# Patient Record
Sex: FEMALE | Race: WHITE | Employment: OTHER | URBAN - METROPOLITAN AREA
[De-identification: names, ages, dates, MRNs, and addresses within clinical notes are randomized per-mention and may not be internally consistent; named-entity substitution may affect disease eponyms.]

---

## 2017-02-20 PROBLEM — E11.621 TYPE 2 DIABETES MELLITUS WITH FOOT ULCER, WITHOUT LONG-TERM CURRENT USE OF INSULIN (HCC): Status: ACTIVE | Noted: 2017-02-20

## 2017-02-20 PROBLEM — L97.509 TYPE 2 DIABETES MELLITUS WITH FOOT ULCER, WITHOUT LONG-TERM CURRENT USE OF INSULIN (HCC): Status: ACTIVE | Noted: 2017-02-20

## 2017-03-24 PROBLEM — E11.610 DIABETIC CHARCOT FOOT (HCC): Status: ACTIVE | Noted: 2017-03-24

## 2017-03-24 PROBLEM — L97.509 TYPE 2 DIABETES MELLITUS WITH FOOT ULCER, WITHOUT LONG-TERM CURRENT USE OF INSULIN (HCC): Status: RESOLVED | Noted: 2017-02-20 | Resolved: 2017-03-24

## 2017-03-24 PROBLEM — E11.621 TYPE 2 DIABETES MELLITUS WITH FOOT ULCER, WITHOUT LONG-TERM CURRENT USE OF INSULIN (HCC): Status: RESOLVED | Noted: 2017-02-20 | Resolved: 2017-03-24

## 2017-03-24 PROBLEM — E11.42 DM TYPE 2 WITH DIABETIC PERIPHERAL NEUROPATHY (HCC): Status: ACTIVE | Noted: 2017-03-24

## 2017-08-17 ENCOUNTER — HOME HEALTH ADMISSION (OUTPATIENT)
Dept: HOME HEALTH SERVICES | Facility: HOME HEALTH | Age: 82
End: 2017-08-17
Payer: MEDICARE

## 2017-08-17 PROBLEM — L03.119 CELLULITIS OF FOOT: Status: ACTIVE | Noted: 2017-08-17

## 2017-08-17 PROBLEM — L97.502 DIABETIC ULCER OF TOE ASSOCIATED WITH TYPE 2 DIABETES MELLITUS, WITH FAT LAYER EXPOSED (HCC): Status: ACTIVE | Noted: 2017-08-17

## 2017-08-17 PROBLEM — E11.621 DIABETIC ULCER OF TOE ASSOCIATED WITH TYPE 2 DIABETES MELLITUS, WITH FAT LAYER EXPOSED (HCC): Status: ACTIVE | Noted: 2017-08-17

## 2017-08-17 PROBLEM — L97.501 DIABETIC ULCER OF TOE ASSOCIATED WITH TYPE 2 DIABETES MELLITUS, LIMITED TO BREAKDOWN OF SKIN (HCC): Status: ACTIVE | Noted: 2017-08-17

## 2017-08-17 PROBLEM — E11.621 DIABETIC ULCER OF TOE ASSOCIATED WITH TYPE 2 DIABETES MELLITUS, LIMITED TO BREAKDOWN OF SKIN (HCC): Status: ACTIVE | Noted: 2017-08-17

## 2017-08-18 ENCOUNTER — HOME CARE VISIT (OUTPATIENT)
Dept: SCHEDULING | Facility: HOME HEALTH | Age: 82
End: 2017-08-18
Payer: MEDICARE

## 2017-08-18 VITALS
SYSTOLIC BLOOD PRESSURE: 130 MMHG | TEMPERATURE: 98 F | RESPIRATION RATE: 13 BRPM | DIASTOLIC BLOOD PRESSURE: 70 MMHG | HEART RATE: 71 BPM | OXYGEN SATURATION: 97 %

## 2017-08-18 PROCEDURE — G0299 HHS/HOSPICE OF RN EA 15 MIN: HCPCS

## 2017-08-18 PROCEDURE — 400013 HH SOC

## 2017-08-21 ENCOUNTER — HOME CARE VISIT (OUTPATIENT)
Dept: SCHEDULING | Facility: HOME HEALTH | Age: 82
End: 2017-08-21
Payer: MEDICARE

## 2017-08-21 PROCEDURE — G0299 HHS/HOSPICE OF RN EA 15 MIN: HCPCS

## 2017-08-23 ENCOUNTER — HOME CARE VISIT (OUTPATIENT)
Dept: SCHEDULING | Facility: HOME HEALTH | Age: 82
End: 2017-08-23
Payer: MEDICARE

## 2017-08-23 VITALS
RESPIRATION RATE: 16 BRPM | SYSTOLIC BLOOD PRESSURE: 152 MMHG | TEMPERATURE: 99.1 F | DIASTOLIC BLOOD PRESSURE: 52 MMHG | OXYGEN SATURATION: 98 % | HEART RATE: 107 BPM

## 2017-08-23 VITALS
RESPIRATION RATE: 20 BRPM | OXYGEN SATURATION: 99 % | SYSTOLIC BLOOD PRESSURE: 138 MMHG | TEMPERATURE: 98.2 F | HEART RATE: 100 BPM | DIASTOLIC BLOOD PRESSURE: 70 MMHG

## 2017-08-23 PROCEDURE — G0299 HHS/HOSPICE OF RN EA 15 MIN: HCPCS

## 2017-08-23 PROCEDURE — A9270 NON-COVERED ITEM OR SERVICE: HCPCS

## 2017-08-23 PROCEDURE — A6266 IMPREG GAUZE NO H20/SAL/YARD: HCPCS

## 2017-08-25 ENCOUNTER — HOME CARE VISIT (OUTPATIENT)
Dept: SCHEDULING | Facility: HOME HEALTH | Age: 82
End: 2017-08-25
Payer: MEDICARE

## 2017-08-25 PROCEDURE — G0299 HHS/HOSPICE OF RN EA 15 MIN: HCPCS

## 2017-08-27 PROBLEM — E11.621 DIABETIC ULCER OF TOE ASSOCIATED WITH TYPE 2 DIABETES MELLITUS, LIMITED TO BREAKDOWN OF SKIN (HCC): Status: RESOLVED | Noted: 2017-08-17 | Resolved: 2017-08-27

## 2017-08-27 PROBLEM — L97.501 DIABETIC ULCER OF TOE ASSOCIATED WITH TYPE 2 DIABETES MELLITUS, LIMITED TO BREAKDOWN OF SKIN (HCC): Status: RESOLVED | Noted: 2017-08-17 | Resolved: 2017-08-27

## 2017-08-28 ENCOUNTER — HOME CARE VISIT (OUTPATIENT)
Dept: SCHEDULING | Facility: HOME HEALTH | Age: 82
End: 2017-08-28
Payer: MEDICARE

## 2017-08-28 VITALS
DIASTOLIC BLOOD PRESSURE: 60 MMHG | SYSTOLIC BLOOD PRESSURE: 146 MMHG | TEMPERATURE: 98.6 F | OXYGEN SATURATION: 98 % | RESPIRATION RATE: 16 BRPM | HEART RATE: 96 BPM

## 2017-08-28 PROCEDURE — G0299 HHS/HOSPICE OF RN EA 15 MIN: HCPCS

## 2017-08-30 ENCOUNTER — HOME CARE VISIT (OUTPATIENT)
Dept: SCHEDULING | Facility: HOME HEALTH | Age: 82
End: 2017-08-30
Payer: MEDICARE

## 2017-08-30 ENCOUNTER — HOSPITAL ENCOUNTER (OUTPATIENT)
Dept: GENERAL RADIOLOGY | Age: 82
Discharge: HOME | End: 2017-08-30
Attending: PODIATRIST
Payer: MEDICARE

## 2017-08-30 ENCOUNTER — HOSPITAL ENCOUNTER (OUTPATIENT)
Dept: WOUND CARE | Age: 82
Discharge: HOME | End: 2017-08-30
Attending: PODIATRIST
Payer: MEDICARE

## 2017-08-30 DIAGNOSIS — M25.471 RIGHT ANKLE SWELLING: ICD-10-CM
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
## 2017-08-30 PROCEDURE — 11042 DBRDMT SUBQ TIS 1ST 20SQCM/<: CPT

## 2017-08-30 PROCEDURE — 99215 OFFICE O/P EST HI 40 MIN: CPT

## 2017-08-30 PROCEDURE — 73630 X-RAY EXAM OF FOOT: CPT

## 2017-08-30 PROCEDURE — 11043 DBRDMT MUSC&/FSCA 1ST 20/<: CPT

## 2017-08-31 ENCOUNTER — HOME CARE VISIT (OUTPATIENT)
Dept: SCHEDULING | Facility: HOME HEALTH | Age: 82
End: 2017-08-31
Payer: MEDICARE

## 2017-08-31 VITALS
RESPIRATION RATE: 16 BRPM | TEMPERATURE: 98.6 F | OXYGEN SATURATION: 98 % | DIASTOLIC BLOOD PRESSURE: 58 MMHG | SYSTOLIC BLOOD PRESSURE: 144 MMHG | HEART RATE: 93 BPM

## 2017-08-31 PROCEDURE — G0299 HHS/HOSPICE OF RN EA 15 MIN: HCPCS

## 2017-09-01 ENCOUNTER — HOME CARE VISIT (OUTPATIENT)
Dept: SCHEDULING | Facility: HOME HEALTH | Age: 82
End: 2017-09-01
Payer: MEDICARE

## 2017-09-01 VITALS
OXYGEN SATURATION: 98 % | HEART RATE: 95 BPM | TEMPERATURE: 98.6 F | RESPIRATION RATE: 16 BRPM | DIASTOLIC BLOOD PRESSURE: 58 MMHG | SYSTOLIC BLOOD PRESSURE: 134 MMHG

## 2017-09-01 PROCEDURE — A6445 CONFORM BAND S W <3"/YD: HCPCS

## 2017-09-01 PROCEDURE — A6252 ABSORPT DRG >16 <=48 W/O BDR: HCPCS

## 2017-09-01 PROCEDURE — G0299 HHS/HOSPICE OF RN EA 15 MIN: HCPCS

## 2017-09-02 ENCOUNTER — HOME CARE VISIT (OUTPATIENT)
Dept: SCHEDULING | Facility: HOME HEALTH | Age: 82
End: 2017-09-02
Payer: MEDICARE

## 2017-09-02 VITALS
TEMPERATURE: 97 F | RESPIRATION RATE: 13 BRPM | HEART RATE: 78 BPM | SYSTOLIC BLOOD PRESSURE: 130 MMHG | DIASTOLIC BLOOD PRESSURE: 70 MMHG | OXYGEN SATURATION: 98 %

## 2017-09-02 PROCEDURE — G0299 HHS/HOSPICE OF RN EA 15 MIN: HCPCS

## 2017-09-03 ENCOUNTER — HOME CARE VISIT (OUTPATIENT)
Dept: SCHEDULING | Facility: HOME HEALTH | Age: 82
End: 2017-09-03
Payer: MEDICARE

## 2017-09-03 VITALS
HEART RATE: 78 BPM | RESPIRATION RATE: 15 BRPM | OXYGEN SATURATION: 98 % | SYSTOLIC BLOOD PRESSURE: 120 MMHG | TEMPERATURE: 98.1 F | DIASTOLIC BLOOD PRESSURE: 60 MMHG

## 2017-09-03 PROCEDURE — G0299 HHS/HOSPICE OF RN EA 15 MIN: HCPCS

## 2017-09-04 ENCOUNTER — HOME CARE VISIT (OUTPATIENT)
Dept: SCHEDULING | Facility: HOME HEALTH | Age: 82
End: 2017-09-04
Payer: MEDICARE

## 2017-09-04 VITALS
SYSTOLIC BLOOD PRESSURE: 140 MMHG | HEART RATE: 81 BPM | TEMPERATURE: 98 F | DIASTOLIC BLOOD PRESSURE: 70 MMHG | RESPIRATION RATE: 20 BRPM | OXYGEN SATURATION: 99 %

## 2017-09-04 PROCEDURE — G0299 HHS/HOSPICE OF RN EA 15 MIN: HCPCS

## 2017-09-05 ENCOUNTER — HOME CARE VISIT (OUTPATIENT)
Dept: SCHEDULING | Facility: HOME HEALTH | Age: 82
End: 2017-09-05
Payer: MEDICARE

## 2017-09-05 VITALS
DIASTOLIC BLOOD PRESSURE: 60 MMHG | OXYGEN SATURATION: 98 % | RESPIRATION RATE: 16 BRPM | HEART RATE: 96 BPM | TEMPERATURE: 99.3 F | SYSTOLIC BLOOD PRESSURE: 146 MMHG

## 2017-09-05 PROCEDURE — G0299 HHS/HOSPICE OF RN EA 15 MIN: HCPCS

## 2017-09-06 ENCOUNTER — HOSPITAL ENCOUNTER (OUTPATIENT)
Dept: GENERAL RADIOLOGY | Age: 82
Discharge: HOME | End: 2017-09-06
Payer: MEDICARE

## 2017-09-06 ENCOUNTER — HOSPITAL ENCOUNTER (OUTPATIENT)
Dept: WOUND CARE | Age: 82
Discharge: HOME | End: 2017-09-06
Attending: PODIATRIST
Payer: MEDICARE

## 2017-09-06 DIAGNOSIS — L97.509 DIABETIC FOOT ULCER (HCC): ICD-10-CM

## 2017-09-06 DIAGNOSIS — E11.621 DIABETIC FOOT ULCER (HCC): ICD-10-CM

## 2017-09-06 PROCEDURE — 99214 OFFICE O/P EST MOD 30 MIN: CPT

## 2017-09-06 PROCEDURE — 77030012935 HC DRSG AQUACEL BMS -B

## 2017-09-06 PROCEDURE — 97597 DBRDMT OPN WND 1ST 20 CM/<: CPT

## 2017-09-08 ENCOUNTER — HOME CARE VISIT (OUTPATIENT)
Dept: SCHEDULING | Facility: HOME HEALTH | Age: 82
End: 2017-09-08
Payer: MEDICARE

## 2017-09-08 VITALS
TEMPERATURE: 98.5 F | RESPIRATION RATE: 18 BRPM | OXYGEN SATURATION: 97 % | DIASTOLIC BLOOD PRESSURE: 58 MMHG | HEART RATE: 97 BPM | SYSTOLIC BLOOD PRESSURE: 126 MMHG

## 2017-09-08 PROCEDURE — G0299 HHS/HOSPICE OF RN EA 15 MIN: HCPCS

## 2017-09-11 ENCOUNTER — HOME CARE VISIT (OUTPATIENT)
Dept: SCHEDULING | Facility: HOME HEALTH | Age: 82
End: 2017-09-11
Payer: MEDICARE

## 2017-09-11 VITALS
RESPIRATION RATE: 16 BRPM | TEMPERATURE: 98.8 F | DIASTOLIC BLOOD PRESSURE: 66 MMHG | OXYGEN SATURATION: 98 % | HEART RATE: 94 BPM | SYSTOLIC BLOOD PRESSURE: 150 MMHG

## 2017-09-11 PROCEDURE — G0299 HHS/HOSPICE OF RN EA 15 MIN: HCPCS

## 2017-09-12 ENCOUNTER — APPOINTMENT (OUTPATIENT)
Dept: GENERAL RADIOLOGY | Age: 82
DRG: 872 | End: 2017-09-12
Attending: EMERGENCY MEDICINE
Payer: MEDICARE

## 2017-09-12 ENCOUNTER — HOSPITAL ENCOUNTER (INPATIENT)
Age: 82
LOS: 3 days | Discharge: NURSING, REHAB OR LONG-TERM CARE | DRG: 872 | End: 2017-09-15
Attending: EMERGENCY MEDICINE | Admitting: INTERNAL MEDICINE
Payer: MEDICARE

## 2017-09-12 DIAGNOSIS — M54.50 LOW BACK PAIN RADIATING TO BOTH LEGS: ICD-10-CM

## 2017-09-12 DIAGNOSIS — M79.604 LOW BACK PAIN RADIATING TO BOTH LEGS: ICD-10-CM

## 2017-09-12 DIAGNOSIS — M86.172 OTHER ACUTE OSTEOMYELITIS OF LEFT FOOT (HCC): Primary | ICD-10-CM

## 2017-09-12 DIAGNOSIS — M15.9 GENERALIZED OA: ICD-10-CM

## 2017-09-12 DIAGNOSIS — M79.605 LOW BACK PAIN RADIATING TO BOTH LEGS: ICD-10-CM

## 2017-09-12 PROBLEM — N17.9 AKI (ACUTE KIDNEY INJURY) (HCC): Status: ACTIVE | Noted: 2017-09-12

## 2017-09-12 PROBLEM — A41.9 SEPSIS (HCC): Status: ACTIVE | Noted: 2017-09-12

## 2017-09-12 PROBLEM — E87.1 HYPONATREMIA: Status: ACTIVE | Noted: 2017-09-12

## 2017-09-12 PROBLEM — N18.30 STAGE 3 CHRONIC KIDNEY DISEASE (HCC): Status: ACTIVE | Noted: 2017-09-12

## 2017-09-12 LAB
ANION GAP SERPL CALC-SCNC: 8 MMOL/L (ref 7–16)
BASOPHILS # BLD: 0 K/UL (ref 0–0.2)
BASOPHILS NFR BLD: 0 % (ref 0–2)
BUN SERPL-MCNC: 29 MG/DL (ref 8–23)
CALCIUM SERPL-MCNC: 8.6 MG/DL (ref 8.3–10.4)
CHLORIDE SERPL-SCNC: 93 MMOL/L (ref 98–107)
CO2 SERPL-SCNC: 28 MMOL/L (ref 21–32)
CREAT SERPL-MCNC: 1.57 MG/DL (ref 0.6–1)
CRP SERPL-MCNC: 13.6 MG/DL (ref 0–0.9)
DIFFERENTIAL METHOD BLD: ABNORMAL
EOSINOPHIL # BLD: 0.4 K/UL (ref 0–0.8)
EOSINOPHIL NFR BLD: 3 % (ref 0.5–7.8)
ERYTHROCYTE [DISTWIDTH] IN BLOOD BY AUTOMATED COUNT: 12.7 % (ref 11.9–14.6)
ERYTHROCYTE [SEDIMENTATION RATE] IN BLOOD: 56 MM/HR (ref 0–30)
GLUCOSE BLD STRIP.AUTO-MCNC: 194 MG/DL (ref 65–100)
GLUCOSE SERPL-MCNC: 215 MG/DL (ref 65–100)
HCT VFR BLD AUTO: 32.4 % (ref 35.8–46.3)
HGB BLD-MCNC: 10.6 G/DL (ref 11.7–15.4)
IMM GRANULOCYTES # BLD: 0 K/UL (ref 0–0.5)
IMM GRANULOCYTES NFR BLD: 0.3 % (ref 0–5)
LACTATE BLD-SCNC: 2.3 MMOL/L (ref 0.5–1.9)
LACTATE SERPL-SCNC: 1.8 MMOL/L (ref 0.4–2)
LYMPHOCYTES # BLD: 1.3 K/UL (ref 0.5–4.6)
LYMPHOCYTES NFR BLD: 9 % (ref 13–44)
MCH RBC QN AUTO: 31.3 PG (ref 26.1–32.9)
MCHC RBC AUTO-ENTMCNC: 32.7 G/DL (ref 31.4–35)
MCV RBC AUTO: 95.6 FL (ref 79.6–97.8)
MONOCYTES # BLD: 1.2 K/UL (ref 0.1–1.3)
MONOCYTES NFR BLD: 9 % (ref 4–12)
NEUTS SEG # BLD: 11.1 K/UL (ref 1.7–8.2)
NEUTS SEG NFR BLD: 79 % (ref 43–78)
PLATELET # BLD AUTO: 365 K/UL (ref 150–450)
PMV BLD AUTO: 8.5 FL (ref 10.8–14.1)
POTASSIUM SERPL-SCNC: 4.7 MMOL/L (ref 3.5–5.1)
RBC # BLD AUTO: 3.39 M/UL (ref 4.05–5.25)
SODIUM SERPL-SCNC: 129 MMOL/L (ref 136–145)
WBC # BLD AUTO: 14.1 K/UL (ref 4.3–11.1)

## 2017-09-12 PROCEDURE — 77030027138 HC INCENT SPIROMETER -A

## 2017-09-12 PROCEDURE — 99284 EMERGENCY DEPT VISIT MOD MDM: CPT | Performed by: EMERGENCY MEDICINE

## 2017-09-12 PROCEDURE — 74011000302 HC RX REV CODE- 302: Performed by: INTERNAL MEDICINE

## 2017-09-12 PROCEDURE — 74011000258 HC RX REV CODE- 258: Performed by: INTERNAL MEDICINE

## 2017-09-12 PROCEDURE — 65270000029 HC RM PRIVATE

## 2017-09-12 PROCEDURE — 74011250636 HC RX REV CODE- 250/636: Performed by: EMERGENCY MEDICINE

## 2017-09-12 PROCEDURE — 74011250636 HC RX REV CODE- 250/636: Performed by: INTERNAL MEDICINE

## 2017-09-12 PROCEDURE — 94640 AIRWAY INHALATION TREATMENT: CPT

## 2017-09-12 PROCEDURE — 85652 RBC SED RATE AUTOMATED: CPT | Performed by: EMERGENCY MEDICINE

## 2017-09-12 PROCEDURE — 85025 COMPLETE CBC W/AUTO DIFF WBC: CPT | Performed by: EMERGENCY MEDICINE

## 2017-09-12 PROCEDURE — 73630 X-RAY EXAM OF FOOT: CPT

## 2017-09-12 PROCEDURE — 80048 BASIC METABOLIC PNL TOTAL CA: CPT | Performed by: EMERGENCY MEDICINE

## 2017-09-12 PROCEDURE — 74011250637 HC RX REV CODE- 250/637: Performed by: INTERNAL MEDICINE

## 2017-09-12 PROCEDURE — 86580 TB INTRADERMAL TEST: CPT | Performed by: INTERNAL MEDICINE

## 2017-09-12 PROCEDURE — 83605 ASSAY OF LACTIC ACID: CPT

## 2017-09-12 PROCEDURE — 94760 N-INVAS EAR/PLS OXIMETRY 1: CPT

## 2017-09-12 PROCEDURE — 82962 GLUCOSE BLOOD TEST: CPT

## 2017-09-12 PROCEDURE — 36415 COLL VENOUS BLD VENIPUNCTURE: CPT | Performed by: INTERNAL MEDICINE

## 2017-09-12 PROCEDURE — 74011000250 HC RX REV CODE- 250: Performed by: INTERNAL MEDICINE

## 2017-09-12 PROCEDURE — 87040 BLOOD CULTURE FOR BACTERIA: CPT | Performed by: EMERGENCY MEDICINE

## 2017-09-12 PROCEDURE — 93005 ELECTROCARDIOGRAM TRACING: CPT | Performed by: EMERGENCY MEDICINE

## 2017-09-12 PROCEDURE — 74011636637 HC RX REV CODE- 636/637: Performed by: INTERNAL MEDICINE

## 2017-09-12 PROCEDURE — 83605 ASSAY OF LACTIC ACID: CPT | Performed by: INTERNAL MEDICINE

## 2017-09-12 PROCEDURE — 74011000258 HC RX REV CODE- 258: Performed by: EMERGENCY MEDICINE

## 2017-09-12 PROCEDURE — 86140 C-REACTIVE PROTEIN: CPT | Performed by: EMERGENCY MEDICINE

## 2017-09-12 RX ORDER — FENOFIBRATE 48 MG/1
48 TABLET, COATED ORAL DAILY
Status: DISCONTINUED | OUTPATIENT
Start: 2017-09-13 | End: 2017-09-15 | Stop reason: HOSPADM

## 2017-09-12 RX ORDER — SODIUM CHLORIDE 0.9 % (FLUSH) 0.9 %
5-10 SYRINGE (ML) INJECTION EVERY 8 HOURS
Status: DISCONTINUED | OUTPATIENT
Start: 2017-09-12 | End: 2017-09-15 | Stop reason: HOSPADM

## 2017-09-12 RX ORDER — HEPARIN SODIUM 5000 [USP'U]/ML
5000 INJECTION, SOLUTION INTRAVENOUS; SUBCUTANEOUS EVERY 8 HOURS
Status: DISCONTINUED | OUTPATIENT
Start: 2017-09-12 | End: 2017-09-15 | Stop reason: HOSPADM

## 2017-09-12 RX ORDER — ALBUTEROL SULFATE 0.83 MG/ML
2.5 SOLUTION RESPIRATORY (INHALATION)
Status: DISCONTINUED | OUTPATIENT
Start: 2017-09-12 | End: 2017-09-15 | Stop reason: HOSPADM

## 2017-09-12 RX ORDER — QUETIAPINE FUMARATE 25 MG/1
12.5 TABLET, FILM COATED ORAL
Status: DISCONTINUED | OUTPATIENT
Start: 2017-09-12 | End: 2017-09-15 | Stop reason: HOSPADM

## 2017-09-12 RX ORDER — AMLODIPINE BESYLATE 10 MG/1
10 TABLET ORAL DAILY
Status: DISCONTINUED | OUTPATIENT
Start: 2017-09-13 | End: 2017-09-15 | Stop reason: HOSPADM

## 2017-09-12 RX ORDER — VANCOMYCIN 1.75 GRAM/500 ML IN 0.9 % SODIUM CHLORIDE INTRAVENOUS
1750 ONCE
Status: COMPLETED | OUTPATIENT
Start: 2017-09-12 | End: 2017-09-12

## 2017-09-12 RX ORDER — INSULIN LISPRO 100 [IU]/ML
INJECTION, SOLUTION INTRAVENOUS; SUBCUTANEOUS
Status: DISCONTINUED | OUTPATIENT
Start: 2017-09-12 | End: 2017-09-15 | Stop reason: HOSPADM

## 2017-09-12 RX ORDER — VANCOMYCIN HYDROCHLORIDE 1 G/20ML
INJECTION, POWDER, LYOPHILIZED, FOR SOLUTION INTRAVENOUS ONCE
Status: DISCONTINUED | OUTPATIENT
Start: 2017-09-12 | End: 2017-09-12 | Stop reason: SDUPTHER

## 2017-09-12 RX ORDER — ACETAMINOPHEN 325 MG/1
650 TABLET ORAL
Status: DISCONTINUED | OUTPATIENT
Start: 2017-09-12 | End: 2017-09-15 | Stop reason: HOSPADM

## 2017-09-12 RX ORDER — VANCOMYCIN HYDROCHLORIDE
1250 EVERY 24 HOURS
Status: DISCONTINUED | OUTPATIENT
Start: 2017-09-13 | End: 2017-09-15 | Stop reason: HOSPADM

## 2017-09-12 RX ORDER — BUDESONIDE 0.5 MG/2ML
500 INHALANT ORAL
Status: DISCONTINUED | OUTPATIENT
Start: 2017-09-12 | End: 2017-09-15 | Stop reason: HOSPADM

## 2017-09-12 RX ORDER — SODIUM CHLORIDE 9 MG/ML
75 INJECTION, SOLUTION INTRAVENOUS CONTINUOUS
Status: DISCONTINUED | OUTPATIENT
Start: 2017-09-12 | End: 2017-09-14

## 2017-09-12 RX ORDER — TRAMADOL HYDROCHLORIDE 50 MG/1
50 TABLET ORAL
Status: DISCONTINUED | OUTPATIENT
Start: 2017-09-12 | End: 2017-09-15 | Stop reason: HOSPADM

## 2017-09-12 RX ORDER — SODIUM CHLORIDE 0.9 % (FLUSH) 0.9 %
5-10 SYRINGE (ML) INJECTION AS NEEDED
Status: DISCONTINUED | OUTPATIENT
Start: 2017-09-12 | End: 2017-09-15 | Stop reason: HOSPADM

## 2017-09-12 RX ORDER — MONTELUKAST SODIUM 10 MG/1
10 TABLET ORAL DAILY
Status: DISCONTINUED | OUTPATIENT
Start: 2017-09-13 | End: 2017-09-15 | Stop reason: HOSPADM

## 2017-09-12 RX ORDER — ASPIRIN 81 MG/1
81 TABLET ORAL DAILY
Status: DISCONTINUED | OUTPATIENT
Start: 2017-09-13 | End: 2017-09-15 | Stop reason: HOSPADM

## 2017-09-12 RX ADMIN — TUBERCULIN PURIFIED PROTEIN DERIVATIVE 5 UNITS: 5 INJECTION, SOLUTION INTRADERMAL at 19:29

## 2017-09-12 RX ADMIN — VANCOMYCIN HYDROCHLORIDE 1750 MG: 10 INJECTION, POWDER, LYOPHILIZED, FOR SOLUTION INTRAVENOUS at 17:39

## 2017-09-12 RX ADMIN — CEFTRIAXONE 1 G: 1 INJECTION, POWDER, FOR SOLUTION INTRAMUSCULAR; INTRAVENOUS at 21:00

## 2017-09-12 RX ADMIN — SODIUM CHLORIDE 75 ML/HR: 900 INJECTION, SOLUTION INTRAVENOUS at 19:33

## 2017-09-12 RX ADMIN — PIPERACILLIN SODIUM,TAZOBACTAM SODIUM 4.5 G: 4; .5 INJECTION, POWDER, FOR SOLUTION INTRAVENOUS at 17:04

## 2017-09-12 RX ADMIN — INSULIN LISPRO 2 UNITS: 100 INJECTION, SOLUTION INTRAVENOUS; SUBCUTANEOUS at 22:00

## 2017-09-12 RX ADMIN — QUETIAPINE FUMARATE 12.5 MG: 25 TABLET, FILM COATED ORAL at 21:24

## 2017-09-12 RX ADMIN — SODIUM CHLORIDE 1000 ML: 900 INJECTION, SOLUTION INTRAVENOUS at 17:04

## 2017-09-12 RX ADMIN — HEPARIN SODIUM 5000 UNITS: 5000 INJECTION, SOLUTION INTRAVENOUS; SUBCUTANEOUS at 19:29

## 2017-09-12 RX ADMIN — BUDESONIDE 500 MCG: 0.5 INHALANT RESPIRATORY (INHALATION) at 22:32

## 2017-09-12 NOTE — ED NOTES
TRANSFER - OUT REPORT:    Verbal report given to Agnieszka Lee RN on Oliver Castro  being transferred to [phone] for routine progression of care       Report consisted of patients Situation, Background, Assessment and   Recommendations(SBAR). Information from the following report(s) SBAR, ED Summary, Intake/Output, MAR and Cardiac Rhythm Sinus Tach was reviewed with the receiving nurse. Lines:   Peripheral IV 09/12/17 Right Forearm (Active)   Site Assessment Clean, dry, & intact 9/12/2017  4:21 PM   Phlebitis Assessment 0 9/12/2017  4:21 PM   Infiltration Assessment 0 9/12/2017  4:21 PM   Dressing Status Clean, dry, & intact 9/12/2017  4:21 PM   Dressing Type 4 X 4 9/12/2017  4:21 PM        Opportunity for questions and clarification was provided.       Patient transported with:   [email or web address]

## 2017-09-12 NOTE — ED PROVIDER NOTES
HPI Comments: 27-year-old female has a history of diabetes. Because of that she has neuropathy in her legs and cannot really feel much below her knees. She has had some ulcerations on the left second and third toes that is being treated by the wound center. She had some redness and drainage over the last 2 weeks. Given prescription first for Bactrim then Cipro. Has not improved any. The last 4 days has been increasing redness and swelling and discoloration. Perhaps some chills no definite fever there's been no vomiting chest or abdominal pain. Saw her primary care doctor who referred her over here. Patient is a 80 y.o. female presenting with toe pain. The history is provided by the patient. Toe Pain    This is a new problem. The current episode started more than 1 week ago. The problem has been gradually worsening. The pain is present in the left foot. The pain is at a severity of 1/10. Associated symptoms include numbness and limited range of motion. Pertinent negatives include no back pain and no neck pain. There has been no history of extremity trauma. Past Medical History:   Diagnosis Date    Arthritis     Asthma     sees Dr. Rocco Pritchard with urination     Charcot-Evy-Tooth disease-like deformity of foot     Diabetes ([address]     Full dentures     Gastrointestinal disorder     GERD    Hypertension     Incontinence of urine in female        Past Surgical History:   Procedure Laterality Date    BREAST SURGERY PROCEDURE UNLISTED      bilat lumpectomy    HX CHOLECYSTECTOMY      HX COLONOSCOPY      HX GYN      hyst    HX ORTHOPAEDIC      bilateral knee replacements    HX ORTHOPAEDIC      back         Family History:   Problem Relation Age of Onset    Family history unknown: Yes       Social History     Social History    Marital status:      Spouse name: N/A    Number of children: N/A    Years of education: N/A     Occupational History    Not on file.      Social History Main Topics    Smoking status: Never Smoker    Smokeless tobacco: Never Used    Alcohol use No    Drug use: No    Sexual activity: Not on file     Other Topics Concern    Not on file     Social History Narrative         ALLERGIES: Aleve [naproxen sodium]; Hydrocodone-acetaminophen; Metformin; and Phenobarbital    Review of Systems   Constitutional: Negative for chills and fever. Respiratory: Negative for cough and shortness of breath. Cardiovascular: Negative for chest pain and palpitations. Gastrointestinal: Negative for abdominal pain, diarrhea and vomiting. Genitourinary: Negative for dysuria and flank pain. Musculoskeletal: Negative for back pain and neck pain. Skin: Negative for color change and rash. Neurological: Positive for numbness. Negative for syncope and headaches. All other systems reviewed and are negative. Vitals:    09/12/17 1503   BP: 154/71   Pulse: (!) 115   Resp: 16   Temp: 98.2 °F (36.8 °C)   SpO2: 97%   Weight: 87.5 kg (193 lb)            Physical Exam   Constitutional: She is oriented to person, place, and time. She appears well-developed and well-nourished. No distress. HENT:   Head: Normocephalic and atraumatic. Eyes: Conjunctivae and EOM are normal. Pupils are equal, round, and reactive to light. Neck: Normal range of motion. Neck supple. Pulmonary/Chest: Breath sounds normal. No respiratory distress. Abdominal: Soft. Bowel sounds are normal. She exhibits no mass. There is no tenderness. There is no rebound and no guarding. No hernia. Musculoskeletal:        Feet:    Neurological: She is alert and oriented to person, place, and time. Gait normal.   Nl speech   Skin: Skin is warm and dry. Psychiatric: She has a normal mood and affect. Her speech is normal.   Nursing note and vitals reviewed. MDM  Number of Diagnoses or Management Options  Diagnosis management comments: Apparent diabetic foot infection resistant to outpatient treatment. Assessment osteomyelitis or air in the tissues. Imaging and blood work. Consultation hospitalist.       Amount and/or Complexity of Data Reviewed  Clinical lab tests: ordered and reviewed  Tests in the radiology section of CPT®: ordered and reviewed  Review and summarize past medical records: yes (Recent office visits to primary care doctor and wound center.)  Independent visualization of images, tracings, or specimens: yes    Risk of Complications, Morbidity, and/or Mortality  Presenting problems: moderate  Diagnostic procedures: low  Management options: moderate    Patient Progress  Patient progress: stable    ED Course       Procedures      Xr Foot Lt Min 3 V    Result Date: 9/12/2017  Left foot Clinical indications: Redness in the toes, on antibiotics FINDINGS: Three views of the right foot show hardware screws across the fused second, third, and fourth PIP joints. There is destructive bone changes noted of the distal phalanges of the third and fourth toe with soft tissue swelling. Osteomyelitis is suspected. 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 IMPRESSION: Destructive bone changes of the distal phalanges of the third and fourth toe most in keeping with osteomyelitis. Results Include:    Recent Results (from the past 24 hour(s))   CBC WITH AUTOMATED DIFF    Collection Time: 09/12/17  3:51 PM   Result Value Ref Range    WBC 14.1 (H) 4.3 - 11.1 K/uL    RBC 3.39 (L) 4.05 - 5.25 M/uL    HGB 10.6 (L) 11.7 - 15.4 g/dL    HCT 32.4 (L) 35.8 - 46.3 %    MCV 95.6 79.6 - 97.8 FL    MCH 31.3 26.1 - 32.9 PG    MCHC 32.7 31.4 - 35.0 g/dL    RDW 12.7 11.9 - 14.6 %    PLATELET 607 537 - 807 K/uL    MPV 8.5 (L) 10.8 - 14.1 FL    DF AUTOMATED      NEUTROPHILS 79 (H) 43 - 78 %    LYMPHOCYTES 9 (L) 13 - 44 %    MONOCYTES 9 4.0 - 12.0 %    EOSINOPHILS 3 0.5 - 7.8 %    BASOPHILS 0 0.0 - 2.0 %    IMMATURE GRANULOCYTES 0.3 0.0 - 5.0 %    ABS. NEUTROPHILS 11.1 (H) 1.7 - 8.2 K/UL    ABS. LYMPHOCYTES 1.3 0.5 - 4.6 K/UL    ABS. MONOCYTES 1.2 0.1 - 1.3 K/UL    ABS. EOSINOPHILS 0.4 0.0 - 0.8 K/UL    ABS. BASOPHILS 0.0 0.0 - 0.2 K/UL    ABS. IMM. GRANS. 0.0 0.0 - 0.5 K/UL   METABOLIC PANEL, BASIC    Collection Time: 09/12/17  3:51 PM   Result Value Ref Range    Sodium 129 (L) 136 - 145 mmol/L    Potassium 4.7 3.5 - 5.1 mmol/L    Chloride 93 (L) 98 - 107 mmol/L    CO2 28 21 - 32 mmol/L    Anion gap 8 7 - 16 mmol/L    Glucose 215 (H) 65 - 100 mg/dL    BUN 29 (H) 8 - 23 MG/DL    Creatinine 1.57 (H) 0.6 - 1.0 MG/DL    GFR est AA 40 (L) >60 ml/min/1.73m2    GFR est non-AA 33 (L) >60 ml/min/1.73m2    Calcium 8.6 8.3 - 10.4 MG/DL   POC LACTIC ACID    Collection Time: 09/12/17  4:01 PM   Result Value Ref Range    Lactic Acid (POC) 2.3 (H) 0.5 - 1.9 mmol/L        Xr Foot Lt Min 3 V    Result Date: 9/12/2017  Left foot Clinical indications: Redness in the toes, on antibiotics FINDINGS: Three views of the right foot show hardware screws across the fused second, third, and fourth PIP joints. There is destructive bone changes noted of the distal phalanges of the third and fourth toe with soft tissue swelling. Osteomyelitis is suspected. 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 IMPRESSION: Destructive bone changes of the distal phalanges of the third and fourth toe most in keeping with osteomyelitis. Spoke with the hospitalist regarding admitting the patient.

## 2017-09-12 NOTE — ED TRIAGE NOTES
Pt reports redness to middle 3 toes on left foot and redness going up into left foot. Pt had been on antibiotics 2 weeks for wound on foot/toes and was sent here by primary care to get IV antibiotics.      Jennifer Fowler RN

## 2017-09-12 NOTE — H&P
HOSPITALIST H&P  NAME:  Sabas Blanchard   Age:  80 y.o.  :   1934   MRN:   [id number]  PCP: Terry Bryant MD  Consulting MD:  Treatment Team: Attending Provider: Yasemin Goel MD  HPI:   Josiah Ibarra is an 81 yo with pmhx of T2DM, Charcot-Evy-Tooth deformity, and neuropathy, who presents with worsening L 3rd and 4th toe ulcers and L foot cellulitis. Has been on both bactrim and cipro recently, which did not help. Is following with the [address]. The wounds worsened ~4-5 days ago and she began to have purulent drainage from the toes. Her foot became significantly red. Due to neuropathy of her feet, she cannot feel pain in the foot or toes. Apparently, she was advised to come to the ED 4 days ago, but she did not. She saw Dr. Merlinda Plaster, her PCP today, who sent her to the ED for further evaluation. In the ED, she was noted to be tachycardic, have a leukocytosis of 14k, and changes no L foot x-ray consistent with osteomyelitis of her 3rd and 4th toes. She is accompanied by her daughter, Cathie Majano. They report Ms. Shira Ramirez had an arterial duplex of her lower extremities in the vascular surgery office about a week ago. I see the order for the study, but do not see results. They were told that she likely has what sounds like tibial disease and was to see Dr. Collin Barnhart on .       Complete ROS done and is as stated in HPI or otherwise negative  Past Medical History:   Diagnosis Date    Arthritis     Asthma     sees Dr. Nano Ramsey with urination     Charcot-Evy-Tooth disease-like deformity of foot     Diabetes ([address]     Full dentures     Gastrointestinal disorder     GERD    Hypertension     Incontinence of urine in female       Past Surgical History:   Procedure Laterality Date    BREAST SURGERY PROCEDURE UNLISTED      bilat lumpectomy    HX CHOLECYSTECTOMY      HX COLONOSCOPY      HX GYN      hyst    HX ORTHOPAEDIC      bilateral knee replacements    HX ORTHOPAEDIC      back      Prior to Admission Medications   Prescriptions Last Dose Informant Patient Reported? Taking? Azelastine (ASTEPRO) 0.15 % (205.5 mcg) nasal spray   Yes No   Sig: two (2) times a day. Cholecalciferol, Vitamin D3, (VITAMIN D3) 2,000 unit cap capsule   No No   Sig: Take 2,000 Units by mouth two (2) times a day. FLOVENT HFA 44 mcg/actuation inhaler   Yes Yes   Sig: Take 2 Puffs by inhalation two (2) times a day. ONE TOUCH DELICA 33 gauge misc   Yes No   ONE TOUCH ULTRA 2 monitoring kit   Yes No   ONE TOUCH ULTRA TEST strip   Yes No   QUEtiapine (SEROQUEL) 25 mg tablet   No Yes   Sig: Take 1/2 tablet before bedtime   SPIRIVA WITH HANDIHALER 18 mcg inhalation capsule   Yes Yes   albuterol (PROAIR HFA) 90 mcg/actuation inhaler   Yes Yes   Sig: Take  by inhalation. amLODIPine (NORVASC) 10 mg tablet   No Yes   Sig: Take 1 Tab by mouth daily. aspirin delayed-release 81 mg tablet   Yes Yes   Sig: Take  by mouth daily. b complex vitamins tablet   Yes No   Sig: Take 1 tablet by mouth daily. fenofibrate nanocrystallized (TRICOR) 48 mg tablet   No Yes   Sig: Take 1 Tab by mouth daily. folic acid 953 mcg tablet   Yes No   Sig: Take 400 mcg by mouth daily. glipiZIDE SR (GLUCOTROL XL) 10 mg CR tablet   No Yes   Sig: Take 1 Tab by mouth daily. glucosamine-chondroitin (ELATION) 1,500-1,200 mg/30 mL liqd   Yes Yes   Sig: Take  by mouth.   linagliptin (TRADJENTA) 5 mg tablet   No Yes   Sig: Take 1 Tab by mouth daily. lisinopril (PRINIVIL, ZESTRIL) 40 mg tablet   No Yes   Sig: Take 1 Tab by mouth daily. montelukast (SINGULAIR) 10 mg tablet   No Yes   Sig: Take 1 Tab by mouth daily. omega-3 fatty acids-vitamin e (FISH OIL) 1,000 mg cap   Yes No   Sig: Take 1 capsule by mouth.   pioglitazone (ACTOS) 15 mg tablet   No Yes   Sig: Take 1 Tab by mouth nightly. traMADol (ULTRAM-ER) 300 mg tablet   No Yes   Sig: Take 1 Tab by mouth daily. Max Daily Amount: 300 mg. Facility-Administered Medications: None     Allergies   Allergen Reactions    Aleve [Naproxen Sodium] Rash    Hydrocodone-Acetaminophen Itching    Metformin Other (comments)     Decreased kidney function    Phenobarbital Unknown (comments)      Social History   Substance Use Topics    Smoking status: Never Smoker    Smokeless tobacco: Never Used    Alcohol use No      Family History   Problem Relation Age of Onset    Family history unknown: Yes      Objective:     Visit Vitals    /60    Pulse (!) 101    Temp 98 °F (36.7 °C)    Resp 16    Wt 87.5 kg (193 lb)    LMP 1980    SpO2 97%    BMI 33.13 kg/m2      Temp (24hrs), Av.1 °F (36.7 °C), Min:98 °F (36.7 °C), Max:98.2 °F (36.8 °C)    Patient Vitals for the past 24 hrs:   Temp Pulse Resp BP SpO2   17 1723 98 °F (36.7 °C) (!) 101 16 140/60 97 %   17 1626 - 98 - 141/63 100 %   17 1509 - (!) 110 - 164/74 97 %   17 1503 98.2 °F (36.8 °C) (!) 115 16 154/71 97 %       Oxygen Therapy  O2 Sat (%): 97 % (17 1723)  Pulse via Oximetry: 101 beats per minute (17 1723)  Physical Exam:  General:    Alert, cooperative, no distress, appears stated age. Head:   Normocephalic, without obvious abnormality, atraumatic. Nose:  Nares normal. No drainage or sinus tenderness. Lungs:   Clear to auscultation bilaterally. No Wheezing or Rhonchi. No rales. Heart:   Regular rate and rhythm,  no murmur, rub or gallop. Abdomen:   Soft, non-tender. Not distended. Bowel sounds normal.   Extremities: R foot in walking boot, L foot with significant dorsal erythema, swelling, and warmth, L 3rd and 4th toes swollen and erythematous with packing in ulcers of distal toes  Skin:     Texture, turgor normal. No rashes or lesions.   Not Jaundiced  Neurologic: Alert and oriented x 3, no focal deficits   Data Review:   Recent Results (from the past 24 hour(s))   CBC WITH AUTOMATED DIFF    Collection Time: 17  3:51 PM   Result Value Ref Range    WBC 14.1 (H) 4.3 - 11.1 K/uL    RBC 3.39 (L) 4.05 - 5.25 M/uL    HGB 10.6 (L) 11.7 - 15.4 g/dL    HCT 32.4 (L) 35.8 - 46.3 %    MCV 95.6 79.6 - 97.8 FL    MCH 31.3 26.1 - 32.9 PG    MCHC 32.7 31.4 - 35.0 g/dL    RDW 12.7 11.9 - 14.6 %    PLATELET 306 988 - 444 K/uL    MPV 8.5 (L) 10.8 - 14.1 FL    DF AUTOMATED      NEUTROPHILS 79 (H) 43 - 78 %    LYMPHOCYTES 9 (L) 13 - 44 %    MONOCYTES 9 4.0 - 12.0 %    EOSINOPHILS 3 0.5 - 7.8 %    BASOPHILS 0 0.0 - 2.0 %    IMMATURE GRANULOCYTES 0.3 0.0 - 5.0 %    ABS. NEUTROPHILS 11.1 (H) 1.7 - 8.2 K/UL    ABS. LYMPHOCYTES 1.3 0.5 - 4.6 K/UL    ABS. MONOCYTES 1.2 0.1 - 1.3 K/UL    ABS. EOSINOPHILS 0.4 0.0 - 0.8 K/UL    ABS. BASOPHILS 0.0 0.0 - 0.2 K/UL    ABS. IMM. GRANS. 0.0 0.0 - 0.5 K/UL   METABOLIC PANEL, BASIC    Collection Time: 09/12/17  3:51 PM   Result Value Ref Range    Sodium 129 (L) 136 - 145 mmol/L    Potassium 4.7 3.5 - 5.1 mmol/L    Chloride 93 (L) 98 - 107 mmol/L    CO2 28 21 - 32 mmol/L    Anion gap 8 7 - 16 mmol/L    Glucose 215 (H) 65 - 100 mg/dL    BUN 29 (H) 8 - 23 MG/DL    Creatinine 1.57 (H) 0.6 - 1.0 MG/DL    GFR est AA 40 (L) >60 ml/min/1.73m2    GFR est non-AA 33 (L) >60 ml/min/1.73m2    Calcium 8.6 8.3 - 10.4 MG/DL   C REACTIVE PROTEIN, QT    Collection Time: 09/12/17  3:51 PM   Result Value Ref Range    C-Reactive protein 13.6 (H) 0.0 - 0.9 mg/dL   SED RATE, AUTOMATED    Collection Time: 09/12/17  3:51 PM   Result Value Ref Range    Sed rate, automated 56 (H) 0 - 30 mm/hr   POC LACTIC ACID    Collection Time: 09/12/17  4:01 PM   Result Value Ref Range    Lactic Acid (POC) 2.3 (H) 0.5 - 1.9 mmol/L     Imaging /Procedures /Studies   XR FOOT LT MIN 3 V   Final Result   IMPRESSION: Destructive bone changes of the distal phalanges of the third and   fourth toe most in keeping with osteomyelitis. 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 Pam Morley      HTN (hypertension) 11/16/2014       PLAN  ·  Admit to medical bed for IV antibiotics (vanc and rocephin). She has failed outpatient trial of bactrim and cipro. She also meets sepsis criteria (mild) with leukocytosis and tachycardia on admission. · Due to concern for osteomyelitis of L 3rd/4th toes, will consult general surgery and infectious disease. · Given reported PAD based on recent arterial duplex with [facility] (report is missing in [facility]), may need transfer downtown for vascular surgery evaluation. At time of admission, there were no beds available downtown. · DANIELLA on stage 3 CKD- IVF and monitor Cr. · Trend lactic acid level. · Continue spiriva, inhaled corticosteroid, and prn albuterol. · Due to hyponatremia and DANIELLA, hold lisinopril. May need to add additional anti-hypertensive therapy. · Continue other home meds. DVT prophylaxis- Heparin    Plan of care discussed with patient and her daughter, Julissa Michelle, who were in agreement. Code Status: Full    Anticipated discharge: 3-4 days pending clinical course    Signed By: Rafael Mcdaniel.  Jonatan Oconnor MD     September 12, 2017

## 2017-09-12 NOTE — PROGRESS NOTES
Pt arrived to floor, VSS. Pt is A&Ox3. Pt has left foot ulcerations, area of redness outlined. Assisted pt up to bathroom, pt uses walker. Pt has right healing wound toe third toe on right foot, packed with gauze. Daughter at bedside.

## 2017-09-13 ENCOUNTER — APPOINTMENT (OUTPATIENT)
Dept: MRI IMAGING | Age: 82
DRG: 872 | End: 2017-09-13
Attending: INTERNAL MEDICINE
Payer: MEDICARE

## 2017-09-13 ENCOUNTER — HOME CARE VISIT (OUTPATIENT)
Dept: SCHEDULING | Facility: HOME HEALTH | Age: 82
End: 2017-09-13
Payer: MEDICARE

## 2017-09-13 LAB
ANION GAP SERPL CALC-SCNC: 7 MMOL/L (ref 7–16)
ATRIAL RATE: 100 BPM
BASOPHILS # BLD: 0 K/UL (ref 0–0.2)
BASOPHILS NFR BLD: 1 % (ref 0–2)
BUN SERPL-MCNC: 23 MG/DL (ref 8–23)
CALCIUM SERPL-MCNC: 8.4 MG/DL (ref 8.3–10.4)
CALCULATED R AXIS, ECG10: -30 DEGREES
CALCULATED T AXIS, ECG11: 74 DEGREES
CHLORIDE SERPL-SCNC: 103 MMOL/L (ref 98–107)
CO2 SERPL-SCNC: 26 MMOL/L (ref 21–32)
CREAT SERPL-MCNC: 1.14 MG/DL (ref 0.6–1)
DIAGNOSIS, 93000: NORMAL
DIFFERENTIAL METHOD BLD: ABNORMAL
EOSINOPHIL # BLD: 0.4 K/UL (ref 0–0.8)
EOSINOPHIL NFR BLD: 5 % (ref 0.5–7.8)
ERYTHROCYTE [DISTWIDTH] IN BLOOD BY AUTOMATED COUNT: 13 % (ref 11.9–14.6)
GLUCOSE BLD STRIP.AUTO-MCNC: 106 MG/DL (ref 65–100)
GLUCOSE BLD STRIP.AUTO-MCNC: 148 MG/DL (ref 65–100)
GLUCOSE BLD STRIP.AUTO-MCNC: 160 MG/DL (ref 65–100)
GLUCOSE BLD STRIP.AUTO-MCNC: 213 MG/DL (ref 65–100)
GLUCOSE SERPL-MCNC: 94 MG/DL (ref 65–100)
HCT VFR BLD AUTO: 32.3 % (ref 35.8–46.3)
HGB BLD-MCNC: 10.5 G/DL (ref 11.7–15.4)
IMM GRANULOCYTES # BLD: 0 K/UL (ref 0–0.5)
IMM GRANULOCYTES NFR BLD: 0.3 % (ref 0–5)
LYMPHOCYTES # BLD: 1.4 K/UL (ref 0.5–4.6)
LYMPHOCYTES NFR BLD: 16 % (ref 13–44)
MCH RBC QN AUTO: 31.3 PG (ref 26.1–32.9)
MCHC RBC AUTO-ENTMCNC: 32.5 G/DL (ref 31.4–35)
MCV RBC AUTO: 96.4 FL (ref 79.6–97.8)
MM INDURATION POC: 0 MM (ref 0–5)
MONOCYTES # BLD: 1.1 K/UL (ref 0.1–1.3)
MONOCYTES NFR BLD: 12 % (ref 4–12)
NEUTS SEG # BLD: 5.9 K/UL (ref 1.7–8.2)
NEUTS SEG NFR BLD: 66 % (ref 43–78)
P-R INTERVAL, ECG05: 192 MS
PLATELET # BLD AUTO: 315 K/UL (ref 150–450)
PMV BLD AUTO: 8.4 FL (ref 10.8–14.1)
POTASSIUM SERPL-SCNC: 4.4 MMOL/L (ref 3.5–5.1)
PPD POC: NEGATIVE NEGATIVE
Q-T INTERVAL, ECG07: 358 MS
QRS DURATION, ECG06: 134 MS
QTC CALCULATION (BEZET), ECG08: 461 MS
RBC # BLD AUTO: 3.35 M/UL (ref 4.05–5.25)
SODIUM SERPL-SCNC: 136 MMOL/L (ref 136–145)
VENTRICULAR RATE, ECG03: 100 BPM
WBC # BLD AUTO: 8.9 K/UL (ref 4.3–11.1)

## 2017-09-13 PROCEDURE — 74011250637 HC RX REV CODE- 250/637: Performed by: INTERNAL MEDICINE

## 2017-09-13 PROCEDURE — 74011000258 HC RX REV CODE- 258: Performed by: INTERNAL MEDICINE

## 2017-09-13 PROCEDURE — 85025 COMPLETE CBC W/AUTO DIFF WBC: CPT | Performed by: INTERNAL MEDICINE

## 2017-09-13 PROCEDURE — 94640 AIRWAY INHALATION TREATMENT: CPT

## 2017-09-13 PROCEDURE — 73720 MRI LWR EXTREMITY W/O&W/DYE: CPT

## 2017-09-13 PROCEDURE — 77030018719 HC DRSG PTCH ANTIMIC J&J -A

## 2017-09-13 PROCEDURE — 74011250636 HC RX REV CODE- 250/636: Performed by: INTERNAL MEDICINE

## 2017-09-13 PROCEDURE — 02HV33Z INSERTION OF INFUSION DEVICE INTO SUPERIOR VENA CAVA, PERCUTANEOUS APPROACH: ICD-10-PCS | Performed by: INTERNAL MEDICINE

## 2017-09-13 PROCEDURE — 76937 US GUIDE VASCULAR ACCESS: CPT

## 2017-09-13 PROCEDURE — 36569 INSJ PICC 5 YR+ W/O IMAGING: CPT | Performed by: INTERNAL MEDICINE

## 2017-09-13 PROCEDURE — 77030018786 HC NDL GD F/USND BARD -B

## 2017-09-13 PROCEDURE — C1751 CATH, INF, PER/CENT/MIDLINE: HCPCS

## 2017-09-13 PROCEDURE — 74011636637 HC RX REV CODE- 636/637: Performed by: INTERNAL MEDICINE

## 2017-09-13 PROCEDURE — 80048 BASIC METABOLIC PNL TOTAL CA: CPT | Performed by: INTERNAL MEDICINE

## 2017-09-13 PROCEDURE — 65270000029 HC RM PRIVATE

## 2017-09-13 PROCEDURE — B548ZZA ULTRASONOGRAPHY OF SUPERIOR VENA CAVA, GUIDANCE: ICD-10-PCS | Performed by: INTERNAL MEDICINE

## 2017-09-13 PROCEDURE — 94760 N-INVAS EAR/PLS OXIMETRY 1: CPT

## 2017-09-13 PROCEDURE — A9577 INJ MULTIHANCE: HCPCS | Performed by: INTERNAL MEDICINE

## 2017-09-13 PROCEDURE — 36415 COLL VENOUS BLD VENIPUNCTURE: CPT | Performed by: INTERNAL MEDICINE

## 2017-09-13 PROCEDURE — 74011000250 HC RX REV CODE- 250: Performed by: INTERNAL MEDICINE

## 2017-09-13 PROCEDURE — 82962 GLUCOSE BLOOD TEST: CPT

## 2017-09-13 RX ORDER — POLYETHYLENE GLYCOL 3350 17 G/17G
17 POWDER, FOR SOLUTION ORAL
Status: DISCONTINUED | OUTPATIENT
Start: 2017-09-13 | End: 2017-09-14

## 2017-09-13 RX ORDER — POLYETHYLENE GLYCOL 3350 17 G/17G
17 POWDER, FOR SOLUTION ORAL ONCE
Status: COMPLETED | OUTPATIENT
Start: 2017-09-13 | End: 2017-09-13

## 2017-09-13 RX ORDER — SODIUM CHLORIDE 0.9 % (FLUSH) 0.9 %
20 SYRINGE (ML) INJECTION EVERY 8 HOURS
Status: DISCONTINUED | OUTPATIENT
Start: 2017-09-13 | End: 2017-09-15 | Stop reason: HOSPADM

## 2017-09-13 RX ORDER — HEPARIN 100 UNIT/ML
600 SYRINGE INTRAVENOUS EVERY 8 HOURS
Status: DISCONTINUED | OUTPATIENT
Start: 2017-09-13 | End: 2017-09-15 | Stop reason: HOSPADM

## 2017-09-13 RX ORDER — BISACODYL 5 MG
5 TABLET, DELAYED RELEASE (ENTERIC COATED) ORAL DAILY PRN
Status: DISCONTINUED | OUTPATIENT
Start: 2017-09-13 | End: 2017-09-15 | Stop reason: HOSPADM

## 2017-09-13 RX ORDER — SODIUM CHLORIDE 0.9 % (FLUSH) 0.9 %
20 SYRINGE (ML) INJECTION AS NEEDED
Status: DISCONTINUED | OUTPATIENT
Start: 2017-09-13 | End: 2017-09-15 | Stop reason: HOSPADM

## 2017-09-13 RX ORDER — HEPARIN 100 UNIT/ML
600 SYRINGE INTRAVENOUS AS NEEDED
Status: DISCONTINUED | OUTPATIENT
Start: 2017-09-13 | End: 2017-09-15 | Stop reason: HOSPADM

## 2017-09-13 RX ORDER — SODIUM CHLORIDE 0.9 % (FLUSH) 0.9 %
10 SYRINGE (ML) INJECTION
Status: COMPLETED | OUTPATIENT
Start: 2017-09-13 | End: 2017-09-13

## 2017-09-13 RX ORDER — DOCUSATE SODIUM 100 MG/1
100 CAPSULE, LIQUID FILLED ORAL DAILY
Status: DISCONTINUED | OUTPATIENT
Start: 2017-09-13 | End: 2017-09-15 | Stop reason: HOSPADM

## 2017-09-13 RX ADMIN — HEPARIN SODIUM 5000 UNITS: 5000 INJECTION, SOLUTION INTRAVENOUS; SUBCUTANEOUS at 04:33

## 2017-09-13 RX ADMIN — INSULIN LISPRO 4 UNITS: 100 INJECTION, SOLUTION INTRAVENOUS; SUBCUTANEOUS at 21:36

## 2017-09-13 RX ADMIN — SODIUM CHLORIDE 75 ML/HR: 900 INJECTION, SOLUTION INTRAVENOUS at 11:36

## 2017-09-13 RX ADMIN — QUETIAPINE FUMARATE 12.5 MG: 25 TABLET, FILM COATED ORAL at 21:28

## 2017-09-13 RX ADMIN — INSULIN LISPRO 2 UNITS: 100 INJECTION, SOLUTION INTRAVENOUS; SUBCUTANEOUS at 12:15

## 2017-09-13 RX ADMIN — Medication 10 ML: at 14:32

## 2017-09-13 RX ADMIN — DOCUSATE SODIUM 100 MG: 100 CAPSULE, LIQUID FILLED ORAL at 16:23

## 2017-09-13 RX ADMIN — ASPIRIN 81 MG: 81 TABLET, COATED ORAL at 08:15

## 2017-09-13 RX ADMIN — ALBUTEROL SULFATE 2.5 MG: 2.5 SOLUTION RESPIRATORY (INHALATION) at 07:35

## 2017-09-13 RX ADMIN — HEPARIN SODIUM 5000 UNITS: 5000 INJECTION, SOLUTION INTRAVENOUS; SUBCUTANEOUS at 19:36

## 2017-09-13 RX ADMIN — POLYETHYLENE GLYCOL (3350) 17 G: 17 POWDER, FOR SOLUTION ORAL at 16:22

## 2017-09-13 RX ADMIN — HEPARIN SODIUM 5000 UNITS: 5000 INJECTION, SOLUTION INTRAVENOUS; SUBCUTANEOUS at 11:34

## 2017-09-13 RX ADMIN — CEFTRIAXONE SODIUM 2 G: 2 INJECTION, POWDER, FOR SOLUTION INTRAMUSCULAR; INTRAVENOUS at 21:30

## 2017-09-13 RX ADMIN — Medication 20 ML: at 21:38

## 2017-09-13 RX ADMIN — MONTELUKAST SODIUM 10 MG: 10 TABLET, FILM COATED ORAL at 08:15

## 2017-09-13 RX ADMIN — BUDESONIDE 500 MCG: 0.5 INHALANT RESPIRATORY (INHALATION) at 07:35

## 2017-09-13 RX ADMIN — VANCOMYCIN HYDROCHLORIDE 1250 MG: 10 INJECTION, POWDER, LYOPHILIZED, FOR SOLUTION INTRAVENOUS at 18:09

## 2017-09-13 RX ADMIN — Medication 10 ML: at 21:39

## 2017-09-13 RX ADMIN — SODIUM CHLORIDE, PRESERVATIVE FREE 600 UNITS: 5 INJECTION INTRAVENOUS at 21:38

## 2017-09-13 RX ADMIN — BUDESONIDE 500 MCG: 0.5 INHALANT RESPIRATORY (INHALATION) at 20:05

## 2017-09-13 RX ADMIN — AMLODIPINE BESYLATE 10 MG: 10 TABLET ORAL at 08:15

## 2017-09-13 RX ADMIN — GADOBENATE DIMEGLUMINE 9 ML: 529 INJECTION, SOLUTION INTRAVENOUS at 14:31

## 2017-09-13 RX ADMIN — TIOTROPIUM BROMIDE 18 MCG: 18 CAPSULE ORAL; RESPIRATORY (INHALATION) at 07:44

## 2017-09-13 NOTE — PROGRESS NOTES
Patient in MRI. Will see tomorrow after results are final.  Patient known to me. Office notes reviewed.       Angeles Nicolas, DO

## 2017-09-13 NOTE — CONSULTS
Infectious Disease Consult    Today's Date: 9/13/2017   Admit Date: 9/12/2017    Impression:   · Left foot wounds/cellulitis with confirmed osteomyelitis, +hardware due to hammer toe corrections: no recent cultures 2016 cutlures with MSSA  · Charcot Evy arthropathy of amita feet  · DM Hgb A1c 7.3  · PAD    Plan:   · Continue Vancomycin and Ceftriaxone - increase dose to 2g  · MRI neg for abscess requiring surgery - will treat with 6 weeks of IV vanc/rocephin  · picc ordered  · Will need SNF placement for abx - lives alone and family cannot help    Anti-infectives:   Vanc/Ceftriaxone 9/12-    Subjective:   Date of Consultation:  September 13, 2017  Referring Physician: Dr Killian Moncada    Patient is a 80 y.o. female admitted to the hospital from her PCP office for worsening Left foot wounds with increased drainage, presentation appears to be acute; she was noted to have leukocytosis, however no fever, LA 1.8, ESR 56, CRP 13.6. She has a hx significant for Charcot evy tooth arthropathy, newly noted PAD (abnormal ROB recently) and DM. She has been followed at the wound care center, and has been on Cipro recently, and Bactrim prior to that. She had a left foot xray that noted possible osteomyelitis, and MRI that is pending. She has been started on Vancomycin and Ceftriaxone. Chart review notes she has been followed by Dr Pierce/Dr Aiyana Winston at [facility], most recently for her R foot 3/2017 and onychomycosis 6/2017. Left foot sigificantly erythematous up to ankle, per lines, erythema extended beyond this as well and has now improved. Pt says she feels well and has not had fevers or chills. Ulcers have been present for weeks-months.  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 E11.621, L97.502    Cellulitis of foot L03.119    Sepsis (HCC) A41.9    Hyponatremia E87.1    DANIELLA (acute kidney injury) ([facility] N17.9    Stage 3 chronic kidney disease N18.3     Past Medical History:   Diagnosis Date    Arthritis     Asthma     sees Dr. Agustin Felix with urination     Charcot-Evy-Tooth disease-like deformity of foot     Diabetes ([facility]     Full dentures     Gastrointestinal disorder     GERD    Hypertension     Incontinence of urine in female       Family History   Problem Relation Age of Onset    Family history unknown: Yes      Social History   Substance Use Topics    Smoking status: Never Smoker    Smokeless tobacco: Never Used    Alcohol use No     Past Surgical History:   Procedure Laterality Date    BREAST SURGERY PROCEDURE UNLISTED      bilat lumpectomy    HX CHOLECYSTECTOMY      HX COLONOSCOPY      HX GYN      hyst    HX ORTHOPAEDIC      bilateral knee replacements    HX ORTHOPAEDIC      back      Prior to Admission medications    Medication Sig Start Date End Date Taking? Authorizing Provider   pioglitazone (ACTOS) 15 mg tablet Take 1 Tab by mouth nightly. 9/12/17  Yes Corey Jurado MD   linagliptin (TRADJENTA) 5 mg tablet Take 1 Tab by mouth daily. 8/24/17  Yes Corey Jurado MD   montelukast (SINGULAIR) 10 mg tablet Take 1 Tab by mouth daily. 8/24/17  Yes Corey Jurado MD   amLODIPine (NORVASC) 10 mg tablet Take 1 Tab by mouth daily. 8/24/17  Yes Corey Jurado MD   lisinopril (PRINIVIL, ZESTRIL) 40 mg tablet Take 1 Tab by mouth daily. 8/24/17  Yes Corey Jurado MD   traMADol (ULTRAM-ER) 300 mg tablet Take 1 Tab by mouth daily.  Max Daily Amount: 300 mg. 8/24/17  Yes Corey Jurado MD glucosamine-chondroitin (ELATION) 1,500-1,200 mg/30 mL liqd Take  by mouth. Yes Historical Provider   QUEtiapine (SEROQUEL) 25 mg tablet Take 1/2 tablet before bedtime 5/25/17  Yes Manasa Curtis MD   fenofibrate nanocrystallized (TRICOR) 48 mg tablet Take 1 Tab by mouth daily. 5/25/17  Yes Manasa Curtis MD   aspirin delayed-release 81 mg tablet Take  by mouth daily. Yes Historical Provider   glipiZIDE SR (GLUCOTROL XL) 10 mg CR tablet Take 1 Tab by mouth daily. 5/25/17  Yes Manasa Curtis MD   FLOVENT HFA 44 mcg/actuation inhaler Take 2 Puffs by inhalation two (2) times a day. 12/26/14  Yes Historical Provider   SPIRIVA WITH HANDIHALER 18 mcg inhalation capsule  12/26/14  Yes Historical Provider   albuterol (PROAIR HFA) 90 mcg/actuation inhaler Take  by inhalation. Yes Historical Provider   Azelastine (ASTEPRO) 0.15 % (205.5 mcg) nasal spray two (2) times a day. Historical Provider   Cholecalciferol, Vitamin D3, (VITAMIN D3) 2,000 unit cap capsule Take 2,000 Units by mouth two (2) times a day. 1/13/15   Manasa Curtis MD   ONE TOUCH ULTRA TEST strip  1/7/15   Historical Provider   ONE TOUCH ULTRA 2 monitoring kit  1/7/15   Historical Provider   ONE TOUCH DELICA 33 gauge misc  6/0/01   Historical Provider   omega-3 fatty acids-vitamin e (FISH OIL) 1,000 mg cap Take 1 capsule by mouth. Historical Provider   b complex vitamins tablet Take 1 tablet by mouth daily. Historical Provider   folic acid 820 mcg tablet Take 400 mcg by mouth daily. Historical Provider       Allergies   Allergen Reactions    Aleve [Naproxen Sodium] Rash    Hydrocodone-Acetaminophen Itching    Metformin Other (comments)     Decreased kidney function    Phenobarbital Unknown (comments)        Review of Systems:  A comprehensive review of systems was negative except for that written in the History of Present Illness. Objective:     Visit Vitals    /74 (BP 1 Location: Left arm, BP Patient Position:  At rest)    Pulse 98    Temp 96.9 °F (36.1 °C)    Resp 20    Wt 87.5 kg (193 lb)    SpO2 95%    BMI 33.13 kg/m2     Temp (24hrs), Av.7 °F (36.5 °C), Min:96.7 °F (35.9 °C), Max:98.3 °F (36.8 °C)       Lines:  Peripheral IV:            Physical Exam:    General:  Alert, cooperative, well noursished, well developed, appears stated age   Eyes:  Sclera anicteric. Pupils equally round and reactive to light. Mouth/Throat: Mucous membranes normal, oral pharynx clear   Neck: Supple   Lungs:   Clear to auscultation bilaterally, good effort   CV:  Regular rate and rhythm,no murmur, click, rub or gallop   Abdomen:   Soft, non-tender. bowel sounds normal. non-distended   Extremities: No cyanosis or edema   Skin: Skin color, texture, turgor normal. no acute rash or lesions   Lymph nodes: Cervical and supraclavicular normal   Musculoskeletal: Bilateral flat feet. L foot with 3rd and 4th toe ulcerations packed. Cellulitis extending to ankle   Lines/Devices:  Intact, no erythema, drainage or tenderness   Psych: Alert and oriented, normal mood affect given the setting         Data Review:     CBC:Recent Labs      17   0607  17   1551   WBC  8.9  14.1*   GRANS  66  79*   MONOS  12  9   EOS  5  3   ANEU  5.9  11.1*   ABL  1.4  1.3   HGB  10.5*  10.6*   HCT  32.3*  32.4*   PLT  315  365       BMP:  Recent Labs      17   0607  17   1551   CREA  1.14*  1.57*   BUN  23  29*   NA  136  129*   K  4.4  4.7   CL  103  93*   CO2  26  28   AGAP  7  8   GLU  94  215*       LFTS:  No results for input(s): TBILI, ALT, SGOT, AP, TP, ALB in the last 72 hours.     Microbiology:     All Micro Results     Procedure Component Value Units Date/Time    CULTURE, BLOOD [[id number]] Collected:  17 1704    Order Status:  Completed Specimen:  Whole Blood from Blood Updated:  17 1723    CULTURE, BLOOD [[id number]] Collected:  17 1551    Order Status:  Completed Specimen:  Whole Blood from Blood Updated:  17 1616          Imagin17 Left foot Xray  IMPRESSION: Destructive bone changes of the distal phalanges of the third and  fourth toe most in keeping with osteomyelitis. 17 MRI left foot   IMPRESSION  IMPRESSION:  1. Findings compatible with osteomyelitis of the third and fourth distal  phalanges. Cannot exclude infection extending beyond the level of the DIP. Evaluation is limited due to susceptibility artifact from surgical hardware. 2. Ulcerations of the third and fourth digits with subjacent cellulitis. 3. Evidence of chronic denervation change.     Signed By: Erik Rosales NP     2017

## 2017-09-13 NOTE — PROGRESS NOTES
AM assessment completed. Pt alert and oriented x4. Resting in bed quietly, daughter at bedside. Respirations even and unlabored, lung sounds clear bilaterally on room air. Abdomen soft, nontender. Pt has 1+ edema to BLLE. Left foot with redness on 3 toes. Warmth noted. Pt denies pain, denies other needs. All safety measures are in place. Son at bedside.

## 2017-09-13 NOTE — PROGRESS NOTES
Problem: Interdisciplinary Rounds  Goal: Interdisciplinary Rounds  Interdisciplinary team rounds were held 9/13/2017 with the following team members:Care Management, Nursing, Nutrition, Pharmacy and Physician and the patient. Plan of care discussed. See clinical pathway and/or care plan for interventions and desired outcomes.

## 2017-09-13 NOTE — PROGRESS NOTES
Hospitalist Progress Note    2017  Admit Date: 2017  3:06 PM   NAME: Oliver Castro   :  1934   MRN:  [id number]   Attending: Carlin Morales MD  PCP:  Bruce Miller MD    SUBJECTIVE:   Ivonne Yeager is an 79 yo F admitted for sepsis due L foot cellulitis and toe ulcers. X-ray showed possible osteomyelitis of the 3rd and 4th toes. She reports she is doing okay this morning. Denies concerns. She has improved clinically with her lactic acid normalizing, renal function improving, sodium normalizing, and WBC count trending down to normal.  She thinks her L foot is less red than yesterday. Review of Systems negative with exception of pertinent positives noted above  PHYSICAL EXAM     Visit Vitals    /67 (BP 1 Location: Left arm, BP Patient Position: At rest)    Pulse 94    Temp 97.4 °F (36.3 °C)    Resp 19    Wt 87.5 kg (193 lb)    LMP 1980    SpO2 98%    BMI 33.13 kg/m2      Temp (24hrs), Av.8 °F (36.6 °C), Min:96.7 °F (35.9 °C), Max:98.3 °F (36.8 °C)    Oxygen Therapy  O2 Sat (%): 98 % (17 0735)  Pulse via Oximetry: 90 beats per minute (17 0735)  O2 Device: Room air (17 0735)    Intake/Output Summary (Last 24 hours) at 17 1011  Last data filed at 17 0850   Gross per 24 hour   Intake                0 ml   Output              375 ml   Net             -375 ml      General: No acute distress    Lungs:  CTA Bilaterally.    Heart:  Regular rate and rhythm,  No murmur, rub, or gallop  Abdomen: Soft, Non distended, Non tender, Positive bowel sounds  Extremities: L foot erythematous (slightly less so) and 3rd and 4th toes with packing in place  Neurologic:  No focal deficits    Recent Results (from the past 24 hour(s))   CBC WITH AUTOMATED DIFF    Collection Time: 17  3:51 PM   Result Value Ref Range    WBC 14.1 (H) 4.3 - 11.1 K/uL    RBC 3.39 (L) 4.05 - 5.25 M/uL    HGB 10.6 (L) 11.7 - 15.4 g/dL    HCT 32.4 (L) 35.8 - 46.3 %    MCV 95.6 79.6 - 97.8 FL    MCH 31.3 26.1 - 32.9 PG    MCHC 32.7 31.4 - 35.0 g/dL    RDW 12.7 11.9 - 14.6 %    PLATELET 030 179 - 432 K/uL    MPV 8.5 (L) 10.8 - 14.1 FL    DF AUTOMATED      NEUTROPHILS 79 (H) 43 - 78 %    LYMPHOCYTES 9 (L) 13 - 44 %    MONOCYTES 9 4.0 - 12.0 %    EOSINOPHILS 3 0.5 - 7.8 %    BASOPHILS 0 0.0 - 2.0 %    IMMATURE GRANULOCYTES 0.3 0.0 - 5.0 %    ABS. NEUTROPHILS 11.1 (H) 1.7 - 8.2 K/UL    ABS. LYMPHOCYTES 1.3 0.5 - 4.6 K/UL    ABS. MONOCYTES 1.2 0.1 - 1.3 K/UL    ABS. EOSINOPHILS 0.4 0.0 - 0.8 K/UL    ABS. BASOPHILS 0.0 0.0 - 0.2 K/UL    ABS. IMM.  GRANS. 0.0 0.0 - 0.5 K/UL   METABOLIC PANEL, BASIC    Collection Time: 09/12/17  3:51 PM   Result Value Ref Range    Sodium 129 (L) 136 - 145 mmol/L    Potassium 4.7 3.5 - 5.1 mmol/L    Chloride 93 (L) 98 - 107 mmol/L    CO2 28 21 - 32 mmol/L    Anion gap 8 7 - 16 mmol/L    Glucose 215 (H) 65 - 100 mg/dL    BUN 29 (H) 8 - 23 MG/DL    Creatinine 1.57 (H) 0.6 - 1.0 MG/DL    GFR est AA 40 (L) >60 ml/min/1.73m2    GFR est non-AA 33 (L) >60 ml/min/1.73m2    Calcium 8.6 8.3 - 10.4 MG/DL   C REACTIVE PROTEIN, QT    Collection Time: 09/12/17  3:51 PM   Result Value Ref Range    C-Reactive protein 13.6 (H) 0.0 - 0.9 mg/dL   SED RATE, AUTOMATED    Collection Time: 09/12/17  3:51 PM   Result Value Ref Range    Sed rate, automated 56 (H) 0 - 30 mm/hr   POC LACTIC ACID    Collection Time: 09/12/17  4:01 PM   Result Value Ref Range    Lactic Acid (POC) 2.3 (H) 0.5 - 1.9 mmol/L   EKG, 12 LEAD, INITIAL    Collection Time: 09/12/17  5:22 PM   Result Value Ref Range    Ventricular Rate 100 BPM    Atrial Rate 100 BPM    P-R Interval 192 ms    QRS Duration 134 ms    Q-T Interval 358 ms    QTC Calculation (Bezet) 461 ms    Calculated R Axis -30 degrees    Calculated T Axis 74 degrees    Diagnosis       Normal sinus rhythm  Left axis deviation  Right bundle branch block  Abnormal ECG  When compared with ECG of 11-FEB-2015 09:12,  No significant change was found  Confirmed by SCOT SHANNON (), Chanda Piersonshirley ([id number]) on 9/13/2017 8:11:47 AM     LACTIC ACID    Collection Time: 09/12/17  8:24 PM   Result Value Ref Range    Lactic acid 1.8 0.4 - 2.0 MMOL/L   GLUCOSE, POC    Collection Time: 09/12/17  9:37 PM   Result Value Ref Range    Glucose (POC) 194 (H) 65 - 552 mg/dL   METABOLIC PANEL, BASIC    Collection Time: 09/13/17  6:07 AM   Result Value Ref Range    Sodium 136 136 - 145 mmol/L    Potassium 4.4 3.5 - 5.1 mmol/L    Chloride 103 98 - 107 mmol/L    CO2 26 21 - 32 mmol/L    Anion gap 7 7 - 16 mmol/L    Glucose 94 65 - 100 mg/dL    BUN 23 8 - 23 MG/DL    Creatinine 1.14 (H) 0.6 - 1.0 MG/DL    GFR est AA 59 (L) >60 ml/min/1.73m2    GFR est non-AA 48 (L) >60 ml/min/1.73m2    Calcium 8.4 8.3 - 10.4 MG/DL   CBC WITH AUTOMATED DIFF    Collection Time: 09/13/17  6:07 AM   Result Value Ref Range    WBC 8.9 4.3 - 11.1 K/uL    RBC 3.35 (L) 4.05 - 5.25 M/uL    HGB 10.5 (L) 11.7 - 15.4 g/dL    HCT 32.3 (L) 35.8 - 46.3 %    MCV 96.4 79.6 - 97.8 FL    MCH 31.3 26.1 - 32.9 PG    MCHC 32.5 31.4 - 35.0 g/dL    RDW 13.0 11.9 - 14.6 %    PLATELET 475 618 - 876 K/uL    MPV 8.4 (L) 10.8 - 14.1 FL    DF AUTOMATED      NEUTROPHILS 66 43 - 78 %    LYMPHOCYTES 16 13 - 44 %    MONOCYTES 12 4.0 - 12.0 %    EOSINOPHILS 5 0.5 - 7.8 %    BASOPHILS 1 0.0 - 2.0 %    IMMATURE GRANULOCYTES 0.3 0.0 - 5.0 %    ABS. NEUTROPHILS 5.9 1.7 - 8.2 K/UL    ABS. LYMPHOCYTES 1.4 0.5 - 4.6 K/UL    ABS. MONOCYTES 1.1 0.1 - 1.3 K/UL    ABS. EOSINOPHILS 0.4 0.0 - 0.8 K/UL    ABS. BASOPHILS 0.0 0.0 - 0.2 K/UL    ABS. IMM. GRANS. 0.0 0.0 - 0.5 K/UL   GLUCOSE, POC    Collection Time: 09/13/17  7:13 AM   Result Value Ref Range    Glucose (POC) 106 (H) 65 - 100 mg/dL     Imaging:    XR FOOT LT MIN 3 V   Final Result   IMPRESSION: Destructive bone changes of the distal phalanges of the third and   fourth toe most in keeping with osteomyelitis.       MRI FOOT LT W WO CONT    (Results Pending)         [address] Problems    Diagnosis Date Noted    Sepsis ([facility] 09/12/2017    Hyponatremia 09/12/2017    DANIELLA [facility]acute [facility]) [facility] 09/12/2017    Stage 3 chronic kidney disease 09/12/2017    Cellulitis of foot 08/17/2017    Diabetic ulcer of toe associated with type 2 diabetes mellitus, with fat layer exposed ([facility] 08/17/2017    Asthma      sees Dr. Artie Benavidez      HTN (hypertension) 11/16/2014     Plan:  · Continue rocephin and vancomycin. Sepsis resolved and clinically improved. · Await ID and surgical consult. · Get MRI of L foot. · DANIELLA- Continue IVF until after MRI complete. · T2DM- continue SSI. DVT Prophylaxis: Heparin    Signed By: Doron Zee.  Darcy Duke MD     September 13, 2017

## 2017-09-13 NOTE — WOUND CARE
Attempted to see patient. She just left the floor for MRI per nurse. Noted surgical consult for this same toe wound. Will follow up tomorrow and see if needed.

## 2017-09-13 NOTE — PROGRESS NOTES
Pharmacokinetic Consult to Pharmacist    Juan R Last is a 80 y.o. female being treated for left foot 3rd and 4th toe diabetic cellulitis with ceftriaxone and vancomycin. Weight: 87.5 kg (193 lb)  Lab Results   Component Value Date/Time    BUN 23 09/13/2017 06:07 AM    Creatinine 1.14 09/13/2017 06:07 AM    WBC 8.9 09/13/2017 06:07 AM    Lactic acid 1.8 09/12/2017 08:24 PM    Lactic Acid (POC) 2.3 09/12/2017 04:01 PM      Estimated Creatinine Clearance: 40 mL/min (based on Cr of 1.14). CULTURES:  9/12  BC x 2 : Pending        Day 2 of vancomycin. Goal trough is 15-20. We have initiated therapy with vancomycin 1750mg x 1 followed by  1250mg q24h. . We will continue to follow patient and adjust the dose as necessary per guidelines    Thank you,  Yael Meyer, PharmD

## 2017-09-13 NOTE — PROGRESS NOTES
PICC PLACEMENT NOTE    PRE-PROCEDURE VERIFICATION    Correct Patient: Yes (Time out preformed)Suzanne Merlin Cutting rn in agreement with time out. Consent Procedure: Yes  Appropriate Site: Yes    Temperature: Temp: 96.9 °F (36.1 °C), Temperature Source: Temp Source: Oral    Recent Labs      09/13/17   0607   BUN  23   CREA  1.14*   PLT  315   WBC  8.9       Allergies: Aleve [naproxen sodium]; Hydrocodone-acetaminophen; Metformin; and Phenobarbital    Education materials for PICC Care given to family: yes. See Patient Education activity for further details. PICC Booklet placed on bedside table. PROCEDURE DETAIL  A double lumen PICC line was started for antibiotic therapy. The following documentation is in addition to the PICC properties in the lines/airways flowsheet :  Lot #: [id number]  xylocaine used: yes  Mid-Arm Circumference: 35 (cm)  Internal Catheter Length: 39 (cm)  Internal Catheter Total Length: 39 (cm)  Vein Selection for PICC:right basilic  Central Line Bundle followed yes  Complication Related to Insertion: none  Ports flushed with positive blood return in each port. Guidewires removed intact. The placement was verified by ecg technology: yes. The ecg technology results state the tip location is on the right side and the tip overlies the lower  superior vena cava. Primary nurse notified.     Line is okay to use: yes      Mike Foss RN

## 2017-09-14 ENCOUNTER — HOME CARE VISIT (OUTPATIENT)
Dept: HOME HEALTH SERVICES | Facility: HOME HEALTH | Age: 82
End: 2017-09-14
Payer: MEDICARE

## 2017-09-14 LAB
ANION GAP SERPL CALC-SCNC: 8 MMOL/L (ref 7–16)
BASOPHILS # BLD: 0 K/UL (ref 0–0.2)
BASOPHILS NFR BLD: 1 % (ref 0–2)
BUN SERPL-MCNC: 15 MG/DL (ref 8–23)
CALCIUM SERPL-MCNC: 8.9 MG/DL (ref 8.3–10.4)
CHLORIDE SERPL-SCNC: 104 MMOL/L (ref 98–107)
CO2 SERPL-SCNC: 26 MMOL/L (ref 21–32)
CREAT SERPL-MCNC: 1.11 MG/DL (ref 0.6–1)
DIFFERENTIAL METHOD BLD: ABNORMAL
EOSINOPHIL # BLD: 0.4 K/UL (ref 0–0.8)
EOSINOPHIL NFR BLD: 5 % (ref 0.5–7.8)
ERYTHROCYTE [DISTWIDTH] IN BLOOD BY AUTOMATED COUNT: 12.7 % (ref 11.9–14.6)
GLUCOSE BLD STRIP.AUTO-MCNC: 124 MG/DL (ref 65–100)
GLUCOSE BLD STRIP.AUTO-MCNC: 137 MG/DL (ref 65–100)
GLUCOSE BLD STRIP.AUTO-MCNC: 152 MG/DL (ref 65–100)
GLUCOSE BLD STRIP.AUTO-MCNC: 157 MG/DL (ref 65–100)
GLUCOSE SERPL-MCNC: 119 MG/DL (ref 65–100)
HCT VFR BLD AUTO: 32 % (ref 35.8–46.3)
HGB BLD-MCNC: 10.3 G/DL (ref 11.7–15.4)
IMM GRANULOCYTES # BLD: 0 K/UL (ref 0–0.5)
IMM GRANULOCYTES NFR BLD: 0.3 % (ref 0–5)
LYMPHOCYTES # BLD: 1.5 K/UL (ref 0.5–4.6)
LYMPHOCYTES NFR BLD: 17 % (ref 13–44)
MCH RBC QN AUTO: 30.7 PG (ref 26.1–32.9)
MCHC RBC AUTO-ENTMCNC: 32.2 G/DL (ref 31.4–35)
MCV RBC AUTO: 95.5 FL (ref 79.6–97.8)
MM INDURATION POC: 0 MM (ref 0–5)
MONOCYTES # BLD: 0.8 K/UL (ref 0.1–1.3)
MONOCYTES NFR BLD: 9 % (ref 4–12)
NEUTS SEG # BLD: 6 K/UL (ref 1.7–8.2)
NEUTS SEG NFR BLD: 68 % (ref 43–78)
PLATELET # BLD AUTO: 362 K/UL (ref 150–450)
PMV BLD AUTO: 8.5 FL (ref 10.8–14.1)
POTASSIUM SERPL-SCNC: 4.4 MMOL/L (ref 3.5–5.1)
PPD POC: NORMAL NEGATIVE
RBC # BLD AUTO: 3.35 M/UL (ref 4.05–5.25)
SODIUM SERPL-SCNC: 138 MMOL/L (ref 136–145)
WBC # BLD AUTO: 8.8 K/UL (ref 4.3–11.1)

## 2017-09-14 PROCEDURE — 74011000250 HC RX REV CODE- 250: Performed by: INTERNAL MEDICINE

## 2017-09-14 PROCEDURE — 77030019895 HC PCKNG STRP IODO -A

## 2017-09-14 PROCEDURE — 85025 COMPLETE CBC W/AUTO DIFF WBC: CPT | Performed by: INTERNAL MEDICINE

## 2017-09-14 PROCEDURE — 74011250636 HC RX REV CODE- 250/636: Performed by: INTERNAL MEDICINE

## 2017-09-14 PROCEDURE — 82962 GLUCOSE BLOOD TEST: CPT

## 2017-09-14 PROCEDURE — 80048 BASIC METABOLIC PNL TOTAL CA: CPT | Performed by: INTERNAL MEDICINE

## 2017-09-14 PROCEDURE — 97162 PT EVAL MOD COMPLEX 30 MIN: CPT

## 2017-09-14 PROCEDURE — 94760 N-INVAS EAR/PLS OXIMETRY 1: CPT

## 2017-09-14 PROCEDURE — 65270000029 HC RM PRIVATE

## 2017-09-14 PROCEDURE — 94640 AIRWAY INHALATION TREATMENT: CPT

## 2017-09-14 PROCEDURE — 74011636637 HC RX REV CODE- 636/637: Performed by: INTERNAL MEDICINE

## 2017-09-14 PROCEDURE — 74011250637 HC RX REV CODE- 250/637: Performed by: INTERNAL MEDICINE

## 2017-09-14 PROCEDURE — 74011000258 HC RX REV CODE- 258: Performed by: INTERNAL MEDICINE

## 2017-09-14 RX ORDER — POLYETHYLENE GLYCOL 3350 17 G/17G
17 POWDER, FOR SOLUTION ORAL
Status: DISCONTINUED | OUTPATIENT
Start: 2017-09-14 | End: 2017-09-15 | Stop reason: HOSPADM

## 2017-09-14 RX ORDER — CARVEDILOL 3.12 MG/1
3.12 TABLET ORAL 2 TIMES DAILY WITH MEALS
Status: DISCONTINUED | OUTPATIENT
Start: 2017-09-14 | End: 2017-09-15 | Stop reason: HOSPADM

## 2017-09-14 RX ADMIN — HEPARIN SODIUM 5000 UNITS: 5000 INJECTION, SOLUTION INTRAVENOUS; SUBCUTANEOUS at 02:32

## 2017-09-14 RX ADMIN — DOCUSATE SODIUM 100 MG: 100 CAPSULE, LIQUID FILLED ORAL at 08:39

## 2017-09-14 RX ADMIN — HEPARIN SODIUM 5000 UNITS: 5000 INJECTION, SOLUTION INTRAVENOUS; SUBCUTANEOUS at 10:50

## 2017-09-14 RX ADMIN — AMLODIPINE BESYLATE 10 MG: 10 TABLET ORAL at 08:39

## 2017-09-14 RX ADMIN — BUDESONIDE 500 MCG: 0.5 INHALANT RESPIRATORY (INHALATION) at 20:21

## 2017-09-14 RX ADMIN — ASPIRIN 81 MG: 81 TABLET, COATED ORAL at 08:39

## 2017-09-14 RX ADMIN — CARVEDILOL 3.12 MG: 3.12 TABLET, FILM COATED ORAL at 10:50

## 2017-09-14 RX ADMIN — Medication 20 ML: at 21:49

## 2017-09-14 RX ADMIN — MONTELUKAST SODIUM 10 MG: 10 TABLET, FILM COATED ORAL at 08:39

## 2017-09-14 RX ADMIN — HEPARIN SODIUM 5000 UNITS: 5000 INJECTION, SOLUTION INTRAVENOUS; SUBCUTANEOUS at 19:00

## 2017-09-14 RX ADMIN — BUDESONIDE 500 MCG: 0.5 INHALANT RESPIRATORY (INHALATION) at 08:04

## 2017-09-14 RX ADMIN — FENOFIBRATE 48 MG: 48 TABLET, COATED ORAL at 08:39

## 2017-09-14 RX ADMIN — SODIUM CHLORIDE, PRESERVATIVE FREE 600 UNITS: 5 INJECTION INTRAVENOUS at 21:46

## 2017-09-14 RX ADMIN — QUETIAPINE FUMARATE 12.5 MG: 25 TABLET, FILM COATED ORAL at 21:47

## 2017-09-14 RX ADMIN — Medication 20 ML: at 14:52

## 2017-09-14 RX ADMIN — CARVEDILOL 3.12 MG: 3.12 TABLET, FILM COATED ORAL at 17:39

## 2017-09-14 RX ADMIN — Medication 20 ML: at 06:21

## 2017-09-14 RX ADMIN — SODIUM CHLORIDE, PRESERVATIVE FREE 600 UNITS: 5 INJECTION INTRAVENOUS at 06:21

## 2017-09-14 RX ADMIN — VANCOMYCIN HYDROCHLORIDE 1250 MG: 10 INJECTION, POWDER, LYOPHILIZED, FOR SOLUTION INTRAVENOUS at 17:39

## 2017-09-14 RX ADMIN — BISACODYL 5 MG: 5 TABLET, COATED ORAL at 08:42

## 2017-09-14 RX ADMIN — TIOTROPIUM BROMIDE 18 MCG: 18 CAPSULE ORAL; RESPIRATORY (INHALATION) at 08:04

## 2017-09-14 RX ADMIN — Medication 10 ML: at 06:21

## 2017-09-14 RX ADMIN — SODIUM CHLORIDE, PRESERVATIVE FREE 600 UNITS: 5 INJECTION INTRAVENOUS at 14:52

## 2017-09-14 RX ADMIN — CEFTRIAXONE SODIUM 2 G: 2 INJECTION, POWDER, FOR SOLUTION INTRAMUSCULAR; INTRAVENOUS at 21:37

## 2017-09-14 RX ADMIN — INSULIN LISPRO 2 UNITS: 100 INJECTION, SOLUTION INTRAVENOUS; SUBCUTANEOUS at 17:44

## 2017-09-14 RX ADMIN — INSULIN LISPRO 2 UNITS: 100 INJECTION, SOLUTION INTRAVENOUS; SUBCUTANEOUS at 12:30

## 2017-09-14 NOTE — PROGRESS NOTES
Hospitalist spoke with surgeon. Pt can transfer to [facility] today. Rn to call report. All paperwork completed. Michelle spoke with daughter and pt and both are very pleased to be going to a facility close to home. No other needs iden. Yessenia White spoke with [facility] who accepted pt and is prepared to handle all of pt's needs. Michelle will follow up with hospitalist and surgery tomorrow to discuss when pt will be medically stable to transfer. Bed avail tomorrow and no precert needed as [employer] is notification only. Yessenia White met pt and her daughter this pm. Pt is an 80 yr old female adm on 9/12 due to osteomyelitis of the right two toes. Pt is being treated with two IV antibiotics and will need them for weeks. Pt's daughter informed [facility] and they absolutely will not go back. Daughter had many examples of their bad experience. Pt and daughter are open to other facilities and are aware semi private may be necessary. Pt has [employer] and Medicaid Ins. Pt unsure when she will be ready to go but does not feel this care be managed at home. Pt will need therapy to assist with boot and ambulation as well as wound care and IV meds. Due to the area in which pt and daughter live (after Michelle discussed the affiliation) all agreed to [facility]. Michelle placed call to adm coord and opened the CC Link for review. Need to make sure NH can accommodate all pt's needs.  Will cont to follow and assist. Esequiel Galloway

## 2017-09-14 NOTE — PROGRESS NOTES
Shift assessment complete. Pt alert and oriented x4, respirations present, even and unlabored, HOB elevated, pt denies any SOB at this time, S1&S2 auscultated, HR regular, abd soft, non- tender, Active BS in all 4 quadrants, dressing to left foot clean, dry, intact, pt is up with assistance to the bathroom, voiding, IVF infusing without difficulty, pt denies any pain at this time, pt instructed to call for assistance, pt verbalizes understanding, bed low and locked, side rails x3, call light within reach.

## 2017-09-14 NOTE — PROGRESS NOTES
Problem: Mobility Impaired (Adult and Pediatric)  Goal: *Acute Goals and Plan of Care (Insert Text)  STG:  (1.)Ms. Tomasz Tanner will move from supine to sit and sit to supine with SUPERVISION within 5 day(s). (2.)Ms. Tomasz Tanner will transfer from bed to chair and chair to bed with SUPERVISION using the least restrictive device within 5 day(s). (3.)Ms. Tomasz Tanner will ambulate with SUPERVISION for 100 feet with the least restrictive device within 5 day(s). (4.)Ms. Tomasz Tanner will increase B LE strength 1/2 grade to improve functional mobility within 5 day(s). ________________________________________________________________________________________________      PHYSICAL THERAPY: INITIAL ASSESSMENT, AM 9/14/2017  INPATIENT: Hospital Day: 3  Payor: CARE IMPROVEMENT PLUS [place] / Product Type: [employer] Medicare /      NAME/AGE/GENDER: Odessa Yang is a 80 y.o. female             PRIMARY DIAGNOSIS: Cellulitis of foot Cellulitis of foot Cellulitis of foot        ICD-10: Treatment Diagnosis:       · Generalized Muscle Weakness (M62.81)  · Difficulty in walking, Not elsewhere classified (R26.2)  · Other abnormalities of gait and mobility (R26.89)   Precaution/Allergies:  Aleve [naproxen sodium]; Hydrocodone-acetaminophen; Metformin; and Phenobarbital       ASSESSMENT:      Ms. Tomasz Tanner presents with cellulitis of the L foot. MRI confirms osteomyelitis of the L 3rd and 4th distal phalanges. She wears a boot on the R LE for support and protection and has a large tennis shoe on the L foot. I expect that she will need a similar boot for the L LE in near future. She presents with generalized weakness in all 4 extremities, decreased endurance, standing balance and functional mobility. She would benefit from further PT while here. The surgeon will review her chart and speak with her today. Her dtr. Feels that the plan will be for her to go to rehab at [place] to continue with IV antibiotics.  I think she would benefit from further therapy there, if she is approved for admission. Otherwise HH or OP PT would beneficial.      This section established at most recent assessment   PROBLEM LIST (Impairments causing functional limitations):  1. Decreased Strength  2. Decreased ADL/Functional Activities  3. Decreased Transfer Abilities  4. Decreased Ambulation Ability/Technique  5. Decreased Balance  6. Decreased Activity Tolerance  7. Increased Fatigue  8. Decreased Flexibility/Joint Mobility  9. Decreased Skin Integrity/Hygeine  10. Decreased [place] with Home Exercise Program    INTERVENTIONS PLANNED: (Benefits and precautions of physical therapy have been discussed with the patient.)  1. Balance Exercise  2. Bed Mobility  3. Family Education  4. Gait Training  5. Home Exercise Program (HEP)  6. Range of Motion (ROM)  7. Therapeutic Activites  8. Therapeutic Exercise/Strengthening  9. Transfer Training      TREATMENT PLAN: Frequency/Duration: daily for duration of hospital stay  Rehabilitation Potential For Stated Goals: [place] REHABILITATION/EQUIPMENT: (at time of discharge pending progress): Due to the probability of continued deficits (see above) this patient will likely need continued skilled physical therapy after discharge. Equipment: Pt. has  · Walkers, Type: Rolling Walker  · [employer], Type: Raised Toilet Seat and shower chair  · rollator                   HISTORY:   History of Present Injury/Illness (Reason for Referral): Admitted with cellulitis of the L foot. MRI confirms osteomyelitis L 3rd and 4th toes  Past Medical History/Comorbidities:   Ms. Sonia Lee  has a past medical history of Arthritis; Asthma; Burning with urination; Charcot-Evy-Tooth disease-like deformity of foot; Diabetes ([facility] [address] Full dentures; Gastrointestinal disorder; Hypertension; and Incontinence of urine in female. She also has no past medical history of CAD (coronary artery disease); Cancer ([address]  Chronic kidney disease; COPD; Heart failure ([facility] [address] Liver disease; PUD (peptic ulcer disease); Seizures ([facility] [address] Stroke [facility]); or Thromboembolus ([facility] [address] Ms. Jon Hale  has a past surgical history that includes gyn; breast surgery procedure unlisted; cholecystectomy; orthopaedic; orthopaedic; and colonoscopy. Social History/Living Environment:   Home Environment: Apartment  # Steps to Enter: 0  One/Two Story Residence: One story  Living Alone: Yes  Support Systems: Child(don), Family member(s)  Patient Expects to be Discharged to[de-identified] Apartment (dtr states she may go to rehab for IV antibiotics)  Current DME Used/Available at Home: Raised toilet seat, Shower chair, Walker, rolling, Walker, rollator  Tub or Shower Type: Tub/Shower combination  Prior Level of Function/Work/Activity:  Lives alone and is functionally independent with ADls, uses rollator for ambulation. Family helps with meal prep  Dominant Side:         RIGHT   Number of Personal Factors/Comorbidities that affect the Plan of Care: 1-2: MODERATE COMPLEXITY   EXAMINATION:   Most Recent Physical Functioning:   Gross Assessment:  AROM: Generally decreased, functional (all 4s)  Strength: Generally decreased, functional (grossly 3+/5 all 4s)  Sensation: Impaired (B feet)               Posture:  Posture Assessment: Forward head, Rounded shoulders  Balance:  Sitting: Intact  Standing: Pull to stand; With support Bed Mobility:  Supine to Sit: Minimum assistance  Wheelchair Mobility:     Transfers:  Sit to Stand: Minimum assistance  Stand to Sit: Contact guard assistance  Gait:     Base of Support: Narrowed  Speed/Loan: Pace decreased (<100 feet/min)  Gait Abnormalities: Path deviations  Assistive Device: Walker, rollator  Ambulation - Level of Assistance:  (close CGA)  Interventions: Safety awareness training;Verbal cues       Body Structures Involved:  1. Bones  2. Joints  3. Muscles Body Functions Affected:  1. Neuromusculoskeletal  2. Movement Related  3.  Skin Related Activities and Participation Affected:  1. Mobility  2. Self Care   Number of elements that affect the Plan of Care: 4+: HIGH COMPLEXITY   CLINICAL PRESENTATION:   Presentation: Evolving clinical presentation with changing clinical characteristics: MODERATE COMPLEXITY   CLINICAL DECISION MAKIN [facility] Mobility Inpatient Short Form  How much difficulty does the patient currently have. .. Unable A Lot A Little None   1. Turning over in bed (including adjusting bedclothes, sheets and blankets)? [ ] 1   [ ] 2   [ ] 3   [X] 4   2. Sitting down on and standing up from a chair with arms ( e.g., wheelchair, bedside commode, etc.)   [ ] 1   [ ] 2   [X] 3   [ ] 4   3. Moving from lying on back to sitting on the side of the bed? [ ] 1   [ ] 2   [X] 3   [ ] 4   How much help from another person does the patient currently need. .. Total A Lot A Little None   4. Moving to and from a bed to a chair (including a wheelchair)? [ ] 1   [ ] 2   [X] 3   [ ] 4   5. Need to walk in hospital room? [ ] 1   [ ] 2   [X] 3   [ ] 4   6. Climbing 3-5 steps with a railing? [ ] 1   [ ] 2   [X] 3   [ ] 4   © , Trustees of [address] [place] [address] [address] [address], under license to [employer]. All rights reserved    Score:  Initial:19 Most Recent: X (Date: -- )     Interpretation of Tool:  Represents activities that are increasingly more difficult (i.e. Bed mobility, Transfers, Gait).        Score 24 23 22-20 19-15 14-10 9-7 6       Modifier CH CI CJ CK CL CM CN         · Mobility - Walking and Moving Around:               - CURRENT STATUS:    CK - 40%-59% impaired, limited or restricted               - GOAL STATUS:           CJ - 20%-39% impaired, limited or restricted               - D/C STATUS:                       ---------------To be determined---------------  Payor: CARE IMPROVEMENT PLUS [place] / Product Type: Managed Care Medicare /       Medical Necessity:     · Patient demonstrates fair rehab potential due to higher previous functional level. Reason for Services/Other Comments:  · Patient continues to require present interventions due to patient's inability to perform functional mobility independently. Use of outcome tool(s) and clinical judgement create a POC that gives a: Questionable prediction of patient's progress: MODERATE COMPLEXITY                 TREATMENT:   (In addition to Assessment/Re-Assessment sessions the following treatments were rendered)   Pre-treatment Symptoms/Complaints:  No pain in feet due to lack of sensation  Pain: Initial:   Pain Intensity 1: 0  Post Session:  0      Assessment/Reassessment only, no treatment provided today     Braces/Orthotics/Lines/Etc:   · special boot for R LE  · O2 Device: Room air  Treatment/Session Assessment:    · Response to Treatment:  Tolerated well. Wanted to sit up in chair with her dtr in the room  · Interdisciplinary Collaboration:  · Physical Therapist  · Registered Nurse  · After treatment position/precautions:  · Up in chair  · Call light within reach  · RN notified  · Family at bedside  · Compliance with Program/Exercises: Will assess as treatment progresses. · Recommendations/Intent for next treatment session: \"Next visit will focus on advancements to more challenging activities and reduction in assistance provided\".   Total Treatment Duration:  PT Patient Time In/Time Out  Time In: 1000  Time Out: [address], PT

## 2017-09-14 NOTE — PROGRESS NOTES
Hospitalist Progress Note    2017  Admit Date: 2017  3:06 PM   NAME: Emelina Oreilly   :  1934   MRN:  [id number]   Attending: Jasmyne العلي. oNrth Norton MD  PCP:  Sera Schmidt MD    SUBJECTIVE:   Cecy Sharma is an 81 yo F admitted for sepsis due L foot cellulitis and toe ulcers. X-ray showed possible osteomyelitis of the 3rd and 4th toes. She reports she is doing okay this morning. Denies concerns. She has improved clinically with her lactic acid normalizing, renal function improving, sodium normalizing, and WBC count trending down to normal.  She thinks her L foot is less red than yesterday. - seen with daughter at bedside. She is complaining of constipation. Otherwise, she feels okay. MRI R foot confirmed osteomyelitis. ID following and recommending rocephin and vanc for 6 weeks. Surgical consult pending. PICC line placed yesterday. Review of Systems negative with exception of pertinent positives noted above  PHYSICAL EXAM     Visit Vitals    /70 (BP 1 Location: Left arm, BP Patient Position: At rest;Head of bed elevated (Comment degrees))    Pulse 100    Temp 97.3 °F (36.3 °C)    Resp 20    Wt 87.5 kg (193 lb)    LMP 1980    SpO2 96%    BMI 33.13 kg/m2      Temp (24hrs), Av.4 °F (36.9 °C), Min:96.9 °F (36.1 °C), Max:99.9 °F (37.7 °C)    Oxygen Therapy  O2 Sat (%): 96 % (17 08)  Pulse via Oximetry: 101 beats per minute (17 08)  O2 Device: Room air (17 08)    Intake/Output Summary (Last 24 hours) at 17 09  Last data filed at 17 0138   Gross per 24 hour   Intake                0 ml   Output              775 ml   Net             -775 ml      General: No acute distress    Lungs:  CTA Bilaterally.    Heart:  Regular rate and rhythm,  No murmur, rub, or gallop  Abdomen: Soft, Non distended, Non tender, Positive bowel sounds  Extremities: L foot erythema and swelling improved; 3rd and 4th toes with packing in place  Neurologic:  No focal deficits    Recent Results (from the past 24 hour(s))   GLUCOSE, POC    Collection Time: 09/13/17 11:27 AM   Result Value Ref Range    Glucose (POC) 160 (H) 65 - 100 mg/dL   GLUCOSE, POC    Collection Time: 09/13/17  5:11 PM   Result Value Ref Range    Glucose (POC) 148 (H) 65 - 100 mg/dL   PLEASE READ & DOCUMENT PPD TEST IN 24 HRS    Collection Time: 09/13/17  8:00 PM   Result Value Ref Range    PPD Negative Negative    mm Induration 0 mm   GLUCOSE, POC    Collection Time: 09/13/17  9:13 PM   Result Value Ref Range    Glucose (POC) 213 (H) 65 - 455 mg/dL   METABOLIC PANEL, BASIC    Collection Time: 09/14/17  6:17 AM   Result Value Ref Range    Sodium 138 136 - 145 mmol/L    Potassium 4.4 3.5 - 5.1 mmol/L    Chloride 104 98 - 107 mmol/L    CO2 26 21 - 32 mmol/L    Anion gap 8 7 - 16 mmol/L    Glucose 119 (H) 65 - 100 mg/dL    BUN 15 8 - 23 MG/DL    Creatinine 1.11 (H) 0.6 - 1.0 MG/DL    GFR est AA >60 >60 ml/min/1.73m2    GFR est non-AA 50 (L) >60 ml/min/1.73m2    Calcium 8.9 8.3 - 10.4 MG/DL   CBC WITH AUTOMATED DIFF    Collection Time: 09/14/17  6:17 AM   Result Value Ref Range    WBC 8.8 4.3 - 11.1 K/uL    RBC 3.35 (L) 4.05 - 5.25 M/uL    HGB 10.3 (L) 11.7 - 15.4 g/dL    HCT 32.0 (L) 35.8 - 46.3 %    MCV 95.5 79.6 - 97.8 FL    MCH 30.7 26.1 - 32.9 PG    MCHC 32.2 31.4 - 35.0 g/dL    RDW 12.7 11.9 - 14.6 %    PLATELET 965 838 - 230 K/uL    MPV 8.5 (L) 10.8 - 14.1 FL    DF AUTOMATED      NEUTROPHILS 68 43 - 78 %    LYMPHOCYTES 17 13 - 44 %    MONOCYTES 9 4.0 - 12.0 %    EOSINOPHILS 5 0.5 - 7.8 %    BASOPHILS 1 0.0 - 2.0 %    IMMATURE GRANULOCYTES 0.3 0.0 - 5.0 %    ABS. NEUTROPHILS 6.0 1.7 - 8.2 K/UL    ABS. LYMPHOCYTES 1.5 0.5 - 4.6 K/UL    ABS. MONOCYTES 0.8 0.1 - 1.3 K/UL    ABS. EOSINOPHILS 0.4 0.0 - 0.8 K/UL    ABS. BASOPHILS 0.0 0.0 - 0.2 K/UL    ABS. IMM.  GRANS. 0.0 0.0 - 0.5 K/UL   GLUCOSE, POC    Collection Time: 09/14/17  7:36 AM   Result Value Ref Range    Glucose (POC) 124 (H) 65 - 100 mg/dL     Imaging:    MRI FOOT LT W WO CONT   Final Result   IMPRESSION:   1. Findings compatible with osteomyelitis of the third and fourth distal   phalanges. Cannot exclude infection extending beyond the level of the DIP. Evaluation is limited due to susceptibility artifact from surgical hardware. 2. Ulcerations of the third and fourth digits with subjacent cellulitis. 3. Evidence of chronic denervation change. XR FOOT LT MIN 3 V   Final Result   IMPRESSION: Destructive bone changes of the distal phalanges of the third and   fourth toe most in keeping with osteomyelitis. 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 Sarahi Arellano HTN (hypertension) 11/16/2014     Plan:  · MRI R foot confirmed osteomyelitis of 3rd and 4th toes. Continue rocephin and vancomycin for 6 weeks per ID. Cellulitis clinically improving. · Await ID surgical consult. · Get MRI of L foot. · DANIELLA- Resoloved. Stop IVF. · HTN- continue to hold ACE due to DANIELLA. Start carvedilol. · T2DM- continue SSI. · Constipation- if not improved with dulcolax, try enema tomorrow. DVT Prophylaxis: Heparin    Signed By: Emery Tejeda.  Abdoulaye Alexis MD     September 14, 2017

## 2017-09-14 NOTE — PROGRESS NOTES
Infectious Disease Consult    Today's Date: 2017   Admit Date: 2017    Impression:   · Left foot wounds/cellulitis with confirmed osteomyelitis, +hardware due to hammer toe corrections: no recent cultures 2016 cutlures with MSSA  · Charcot Evy arthropathy of amita feet  · DM Hgb A1c 7.3  · PAD    Plan:   · Continue Vancomycin and Ceftriaxone 6 week eot 10/25/17. Can shorten to 2 weeks and switch to pills (bactrim, duricef) for remainder of treatment course if desired  · Surgery following - may need to amputate 3rd and 4th toes if does not improve  · Will arrange ID f/u in 2-3 weeks to assess if treatment should continue with IV abx or switched to pills (2 week eot would be 17)    [place] planned      Anti-infectives:   Vanc/Ceftriaxone -    Subjective:   Pt not seen today (no charge)      Allergies   Allergen Reactions    Aleve [Naproxen Sodium] Rash    Hydrocodone-Acetaminophen Itching    Metformin Other (comments)     Decreased kidney function    Phenobarbital Unknown (comments)        Review of Systems:  A comprehensive review of systems was negative except for that written in the History of Present Illness. Objective:     Visit Vitals    /78 (BP 1 Location: Left arm, BP Patient Position: At rest;Post activity)    Pulse (!) 103    Temp 97.8 °F (36.6 °C)    Resp 18    Wt 87.5 kg (193 lb)    SpO2 99%    BMI 33.13 kg/m2     Temp (24hrs), Av.4 °F (36.9 °C), Min:97.3 °F (36.3 °C), Max:99.9 °F (37.7 °C)       Lines:  Peripheral IV:            Physical Exam:    General:  Alert, cooperative, well noursished, well developed, appears stated age   Eyes:  Sclera anicteric. Pupils equally round and reactive to light. Mouth/Throat: Mucous membranes normal, oral pharynx clear   Neck: Supple   Lungs:   Clear to auscultation bilaterally, good effort   CV:  Regular rate and rhythm,no murmur, click, rub or gallop   Abdomen:   Soft, non-tender.  bowel sounds normal. non-distended Extremities: No cyanosis or edema   Skin: Skin color, texture, turgor normal. no acute rash or lesions   Lymph nodes: Cervical and supraclavicular normal   Musculoskeletal: Bilateral flat feet. L foot with 3rd and 4th toe ulcerations packed. Cellulitis extending to ankle   Lines/Devices:  Intact, no erythema, drainage or tenderness   Psych: Alert and oriented, normal mood affect given the setting         Data Review:     CBC:  Recent Labs      17   0617   1551   WBC  8.8  8.9  14.1*   GRANS  68  66  79*   MONOS  9  12  9   EOS  5  5  3   ANEU  6.0  5.9  11.1*   ABL  1.5  1.4  1.3   HGB  10.3*  10.5*  10.6*   HCT  32.0*  32.3*  32.4*   PLT  362  315  365       BMP:  Recent Labs      1707  17   1551   CREA  1.11*  1.14*  1.57*   BUN  15  23  29*   NA  138  136  129*   K  4.4  4.4  4.7   CL  104  103  93*   CO2  26  26  28   AGAP  8  7  8   GLU  119*  94  215*       LFTS:  No results for input(s): TBILI, ALT, SGOT, AP, TP, ALB in the last 72 hours. Microbiology:     All Micro Results     Procedure Component Value Units Date/Time    CULTURE, BLOOD [[id number]] Collected:  17 1551    Order Status:  Completed Specimen:  Whole Blood from Blood Updated:  17     Special Requests: --        RIGHT  Antecubital       Culture result: NO GROWTH 2 DAYS       CULTURE, BLOOD [[id number]] Collected:  17 1704    Order Status:  Completed Specimen:  Whole Blood from Blood Updated:  17     Special Requests: --        RIGHT  HAND       Culture result: NO GROWTH 2 DAYS             Imagin17 Left foot Xray  IMPRESSION: Destructive bone changes of the distal phalanges of the third and  fourth toe most in keeping with osteomyelitis. 17 MRI left foot   IMPRESSION  IMPRESSION:  1. Findings compatible with osteomyelitis of the third and fourth distal  phalanges.  Cannot exclude infection extending beyond the level of the DIP.  Evaluation is limited due to susceptibility artifact from surgical hardware. 2. Ulcerations of the third and fourth digits with subjacent cellulitis. 3. Evidence of chronic denervation change.     Signed By: Lanette Fleischer, MD     September 14, 2017

## 2017-09-14 NOTE — PROGRESS NOTES
Problem: Falls - Risk of  Goal: *Absence of Falls  Document Jj Fall Risk and appropriate interventions in the flowsheet.    Outcome: Progressing Towards Goal  Fall Risk Interventions:  Mobility Interventions: Patient to call before getting OOB           Medication Interventions: Patient to call before getting OOB     Elimination Interventions: Call light in reach, Patient to call for help with toileting needs

## 2017-09-14 NOTE — PROGRESS NOTES
Bedside and Verbal shift change report given to Tc King  (oncoming nurse) by Kamran Ang  (offgoing nurse). Report included the following information SBAR, Kardex, Intake/Output, MAR and Recent Results.

## 2017-09-14 NOTE — PROGRESS NOTES
Shift assessment complete; pt resting in bed with daughter at bedside. Alert & oriented X4. Resp even & unlabored on room air; lungs clear bilat. HR regular. Abdomen soft with active bowel sounds; pt states she feels constipated. Will give prn dulcolax. Right UE PICC in place & infusing IV fluids with no difficulty. Left foot red with outline receding. Pt states she has very little sensation in her LE but that is not abnormal for her. No needs voiced; call light in reach; bed low & locked; will continue to monitor.

## 2017-09-14 NOTE — CONSULTS
[address]  [address] [facility], [address] [place] [address], [address] [facility]       History and Physical/Surgical Consult   Joana Terrell date: 2017    MRN: [id number]     : 1934     Age: 80 y.o.          2017 1:18 PM    Subjective/HPI:   This patient is a 80 y.o. previously seen in my office for diabetic foot ulcers on the left foot second third and fourth digits. At that time the patient was on antibiotics and had a scheduled wound care appointment. The ulcers were superficial and did not require surgery there is very little redness. That appointment was approximately 2 weeks ago. The patient was admitted to [facility] with increasing cellulitis over the forefoot and third and fourth digits. Again the patient has a Charcot's foot and diabetic neuropathy and does not know when the symptoms began because she has no feeling. She has had hardware placed in the third and fourth digits in the distant past and the details of that surgery are not known. She denies any pain. She has reported odor and redness over her forefoot. The patient had x-rays that revealed suspicion of osteomyelitis and this has been confirmed with MRI. Patient has just received a PICC line and has agreed to 2-6 weeks of IV antibiotics in a rehab unit. .     Review of Systems  A comprehensive review of systems was negative except for that written in the HPI.   Past Medical History:   Diagnosis Date    Arthritis     Asthma     sees Dr. Kailey Lara with urination     Charcot-Evy-Tooth disease-like deformity of foot     Diabetes ([address]     Full dentures     Gastrointestinal disorder     GERD    Hypertension     Incontinence of urine in female       Past Surgical History:   Procedure Laterality Date    BREAST SURGERY PROCEDURE UNLISTED      bilat lumpectomy    HX CHOLECYSTECTOMY      HX COLONOSCOPY      HX GYN      hyst    HX ORTHOPAEDIC      bilateral knee replacements    HX ORTHOPAEDIC      back      Allergies   Allergen Reactions    Aleve [Naproxen Sodium] Rash    Hydrocodone-Acetaminophen Itching    Metformin Other (comments)     Decreased kidney function    Phenobarbital Unknown (comments)      Social History   Substance Use Topics    Smoking status: Never Smoker    Smokeless tobacco: Never Used    Alcohol use No      Social History     Social History Narrative     Family History   Problem Relation Age of Onset    Family history unknown: Yes      Prior to Admission Medications   Prescriptions Last Dose Informant Patient Reported? Taking? Azelastine (ASTEPRO) 0.15 % (205.5 mcg) nasal spray Not Taking at Unknown time  Yes No   Sig: two (2) times a day. Cholecalciferol, Vitamin D3, (VITAMIN D3) 2,000 unit cap capsule 9/11/2017  No No   Sig: Take 2,000 Units by mouth two (2) times a day. FLOVENT HFA 44 mcg/actuation inhaler 9/11/2017  Yes Yes   Sig: Take 2 Puffs by inhalation two (2) times a day. ONE TOUCH DELICA 33 gauge misc 9/53/7676  Yes No   ONE TOUCH ULTRA 2 monitoring kit 9/11/2017  Yes No   ONE TOUCH ULTRA TEST strip 9/11/2017  Yes No   QUEtiapine (SEROQUEL) 25 mg tablet 9/10/2017  No Yes   Sig: Take 1/2 tablet before bedtime   SPIRIVA WITH HANDIHALER 18 mcg inhalation capsule 9/11/2017  Yes Yes   albuterol (PROAIR HFA) 90 mcg/actuation inhaler 9/11/2017 at Unknown time  Yes Yes   Sig: Take  by inhalation. amLODIPine (NORVASC) 10 mg tablet 9/11/2017 at Unknown time  No Yes   Sig: Take 1 Tab by mouth daily. aspirin delayed-release 81 mg tablet 9/11/2017  Yes Yes   Sig: Take  by mouth daily. b complex vitamins tablet 9/11/2017  Yes No   Sig: Take 1 tablet by mouth daily. fenofibrate nanocrystallized (TRICOR) 48 mg tablet 9/11/2017  No Yes   Sig: Take 1 Tab by mouth daily. folic acid 422 mcg tablet 9/11/2017  Yes No   Sig: Take 400 mcg by mouth daily.    glipiZIDE SR (GLUCOTROL XL) 10 mg CR tablet 9/11/2017  No Yes   Sig: Take 1 Tab by mouth daily. glucosamine-chondroitin (ELATION) 1,500-1,200 mg/30 mL liqd 9/11/2017  Yes Yes   Sig: Take  by mouth.   linagliptin (TRADJENTA) 5 mg tablet 9/11/2017  No Yes   Sig: Take 1 Tab by mouth daily. lisinopril (PRINIVIL, ZESTRIL) 40 mg tablet 9/11/2017  No Yes   Sig: Take 1 Tab by mouth daily. montelukast (SINGULAIR) 10 mg tablet 9/11/2017  No Yes   Sig: Take 1 Tab by mouth daily. omega-3 fatty acids-vitamin e (FISH OIL) 1,000 mg cap Not Taking at Unknown time  Yes No   Sig: Take 1 capsule by mouth.   pioglitazone (ACTOS) 15 mg tablet 9/10/2017  No Yes   Sig: Take 1 Tab by mouth nightly. traMADol (ULTRAM-ER) 300 mg tablet 9/11/2017  No Yes   Sig: Take 1 Tab by mouth daily. Max Daily Amount: 300 mg.       Facility-Administered Medications: None     Current Facility-Administered Medications   Medication Dose Route Frequency    carvedilol (COREG) tablet 3.125 mg  3.125 mg Oral BID WITH MEALS    polyethylene glycol (MIRALAX) packet 17 g  17 g Oral DAILY PRN    docusate sodium (COLACE) capsule 100 mg  100 mg Oral DAILY    bisacodyl (DULCOLAX) tablet 5 mg  5 mg Oral DAILY PRN    cefTRIAXone (ROCEPHIN) 2 g in 0.9% sodium chloride (MBP/ADV) 50 mL  2 g IntraVENous Q24H    sodium chloride (NS) flush 20 mL  20 mL InterCATHeter Q8H    heparin (porcine) pf 600 Units  600 Units InterCATHeter Q8H    sodium chloride (NS) flush 20 mL  20 mL InterCATHeter PRN    heparin (porcine) pf 600 Units  600 Units InterCATHeter PRN    amLODIPine (NORVASC) tablet 10 mg  10 mg Oral DAILY    aspirin delayed-release tablet 81 mg  81 mg Oral DAILY    fenofibrate nanocrystallized (TRICOR) tablet 48 mg (Patient Supplied)  48 mg Oral DAILY    linagliptin (TRADJENTA) tablet 5 mg (Patient Supplied)  5 mg Oral DAILY    montelukast (SINGULAIR) tablet 10 mg  10 mg Oral DAILY    QUEtiapine (SEROquel) tablet 12.5 mg  12.5 mg Oral QHS    tiotropium (SPIRIVA) inhalation capsule 18 mcg  1 Cap Inhalation DAILY    sodium chloride (NS) flush 5-10 mL  5-10 mL IntraVENous Q8H    sodium chloride (NS) flush 5-10 mL  5-10 mL IntraVENous PRN    acetaminophen (TYLENOL) tablet 650 mg  650 mg Oral Q4H PRN    heparin (porcine) injection 5,000 Units  5,000 Units SubCUTAneous Q8H    vancomycin (VANCOCIN) 1250 mg in  ml infusion  1,250 mg IntraVENous Q24H    albuterol (PROVENTIL VENTOLIN) nebulizer solution 2.5 mg  2.5 mg Nebulization Q6H PRN    insulin lispro (HUMALOG) injection   SubCUTAneous AC&HS    traMADol (ULTRAM) tablet 50 mg  50 mg Oral Q6H PRN    budesonide (PULMICORT) 500 mcg/2 ml nebulizer suspension  500 mcg Nebulization BID RT     Objective:     Vitals:    09/14/17 0231 09/14/17 0728 09/14/17 0804 09/14/17 1109   BP: 167/78 157/70  154/79   Pulse: 97 100  100   Resp: 20 20  18   Temp: 98.7 °F (37.1 °C) 97.3 °F (36.3 °C)  97.8 °F (36.6 °C)   SpO2: 95% 95% 96% 96%   Weight:            09/12 1901 - 09/14 0700  In: -   Out: 1150 [Urine:1150]  Physical Exam:   Gen- the patient is well developed and in no acute distress  HEENT- PERRL, EOMI, no scleral icterus       nose without alar flaring or epistaxis                  oral muscosa moist without cyanosis  Neck- no JVD or retractions  Lungs- resp even/unlab   Heart- RRR   Abd-soft no tenderness palpation normal bowel sounds  Ext- warm without cyanosis. There is no lower leg edema. The left foot shows edema and redness up to the ankle. The area has been marked and the redness has improved to the mid forefoot. The third and fourth digit are red with ulceration along the toe. There is open wound that tracks down to the bone. The packing was removed and built will be replaced by the nursing staff. Skin- no jaundice or rashes  Neuro- alert and oriented x 3. No gross sensorimotor deficits are present.      Data Review   Recent Labs      09/14/17   0617  09/13/17   0607  09/12/17   1551   WBC  8.8  8.9  14.1*   HGB  10.3*  10.5*  10.6*   HCT  32.0*  32.3*  32.4*   PLT  362  315  434 Recent Labs      09/14/17   0617  09/13/17   0607  09/12/17   1551   NA  138  136  129*   K  4.4  4.4  4.7   CL  104  103  93*   CO2  26  26  28   GLU  119*  94  215*   BUN  15  23  29*   CREA  1.11*  1.14*  1.57*       Assessment:     Hospital Problems  Date Reviewed: 8/24/2017          Codes Class Noted POA    Sepsis ([facility] ICD-10-CM: A41.9  ICD-9-CM: 038.9, 995.91  9/12/2017 Yes        Hyponatremia ICD-10-CM: E87.1  ICD-9-CM: 276.1  9/12/2017 Yes        DANIELLA (acute kidney injury) ([facility] ICD-10-CM: N17.9  ICD-9-CM: 584.9  9/12/2017 Yes        Stage 3 chronic kidney disease ICD-10-CM: N18.3  ICD-9-CM: 585.3  9/12/2017 Yes        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 Jn Larson             HTN (hypertension) ICD-10-CM: I10  ICD-9-CM: 401.9  11/16/2014 Yes            Plan:   #1 continue IV antibiotics    2. I will follow the patient on a daily basis evaluating the cellulitis. If there is no improvement then amputation of the third and fourth digit may be necessary. 3.  Agree with 2-6 weeks of IV antibiotics and local wound care.     Thank you for this consult I will continue to follow while she is in the hospital.  --    Vangie Fermin Maria Isabel, DO

## 2017-09-14 NOTE — PROGRESS NOTES
Problem: Interdisciplinary Rounds  Goal: Interdisciplinary Rounds  Interdisciplinary team rounds were held 9/14/2017 with the following team members:Care Management, Nursing, Nutrition, Patient Relations, Pharmacy and Physician . Plan of care discussed. See clinical pathway and/or care plan for interventions and desired outcomes.

## 2017-09-15 VITALS
RESPIRATION RATE: 16 BRPM | SYSTOLIC BLOOD PRESSURE: 146 MMHG | TEMPERATURE: 98.6 F | HEART RATE: 104 BPM | BODY MASS INDEX: 33.13 KG/M2 | OXYGEN SATURATION: 99 % | WEIGHT: 193 LBS | DIASTOLIC BLOOD PRESSURE: 76 MMHG

## 2017-09-15 LAB
ANION GAP SERPL CALC-SCNC: 8 MMOL/L (ref 7–16)
BASOPHILS # BLD: 0.1 K/UL (ref 0–0.2)
BASOPHILS NFR BLD: 1 % (ref 0–2)
BUN SERPL-MCNC: 16 MG/DL (ref 8–23)
CALCIUM SERPL-MCNC: 9.8 MG/DL (ref 8.3–10.4)
CHLORIDE SERPL-SCNC: 101 MMOL/L (ref 98–107)
CO2 SERPL-SCNC: 27 MMOL/L (ref 21–32)
CREAT SERPL-MCNC: 1.2 MG/DL (ref 0.6–1)
DIFFERENTIAL METHOD BLD: ABNORMAL
EOSINOPHIL # BLD: 0.5 K/UL (ref 0–0.8)
EOSINOPHIL NFR BLD: 5 % (ref 0.5–7.8)
ERYTHROCYTE [DISTWIDTH] IN BLOOD BY AUTOMATED COUNT: 12.5 % (ref 11.9–14.6)
GLUCOSE BLD STRIP.AUTO-MCNC: 132 MG/DL (ref 65–100)
GLUCOSE BLD STRIP.AUTO-MCNC: 148 MG/DL (ref 65–100)
GLUCOSE SERPL-MCNC: 149 MG/DL (ref 65–100)
HCT VFR BLD AUTO: 34.2 % (ref 35.8–46.3)
HGB BLD-MCNC: 11.3 G/DL (ref 11.7–15.4)
IMM GRANULOCYTES # BLD: 0 K/UL (ref 0–0.5)
IMM GRANULOCYTES NFR BLD: 0.3 % (ref 0–5)
LYMPHOCYTES # BLD: 1.6 K/UL (ref 0.5–4.6)
LYMPHOCYTES NFR BLD: 17 % (ref 13–44)
MCH RBC QN AUTO: 31.4 PG (ref 26.1–32.9)
MCHC RBC AUTO-ENTMCNC: 33 G/DL (ref 31.4–35)
MCV RBC AUTO: 95 FL (ref 79.6–97.8)
MONOCYTES # BLD: 0.7 K/UL (ref 0.1–1.3)
MONOCYTES NFR BLD: 7 % (ref 4–12)
NEUTS SEG # BLD: 6.6 K/UL (ref 1.7–8.2)
NEUTS SEG NFR BLD: 70 % (ref 43–78)
PLATELET # BLD AUTO: 391 K/UL (ref 150–450)
PMV BLD AUTO: 8.2 FL (ref 10.8–14.1)
POTASSIUM SERPL-SCNC: 4.3 MMOL/L (ref 3.5–5.1)
RBC # BLD AUTO: 3.6 M/UL (ref 4.05–5.25)
SODIUM SERPL-SCNC: 136 MMOL/L (ref 136–145)
WBC # BLD AUTO: 9.5 K/UL (ref 4.3–11.1)

## 2017-09-15 PROCEDURE — 97116 GAIT TRAINING THERAPY: CPT

## 2017-09-15 PROCEDURE — 94640 AIRWAY INHALATION TREATMENT: CPT

## 2017-09-15 PROCEDURE — 74011250636 HC RX REV CODE- 250/636: Performed by: INTERNAL MEDICINE

## 2017-09-15 PROCEDURE — 82962 GLUCOSE BLOOD TEST: CPT

## 2017-09-15 PROCEDURE — 80048 BASIC METABOLIC PNL TOTAL CA: CPT | Performed by: INTERNAL MEDICINE

## 2017-09-15 PROCEDURE — 85025 COMPLETE CBC W/AUTO DIFF WBC: CPT | Performed by: INTERNAL MEDICINE

## 2017-09-15 PROCEDURE — 94760 N-INVAS EAR/PLS OXIMETRY 1: CPT

## 2017-09-15 PROCEDURE — 77030012939 HC DRSG HYDRCOIL BMS -A

## 2017-09-15 PROCEDURE — 77030019895 HC PCKNG STRP IODO -A

## 2017-09-15 PROCEDURE — 74011000250 HC RX REV CODE- 250: Performed by: INTERNAL MEDICINE

## 2017-09-15 PROCEDURE — 74011250637 HC RX REV CODE- 250/637: Performed by: INTERNAL MEDICINE

## 2017-09-15 RX ORDER — BISACODYL 5 MG
5 TABLET, DELAYED RELEASE (ENTERIC COATED) ORAL
Qty: 1 TAB | Refills: 0 | Status: SHIPPED
Start: 2017-09-15 | End: 2017-09-15

## 2017-09-15 RX ORDER — TRAMADOL HYDROCHLORIDE 300 MG/1
300 TABLET, EXTENDED RELEASE ORAL DAILY
Qty: 5 TAB | Refills: 0 | Status: SHIPPED | OUTPATIENT
Start: 2017-09-15 | End: 2018-02-26 | Stop reason: SDUPTHER

## 2017-09-15 RX ORDER — CARVEDILOL 12.5 MG/1
12.5 TABLET ORAL 2 TIMES DAILY WITH MEALS
Qty: 60 TAB | Refills: 0 | Status: SHIPPED
Start: 2017-09-15 | End: 2018-05-24 | Stop reason: SDUPTHER

## 2017-09-15 RX ORDER — DOCUSATE SODIUM 100 MG/1
100 CAPSULE, LIQUID FILLED ORAL DAILY
Qty: 30 CAP | Refills: 2 | Status: SHIPPED
Start: 2017-09-15 | End: 2017-12-14

## 2017-09-15 RX ORDER — POLYETHYLENE GLYCOL 3350 17 G/17G
17 POWDER, FOR SOLUTION ORAL
Qty: 1 EACH | Refills: 0 | Status: SHIPPED
Start: 2017-09-15 | End: 2021-11-02 | Stop reason: SDUPTHER

## 2017-09-15 RX ORDER — VANCOMYCIN HYDROCHLORIDE
1250 EVERY 24 HOURS
Qty: 7500 ML | Refills: 1 | Status: SHIPPED
Start: 2017-09-15 | End: 2017-10-25

## 2017-09-15 RX ADMIN — HEPARIN SODIUM 5000 UNITS: 5000 INJECTION, SOLUTION INTRAVENOUS; SUBCUTANEOUS at 11:23

## 2017-09-15 RX ADMIN — DOCUSATE SODIUM 100 MG: 100 CAPSULE, LIQUID FILLED ORAL at 08:08

## 2017-09-15 RX ADMIN — CARVEDILOL 3.12 MG: 3.12 TABLET, FILM COATED ORAL at 08:14

## 2017-09-15 RX ADMIN — HEPARIN SODIUM 5000 UNITS: 5000 INJECTION, SOLUTION INTRAVENOUS; SUBCUTANEOUS at 02:44

## 2017-09-15 RX ADMIN — SODIUM CHLORIDE, PRESERVATIVE FREE 600 UNITS: 5 INJECTION INTRAVENOUS at 06:11

## 2017-09-15 RX ADMIN — TIOTROPIUM BROMIDE 18 MCG: 18 CAPSULE ORAL; RESPIRATORY (INHALATION) at 08:44

## 2017-09-15 RX ADMIN — FENOFIBRATE 48 MG: 48 TABLET, COATED ORAL at 11:13

## 2017-09-15 RX ADMIN — BUDESONIDE 500 MCG: 0.5 INHALANT RESPIRATORY (INHALATION) at 08:44

## 2017-09-15 RX ADMIN — Medication 20 ML: at 06:11

## 2017-09-15 RX ADMIN — SODIUM CHLORIDE, PRESERVATIVE FREE 600 UNITS: 5 INJECTION INTRAVENOUS at 10:58

## 2017-09-15 RX ADMIN — ASPIRIN 81 MG: 81 TABLET, COATED ORAL at 08:08

## 2017-09-15 RX ADMIN — AMLODIPINE BESYLATE 10 MG: 10 TABLET ORAL at 08:08

## 2017-09-15 RX ADMIN — MONTELUKAST SODIUM 10 MG: 10 TABLET, FILM COATED ORAL at 08:08

## 2017-09-15 NOTE — DISCHARGE SUMMARY
Hospitalist Discharge Summary     Patient ID:  Marilee Mcrae  [id number]  89 y.o.  1934  Admit date: 9/12/2017  3:06 PM  Discharge date and time: 9/15/2017  Attending: Radha Espinoza. Dariana Cortez MD  PCP:  Taniya Mccabe MD  Treatment Team: Attending Provider: Radha Espinoza. Dariana Cortez MD; Consulting Provider: Sydney Tam MD; Consulting Provider: Joseph Villatoro MD; Utilization Review: Mariela Urbano RN    Principal Diagnosis Cellulitis of foot   Principal Problem:    Cellulitis of foot (8/17/2017)    Active Problems:    HTN (hypertension) (11/16/2014)      Asthma ()      Overview: sees Dr. 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 Has been on both bactrim and cipro recently, which did not help. Is following with the [address] [facility]. The wounds worsened ~4-5 days ago and she began to have purulent drainage from the toes. Her foot became significantly red. Due to neuropathy of her feet, she cannot feel pain in the foot or toes. Apparently, she was advised to come to the ED 4 days ago, but she did not. She saw Dr. Philly Arias, her PCP today, who sent her to the ED for further evaluation. In the ED, she was noted to be tachycardic, have a leukocytosis of 14k, and changes no L foot x-ray consistent with osteomyelitis of her 3rd and 4th toes.     She is accompanied by her daughter, Roc Grullon. They report Ms. Kobe Skelton had an arterial duplex of her lower extremities in the vascular surgery office about a week ago. I see the order for the study, but do not see results.   They were told that she likely has what sounds like tibial disease and was to see Dr. Jessica Clement on 9/18[facility] Course:  She was admitted and started on IV rocephin and vancomycin. MRI of L foot showing osteomyelitis, but no abscess. Infectious Disease team consulted and recommended IV rocephin and vancomycin for 2-6 weeks pending ulcer improvement. General surgery consulted and no need for surgery at present, but if wounds do not heal, may need L 3rd and 4th toe amputations. Her Cr was 1.57 on admission. Her lisinopril stopped due to this and carvedilol added at 3.125 mg BID. BP elevated and dose adjusted to 12.5 mg at discharge. Anti-hypertensive meds may need to be further titrated while at SNF. Renal function improved and Cr down to 1.2 on day of discharge. On day of discharge, she is deemed stable to go to SNF for IV antibiotic therapy. Significant Diagnostic Studies:       Labs: Results:       Chemistry Recent Labs      09/15/17   0608 09/14/17 0617 09/13/17   0607   GLU  149*  119*  94   NA  136  138  136   K  4.3  4.4  4.4   CL  101  104  103   CO2  27  26  26   BUN  16  15  23   CREA  1.20*  1.11*  1.14*   CA  9.8  8.9  8.4   AGAP  8  8  7      CBC w/Diff Recent Labs      09/15/17   0608 09/14/17 0617 09/13/17   0607   WBC  9.5  8.8  8.9   RBC  3.60*  3.35*  3.35*   HGB  11.3*  10.3*  10.5*   HCT  34.2*  32.0*  32.3*   PLT  391  362  315   GRANS  70  68  66   LYMPH  17  17  16   EOS  5  5  5      Cardiac Enzymes No results for input(s): CPK, CKND1, WALI in the last 72 hours. No lab exists for component: CKRMB, TROIP   Coagulation No results for input(s): PTP, INR, APTT in the last 72 hours.     No lab exists for component: INREXT    Lipid Panel Lab Results   Component Value Date/Time    Cholesterol, total 174 08/17/2017 09:56 AM    HDL Cholesterol 57 08/17/2017 09:56 AM    LDL, calculated 83 08/17/2017 09:56 AM    VLDL, calculated 34 08/17/2017 09:56 AM    Triglyceride 172 08/17/2017 09:56 AM      BNP No results for input(s): BNPP in the last 72 hours. Liver Enzymes No results for input(s): TP, ALB, TBIL, AP, SGOT, GPT in the last 72 hours. No lab exists for component: DBIL   Thyroid Studies Lab Results   Component Value Date/Time    TSH 3.760 03/03/2016 10:17 AM          Imaging:    MRI FOOT LT W WO CONT   Final Result   IMPRESSION:   1. Findings compatible with osteomyelitis of the third and fourth distal   phalanges. Cannot exclude infection extending beyond the level of the DIP. Evaluation is limited due to susceptibility artifact from surgical hardware. 2. Ulcerations of the third and fourth digits with subjacent cellulitis. 3. Evidence of chronic denervation change. XR FOOT LT MIN 3 V   Final Result   IMPRESSION: Destructive bone changes of the distal phalanges of the third and   fourth toe most in keeping with osteomyelitis. Discharge Exam:  Visit Vitals    /86 (BP 1 Location: Left arm, BP Patient Position: Post activity)    Pulse (!) 103  Comment: before meds    Temp 96.6 °F (35.9 °C)    Resp 16    Wt 87.5 kg (193 lb)    LMP 11/12/1980    SpO2 96%    BMI 33.13 kg/m2     General appearance: alert, cooperative, no distress, appears stated age  Lungs: clear to auscultation bilaterally  Heart: regular rate and rhythm, S1, S2 normal, no murmur, click, rub or gallop  Abdomen: soft, non-tender. Bowel sounds normal. No masses,  no organomegaly  Extremities: L foot erythema improved, L 3rd and 4th toe ulcers packed  Neurologic: Grossly normal    Disposition: SNF  Discharge Condition: stable  Patient Instructions:   Current Discharge Medication List      START taking these medications    Details   bisacodyl (DULCOLAX) 5 mg EC tablet Take 1 Tab by mouth now for 1 dose. Qty: 1 Tab, Refills: 0      carvedilol (COREG) 12.5 mg tablet Take 1 Tab by mouth two (2) times daily (with meals).  Indications: hypertension  Qty: 60 Tab, Refills: 0      cefTRIAXone 2 gram 2 g, ADDaptor 1 Device IVPB 2 g by IntraVENous route every twenty-four (24) hours for 40 days. Indications: Toe ulcers/cellulitis  Qty: 40 Dose, Refills: 0      docusate sodium (COLACE) 100 mg capsule Take 1 Cap by mouth daily for 90 days. Qty: 30 Cap, Refills: 2      polyethylene glycol (MIRALAX) 17 gram packet Take 1 Packet by mouth daily as needed. Indications: constipation  Qty: 1 Each, Refills: 0      VANCOMYCIN/0.9 % SOD CHLORIDE (VANCOMYCIN IN 0.9% SODIUM CL) 1.25 gram/250 mL soln 250 mL by IntraVENous route every twenty-four (24) hours for 40 days. Indications: Toe ulcers/foot cellulitis  Qty: 7500 mL, Refills: 1         CONTINUE these medications which have CHANGED    Details   traMADol (ULTRAM-ER) 300 mg tablet Take 1 Tab by mouth daily. Max Daily Amount: 300 mg. Qty: 5 Tab, Refills: 0    Associated Diagnoses: Low back pain radiating to both legs; Generalized OA         CONTINUE these medications which have NOT CHANGED    Details   pioglitazone (ACTOS) 15 mg tablet Take 1 Tab by mouth nightly. Qty: 30 Tab, Refills: 2      linagliptin (TRADJENTA) 5 mg tablet Take 1 Tab by mouth daily. Qty: 90 Tab, Refills: 3    Associated Diagnoses: DM type 2 with diabetic peripheral neuropathy (HCC)      montelukast (SINGULAIR) 10 mg tablet Take 1 Tab by mouth daily. Qty: 90 Tab, Refills: 3    Associated Diagnoses: Mild intermittent asthma without complication      amLODIPine (NORVASC) 10 mg tablet Take 1 Tab by mouth daily. Qty: 90 Tab, Refills: 3    Associated Diagnoses: Essential hypertension with goal blood pressure less than 140/90      glucosamine-chondroitin (ELATION) 1,500-1,200 mg/30 mL liqd Take  by mouth. QUEtiapine (SEROQUEL) 25 mg tablet Take 1/2 tablet before bedtime  Qty: 45 Tab, Refills: 3    Associated Diagnoses: Presenile dementia, paranoid type, with behavioral disturbance      fenofibrate nanocrystallized (TRICOR) 48 mg tablet Take 1 Tab by mouth daily.   Qty: 90 Tab, Refills: 3    Associated Diagnoses: Hypertriglyceridemia      aspirin delayed-release 81 mg tablet Take  by mouth daily. glipiZIDE SR (GLUCOTROL XL) 10 mg CR tablet Take 1 Tab by mouth daily. Qty: 90 Tab, Refills: 3      FLOVENT HFA 44 mcg/actuation inhaler Take 2 Puffs by inhalation two (2) times a day. SPIRIVA WITH HANDIHALER 18 mcg inhalation capsule       albuterol (PROAIR HFA) 90 mcg/actuation inhaler Take  by inhalation. Azelastine (ASTEPRO) 0.15 % (205.5 mcg) nasal spray two (2) times a day. Cholecalciferol, Vitamin D3, (VITAMIN D3) 2,000 unit cap capsule Take 2,000 Units by mouth two (2) times a day. Qty: 60 capsule, Refills: 5    Associated Diagnoses: Vitamin D deficiency      ONE TOUCH ULTRA TEST strip       ONE TOUCH ULTRA 2 monitoring kit       ONE TOUCH DELICA 33 gauge misc       omega-3 fatty acids-vitamin e (FISH OIL) 1,000 mg cap Take 1 capsule by mouth.      b complex vitamins tablet Take 1 tablet by mouth daily. folic acid 146 mcg tablet Take 400 mcg by mouth daily. STOP taking these medications       lisinopril (PRINIVIL, ZESTRIL) 40 mg tablet Comments:   Reason for Stopping:               Activity: PT/OT Eval and Treat  Diet: Diabetic Diet  Wound Care: Wound Orders  Wound #1 Right, Distal, Plantar Second Toe  Other Orders  Wound Cleansing  n Cleanse wound and periwound with wound cleanser or normal saline. n May shower if wound can be effectively kept dry. Cast covers may be purchased at [facility]. Wound Dressings  n Acticoat Flex: cut to approximate size of wound, apply to wound bed. - cover with ABD, wrap with Rolled  Gauze. Dressing changes every other day. Off-Loading, Pressure Relief  n Off-loading Device - Continue wearing CROW BOOT with [employer] Orth[employer]. Continue anticipating arrival of new  Diabetic Shoe to Left Foot with Custom Orthotic. Begin wearing as soon as possible. Bring new Offloading SHOES AND  INSERTS to next visit or as soon as possible.   Wound #2 Left, Distal, Plantar Third Toe  Other Orders  Wound Cleansing  n Cleanse wound and periwound with wound cleanser or normal saline. n May shower if wound can be effectively kept dry. Cast covers may be purchased at [facility]. Wound Dressings  n Iodoform: pack loosely into wound, filling all dead space change daily - cover with ABD, wrap with Rolled  Gauze, secure with Tape. DRESSING CHANGE DAILY. HOME HEALTH, PLEASE SEE PATIENT DAILY FOR AT LEAST  FIRST WEEK OF WOUND CARE. Off-Loading, Pressure Relief  n Off-loading Device - Continue wearing CROW BOOT with Custom Orthotics. Continue anticipating arrival of new  Diabetic Shoe to Left Foot with Custom Orthotic. Begin wearing as soon as possible. Bring new Offloading SHOES AND  INSERTS to next visit or as soon as possible. Wound #3 Left, Distal, Plantar Fourth Toe  Other Orders  Wound Cleansing  n Cleanse wound and periwound with wound cleanser or normal saline. n May shower if wound can be effectively kept dry. Cast covers may be purchased at [facility]. Wound Dressings  n Iodoform: pack loosely into wound, filling all dead space change daily - cover with ABD, wrap with Rolled  Gauze, secure with Tape. DRESSING CHANGE DAILY. HOME HEALTH, PLEASE SEE PATIENT DAILY FOR AT LEAST  FIRST WEEK OF WOUND CARE. Off-Loading, Pressure Relief  n Off-loading Device - Continue wearing CROW BOOT with Custom Orthotics. Continue anticipating arrival of new  Diabetic Shoe to Left Foot with Custom Orthotic. Begin wearing as soon as possible. Bring new Offloading SHOES AND  INSERTS to next visit or as soon as possible. Follow-up  ·   Care team at [facility]. · Infecious Disease (Dr. Madelyn Sultana) in 2 weeks. · General Surgery (Dr. Mark Cabrera) in 2 weeks  Time spent to discharge patient 35 minutes  Signed:  Lexi Lamar.  Radha Taylor MD  9/15/2017  11:44 AM

## 2017-09-15 NOTE — PROGRESS NOTES
Report called to [facility] rehab to unit 4, Nurse Smiley. Included SBAR, Dressing change instructions, H/P. No further clarification needed.

## 2017-09-15 NOTE — PROGRESS NOTES
Problem: Mobility Impaired (Adult and Pediatric)  Goal: *Acute Goals and Plan of Care (Insert Text)  STG:  (1.)Ms. Ayan Gardner will move from supine to sit and sit to supine with SUPERVISION within 5 day(s). (2.)Ms. Ayan Gardner will transfer from bed to chair and chair to bed with SUPERVISION using the least restrictive device within 5 day(s). (3.)Ms. Ayan Gardner will ambulate with SUPERVISION for 100 feet with the least restrictive device within 5 day(s). (4.)Ms. Ayan Gardner will increase B LE strength 1/2 grade to improve functional mobility within 5 day(s). ________________________________________________________________________________________________      PHYSICAL THERAPY: Daily Note, Treatment Day: 1st and PM 9/15/2017  INPATIENT: Hospital Day: 4  Payor: CARE IMPROVEMENT PLUS [place] / Product Type: [employer] Medicare /      NAME/AGE/GENDER: Caryle Peper is a 80 y.o. female             PRIMARY DIAGNOSIS: Cellulitis of foot Cellulitis of foot Cellulitis of foot        ICD-10: Treatment Diagnosis:       · Generalized Muscle Weakness (M62.81)  · Difficulty in walking, Not elsewhere classified (R26.2)  · Other abnormalities of gait and mobility (R26.89)   Precaution/Allergies:  Aleve [naproxen sodium]; Hydrocodone-acetaminophen; Metformin; and Phenobarbital       ASSESSMENT:      Ms. Ayan Gardner presents with cellulitis of the L foot. MRI confirms osteomyelitis of the L 3rd and 4th distal phalanges. She wears a boot on the R LE for support and protection and has a large tennis shoe on the L foot. I expect that she will need a similar boot for the L LE in near future. She presents with generalized weakness in all 4 extremities, decreased endurance, standing balance and functional mobility. She would benefit from further PT while here. The surgeon will review her chart and speak with her today. Her dtr.  Feels that the plan will be for her to go to rehab at [place] to continue with IV antibiotics. I think she would benefit from further therapy there, if she is approved for admission. Otherwise HH or OP PT would beneficial.  She is sitting in the chair upon arrival.  She walk 650 ft using a Rollator Walker with SBA and no LOB with 1 rest break. She is leaving today. This section established at most recent assessment   PROBLEM LIST (Impairments causing functional limitations):  1. Decreased Strength  2. Decreased ADL/Functional Activities  3. Decreased Transfer Abilities  4. Decreased Ambulation Ability/Technique  5. Decreased Balance  6. Decreased Activity Tolerance  7. Increased Fatigue  8. Decreased Flexibility/Joint Mobility  9. Decreased Skin Integrity/Hygeine  10. Decreased [place] with Home Exercise Program    INTERVENTIONS PLANNED: (Benefits and precautions of physical therapy have been discussed with the patient.)  1. Balance Exercise  2. Bed Mobility  3. Family Education  4. Gait Training  5. Home Exercise Program (HEP)  6. Range of Motion (ROM)  7. Therapeutic Activites  8. Therapeutic Exercise/Strengthening  9. Transfer Training      TREATMENT PLAN: Frequency/Duration: daily for duration of hospital stay  Rehabilitation Potential For Stated Goals: Daphney Barreto REHABILITATION/EQUIPMENT: (at time of discharge pending progress): Due to the probability of continued deficits (see above) this patient will likely need continued skilled physical therapy after discharge. Equipment: Pt. has  · Walkers, Type: Rolling Walker  · [employer], Type: Raised Toilet Seat and shower chair  · rollator                   HISTORY:   History of Present Injury/Illness (Reason for Referral): Admitted with cellulitis of the L foot. MRI confirms osteomyelitis L 3rd and 4th toes  Past Medical History/Comorbidities:   Ms. Tawny New  has a past medical history of Arthritis; Asthma; Burning with urination; Charcot-Evy-Tooth disease-like deformity of foot; Diabetes ([facility]  Full dentures; Gastrointestinal disorder; Hypertension; and Incontinence of urine in female. She also has no past medical history of CAD (coronary artery disease); Cancer ([facility] Chronic kidney disease; COPD; Heart failure ([facility] [address] Liver disease; PUD (peptic ulcer disease); Seizures ([facility] Stroke [facility]); or Thromboembolus ([facility] Ms. Ronal Knowles  has a past surgical history that includes gyn; breast surgery procedure unlisted; cholecystectomy; orthopaedic; orthopaedic; and colonoscopy. Social History/Living Environment:   Home Environment: Apartment  # Steps to Enter: 0  One/Two Story Residence: One story  Living Alone: Yes  Support Systems: Child(don), Family member(s)  Patient Expects to be Discharged to[de-identified] Apartment (dtr states she may go to rehab for IV antibiotics)  Current DME Used/Available at Home: Raised toilet seat, Shower chair, Walker, rolling, Walker, rollator  Tub or Shower Type: Tub/Shower combination  Prior Level of Function/Work/Activity:  Lives alone and is functionally independent with ADls, uses rollator for ambulation. Family helps with meal prep  Dominant Side:         RIGHT   Number of Personal Factors/Comorbidities that affect the Plan of Care: 1-2: MODERATE COMPLEXITY   EXAMINATION:   Most Recent Physical Functioning:   Gross Assessment:                  Posture:     Balance:    Bed Mobility:     Wheelchair Mobility:     Transfers:  Sit to Stand: Stand-by asssistance  Stand to Sit: Stand-by asssistance  Gait:     Base of Support: Narrowed  Speed/Loan: Pace decreased (<100 feet/min)  Gait Abnormalities: Path deviations  Distance (ft): 650 Feet (ft)  Assistive Device: Walker, rollator  Ambulation - Level of Assistance: Stand-by asssistance  Interventions: Safety awareness training  Duration: 25 Minutes       Body Structures Involved:  1. Bones  2. Joints  3. Muscles Body Functions Affected:  1. Neuromusculoskeletal  2. Movement Related  3. Skin Related Activities and Participation Affected:  1. Mobility  2.  Self Care   Number of elements that affect the Plan of Care: 4+: HIGH COMPLEXITY   CLINICAL PRESENTATION:   Presentation: Evolving clinical presentation with changing clinical characteristics: MODERATE COMPLEXITY   CLINICAL DECISION MAKIN [facility] Mobility Inpatient Short Form  How much difficulty does the patient currently have. .. Unable A Lot A Little None   1. Turning over in bed (including adjusting bedclothes, sheets and blankets)? [ ] 1   [ ] 2   [ ] 3   [X] 4   2. Sitting down on and standing up from a chair with arms ( e.g., wheelchair, bedside commode, etc.)   [ ] 1   [ ] 2   [X] 3   [ ] 4   3. Moving from lying on back to sitting on the side of the bed? [ ] 1   [ ] 2   [X] 3   [ ] 4   How much help from another person does the patient currently need. .. Total A Lot A Little None   4. Moving to and from a bed to a chair (including a wheelchair)? [ ] 1   [ ] 2   [X] 3   [ ] 4   5. Need to walk in hospital room? [ ] 1   [ ] 2   [X] 3   [ ] 4   6. Climbing 3-5 steps with a railing? [ ] 1   [ ] 2   [X] 3   [ ] 4   © , Trustees of [address] [place] [address], under license to [employer]. All rights reserved    Score:  Initial:19 Most Recent: X (Date: -- )     Interpretation of Tool:  Represents activities that are increasingly more difficult (i.e. Bed mobility, Transfers, Gait).        Score 24 23 22-20 19-15 14-10 9-7 6       Modifier CH CI CJ CK CL CM CN         · Mobility - Walking and Moving Around:               - CURRENT STATUS:    CK - 40%-59% impaired, limited or restricted               - GOAL STATUS:           CJ - 20%-39% impaired, limited or restricted               - D/C STATUS:                       ---------------To be determined---------------  Payor: CARE IMPROVEMENT PLUS [place] / Product Type: Managed Care Medicare /       Medical Necessity:     · Patient demonstrates fair rehab potential due to higher previous functional level. Reason for Services/Other Comments:  · Patient continues to require present interventions due to patient's inability to perform functional mobility independently. Use of outcome tool(s) and clinical judgement create a POC that gives a: Questionable prediction of patient's progress: MODERATE COMPLEXITY                 TREATMENT:   (In addition to Assessment/Re-Assessment sessions the following treatments were rendered)   Pre-treatment Symptoms/Complaints:  No pain in feet due to lack of sensation  Pain: Initial:   Pain Intensity 1: 0 (0/10 after therapy)  Post Session:        Gait Training (25 Minutes):  Gait training to improve and/or restore physical functioning as related to mobility. Ambulated 650 Feet (ft) with Stand-by asssistance using a Walker, rollator and minimal Safety awareness training       Braces/Orthotics/Lines/Etc:   · special boot for R LE  · O2 Device: Room air  Treatment/Session Assessment:    · Response to Treatment: she tolerated session well. · Interdisciplinary Collaboration:  · Physical Therapist  · Registered Nurse  · After treatment position/precautions:  · Up in chair  · Call light within reach  · RN notified  · Family at bedside  · Compliance with Program/Exercises: Will assess as treatment progresses. · Recommendations/Intent for next treatment session: \"Next visit will focus on advancements to more challenging activities and reduction in assistance provided\".   Total Treatment Duration:PT Patient Time In/Time Out  Time In: 1330  Time Out: Ryley Sanchez, PTA

## 2017-09-15 NOTE — PROGRESS NOTES
Problem: Mobility Impaired (Adult and Pediatric)  Goal: *Acute Goals and Plan of Care (Insert Text)  STG:  (1.)Ms. India Brown will move from supine to sit and sit to supine with SUPERVISION within 5 day(s). (2.)Ms. India Brown will transfer from bed to chair and chair to bed with SUPERVISION using the least restrictive device within 5 day(s). (3.)Ms. India Brown will ambulate with SUPERVISION for 100 feet with the least restrictive device within 5 day(s). (4.)Ms. India Brown will increase B LE strength 1/2 grade to improve functional mobility within 5 day(s). ________________________________________________________________________________________________      PHYSICAL THERAPY: Daily Note, Treatment Day: 1st and AM 9/15/2017  INPATIENT: Hospital Day: 4  Payor: CARE IMPROVEMENT PLUS [place] / Product Type: [employer] via [place] Care Medicare /      NAME/AGE/GENDER: Oliver Castro is a 80 y.o. female             PRIMARY DIAGNOSIS: Cellulitis of foot Cellulitis of foot Cellulitis of foot        ICD-10: Treatment Diagnosis:       · Generalized Muscle Weakness (M62.81)  · Difficulty in walking, Not elsewhere classified (R26.2)  · Other abnormalities of gait and mobility (R26.89)   Precaution/Allergies:  Aleve [naproxen sodium]; Hydrocodone-acetaminophen; Metformin; and Phenobarbital       ASSESSMENT:      Ms. India Brown presents with cellulitis of the L foot. MRI confirms osteomyelitis of the L 3rd and 4th distal phalanges. She wears a boot on the R LE for support and protection and has a large tennis shoe on the L foot. I expect that she will need a similar boot for the L LE in near future. She presents with generalized weakness in all 4 extremities, decreased endurance, standing balance and functional mobility. She would benefit from further PT while here. The surgeon will review her chart and speak with her today. Her dtr.  Feels that the plan will be for her to go to rehab at [place] to continue with IV antibiotics. I think she would benefit from further therapy there, if she is approved for admission. Otherwise HH or OP PT would beneficial.  She is sitting in the chair upon arrival.  She goes from sit to stand with CGA-SBA , then ambulates down the martin using a Rollator Walker with CGA-SBA with no LOB. She return to the chair with call light near. This section established at most recent assessment   PROBLEM LIST (Impairments causing functional limitations):  1. Decreased Strength  2. Decreased ADL/Functional Activities  3. Decreased Transfer Abilities  4. Decreased Ambulation Ability/Technique  5. Decreased Balance  6. Decreased Activity Tolerance  7. Increased Fatigue  8. Decreased Flexibility/Joint Mobility  9. Decreased Skin Integrity/Hygeine  10. Decreased [place] with Home Exercise Program    INTERVENTIONS PLANNED: (Benefits and precautions of physical therapy have been discussed with the patient.)  1. Balance Exercise  2. Bed Mobility  3. Family Education  4. Gait Training  5. Home Exercise Program (HEP)  6. Range of Motion (ROM)  7. Therapeutic Activites  8. Therapeutic Exercise/Strengthening  9. Transfer Training      TREATMENT PLAN: Frequency/Duration: daily for duration of hospital stay  Rehabilitation Potential For Stated Goals: Daphney Barreto REHABILITATION/EQUIPMENT: (at time of discharge pending progress): Due to the probability of continued deficits (see above) this patient will likely need continued skilled physical therapy after discharge. Equipment: Pt. has  · Walkers, Type: Rolling Walker  · [employer], Type: Raised Toilet Seat and shower chair  · rollator                   HISTORY:   History of Present Injury/Illness (Reason for Referral): Admitted with cellulitis of the L foot. MRI confirms osteomyelitis L 3rd and 4th toes  Past Medical History/Comorbidities:   Ms. Tawny New  has a past medical history of Arthritis;  Asthma; Burning with urination; Charcot-Evy-Tooth disease-like deformity of foot; Diabetes ([facility] Full dentures; Gastrointestinal disorder; Hypertension; and Incontinence of urine in female. She also has no past medical history of CAD (coronary artery disease); Cancer ([facility] Chronic kidney disease; COPD; Heart failure ([facility] Liver disease; PUD (peptic ulcer disease); Seizures ([facility] Stroke [facility]); or Thromboembolus ([facility] Ms. Natalie Wise  has a past surgical history that includes gyn; breast surgery procedure unlisted; cholecystectomy; orthopaedic; orthopaedic; and colonoscopy. Social History/Living Environment:   Home Environment: Apartment  # Steps to Enter: 0  One/Two Story Residence: One story  Living Alone: Yes  Support Systems: Child(don), Family member(s)  Patient Expects to be Discharged to[de-identified] Apartment (dtr states she may go to rehab for IV antibiotics)  Current DME Used/Available at Home: Raised toilet seat, Shower chair, Walker, rolling, Walker, rollator  Tub or Shower Type: Tub/Shower combination  Prior Level of Function/Work/Activity:  Lives alone and is functionally independent with ADls, uses rollator for ambulation. Family helps with meal prep  Dominant Side:         RIGHT   Number of Personal Factors/Comorbidities that affect the Plan of Care: 1-2: MODERATE COMPLEXITY   EXAMINATION:   Most Recent Physical Functioning:   Gross Assessment:                  Posture:     Balance:    Bed Mobility:     Wheelchair Mobility:     Transfers:  Sit to Stand: Stand-by asssistance  Stand to Sit: Stand-by asssistance;Contact guard assistance  Gait:     Base of Support: Narrowed  Speed/Loan: Pace decreased (<100 feet/min)  Gait Abnormalities: Path deviations  Distance (ft): 200 Feet (ft)  Assistive Device: Walker, rollator  Ambulation - Level of Assistance: Stand-by asssistance;Contact guard assistance  Interventions: Safety awareness training  Duration: 25 Minutes       Body Structures Involved:  1. Bones  2. Joints  3.  Muscles Body Functions Affected:  1. Neuromusculoskeletal  2. Movement Related  3. Skin Related Activities and Participation Affected:  1. Mobility  2. Self Care   Number of elements that affect the Plan of Care: 4+: HIGH COMPLEXITY   CLINICAL PRESENTATION:   Presentation: Evolving clinical presentation with changing clinical characteristics: MODERATE COMPLEXITY   CLINICAL DECISION MAKIN [facility] Mobility Inpatient Short Form  How much difficulty does the patient currently have. .. Unable A Lot A Little None   1. Turning over in bed (including adjusting bedclothes, sheets and blankets)? [ ] 1   [ ] 2   [ ] 3   [X] 4   2. Sitting down on and standing up from a chair with arms ( e.g., wheelchair, bedside commode, etc.)   [ ] 1   [ ] 2   [X] 3   [ ] 4   3. Moving from lying on back to sitting on the side of the bed? [ ] 1   [ ] 2   [X] 3   [ ] 4   How much help from another person does the patient currently need. .. Total A Lot A Little None   4. Moving to and from a bed to a chair (including a wheelchair)? [ ] 1   [ ] 2   [X] 3   [ ] 4   5. Need to walk in hospital room? [ ] 1   [ ] 2   [X] 3   [ ] 4   6. Climbing 3-5 steps with a railing? [ ] 1   [ ] 2   [X] 3   [ ] 4   © , Trustees of [address] [place] [address] [address] [address], under license to [employer]. All rights reserved    Score:  Initial:19 Most Recent: X (Date: -- )     Interpretation of Tool:  Represents activities that are increasingly more difficult (i.e. Bed mobility, Transfers, Gait).        Score 24 23 22-20 19-15 14-10 9-7 6       Modifier CH CI CJ CK CL CM CN         · Mobility - Walking and Moving Around:               - CURRENT STATUS:    CK - 40%-59% impaired, limited or restricted               - GOAL STATUS:           CJ - 20%-39% impaired, limited or restricted               - D/C STATUS:                       ---------------To be determined---------------  Payor: CARE IMPROVEMENT PLUS / [place]PR[place] / Product Type: Managed Care Medicare /       Medical Necessity:     · Patient demonstrates fair rehab potential due to higher previous functional level. Reason for Services/Other Comments:  · Patient continues to require present interventions due to patient's inability to perform functional mobility independently. Use of outcome tool(s) and clinical judgement create a POC that gives a: Questionable prediction of patient's progress: MODERATE COMPLEXITY                 TREATMENT:   (In addition to Assessment/Re-Assessment sessions the following treatments were rendered)   Pre-treatment Symptoms/Complaints:  No pain in feet due to lack of sensation  Pain: Initial:   Pain Intensity 1: 0 (0/10 after therapy)  Post Session:        Gait Training (25 Minutes):  Gait training to improve and/or restore physical functioning as related to mobility. Ambulated 200 Feet (ft) with Stand-by asssistance;Contact guard assistance using a Walker, rollator and minimal Safety awareness training       Braces/Orthotics/Lines/Etc:   · special boot for R LE  · O2 Device: Room air  Treatment/Session Assessment:    · Response to Treatment: she tolerated session well. · Interdisciplinary Collaboration:  · Physical Therapist  · Registered Nurse  · After treatment position/precautions:  · Up in chair  · Call light within reach  · RN notified  · Family at bedside  · Compliance with Program/Exercises: Will assess as treatment progresses. · Recommendations/Intent for next treatment session: \"Next visit will focus on advancements to more challenging activities and reduction in assistance provided\".   Total Treatment Duration:PT Patient Time In/Time Out  Time In: 1355  Time Out: 0940     Irena Sanchez, PTA

## 2017-09-15 NOTE — PROGRESS NOTES
Shift assessment complete. Pt alert and oriented. Respirations even and unlabored. Abdomen soft, non-tender. PICC patent, antibiotics infusing without difficulty. Pt denies pain. Dressing on left foot c/d/i. No needs voiced at this time. All safety measures in place. Pt instructed to call for any assistance.

## 2017-09-15 NOTE — PROGRESS NOTES
Pt assessment completed. PIC flushed and hep locked in both lumens. Dressing to Left foot changed and pt tolerated well.

## 2017-09-16 ENCOUNTER — HOME CARE VISIT (OUTPATIENT)
Dept: HOME HEALTH SERVICES | Facility: HOME HEALTH | Age: 82
End: 2017-09-16
Payer: MEDICARE

## 2017-09-17 LAB
BACTERIA SPEC CULT: NORMAL
BACTERIA SPEC CULT: NORMAL
SERVICE CMNT-IMP: NORMAL
SERVICE CMNT-IMP: NORMAL

## 2017-09-18 ENCOUNTER — HOME CARE VISIT (OUTPATIENT)
Dept: HOME HEALTH SERVICES | Facility: HOME HEALTH | Age: 82
End: 2017-09-18
Payer: MEDICARE

## 2017-09-18 ENCOUNTER — PATIENT OUTREACH (OUTPATIENT)
Dept: CASE MANAGEMENT | Age: 82
End: 2017-09-18

## 2017-09-22 ENCOUNTER — HOSPITAL ENCOUNTER (OUTPATIENT)
Dept: SURGERY | Age: 82
Discharge: HOME | End: 2017-09-22
Payer: MEDICARE

## 2017-09-22 VITALS
HEART RATE: 93 BPM | DIASTOLIC BLOOD PRESSURE: 65 MMHG | HEIGHT: 64 IN | TEMPERATURE: 98 F | SYSTOLIC BLOOD PRESSURE: 137 MMHG | BODY MASS INDEX: 33.51 KG/M2 | OXYGEN SATURATION: 96 % | RESPIRATION RATE: 16 BRPM | WEIGHT: 196.31 LBS

## 2017-09-22 LAB — GLUCOSE BLD STRIP.AUTO-MCNC: 177 MG/DL (ref 65–100)

## 2017-09-22 PROCEDURE — 82962 GLUCOSE BLOOD TEST: CPT

## 2017-09-22 RX ORDER — LISINOPRIL 40 MG/1
TABLET ORAL
COMMUNITY
Start: 2017-08-24 | End: 2018-02-26 | Stop reason: SDUPTHER

## 2017-09-22 NOTE — PERIOP NOTES
Jose Stallings reviewed labs dated 9-15-17 in [address] [place] [address] and reviewed creatinine old values in CC. Pt ok for surgery.

## 2017-09-22 NOTE — PERIOP NOTES
Patient verified name, , and surgery as listed in [facility]. Patient provided medical/health information and PTA medications to the best of their ability. TYPE  CASE:1B  Orders per surgeon: yes Received  Labs per surgeon:BMP-9-15-17 (ok to use per DR Shankar's orders. Labs per anesthesia protocol: none. Results Hgb 9-15-17  WNL 9-15-17 if needed  EKG  :  Not needed-see note    Patient provided with and instructed on education handouts including Guide to Surgery, blood transfusions, pain management, and hand hygiene for the family and community, and [facility] brochure. One bar andrew mist soap and instructions given per hospital policy. Instructed patient to continue previous medications as prescribed prior to surgery unless otherwise directed and to take the following medications the day of surgery according to anesthesia guidelines : albuterol-bring, amlodipine, ASA 81 mg, carvedilol, flovent-bring, spiriva-bring, tramadol  . Instructed patient to hold  the following medications: B complex, vit D, folic acid, glucosamine, fish oil . Original medication prescription bottles NOT visualized during patient appointment. Patient teach back successful and patient demonstrates knowledge of instruction.

## 2017-09-27 PROCEDURE — 99214 OFFICE O/P EST MOD 30 MIN: CPT

## 2017-09-27 PROCEDURE — 11056 PARNG/CUTG B9 HYPRKR LES 2-4: CPT

## 2017-09-28 ENCOUNTER — ANESTHESIA EVENT (OUTPATIENT)
Dept: SURGERY | Age: 82
End: 2017-09-28
Payer: MEDICARE

## 2017-09-29 ENCOUNTER — HOSPITAL ENCOUNTER (OUTPATIENT)
Age: 82
Setting detail: OUTPATIENT SURGERY
Discharge: HOME | End: 2017-09-29
Attending: SURGERY | Admitting: SURGERY
Payer: MEDICARE

## 2017-09-29 ENCOUNTER — ANESTHESIA (OUTPATIENT)
Dept: SURGERY | Age: 82
End: 2017-09-29
Payer: MEDICARE

## 2017-09-29 ENCOUNTER — APPOINTMENT (OUTPATIENT)
Dept: INTERVENTIONAL RADIOLOGY/VASCULAR | Age: 82
End: 2017-09-29
Attending: SURGERY
Payer: MEDICARE

## 2017-09-29 VITALS
HEART RATE: 79 BPM | WEIGHT: 200.5 LBS | BODY MASS INDEX: 34.23 KG/M2 | DIASTOLIC BLOOD PRESSURE: 69 MMHG | HEIGHT: 64 IN | SYSTOLIC BLOOD PRESSURE: 146 MMHG | RESPIRATION RATE: 18 BRPM | TEMPERATURE: 98 F | OXYGEN SATURATION: 95 %

## 2017-09-29 LAB
ANION GAP SERPL CALC-SCNC: 9 MMOL/L (ref 7–16)
BUN SERPL-MCNC: 30 MG/DL (ref 8–23)
CALCIUM SERPL-MCNC: 8.8 MG/DL (ref 8.3–10.4)
CHLORIDE SERPL-SCNC: 104 MMOL/L (ref 98–107)
CO2 SERPL-SCNC: 26 MMOL/L (ref 21–32)
CREAT SERPL-MCNC: 1.64 MG/DL (ref 0.6–1)
GLUCOSE BLD STRIP.AUTO-MCNC: 122 MG/DL (ref 65–100)
GLUCOSE SERPL-MCNC: 118 MG/DL (ref 65–100)
POTASSIUM SERPL-SCNC: 4.4 MMOL/L (ref 3.5–5.1)
SODIUM SERPL-SCNC: 139 MMOL/L (ref 136–145)

## 2017-09-29 PROCEDURE — 74011250636 HC RX REV CODE- 250/636: Performed by: SURGERY

## 2017-09-29 PROCEDURE — 75625 CONTRAST EXAM ABDOMINL AORTA: CPT

## 2017-09-29 PROCEDURE — 77030020782 HC GWN BAIR PAWS FLX 3M -B: Performed by: ANESTHESIOLOGY

## 2017-09-29 PROCEDURE — C1894 INTRO/SHEATH, NON-LASER: HCPCS

## 2017-09-29 PROCEDURE — 76210000017 HC OR PH I REC 1.5 TO 2 HR: Performed by: SURGERY

## 2017-09-29 PROCEDURE — C1769 GUIDE WIRE: HCPCS

## 2017-09-29 PROCEDURE — C1725 CATH, TRANSLUMIN NON-LASER: HCPCS

## 2017-09-29 PROCEDURE — C1887 CATHETER, GUIDING: HCPCS

## 2017-09-29 PROCEDURE — 74011636320 HC RX REV CODE- 636/320: Performed by: SURGERY

## 2017-09-29 PROCEDURE — C1751 CATH, INF, PER/CENT/MIDLINE: HCPCS

## 2017-09-29 PROCEDURE — 74011250636 HC RX REV CODE- 250/636

## 2017-09-29 PROCEDURE — 82962 GLUCOSE BLOOD TEST: CPT

## 2017-09-29 PROCEDURE — 76937 US GUIDE VASCULAR ACCESS: CPT

## 2017-09-29 PROCEDURE — 77030020143 HC AIRWY LARYN INTUB CGAS -A: Performed by: ANESTHESIOLOGY

## 2017-09-29 PROCEDURE — 80048 BASIC METABOLIC PNL TOTAL CA: CPT | Performed by: SURGERY

## 2017-09-29 PROCEDURE — 74011250637 HC RX REV CODE- 250/637: Performed by: ANESTHESIOLOGY

## 2017-09-29 PROCEDURE — 76060000033 HC ANESTHESIA 1 TO 1.5 HR: Performed by: SURGERY

## 2017-09-29 PROCEDURE — 76010000149 HC OR TIME 1 TO 1.5 HR: Performed by: SURGERY

## 2017-09-29 PROCEDURE — 76210000020 HC REC RM PH II FIRST 0.5 HR: Performed by: SURGERY

## 2017-09-29 PROCEDURE — 74011250636 HC RX REV CODE- 250/636: Performed by: ANESTHESIOLOGY

## 2017-09-29 RX ORDER — OXYCODONE AND ACETAMINOPHEN 5; 325 MG/1; MG/1
1 TABLET ORAL
Status: DISCONTINUED | OUTPATIENT
Start: 2017-09-29 | End: 2017-09-29 | Stop reason: HOSPADM

## 2017-09-29 RX ORDER — FENTANYL CITRATE 50 UG/ML
25 INJECTION, SOLUTION INTRAMUSCULAR; INTRAVENOUS ONCE
Status: DISCONTINUED | OUTPATIENT
Start: 2017-09-29 | End: 2017-09-29 | Stop reason: HOSPADM

## 2017-09-29 RX ORDER — OXYCODONE HYDROCHLORIDE 5 MG/1
5 TABLET ORAL
Status: DISCONTINUED | OUTPATIENT
Start: 2017-09-29 | End: 2017-09-29 | Stop reason: HOSPADM

## 2017-09-29 RX ORDER — MIDAZOLAM HYDROCHLORIDE 1 MG/ML
2 INJECTION, SOLUTION INTRAMUSCULAR; INTRAVENOUS
Status: DISCONTINUED | OUTPATIENT
Start: 2017-09-29 | End: 2017-09-29 | Stop reason: HOSPADM

## 2017-09-29 RX ORDER — ONDANSETRON 2 MG/ML
4 INJECTION INTRAMUSCULAR; INTRAVENOUS
Status: DISCONTINUED | OUTPATIENT
Start: 2017-09-29 | End: 2017-09-29 | Stop reason: HOSPADM

## 2017-09-29 RX ORDER — MORPHINE SULFATE 2 MG/ML
1 INJECTION, SOLUTION INTRAMUSCULAR; INTRAVENOUS
Status: DISCONTINUED | OUTPATIENT
Start: 2017-09-29 | End: 2017-09-29 | Stop reason: HOSPADM

## 2017-09-29 RX ORDER — PROTAMINE SULFATE 10 MG/ML
INJECTION, SOLUTION INTRAVENOUS AS NEEDED
Status: DISCONTINUED | OUTPATIENT
Start: 2017-09-29 | End: 2017-09-29 | Stop reason: HOSPADM

## 2017-09-29 RX ORDER — ONDANSETRON 2 MG/ML
INJECTION INTRAMUSCULAR; INTRAVENOUS AS NEEDED
Status: DISCONTINUED | OUTPATIENT
Start: 2017-09-29 | End: 2017-09-29 | Stop reason: HOSPADM

## 2017-09-29 RX ORDER — FAMOTIDINE 20 MG/1
20 TABLET, FILM COATED ORAL ONCE
Status: COMPLETED | OUTPATIENT
Start: 2017-09-29 | End: 2017-09-29

## 2017-09-29 RX ORDER — HEPARIN SODIUM 1000 [USP'U]/ML
INJECTION, SOLUTION INTRAVENOUS; SUBCUTANEOUS AS NEEDED
Status: DISCONTINUED | OUTPATIENT
Start: 2017-09-29 | End: 2017-09-29 | Stop reason: HOSPADM

## 2017-09-29 RX ORDER — PROPOFOL 10 MG/ML
INJECTION, EMULSION INTRAVENOUS AS NEEDED
Status: DISCONTINUED | OUTPATIENT
Start: 2017-09-29 | End: 2017-09-29 | Stop reason: HOSPADM

## 2017-09-29 RX ORDER — SODIUM CHLORIDE 9 MG/ML
50 INJECTION, SOLUTION INTRAVENOUS CONTINUOUS
Status: DISCONTINUED | OUTPATIENT
Start: 2017-09-29 | End: 2017-09-29 | Stop reason: HOSPADM

## 2017-09-29 RX ORDER — LIDOCAINE HYDROCHLORIDE 10 MG/ML
0.1 INJECTION INFILTRATION; PERINEURAL AS NEEDED
Status: DISCONTINUED | OUTPATIENT
Start: 2017-09-29 | End: 2017-09-29 | Stop reason: HOSPADM

## 2017-09-29 RX ORDER — OXYCODONE AND ACETAMINOPHEN 5; 325 MG/1; MG/1
1 TABLET ORAL AS NEEDED
Status: DISCONTINUED | OUTPATIENT
Start: 2017-09-29 | End: 2017-09-29 | Stop reason: HOSPADM

## 2017-09-29 RX ORDER — SODIUM CHLORIDE, SODIUM LACTATE, POTASSIUM CHLORIDE, CALCIUM CHLORIDE 600; 310; 30; 20 MG/100ML; MG/100ML; MG/100ML; MG/100ML
100 INJECTION, SOLUTION INTRAVENOUS CONTINUOUS
Status: DISCONTINUED | OUTPATIENT
Start: 2017-09-29 | End: 2017-09-29 | Stop reason: HOSPADM

## 2017-09-29 RX ORDER — SODIUM CHLORIDE 9 MG/ML
75 INJECTION, SOLUTION INTRAVENOUS CONTINUOUS
Status: DISCONTINUED | OUTPATIENT
Start: 2017-09-29 | End: 2017-09-29 | Stop reason: HOSPADM

## 2017-09-29 RX ORDER — SODIUM CHLORIDE 0.9 % (FLUSH) 0.9 %
5-10 SYRINGE (ML) INJECTION EVERY 8 HOURS
Status: DISCONTINUED | OUTPATIENT
Start: 2017-09-29 | End: 2017-09-29 | Stop reason: HOSPADM

## 2017-09-29 RX ORDER — SODIUM CHLORIDE 0.9 % (FLUSH) 0.9 %
5-10 SYRINGE (ML) INJECTION AS NEEDED
Status: DISCONTINUED | OUTPATIENT
Start: 2017-09-29 | End: 2017-09-29 | Stop reason: HOSPADM

## 2017-09-29 RX ORDER — DIPHENHYDRAMINE HYDROCHLORIDE 50 MG/ML
12.5 INJECTION, SOLUTION INTRAMUSCULAR; INTRAVENOUS
Status: DISCONTINUED | OUTPATIENT
Start: 2017-09-29 | End: 2017-09-29 | Stop reason: HOSPADM

## 2017-09-29 RX ORDER — DIPHENHYDRAMINE HYDROCHLORIDE 50 MG/ML
12.5 INJECTION, SOLUTION INTRAMUSCULAR; INTRAVENOUS ONCE
Status: DISCONTINUED | OUTPATIENT
Start: 2017-09-29 | End: 2017-09-29 | Stop reason: HOSPADM

## 2017-09-29 RX ORDER — CEFAZOLIN SODIUM IN 0.9 % NACL 2 G/50 ML
2 INTRAVENOUS SOLUTION, PIGGYBACK (ML) INTRAVENOUS ONCE
Status: COMPLETED | OUTPATIENT
Start: 2017-09-29 | End: 2017-09-29

## 2017-09-29 RX ORDER — MIDAZOLAM HYDROCHLORIDE 1 MG/ML
2 INJECTION, SOLUTION INTRAMUSCULAR; INTRAVENOUS ONCE
Status: DISCONTINUED | OUTPATIENT
Start: 2017-09-29 | End: 2017-09-29 | Stop reason: HOSPADM

## 2017-09-29 RX ORDER — CARVEDILOL 12.5 MG/1
12.5 TABLET ORAL ONCE
Status: COMPLETED | OUTPATIENT
Start: 2017-09-29 | End: 2017-09-29

## 2017-09-29 RX ORDER — HYDROMORPHONE HYDROCHLORIDE 2 MG/ML
0.5 INJECTION, SOLUTION INTRAMUSCULAR; INTRAVENOUS; SUBCUTANEOUS
Status: DISCONTINUED | OUTPATIENT
Start: 2017-09-29 | End: 2017-09-29 | Stop reason: HOSPADM

## 2017-09-29 RX ADMIN — PROTAMINE SULFATE 20 MG: 10 INJECTION, SOLUTION INTRAVENOUS at 10:59

## 2017-09-29 RX ADMIN — ONDANSETRON 4 MG: 2 INJECTION INTRAMUSCULAR; INTRAVENOUS at 10:45

## 2017-09-29 RX ADMIN — FAMOTIDINE 20 MG: 20 TABLET ORAL at 07:52

## 2017-09-29 RX ADMIN — SODIUM CHLORIDE, SODIUM LACTATE, POTASSIUM CHLORIDE, AND CALCIUM CHLORIDE 100 ML/HR: 600; 310; 30; 20 INJECTION, SOLUTION INTRAVENOUS at 08:05

## 2017-09-29 RX ADMIN — HEPARIN SODIUM 3000 UNITS: 1000 INJECTION, SOLUTION INTRAVENOUS; SUBCUTANEOUS at 10:22

## 2017-09-29 RX ADMIN — PROPOFOL 150 MG: 10 INJECTION, EMULSION INTRAVENOUS at 10:00

## 2017-09-29 RX ADMIN — CEFAZOLIN 2 G: 1 INJECTION, POWDER, FOR SOLUTION INTRAMUSCULAR; INTRAVENOUS; PARENTERAL at 09:48

## 2017-09-29 RX ADMIN — CARVEDILOL 12.5 MG: 12.5 TABLET, FILM COATED ORAL at 08:25

## 2017-09-29 NOTE — OP NOTES
[facility] 65   OPERATIVE REPORT       Name:  Lucille Gloria   MR#:  [id number]   :  1934   Account #:  [de-identified]   Date of Adm:  2017       DATE OF PROCEDURE: 2017. PREOPERATIVE DIAGNOSIS: Left lower extremity ischemia with   nonhealing foot wound. POSTOPERATIVE DIAGNOSIS: Left lower extremity ischemia with   nonhealing foot wound. PROCEDURES:    1. Aortogram.   2. Left lower extremity arteriogram.   3. Left anterior tibial artery, PTA x2, ultrasound-guided femoral   artery access. SURGEON: Adilson Longo MD.    ANESTHESIA: IV sedation +1% lidocaine as local.      TOTAL CONTRAST: 90 mL Isovue 300 mL. TOTAL FLUOROSCOPY TIME: 15 minutes. DESCRIPTION OF PROCEDURE: The patient was brought to the angio   suite, placed on the angio table in supine position. Following   adequate LMA anesthesia and a time-out procedure, the groins   were draped and prepped in a sterile fashion. The right common   femoral artery was then percutaneously punctured under direct   vision using ultrasound guidance. A 5-Fijian sheath was placed   over a guidewire via Seldinger technique. A 0.035 guidewire was   then advanced in aorta followed by a 5-Fijian Omni flush   catheter positioned into the supraceliac aorta. Abdominal   aortogram was performed. The catheter was then pulled down to   just above the aortic bifurcation used to direct the guidewire   over to the left side. Next, a 6-Fijian crossover sheath was   advanced to the level of the common femoral artery on the left. Serial images of the left lower extremity was performed from the   femoral artery to the foot. Next, the patient was systemically   heparinized. A 4-Fijian Shuttle sheath was then advanced over a   guidewire to the level of the below-knee popliteal artery. An   0.014 guidewire was then used to select the anterior tibial   artery.  A CXI catheter over 0.014 was then used to cross the   area of high-grade stenosis in the mid portion of the anterior   tibial artery exceeding guidewire access to the level of the   foot. Next, a 3 x 80 balloon was selected and used to dilate the   mid portion of the AT and overlapping segments x2. Completion   imaging demonstrated excellent flow AT with only minimal   residual stenosis and excellent flow to the level of the foot. At this point, the guidewires and sheaths were removed and the   puncture site was closed with a Mynx closure device. Sterile dry   dressings were applied. The patient was awakened from anesthesia   and transferred to the recovery room in stable condition. The   patient tolerated the procedure well. ANGIOGRAPHIC FINDINGS: Abdominal aorta is of normal caliber. It   appears the renal artery is widely patent. There does appear to   be an 80% stenosis of the left renal artery. The common iliac,   hypogastric and external iliac arteries are normal bilaterally. On the left, the common femoral, profunda and SFA are normal in   appearance. The popliteal artery is normal throughout its   course. The anterior tibial artery is patent in its origin;   however, it is severely stenotic at its mid portion  about   90+%. There is a second area of high-grade stenosis in the   distal AT. The tibioperoneal trunk is patent. The post-tibial   arteries were occluded. The peroneal artery is occluded distally   and sent off a large branch tributary that ultimately   reconstitutes the perigenicular collaterals around the ankle. The   anterior tibial artery. terminates on the top of the foot and   fills collateral branches onto the foot itself. Completion   imaging demonstrates excellent flow in the previously stenotic   anterior tibial segment with improved flow into the foot. 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         MD Kosta Warren / Robert   D:  09/29/2017   11:38   T:  09/29/2017   12:21   Job #:  [id number]

## 2017-09-29 NOTE — PERIOP NOTES
Unable to draw blood for labs from either port of PICC line. Lines flushed with NS. Purple port  For LR for procedure.  Butterfly sticks (x2 required) left arm to obtain BMP & blood sugar POC,

## 2017-09-29 NOTE — IP AVS SNAPSHOT
Merlin Laroche 
 
 
 [address] [facility] [address] 
[phone] Patient: Lisseth Kuhn MRN: [id number] UGT:1/36/7716 You are allergic to the following Allergen Reactions Aleve (Naproxen Sodium) Rash Hydrocodone-Acetaminophen Itching Metformin Other (comments) Decreased kidney function Phenobarbital Unknown (comments) Doesn't remember - long time ago Recent Documentation Height Weight BMI OB Status Smoking Status 1.626 m 90.9 kg 34.42 kg/m2 Hysterectomy Never Smoker Emergency Contacts Name Discharge Info Relation Home Work Mobile James Torres  Child [2] [phone] Giuliana Chapman  Other Relative [6] [phone] About your hospitalization You were admitted on:  September 29, 2017 [facility] You were discharged on:  September 29, 2017 Unit phone number:  [phone] Why you were hospitalized Your primary diagnosis was:  Not on File Providers Seen During Your Hospitalizations Provider Role Specialty Primary office phone Jane Marino MD Attending Provider Vascular Surgery [phone] Your Primary Care Physician (PCP) Primary Care Physician Office Phone Office Fax Claudean Lund [phone] Follow-up Information Follow up With Details Comments Contact Info Asha Jack MD   [address] [place] [address] [phone] Your Appointments Monday October 30, 2017  1:30 PM EDT  
ESTABLISHED PATIENT with VSAE PROVIDER  
[facility] ([facility]) [address] [address] [phone] Current Discharge Medication List  
  
CONTINUE these medications which have CHANGED Dose & Instructions Dispensing Information Comments Morning Noon Evening Bedtime  
 amLODIPine 10 mg tablet Commonly known as:  Sarah Brown What changed:   
- when to take this 
- additional instructions Your last dose was: Your next dose is:    
   
   
 Dose:  10 mg Take 1 Tab by mouth daily. Quantity:  90 Tab Refills:  3  
     
   
   
   
  
 carvedilol 12.5 mg tablet Commonly known as:  Kenny Henry What changed:   
- when to take this 
- additional instructions Your last dose was: Your next dose is:    
   
   
 Dose:  12.5 mg Take 1 Tab by mouth two (2) times daily (with meals). Indications: hypertension Quantity:  60 Tab Refills:  0 Cholecalciferol (Vitamin D3) 2,000 unit Cap capsule Commonly known as:  VITAMIN D3 What changed:  additional instructions Your last dose was: Your next dose is:    
   
   
 Dose:  2000 Units Take 2,000 Units by mouth two (2) times a day. Quantity:  60 capsule Refills:  5  
     
   
   
   
  
 fenofibrate nanocrystallized 48 mg tablet Commonly known as:  [employer] What changed:  when to take this Your last dose was: Your next dose is:    
   
   
 Dose:  48 mg Take 1 Tab by mouth daily. Quantity:  90 Tab Refills:  3  
     
   
   
   
  
 glipiZIDE SR 10 mg CR tablet Commonly known as:  GLUCOTROL XL What changed:   
- how much to take - when to take this Your last dose was: Your next dose is:    
   
   
 Dose:  5 mg Take 1 Tab by mouth daily. Quantity:  90 Tab Refills:  3  
     
   
   
   
  
 linagliptin 5 mg tablet Commonly known as:  [place] Kal What changed:  when to take this Your last dose was: Your next dose is:    
   
   
 Dose:  5 mg Take 1 Tab by mouth daily. Quantity:  90 Tab Refills:  3  
     
   
   
   
  
 montelukast 10 mg tablet Commonly known as:  SINGULAIR What changed:  when to take this Your last dose was: 2 g by IntraVENous route every twenty-four (24) hours for 40 days. Indications: Toe ulcers/cellulitis Quantity:  40 Dose Refills:  0  
     
   
   
   
  
 docusate sodium 100 mg capsule Commonly known as:  Rocio Dilling Your last dose was: Your next dose is:    
   
   
 Dose:  100 mg Take 1 Cap by mouth daily for 90 days. Quantity:  30 Cap Refills:  2 FISH OIL 1,000 mg Cap Generic drug:  omega-3 fatty acids-vitamin e Your last dose was: Your next dose is:    
   
   
 Dose:  1 Cap Take 1 Cap by mouth daily. Stop seven days prior to surgery per anesthesia protocol. Refills:  0  
     
   
   
   
  
 FLOVENT HFA 44 mcg/actuation inhaler Generic drug:  fluticasone Your last dose was: Your next dose is:    
   
   
 Dose:  2 Puff Take 2 Puffs by inhalation two (2) times a day. Take day of surgery per anesthesia protocol. Bring DOS  Indications: MAINTENANCE THERAPY FOR ASTHMA Refills:  0  
     
   
   
   
  
 folic acid 080 mcg tablet Your last dose was: Your next dose is:    
   
   
 Dose:  400 mcg Take 400 mcg by mouth two (2) times a day. Stop seven days prior to surgery per anesthesia protocol. Refills:  0  
     
   
   
   
  
 glucosamine-chondroitin 1,500-1,200 mg/30 mL Liqd Commonly known as:  ELATION Your last dose was: Your next dose is:    
   
   
 Dose:  1 Tab Take 1 Tab by mouth daily. Stop seven days prior to surgery per anesthesia protocol. Refills:  0  
     
   
   
   
  
 lisinopril 40 mg tablet Commonly known as:  Solange Correa Your last dose was: Your next dose is:    
   
   
  Refills:  0  
     
   
   
   
  
 polyethylene glycol 17 gram packet Commonly known as:  Miguel Machado Your last dose was: Your next dose is:    
   
   
 Dose:  17 g Take 1 Packet by mouth daily as needed. Indications: constipation Quantity:  1 Each Refills:  0 PROAIR HFA 90 mcg/actuation inhaler Generic drug:  albuterol Your last dose was: Your next dose is:    
   
   
 Dose:  2 Puff Take 2 Puffs by inhalation every four (4) hours as needed. Take day of surgery per anesthesia protocol. Bring DOS  Indications: Acute Asthma Attack Refills:  0 SPIRIVA WITH HANDIHALER 18 mcg inhalation capsule Generic drug:  tiotropium Your last dose was: Your next dose is:    
   
   
 Dose:  1 Cap Take 1 Cap by inhalation every morning. Take day of surgery per anesthesia protocol. Bring DOS  Indications: MAINTENANCE THERAPY FOR ASTHMA Refills:  0  
     
   
   
   
  
 vancomycin in 0.9% Sodium Cl 1.25 gram/250 mL Soln Your last dose was: Your next dose is:    
   
   
 Dose:  1250 mg  
250 mL by IntraVENous route every twenty-four (24) hours for 40 days. Indications: Toe ulcers/foot cellulitis Quantity:  7500 mL Refills:  1 Discharge Instructions Arteriogram: What to Expect at [facility] Your Recovery The doctor inserted a thin, flexible tube (catheter) into a blood vessel in your groin. In some cases, the catheter is placed in a blood vessel in the arm. After an arteriogram, your groin or arm may have a bruise and feel sore for a day or two. You can do light activities around the house but nothing strenuous for several days. Your doctor may give you specific instructions on when you can do your normal activities again, such as driving and going back to work. This care sheet gives you a general idea about how long it will take for you to recover. But each person recovers at a different pace. Follow the steps below to feel better as quickly as possible. How can you care for yourself at home? Activity · Do not do strenuous exercise and do not lift, pull, or push anything heavy until your doctor says it is okay. This may be for a day or two. You can walk around the house and do light activity, such as cooking. · You may shower 24 to 48 hours after the procedure, if your doctor okays it. Pat the incision dry. Do not take a bath for 1 week, or until your doctor tells you it is okay. · If the catheter was placed in your groin, try not to walk up stairs for the first couple of days. · If your doctor recommends it, get more exercise. Walking is a good choice. Bit by bit, increase the amount you walk every day. Try for at least 30 minutes on most days of the week. Diet · Drink plenty of fluids to help your body flush out the dye. If you have kidney, heart, or liver disease and have to limit fluids, talk with your doctor before you increase the amount of fluids you drink. · You can eat your normal diet. If your stomach is upset, try bland, low-fat foods like plain rice, broiled chicken, toast, and yogurt. Medicines · Be safe with medicines. Read and follow all instructions on the label. ¨ If the doctor gave you a prescription medicine for pain, take it as prescribed. ¨ If you are not taking a prescription pain medicine, ask your doctor if you can take an over-the-counter medicine. · If you take blood thinners, such as warfarin (Coumadin), clopidogrel (Plavix), or aspirin, be sure to talk to your doctor. He or she will tell you if and when to start taking those medicines again. Make sure that you understand exactly what your doctor wants you to do. · Your doctor will tell you if and when you can restart your medicines. He or she will also give you instructions about taking any new medicines. Care of the catheter site · You will have a dressing over the cut (incision). A dressing helps the incision heal and protects it. Your doctor will tell you how to take care of this.  
· Put ice or a cold pack on the area for 10 to 20 minutes at a time to help with soreness or swelling. Put a thin cloth between the ice and your skin. Follow-up care is a key part of your treatment and safety. Be sure to make and go to all appointments, and call your doctor if you are having problems. It's also a good idea to know your test results and keep a list of the medicines you take. When should you call for help? Call 911 anytime you think you may need emergency care. For example, call if: 
· You passed out (lost consciousness). · You have severe trouble breathing. · You have sudden chest pain and shortness of breath, or you cough up blood. Call your doctor now or seek immediate medical care if: 
· You are bleeding from the area where the catheter was put in your artery. · You have a fast-growing, painful lump at the catheter site. · You have signs of infection, such as: 
¨ Increased pain, swelling, warmth, or redness. ¨ Red streaks leading from the incision. ¨ Pus draining from the incision. ¨ A fever. Watch closely for any changes in your health, and be sure to contact your doctor if: 
· You don't get better as expected. After general anesthesia or intravenous sedation, for 24 hours or while taking prescription Narcotics: · Limit your activities · Do not drive and operate hazardous machinery · Do not make important personal or business decisions · Do  not drink alcoholic beverages · If you have not urinated within 8 hours after discharge, please contact your surgeon on call. *  Please give a list of your current medications to your Primary Care Provider. *  Please update this list whenever your medications are discontinued, doses are 
    changed, or new medications (including over-the-counter products) are added. *  Please carry medication information at all times in case of emergency situations. These are general instructions for a healthy lifestyle: No smoking/ No tobacco products/ Avoid exposure to second hand smoke Surgeon General's Warning:  Quitting smoking now greatly reduces serious risk to your health. Obesity, smoking, and sedentary lifestyle greatly increases your risk for illness A healthy diet, regular physical exercise & weight monitoring are important for maintaining a healthy lifestyle You may be retaining fluid if you have a history of heart failure or if you experience any of the following symptoms:  Weight gain of 3 pounds or more overnight or 5 pounds in a week, increased swelling in our hands or feet or shortness of breath while lying flat in bed. Please call your doctor as soon as you notice any of these symptoms; do not wait until your next office visit. Recognize signs and symptoms of STROKE: 
 
F-face looks uneven A-arms unable to move or move unevenly S-speech slurred or non-existent T-time-call 911 as soon as signs and symptoms begin-DO NOT go Back to bed or wait to see if you get better-TIME IS BRAIN. Discharge Orders None Introducing [facility]! Patrice Young introduces [employer] patient portal. Now you can access parts of your medical record, email your doctor's office, and request medication refills online. 1. In your internet browser, go to [email or web address] 2. Click on the First Time User? Click Here link in the Sign In box. You will see the New Member Sign Up page. 3. Enter your [employer] Access Code exactly as it appears below. You will not need to use this code after youve completed the sign-up process. If you do not sign up before the expiration date, you must request a new code. · [employer] Access Code: [email or web address] Expires: 11/21/2017 12:45 PM 
 
4. Enter the last four digits of your Social Security Number (xxxx) and Date of Birth (mm/dd/yyyy) as indicated and click Submit. You will be taken to the next sign-up page. 5. Create a [employer] ID.  This will be your [employer] login ID and cannot be changed, so think of one that is secure and easy to remember. 6. Create a [employer] password. You can change your password at any time. 7. Enter your Password Reset Question and Answer. This can be used at a later time if you forget your password. 8. Enter your e-mail address. You will receive e-mail notification when new information is available in [address] 9. Click Sign Up. You can now view and download portions of your medical record. 10. Click the Download Summary menu link to download a portable copy of your medical information. If you have questions, please visit the Frequently Asked Questions section of the [employer] website. Remember, [employer] is NOT to be used for urgent needs. For medical emergencies, dial 911. Now available from your iPhone and Android! General Information Please provide this summary of care documentation to your next provider. Patient Signature:  ____________________________________________________________ Date:  ____________________________________________________________  
  
Jeremiah Police Provider Signature:  ____________________________________________________________ Date:  ____________________________________________________________

## 2017-09-29 NOTE — BRIEF OP NOTE
BRIEF OPERATIVE NOTE    Date of Procedure: 9/29/2017   Preoperative Diagnosis: Non-healing ulcer, limited to breakdown of skin ([facility] [address] [L98.491]  Postoperative Diagnosis: Non-healing ulcer, limited to breakdown of skin ([facility] [address]     Procedure(s):  Aortogram,LEFT LOWER EXTREMITY ARTERIOGRAM, L AT PTA  Surgeon(s) and Role:     * Humaira Verde MD - Primary         Assistant Staff:       Surgical Staff:  Circ-1: David Bella RN  Circ-Relief: Grant Cornejo RN  Radiology Technician: Henry Belle, RT, R, CT  Event Time In   Incision Start 1014   Incision Close 1103     Anesthesia: General   Estimated Blood Loss: 25cc  Specimens: * No specimens in log *   Findings:    Complications: None  Implants: * No implants in log *

## 2017-09-29 NOTE — DISCHARGE INSTRUCTIONS
Arteriogram: What to Expect at [address] [place] [address] Drive inserted a thin, flexible tube (catheter) into a blood vessel in your groin. In some cases, the catheter is placed in a blood vessel in the arm. After an arteriogram, your groin or arm may have a bruise and feel sore for a day or two. You can do light activities around the house but nothing strenuous for several days. Your doctor may give you specific instructions on when you can do your normal activities again, such as driving and going back to work. This care sheet gives you a general idea about how long it will take for you to recover. But each person recovers at a different pace. Follow the steps below to feel better as quickly as possible. How can you care for yourself at home? Activity  · Do not do strenuous exercise and do not lift, pull, or push anything heavy until your doctor says it is okay. This may be for a day or two. You can walk around the house and do light activity, such as cooking. · You may shower 24 to 48 hours after the procedure, if your doctor okays it. Pat the incision dry. Do not take a bath for 1 week, or until your doctor tells you it is okay. · If the catheter was placed in your groin, try not to walk up stairs for the first couple of days. · If your doctor recommends it, get more exercise. Walking is a good choice. Bit by bit, increase the amount you walk every day. Try for at least 30 minutes on most days of the week. Diet  · Drink plenty of fluids to help your body flush out the dye. If you have kidney, heart, or liver disease and have to limit fluids, talk with your doctor before you increase the amount of fluids you drink. · You can eat your normal diet. If your stomach is upset, try bland, low-fat foods like plain rice, broiled chicken, toast, and yogurt. Medicines  · Be safe with medicines. Read and follow all instructions on the label.   ¨ If the doctor gave you a prescription medicine for pain, take it as prescribed. ¨ If you are not taking a prescription pain medicine, ask your doctor if you can take an over-the-counter medicine. · If you take blood thinners, such as warfarin (Coumadin), clopidogrel (Plavix), or aspirin, be sure to talk to your doctor. He or she will tell you if and when to start taking those medicines again. Make sure that you understand exactly what your doctor wants you to do. · Your doctor will tell you if and when you can restart your medicines. He or she will also give you instructions about taking any new medicines. Care of the catheter site  · You will have a dressing over the cut (incision). A dressing helps the incision heal and protects it. Your doctor will tell you how to take care of this. · Put ice or a cold pack on the area for 10 to 20 minutes at a time to help with soreness or swelling. Put a thin cloth between the ice and your skin. Follow-up care is a key part of your treatment and safety. Be sure to make and go to all appointments, and call your doctor if you are having problems. It's also a good idea to know your test results and keep a list of the medicines you take. When should you call for help? Call 911 anytime you think you may need emergency care. For example, call if:  · You passed out (lost consciousness). · You have severe trouble breathing. · You have sudden chest pain and shortness of breath, or you cough up blood. Call your doctor now or seek immediate medical care if:  · You are bleeding from the area where the catheter was put in your artery. · You have a fast-growing, painful lump at the catheter site. · You have signs of infection, such as:  ¨ Increased pain, swelling, warmth, or redness. ¨ Red streaks leading from the incision. ¨ Pus draining from the incision. ¨ A fever. Watch closely for any changes in your health, and be sure to contact your doctor if:  · You don't get better as expected.   After general anesthesia or intravenous sedation, for 24 hours or while taking prescription Narcotics:  · Limit your activities  · Do not drive and operate hazardous machinery  · Do not make important personal or business decisions  · Do  not drink alcoholic beverages  · If you have not urinated within 8 hours after discharge, please contact your surgeon on call. *  Please give a list of your current medications to your Primary Care Provider. *  Please update this list whenever your medications are discontinued, doses are      changed, or new medications (including over-the-counter products) are added. *  Please carry medication information at all times in case of emergency situations. These are general instructions for a healthy lifestyle:  No smoking/ No tobacco products/ Avoid exposure to second hand smoke  Surgeon General's Warning:  Quitting smoking now greatly reduces serious risk to your health. Obesity, smoking, and sedentary lifestyle greatly increases your risk for illness  A healthy diet, regular physical exercise & weight monitoring are important for maintaining a healthy lifestyle    You may be retaining fluid if you have a history of heart failure or if you experience any of the following symptoms:  Weight gain of 3 pounds or more overnight or 5 pounds in a week, increased swelling in our hands or feet or shortness of breath while lying flat in bed. Please call your doctor as soon as you notice any of these symptoms; do not wait until your next office visit. Recognize signs and symptoms of STROKE:    F-face looks uneven    A-arms unable to move or move unevenly    S-speech slurred or non-existent    T-time-call 911 as soon as signs and symptoms begin-DO NOT go       Back to bed or wait to see if you get better-TIME IS BRAIN.

## 2017-09-29 NOTE — ANESTHESIA POSTPROCEDURE EVALUATION
Post-Anesthesia Evaluation and Assessment    Patient: Lisseth Kuhn MRN: [id number]  SSN: [id number]    YOB: 1934  Age: 80 y.o. Sex: female       Cardiovascular Function/Vital Signs  Visit Vitals    /69    Pulse 79    Temp 36.7 °C (98 °F)    Resp 18    Ht 5' 4\" (1.626 m)    Wt 90.9 kg (200 lb 8 oz)    SpO2 95%    BMI 34.42 kg/m2       Patient is status post general anesthesia for Procedure(s):  LEFT LOWER EXTREMITY ARTERIOGRAM/ POSSIBLE INTERVENTION. Nausea/Vomiting: None    Postoperative hydration reviewed and adequate. Pain:  Pain Scale 1: Numeric (0 - 10) (09/29/17 1206)  Pain Intensity 1: 0 (09/29/17 1206)   Managed    Neurological Status:   Neuro (WDL): Exceptions to WDL (09/29/17 1206)  Neuro  LLE Motor Response: Numbness (09/29/17 1206)  RLE Motor Response: Numbness (09/29/17 1206)   At baseline    Mental Status and Level of Consciousness: Arousable    Pulmonary Status:   O2 Device: Room air (09/29/17 1206)   Adequate oxygenation and airway patent    Complications related to anesthesia: None    Post-anesthesia assessment completed.  No concerns    Signed By: Loreli Najjar, MD     September 29, 2017

## 2017-10-16 ENCOUNTER — HOME CARE VISIT (OUTPATIENT)
Dept: HOME HEALTH SERVICES | Facility: HOME HEALTH | Age: 82
End: 2017-10-16
Payer: MEDICARE

## 2017-10-18 ENCOUNTER — HOME HEALTH ADMISSION (OUTPATIENT)
Dept: HOME HEALTH SERVICES | Facility: HOME HEALTH | Age: 82
End: 2017-10-18
Payer: MEDICARE

## 2017-10-22 ENCOUNTER — HOME CARE VISIT (OUTPATIENT)
Dept: SCHEDULING | Facility: HOME HEALTH | Age: 82
End: 2017-10-22
Payer: MEDICARE

## 2017-10-22 PROCEDURE — 400013 HH SOC

## 2017-10-22 PROCEDURE — G0299 HHS/HOSPICE OF RN EA 15 MIN: HCPCS

## 2017-10-23 ENCOUNTER — HOME CARE VISIT (OUTPATIENT)
Dept: HOME HEALTH SERVICES | Facility: HOME HEALTH | Age: 82
End: 2017-10-23
Payer: MEDICARE

## 2017-10-23 VITALS
DIASTOLIC BLOOD PRESSURE: 74 MMHG | OXYGEN SATURATION: 99 % | RESPIRATION RATE: 18 BRPM | HEIGHT: 64 IN | HEART RATE: 84 BPM | TEMPERATURE: 97.1 F | BODY MASS INDEX: 25.52 KG/M2 | WEIGHT: 149.5 LBS | SYSTOLIC BLOOD PRESSURE: 145 MMHG

## 2017-10-24 ENCOUNTER — HOME CARE VISIT (OUTPATIENT)
Dept: SCHEDULING | Facility: HOME HEALTH | Age: 82
End: 2017-10-24
Payer: MEDICARE

## 2017-10-24 ENCOUNTER — HOME CARE VISIT (OUTPATIENT)
Dept: HOME HEALTH SERVICES | Facility: HOME HEALTH | Age: 82
End: 2017-10-24
Payer: MEDICARE

## 2017-10-24 VITALS
DIASTOLIC BLOOD PRESSURE: 74 MMHG | HEART RATE: 94 BPM | TEMPERATURE: 97.7 F | SYSTOLIC BLOOD PRESSURE: 152 MMHG | RESPIRATION RATE: 17 BRPM | OXYGEN SATURATION: 99 %

## 2017-10-24 VITALS
OXYGEN SATURATION: 97 % | DIASTOLIC BLOOD PRESSURE: 76 MMHG | RESPIRATION RATE: 16 BRPM | SYSTOLIC BLOOD PRESSURE: 140 MMHG | HEART RATE: 81 BPM

## 2017-10-24 PROCEDURE — G0151 HHCP-SERV OF PT,EA 15 MIN: HCPCS

## 2017-10-24 PROCEDURE — G0299 HHS/HOSPICE OF RN EA 15 MIN: HCPCS

## 2017-10-25 ENCOUNTER — HOME CARE VISIT (OUTPATIENT)
Dept: SCHEDULING | Facility: HOME HEALTH | Age: 82
End: 2017-10-25
Payer: MEDICARE

## 2017-10-25 VITALS
HEART RATE: 86 BPM | DIASTOLIC BLOOD PRESSURE: 70 MMHG | TEMPERATURE: 97.6 F | RESPIRATION RATE: 16 BRPM | SYSTOLIC BLOOD PRESSURE: 122 MMHG

## 2017-10-25 PROCEDURE — G0157 HHC PT ASSISTANT EA 15: HCPCS

## 2017-10-27 ENCOUNTER — HOME CARE VISIT (OUTPATIENT)
Dept: SCHEDULING | Facility: HOME HEALTH | Age: 82
End: 2017-10-27
Payer: MEDICARE

## 2017-10-27 VITALS
OXYGEN SATURATION: 96 % | SYSTOLIC BLOOD PRESSURE: 144 MMHG | HEART RATE: 90 BPM | RESPIRATION RATE: 16 BRPM | TEMPERATURE: 98.7 F | DIASTOLIC BLOOD PRESSURE: 80 MMHG

## 2017-10-27 PROCEDURE — G0152 HHCP-SERV OF OT,EA 15 MIN: HCPCS

## 2017-10-30 ENCOUNTER — HOME CARE VISIT (OUTPATIENT)
Dept: SCHEDULING | Facility: HOME HEALTH | Age: 82
End: 2017-10-30
Payer: MEDICARE

## 2017-10-30 VITALS
RESPIRATION RATE: 17 BRPM | TEMPERATURE: 96.9 F | HEART RATE: 101 BPM | SYSTOLIC BLOOD PRESSURE: 146 MMHG | OXYGEN SATURATION: 96 % | DIASTOLIC BLOOD PRESSURE: 80 MMHG

## 2017-10-30 VITALS
HEART RATE: 78 BPM | DIASTOLIC BLOOD PRESSURE: 70 MMHG | RESPIRATION RATE: 17 BRPM | TEMPERATURE: 97.8 F | SYSTOLIC BLOOD PRESSURE: 135 MMHG

## 2017-10-30 PROBLEM — I99.8 ISCHEMIA OF LEFT LOWER EXTREMITY: Status: ACTIVE | Noted: 2017-10-30

## 2017-10-30 PROCEDURE — G0157 HHC PT ASSISTANT EA 15: HCPCS

## 2017-10-30 PROCEDURE — G0299 HHS/HOSPICE OF RN EA 15 MIN: HCPCS

## 2017-11-01 ENCOUNTER — HOME CARE VISIT (OUTPATIENT)
Dept: SCHEDULING | Facility: HOME HEALTH | Age: 82
End: 2017-11-01
Payer: MEDICARE

## 2017-11-01 VITALS
HEART RATE: 80 BPM | TEMPERATURE: 97.8 F | DIASTOLIC BLOOD PRESSURE: 70 MMHG | SYSTOLIC BLOOD PRESSURE: 135 MMHG | RESPIRATION RATE: 17 BRPM

## 2017-11-01 PROCEDURE — G0157 HHC PT ASSISTANT EA 15: HCPCS

## 2017-11-02 ENCOUNTER — HOME CARE VISIT (OUTPATIENT)
Dept: HOME HEALTH SERVICES | Facility: HOME HEALTH | Age: 82
End: 2017-11-02
Payer: MEDICARE

## 2017-11-02 ENCOUNTER — HOME CARE VISIT (OUTPATIENT)
Dept: SCHEDULING | Facility: HOME HEALTH | Age: 82
End: 2017-11-02
Payer: MEDICARE

## 2017-11-02 VITALS
TEMPERATURE: 98.7 F | DIASTOLIC BLOOD PRESSURE: 70 MMHG | HEART RATE: 91 BPM | RESPIRATION RATE: 20 BRPM | SYSTOLIC BLOOD PRESSURE: 140 MMHG | OXYGEN SATURATION: 98 %

## 2017-11-02 PROCEDURE — G0299 HHS/HOSPICE OF RN EA 15 MIN: HCPCS

## 2017-11-06 ENCOUNTER — HOME CARE VISIT (OUTPATIENT)
Dept: SCHEDULING | Facility: HOME HEALTH | Age: 82
End: 2017-11-06
Payer: MEDICARE

## 2017-11-06 VITALS
RESPIRATION RATE: 17 BRPM | HEART RATE: 79 BPM | SYSTOLIC BLOOD PRESSURE: 128 MMHG | DIASTOLIC BLOOD PRESSURE: 60 MMHG | TEMPERATURE: 97.3 F | OXYGEN SATURATION: 99 %

## 2017-11-06 VITALS
SYSTOLIC BLOOD PRESSURE: 130 MMHG | RESPIRATION RATE: 17 BRPM | DIASTOLIC BLOOD PRESSURE: 60 MMHG | TEMPERATURE: 98 F | HEART RATE: 70 BPM

## 2017-11-06 PROCEDURE — G0157 HHC PT ASSISTANT EA 15: HCPCS

## 2017-11-06 PROCEDURE — G0299 HHS/HOSPICE OF RN EA 15 MIN: HCPCS

## 2017-11-16 ENCOUNTER — HOME CARE VISIT (OUTPATIENT)
Dept: SCHEDULING | Facility: HOME HEALTH | Age: 82
End: 2017-11-16
Payer: MEDICARE

## 2017-11-16 PROCEDURE — G0151 HHCP-SERV OF PT,EA 15 MIN: HCPCS

## 2017-11-17 ENCOUNTER — HOME CARE VISIT (OUTPATIENT)
Dept: SCHEDULING | Facility: HOME HEALTH | Age: 82
End: 2017-11-17
Payer: MEDICARE

## 2017-11-17 VITALS
OXYGEN SATURATION: 97 % | HEART RATE: 84 BPM | RESPIRATION RATE: 18 BRPM | DIASTOLIC BLOOD PRESSURE: 62 MMHG | SYSTOLIC BLOOD PRESSURE: 140 MMHG | TEMPERATURE: 97.7 F

## 2017-11-17 VITALS
OXYGEN SATURATION: 93 % | DIASTOLIC BLOOD PRESSURE: 90 MMHG | RESPIRATION RATE: 18 BRPM | SYSTOLIC BLOOD PRESSURE: 142 MMHG | HEART RATE: 95 BPM | TEMPERATURE: 97.3 F

## 2017-11-17 PROCEDURE — G0299 HHS/HOSPICE OF RN EA 15 MIN: HCPCS

## 2017-11-20 ENCOUNTER — HOME CARE VISIT (OUTPATIENT)
Dept: SCHEDULING | Facility: HOME HEALTH | Age: 82
End: 2017-11-20
Payer: MEDICARE

## 2017-11-20 VITALS
HEART RATE: 97 BPM | DIASTOLIC BLOOD PRESSURE: 80 MMHG | RESPIRATION RATE: 16 BRPM | OXYGEN SATURATION: 98 % | TEMPERATURE: 98.6 F | SYSTOLIC BLOOD PRESSURE: 138 MMHG

## 2017-11-20 PROCEDURE — G0299 HHS/HOSPICE OF RN EA 15 MIN: HCPCS

## 2018-02-26 PROBLEM — E11.21 TYPE 2 DIABETES WITH NEPHROPATHY (HCC): Status: ACTIVE | Noted: 2018-02-26

## 2018-04-13 ENCOUNTER — APPOINTMENT (RX ONLY)
Dept: URBAN - METROPOLITAN AREA CLINIC 349 | Facility: CLINIC | Age: 83
Setting detail: DERMATOLOGY
End: 2018-04-13

## 2018-04-13 DIAGNOSIS — D18.0 HEMANGIOMA: ICD-10-CM

## 2018-04-13 DIAGNOSIS — L82.1 OTHER SEBORRHEIC KERATOSIS: ICD-10-CM

## 2018-04-13 PROBLEM — M12.9 ARTHROPATHY, UNSPECIFIED: Status: ACTIVE | Noted: 2018-04-13

## 2018-04-13 PROBLEM — E13.9 OTHER SPECIFIED DIABETES MELLITUS WITHOUT COMPLICATIONS: Status: ACTIVE | Noted: 2018-04-13

## 2018-04-13 PROBLEM — D18.01 HEMANGIOMA OF SKIN AND SUBCUTANEOUS TISSUE: Status: ACTIVE | Noted: 2018-04-13

## 2018-04-13 PROCEDURE — ? COUNSELING

## 2018-04-13 PROCEDURE — ? PHOTO-DOCUMENTATION

## 2018-04-13 PROCEDURE — 99243 OFF/OP CNSLTJ NEW/EST LOW 30: CPT

## 2018-04-13 ASSESSMENT — LOCATION ZONE DERM: LOCATION ZONE: FACE

## 2018-04-13 ASSESSMENT — LOCATION DETAILED DESCRIPTION DERM
LOCATION DETAILED: LEFT INFERIOR CENTRAL MALAR CHEEK
LOCATION DETAILED: LEFT CENTRAL MANDIBULAR CHEEK

## 2018-04-13 ASSESSMENT — LOCATION SIMPLE DESCRIPTION DERM: LOCATION SIMPLE: LEFT CHEEK

## 2018-04-13 NOTE — HPI: SKIN LESION
How Severe Is Your Skin Lesion?: moderate
Is This A New Presentation, Or A Follow-Up?: Skin Lesion
Additional History: Patient reports history of skin cancer on right superior forehead that she had excised, but she does not remember what type. Patient states lesion of concern on cheek was blue and has now changed color. She states she pressed on it a couple of times but nothing came out.

## 2018-05-24 PROBLEM — E66.01 SEVERE OBESITY (BMI 35.0-39.9) WITH COMORBIDITY (HCC): Status: ACTIVE | Noted: 2018-05-24

## 2018-08-03 ENCOUNTER — APPOINTMENT (RX ONLY)
Dept: URBAN - METROPOLITAN AREA CLINIC 349 | Facility: CLINIC | Age: 83
Setting detail: DERMATOLOGY
End: 2018-08-03

## 2018-08-03 DIAGNOSIS — L81.4 OTHER MELANIN HYPERPIGMENTATION: ICD-10-CM

## 2018-08-03 DIAGNOSIS — L57.0 ACTINIC KERATOSIS: ICD-10-CM

## 2018-08-03 PROBLEM — J45.909 UNSPECIFIED ASTHMA, UNCOMPLICATED: Status: ACTIVE | Noted: 2018-08-03

## 2018-08-03 PROCEDURE — ? COUNSELING

## 2018-08-03 PROCEDURE — 17000 DESTRUCT PREMALG LESION: CPT

## 2018-08-03 PROCEDURE — 99213 OFFICE O/P EST LOW 20 MIN: CPT | Mod: 25

## 2018-08-03 PROCEDURE — ? LIQUID NITROGEN

## 2018-08-03 ASSESSMENT — LOCATION ZONE DERM
LOCATION ZONE: ARM
LOCATION ZONE: FACE

## 2018-08-03 ASSESSMENT — PAIN INTENSITY VAS: HOW INTENSE IS YOUR PAIN 0 BEING NO PAIN, 10 BEING THE MOST SEVERE PAIN POSSIBLE?: NO PAIN

## 2018-08-03 ASSESSMENT — LOCATION DETAILED DESCRIPTION DERM
LOCATION DETAILED: RIGHT PROXIMAL RADIAL DORSAL FOREARM
LOCATION DETAILED: LEFT INFERIOR MEDIAL MALAR CHEEK
LOCATION DETAILED: LEFT PROXIMAL DORSAL FOREARM
LOCATION DETAILED: LEFT CENTRAL MALAR CHEEK
LOCATION DETAILED: RIGHT INFERIOR CENTRAL MALAR CHEEK

## 2018-08-03 ASSESSMENT — LOCATION SIMPLE DESCRIPTION DERM
LOCATION SIMPLE: RIGHT FOREARM
LOCATION SIMPLE: RIGHT CHEEK
LOCATION SIMPLE: LEFT FOREARM
LOCATION SIMPLE: LEFT CHEEK

## 2018-08-03 NOTE — PROCEDURE: LIQUID NITROGEN
Post-Care Instructions: I reviewed with the patient in detail post-care instructions. Patient is to wear sunprotection, and avoid picking at any of the treated lesions. Pt may apply Vaseline to crusted or scabbing areas.
Detail Level: Zone
Number Of Freeze-Thaw Cycles: 2 freeze-thaw cycles
Render Post-Care Instructions In Note?: no
Duration Of Freeze Thaw-Cycle (Seconds): 2
Consent: The patient's consent was obtained including but not limited to risks of crusting, scabbing, blistering, scarring, darker or lighter pigmentary change, recurrence, incomplete removal and infection.

## 2018-10-02 ENCOUNTER — APPOINTMENT (RX ONLY)
Dept: URBAN - METROPOLITAN AREA CLINIC 349 | Facility: CLINIC | Age: 83
Setting detail: DERMATOLOGY
End: 2018-10-02

## 2018-10-02 DIAGNOSIS — L21.8 OTHER SEBORRHEIC DERMATITIS: ICD-10-CM

## 2018-10-02 PROBLEM — C44.321 SQUAMOUS CELL CARCINOMA OF SKIN OF NOSE: Status: ACTIVE | Noted: 2018-10-02

## 2018-10-02 PROBLEM — L85.3 XEROSIS CUTIS: Status: ACTIVE | Noted: 2018-10-02

## 2018-10-02 PROCEDURE — ? BIOPSY BY SHAVE METHOD

## 2018-10-02 PROCEDURE — ? PRESCRIPTION

## 2018-10-02 PROCEDURE — 11100: CPT

## 2018-10-02 PROCEDURE — 88305 TISSUE EXAM BY PATHOLOGIST: CPT

## 2018-10-02 PROCEDURE — 99213 OFFICE O/P EST LOW 20 MIN: CPT | Mod: 25

## 2018-10-02 PROCEDURE — ? PATHOLOGY BILLING

## 2018-10-02 PROCEDURE — A4550 SURGICAL TRAYS: HCPCS

## 2018-10-02 PROCEDURE — ? COUNSELING

## 2018-10-02 PROCEDURE — ? RECOMMENDATIONS

## 2018-10-02 RX ORDER — KETOCONAZOLE 20 MG/G
CREAM TOPICAL
Qty: 1 | Refills: 1 | Status: ERX | COMMUNITY
Start: 2018-10-02

## 2018-10-02 RX ADMIN — KETOCONAZOLE: 20 CREAM TOPICAL at 15:27

## 2018-10-02 NOTE — PROCEDURE: BIOPSY BY SHAVE METHOD
Size Of Lesion In Cm: 0
Accession #: [id number] [email or web address]
Detail Level: Detailed
Notification Instructions: Patient will be notified of biopsy results. However, patient instructed to call the office if not contacted within 2 weeks.
Anesthesia Type: 1% lidocaine with 1:500,000 epinephrine and a 1:10 solution of 8.4% sodium bicarbonate
Hemostasis: Aluminum Chloride
Biopsy Type: H and E
Cryotherapy Text: The wound bed was treated with cryotherapy after the biopsy was performed.
Render Post-Care Instructions In Note?: yes
Wound Care: Vaseline
Electrodesiccation Text: The wound bed was treated with electrodesiccation after the biopsy was performed.
Anesthesia Volume In Cc (Will Not Render If 0): 0.5
Dressing: bandage
Destruction After The Procedure: No
Post-Care Instructions: I reviewed with the patient in detail post-care instructions. Patient is to keep the biopsy site dry overnight, and then apply bacitracin twice daily until healed. Patient may apply hydrogen peroxide soaks to remove any crusting.\\nAft the procedure, the patient was observed for 5-10 minutes and was oriented to person, place, and time.  Denied feeling dizzy, queasy, and stated that they did not feel that they were going to faint.
X Size Of Lesion In Cm: 0.6
Depth Of Biopsy: dermis
Consent: Written consent was obtained and risks were reviewed including but not limited to scarring, infection, bleeding, scabbing, incomplete removal, nerve damage and allergy to anesthesia.
Billing Type: Third-Party Bill
Type Of Destruction Used: Electrodesiccation
Biopsy Method: Keith ferris

## 2018-10-02 NOTE — PROCEDURE: RECOMMENDATIONS
Recommendations (Free Text): Ketoconazole 2% cream nightly for two weeks then as needed
Detail Level: Zone

## 2018-10-02 NOTE — PROCEDURE: PATHOLOGY BILLING
Immunohistochemistry ([id number] and [id number]) billing is not performed here. Please use the Immunohistochemistry Stain Billing plan to accomplish this. Immunohistochemistry ([id number] and [id number]) billing is not performed here. Please use the Immunohistochemistry Stain Billing plan to accomplish this.

## 2018-10-15 PROBLEM — E11.621 DIABETIC ULCER OF TOE ASSOCIATED WITH TYPE 2 DIABETES MELLITUS, WITH FAT LAYER EXPOSED (HCC): Status: RESOLVED | Noted: 2017-08-17 | Resolved: 2018-10-15

## 2018-10-15 PROBLEM — L03.119 CELLULITIS OF FOOT: Status: RESOLVED | Noted: 2017-08-17 | Resolved: 2018-10-15

## 2018-10-15 PROBLEM — E87.1 HYPONATREMIA: Status: RESOLVED | Noted: 2017-09-12 | Resolved: 2018-10-15

## 2018-10-15 PROBLEM — N17.9 AKI (ACUTE KIDNEY INJURY) (HCC): Status: RESOLVED | Noted: 2017-09-12 | Resolved: 2018-10-15

## 2018-10-15 PROBLEM — L97.502 DIABETIC ULCER OF TOE ASSOCIATED WITH TYPE 2 DIABETES MELLITUS, WITH FAT LAYER EXPOSED (HCC): Status: RESOLVED | Noted: 2017-08-17 | Resolved: 2018-10-15

## 2018-10-15 PROBLEM — A41.9 SEPSIS (HCC): Status: RESOLVED | Noted: 2017-09-12 | Resolved: 2018-10-15

## 2018-10-18 ENCOUNTER — HOSPITAL ENCOUNTER (OUTPATIENT)
Dept: GENERAL RADIOLOGY | Age: 83
Discharge: HOME | End: 2018-10-18
Attending: FAMILY MEDICINE
Payer: MEDICARE
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
## 2018-10-18 DIAGNOSIS — E11.610 CHARCOT FOOT DUE TO DIABETES MELLITUS (HCC): ICD-10-CM

## 2018-10-18 DIAGNOSIS — M79.671 RIGHT FOOT PAIN: ICD-10-CM

## 2018-10-18 PROCEDURE — 73630 X-RAY EXAM OF FOOT: CPT

## 2018-10-25 ENCOUNTER — APPOINTMENT (RX ONLY)
Dept: URBAN - METROPOLITAN AREA CLINIC 349 | Facility: CLINIC | Age: 83
Setting detail: DERMATOLOGY
End: 2018-10-25

## 2018-10-25 PROBLEM — C44.321 SQUAMOUS CELL CARCINOMA OF SKIN OF NOSE: Status: ACTIVE | Noted: 2018-10-25

## 2018-10-25 PROBLEM — E78.5 HYPERLIPIDEMIA, UNSPECIFIED: Status: ACTIVE | Noted: 2018-10-25

## 2018-10-25 PROCEDURE — A4550 SURGICAL TRAYS: HCPCS

## 2018-10-25 PROCEDURE — 17281 DSTR MAL LS F/E/E/N/L/M .6-1: CPT

## 2018-10-25 PROCEDURE — ? CURETTAGE AND DESTRUCTION

## 2018-10-25 NOTE — PROCEDURE: CURETTAGE AND DESTRUCTION
Size Of Lesion After Curettage: 0.8
Cautery Type: electrodesiccation
Bill As A Line Item Or As Units: Line Item
Post-Care Instructions: I reviewed with the patient in detail post-care instructions. Patient is to keep the area dry for 48 hours, and not to engage in any swimming until the area is healed. Should the patient develop any fevers, chills, bleeding, severe pain patient will contact the office immediately.
Bill For Surgical Tray: yes
Total Volume (Ccs): 1
Size Of Lesion In Cm: 0.6
Detail Level: Detailed
Number Of Curettages: 2
Additional Information: (Optional): The wound was cleaned, and a pressure dressing was applied.  The patient received detailed post-op details in length. Aft the procedure, the patient was observed for 5-10 minutes and was oriented to person, place, and time.  Denied feeling dizzy, queasy, and stated that they did not feel that they were going to faint.
Consent was obtained from the patient. The risks, benefits and alternatives to therapy were discussed in detail. Specifically, the risks of infection, scarring, bleeding, prolonged wound healing, nerve injury, incomplete removal, allergy to anesthesia and recurrence were addressed. Alternatives to ED&C, such as: surgical removal and XRT were also discussed.  Prior to the procedure, the treatment site was clearly identified and confirmed by the patient. All components of Universal Protocol/PAUSE Rule completed.
Anesthesia Volume In Cc: 0.5
Add Intralesional Injection: No
Anesthesia Type: 1% lidocaine with 1:100,000 epinephrine and a 1:10 solution of 8.4% sodium bicarbonate
What Was Performed First?: Destruction

## 2018-10-26 ENCOUNTER — APPOINTMENT (RX ONLY)
Dept: URBAN - METROPOLITAN AREA CLINIC 349 | Facility: CLINIC | Age: 83
Setting detail: DERMATOLOGY
End: 2018-10-26

## 2018-10-26 PROBLEM — C44.92 SQUAMOUS CELL CARCINOMA OF SKIN, UNSPECIFIED: Status: ACTIVE | Noted: 2018-10-26

## 2018-10-26 PROBLEM — H91.90 UNSPECIFIED HEARING LOSS, UNSPECIFIED EAR: Status: ACTIVE | Noted: 2018-10-26

## 2018-10-26 PROCEDURE — ? RECOMMENDATIONS

## 2018-10-26 NOTE — PROCEDURE: RECOMMENDATIONS
Recommendations (Free Text): Pt came back into office today as she states that her lesion had been bleeding since procedure \\nArea treat with cautery \\nPt to stop aspirin until Sunday
Detail Level: Zone

## 2018-11-09 PROBLEM — I73.9 PAD (PERIPHERAL ARTERY DISEASE) (HCC): Status: ACTIVE | Noted: 2018-11-09

## 2019-01-11 ENCOUNTER — APPOINTMENT (RX ONLY)
Dept: URBAN - METROPOLITAN AREA CLINIC 349 | Facility: CLINIC | Age: 84
Setting detail: DERMATOLOGY
End: 2019-01-11

## 2019-01-11 DIAGNOSIS — L82.1 OTHER SEBORRHEIC KERATOSIS: ICD-10-CM

## 2019-01-11 DIAGNOSIS — L81.4 OTHER MELANIN HYPERPIGMENTATION: ICD-10-CM

## 2019-01-11 DIAGNOSIS — D22 MELANOCYTIC NEVI: ICD-10-CM

## 2019-01-11 DIAGNOSIS — Z85.828 PERSONAL HISTORY OF OTHER MALIGNANT NEOPLASM OF SKIN: ICD-10-CM

## 2019-01-11 PROBLEM — J30.1 ALLERGIC RHINITIS DUE TO POLLEN: Status: ACTIVE | Noted: 2019-01-11

## 2019-01-11 PROBLEM — I10 ESSENTIAL (PRIMARY) HYPERTENSION: Status: ACTIVE | Noted: 2019-01-11

## 2019-01-11 PROBLEM — L23.7 ALLERGIC CONTACT DERMATITIS DUE TO PLANTS, EXCEPT FOOD: Status: ACTIVE | Noted: 2019-01-11

## 2019-01-11 PROBLEM — D22.5 MELANOCYTIC NEVI OF TRUNK: Status: ACTIVE | Noted: 2019-01-11

## 2019-01-11 PROBLEM — L29.8 OTHER PRURITUS: Status: ACTIVE | Noted: 2019-01-11

## 2019-01-11 PROCEDURE — ? COUNSELING

## 2019-01-11 PROCEDURE — 99213 OFFICE O/P EST LOW 20 MIN: CPT

## 2019-01-11 ASSESSMENT — LOCATION SIMPLE DESCRIPTION DERM
LOCATION SIMPLE: LEFT UPPER BACK
LOCATION SIMPLE: CHEST
LOCATION SIMPLE: UPPER BACK
LOCATION SIMPLE: NOSE
LOCATION SIMPLE: RIGHT UPPER BACK
LOCATION SIMPLE: RIGHT BREAST

## 2019-01-11 ASSESSMENT — LOCATION DETAILED DESCRIPTION DERM
LOCATION DETAILED: INFERIOR THORACIC SPINE
LOCATION DETAILED: NASAL ROOT
LOCATION DETAILED: LEFT SUPERIOR MEDIAL UPPER BACK
LOCATION DETAILED: LEFT MEDIAL SUPERIOR CHEST
LOCATION DETAILED: RIGHT MEDIAL BREAST 3-4:00 REGION
LOCATION DETAILED: RIGHT SUPERIOR UPPER BACK
LOCATION DETAILED: SUPERIOR THORACIC SPINE

## 2019-01-11 ASSESSMENT — LOCATION ZONE DERM
LOCATION ZONE: NOSE
LOCATION ZONE: TRUNK

## 2019-01-11 NOTE — HPI: SKIN LESIONS
How Severe Is Your Skin Lesion?: mild
Have Your Skin Lesions Been Treated?: not been treated
Is This A New Presentation, Or A Follow-Up?: Skin Lesions
Additional History: Upper body skin check

## 2019-02-13 ENCOUNTER — HOSPITAL ENCOUNTER (OUTPATIENT)
Dept: GENERAL RADIOLOGY | Age: 84
Discharge: HOME | End: 2019-02-13
Attending: FAMILY MEDICINE
Payer: MEDICARE

## 2019-02-13 ENCOUNTER — HOSPITAL ENCOUNTER (OUTPATIENT)
Dept: MAMMOGRAPHY | Age: 84
Discharge: HOME | End: 2019-02-13
Attending: FAMILY MEDICINE
Payer: MEDICARE

## 2019-02-13 DIAGNOSIS — R92.8 ABNORMAL SCREENING MAMMOGRAM: ICD-10-CM

## 2019-02-13 DIAGNOSIS — M25.512 CHRONIC LEFT SHOULDER PAIN: ICD-10-CM

## 2019-02-13 DIAGNOSIS — G89.29 CHRONIC LEFT SHOULDER PAIN: ICD-10-CM

## 2019-02-13 PROCEDURE — 73030 X-RAY EXAM OF SHOULDER: CPT

## 2019-02-13 PROCEDURE — 77067 SCR MAMMO BI INCL CAD: CPT

## 2019-02-14 NOTE — PROGRESS NOTES
Please let patient know xray of shoulder showed no dislocation or fracture, did show some mild arthritis.

## 2019-02-25 ENCOUNTER — HOSPITAL ENCOUNTER (OUTPATIENT)
Dept: MAMMOGRAPHY | Age: 84
Discharge: HOME | End: 2019-02-25
Attending: FAMILY MEDICINE
Payer: MEDICARE

## 2019-02-25 DIAGNOSIS — R92.8 ABNORMAL SCREENING MAMMOGRAM: ICD-10-CM

## 2019-02-25 PROCEDURE — 77065 DX MAMMO INCL CAD UNI: CPT

## 2019-03-28 ENCOUNTER — HOSPITAL ENCOUNTER (OUTPATIENT)
Dept: GENERAL RADIOLOGY | Age: 84
Discharge: HOME | End: 2019-03-28
Payer: MEDICARE

## 2019-03-28 DIAGNOSIS — M25.551 RIGHT HIP PAIN: ICD-10-CM

## 2019-03-28 PROCEDURE — 73502 X-RAY EXAM HIP UNI 2-3 VIEWS: CPT

## 2019-04-01 NOTE — PROGRESS NOTES
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 The radiologist recommended an mri, I am ordering it to evaluate this as a possible cause for her hip pain. Has she ever had an MRI before? Has she ever been told she can't have an MRI?

## 2019-04-10 ENCOUNTER — HOSPITAL ENCOUNTER (OUTPATIENT)
Dept: MRI IMAGING | Age: 84
Discharge: HOME | End: 2019-04-10
Attending: FAMILY MEDICINE
Payer: MEDICARE

## 2019-04-10 DIAGNOSIS — M54.50 LOW BACK PAIN RADIATING TO BOTH LEGS: ICD-10-CM

## 2019-04-10 DIAGNOSIS — M79.604 LOW BACK PAIN RADIATING TO BOTH LEGS: ICD-10-CM

## 2019-04-10 DIAGNOSIS — M79.605 LOW BACK PAIN RADIATING TO BOTH LEGS: ICD-10-CM

## 2019-04-10 PROCEDURE — 72148 MRI LUMBAR SPINE W/O DYE: CPT

## 2019-04-12 ENCOUNTER — HOME HEALTH ADMISSION (OUTPATIENT)
Dept: HOME HEALTH SERVICES | Facility: HOME HEALTH | Age: 84
End: 2019-04-12
Payer: MEDICARE

## 2019-04-13 ENCOUNTER — HOME CARE VISIT (OUTPATIENT)
Dept: SCHEDULING | Facility: HOME HEALTH | Age: 84
End: 2019-04-13
Payer: MEDICARE

## 2019-04-13 PROCEDURE — 3331090002 HH PPS REVENUE DEBIT

## 2019-04-13 PROCEDURE — 400013 HH SOC

## 2019-04-13 PROCEDURE — G0151 HHCP-SERV OF PT,EA 15 MIN: HCPCS

## 2019-04-13 PROCEDURE — 3331090001 HH PPS REVENUE CREDIT

## 2019-04-14 VITALS
RESPIRATION RATE: 16 BRPM | DIASTOLIC BLOOD PRESSURE: 72 MMHG | TEMPERATURE: 98.9 F | SYSTOLIC BLOOD PRESSURE: 148 MMHG | HEART RATE: 70 BPM

## 2019-04-14 PROCEDURE — 3331090002 HH PPS REVENUE DEBIT

## 2019-04-14 PROCEDURE — 3331090001 HH PPS REVENUE CREDIT

## 2019-04-15 PROCEDURE — 3331090002 HH PPS REVENUE DEBIT

## 2019-04-15 PROCEDURE — 3331090001 HH PPS REVENUE CREDIT

## 2019-04-16 ENCOUNTER — HOME CARE VISIT (OUTPATIENT)
Dept: SCHEDULING | Facility: HOME HEALTH | Age: 84
End: 2019-04-16
Payer: MEDICARE

## 2019-04-16 VITALS
RESPIRATION RATE: 16 BRPM | SYSTOLIC BLOOD PRESSURE: 135 MMHG | TEMPERATURE: 98.3 F | DIASTOLIC BLOOD PRESSURE: 75 MMHG | HEART RATE: 76 BPM

## 2019-04-16 PROCEDURE — G0152 HHCP-SERV OF OT,EA 15 MIN: HCPCS

## 2019-04-16 PROCEDURE — 3331090001 HH PPS REVENUE CREDIT

## 2019-04-16 PROCEDURE — 3331090002 HH PPS REVENUE DEBIT

## 2019-04-16 PROCEDURE — G0151 HHCP-SERV OF PT,EA 15 MIN: HCPCS

## 2019-04-17 PROCEDURE — 3331090002 HH PPS REVENUE DEBIT

## 2019-04-17 PROCEDURE — 3331090001 HH PPS REVENUE CREDIT

## 2019-04-18 ENCOUNTER — HOME CARE VISIT (OUTPATIENT)
Dept: SCHEDULING | Facility: HOME HEALTH | Age: 84
End: 2019-04-18
Payer: MEDICARE

## 2019-04-18 VITALS
SYSTOLIC BLOOD PRESSURE: 138 MMHG | RESPIRATION RATE: 18 BRPM | TEMPERATURE: 97.6 F | HEART RATE: 90 BPM | DIASTOLIC BLOOD PRESSURE: 62 MMHG

## 2019-04-18 PROCEDURE — 3331090001 HH PPS REVENUE CREDIT

## 2019-04-18 PROCEDURE — G0157 HHC PT ASSISTANT EA 15: HCPCS

## 2019-04-18 PROCEDURE — 3331090002 HH PPS REVENUE DEBIT

## 2019-04-19 VITALS
RESPIRATION RATE: 16 BRPM | HEART RATE: 76 BPM | SYSTOLIC BLOOD PRESSURE: 136 MMHG | TEMPERATURE: 98.4 F | DIASTOLIC BLOOD PRESSURE: 68 MMHG

## 2019-04-19 PROCEDURE — 3331090002 HH PPS REVENUE DEBIT

## 2019-04-19 PROCEDURE — 3331090001 HH PPS REVENUE CREDIT

## 2019-04-20 PROCEDURE — 3331090002 HH PPS REVENUE DEBIT

## 2019-04-20 PROCEDURE — 3331090001 HH PPS REVENUE CREDIT

## 2019-04-21 PROCEDURE — 3331090002 HH PPS REVENUE DEBIT

## 2019-04-21 PROCEDURE — 3331090001 HH PPS REVENUE CREDIT

## 2019-04-22 PROCEDURE — 3331090002 HH PPS REVENUE DEBIT

## 2019-04-22 PROCEDURE — 3331090001 HH PPS REVENUE CREDIT

## 2019-04-23 ENCOUNTER — HOME CARE VISIT (OUTPATIENT)
Dept: SCHEDULING | Facility: HOME HEALTH | Age: 84
End: 2019-04-23
Payer: MEDICARE

## 2019-04-23 VITALS
RESPIRATION RATE: 16 BRPM | DIASTOLIC BLOOD PRESSURE: 80 MMHG | HEART RATE: 72 BPM | SYSTOLIC BLOOD PRESSURE: 125 MMHG | TEMPERATURE: 97.8 F

## 2019-04-23 PROCEDURE — 3331090002 HH PPS REVENUE DEBIT

## 2019-04-23 PROCEDURE — G0157 HHC PT ASSISTANT EA 15: HCPCS

## 2019-04-23 PROCEDURE — 3331090001 HH PPS REVENUE CREDIT

## 2019-04-24 PROCEDURE — 3331090002 HH PPS REVENUE DEBIT

## 2019-04-24 PROCEDURE — 3331090001 HH PPS REVENUE CREDIT

## 2019-04-25 PROCEDURE — 3331090001 HH PPS REVENUE CREDIT

## 2019-04-25 PROCEDURE — 3331090002 HH PPS REVENUE DEBIT

## 2019-04-26 ENCOUNTER — HOME CARE VISIT (OUTPATIENT)
Dept: SCHEDULING | Facility: HOME HEALTH | Age: 84
End: 2019-04-26
Payer: MEDICARE

## 2019-04-26 VITALS
RESPIRATION RATE: 15 BRPM | HEART RATE: 70 BPM | SYSTOLIC BLOOD PRESSURE: 140 MMHG | TEMPERATURE: 97.7 F | DIASTOLIC BLOOD PRESSURE: 75 MMHG

## 2019-04-26 PROCEDURE — 3331090001 HH PPS REVENUE CREDIT

## 2019-04-26 PROCEDURE — 3331090002 HH PPS REVENUE DEBIT

## 2019-04-26 PROCEDURE — G0157 HHC PT ASSISTANT EA 15: HCPCS

## 2019-04-27 PROCEDURE — 3331090002 HH PPS REVENUE DEBIT

## 2019-04-27 PROCEDURE — 3331090001 HH PPS REVENUE CREDIT

## 2019-04-28 PROCEDURE — 3331090002 HH PPS REVENUE DEBIT

## 2019-04-28 PROCEDURE — 3331090001 HH PPS REVENUE CREDIT

## 2019-04-29 ENCOUNTER — HOME CARE VISIT (OUTPATIENT)
Dept: SCHEDULING | Facility: HOME HEALTH | Age: 84
End: 2019-04-29
Payer: MEDICARE

## 2019-04-29 VITALS
TEMPERATURE: 98.2 F | DIASTOLIC BLOOD PRESSURE: 74 MMHG | SYSTOLIC BLOOD PRESSURE: 144 MMHG | RESPIRATION RATE: 16 BRPM | HEART RATE: 70 BPM

## 2019-04-29 PROCEDURE — 3331090001 HH PPS REVENUE CREDIT

## 2019-04-29 PROCEDURE — G0157 HHC PT ASSISTANT EA 15: HCPCS

## 2019-04-29 PROCEDURE — 3331090002 HH PPS REVENUE DEBIT

## 2019-04-30 PROCEDURE — 3331090001 HH PPS REVENUE CREDIT

## 2019-04-30 PROCEDURE — 3331090002 HH PPS REVENUE DEBIT

## 2019-05-01 ENCOUNTER — HOME CARE VISIT (OUTPATIENT)
Dept: SCHEDULING | Facility: HOME HEALTH | Age: 84
End: 2019-05-01
Payer: MEDICARE

## 2019-05-01 ENCOUNTER — HOME CARE VISIT (OUTPATIENT)
Dept: HOME HEALTH SERVICES | Facility: HOME HEALTH | Age: 84
End: 2019-05-01
Payer: MEDICARE

## 2019-05-01 VITALS
SYSTOLIC BLOOD PRESSURE: 155 MMHG | RESPIRATION RATE: 16 BRPM | DIASTOLIC BLOOD PRESSURE: 90 MMHG | HEART RATE: 70 BPM | TEMPERATURE: 98 F

## 2019-05-01 PROCEDURE — G0157 HHC PT ASSISTANT EA 15: HCPCS

## 2019-05-01 PROCEDURE — 3331090002 HH PPS REVENUE DEBIT

## 2019-05-01 PROCEDURE — 3331090001 HH PPS REVENUE CREDIT

## 2019-05-02 PROCEDURE — 3331090001 HH PPS REVENUE CREDIT

## 2019-05-02 PROCEDURE — 3331090002 HH PPS REVENUE DEBIT

## 2019-05-03 PROCEDURE — 3331090002 HH PPS REVENUE DEBIT

## 2019-05-03 PROCEDURE — 3331090001 HH PPS REVENUE CREDIT

## 2019-05-04 PROCEDURE — 3331090001 HH PPS REVENUE CREDIT

## 2019-05-04 PROCEDURE — 3331090002 HH PPS REVENUE DEBIT

## 2019-05-05 PROCEDURE — 3331090001 HH PPS REVENUE CREDIT

## 2019-05-05 PROCEDURE — 3331090002 HH PPS REVENUE DEBIT

## 2019-05-06 ENCOUNTER — HOME CARE VISIT (OUTPATIENT)
Dept: SCHEDULING | Facility: HOME HEALTH | Age: 84
End: 2019-05-06
Payer: MEDICARE

## 2019-05-06 VITALS
HEART RATE: 83 BPM | RESPIRATION RATE: 18 BRPM | TEMPERATURE: 98.2 F | DIASTOLIC BLOOD PRESSURE: 80 MMHG | SYSTOLIC BLOOD PRESSURE: 140 MMHG

## 2019-05-06 PROCEDURE — 3331090002 HH PPS REVENUE DEBIT

## 2019-05-06 PROCEDURE — G0151 HHCP-SERV OF PT,EA 15 MIN: HCPCS

## 2019-05-06 PROCEDURE — 3331090003 HH PPS REVENUE ADJ

## 2019-05-06 PROCEDURE — 3331090001 HH PPS REVENUE CREDIT

## 2019-06-04 ENCOUNTER — APPOINTMENT (OUTPATIENT)
Dept: GENERAL RADIOLOGY | Age: 84
End: 2019-06-04
Attending: EMERGENCY MEDICINE
Payer: MEDICARE

## 2019-06-04 ENCOUNTER — HOSPITAL ENCOUNTER (EMERGENCY)
Age: 84
Discharge: HOME | End: 2019-06-04
Attending: EMERGENCY MEDICINE
Payer: MEDICARE

## 2019-06-04 VITALS
TEMPERATURE: 97.9 F | HEART RATE: 91 BPM | WEIGHT: 220 LBS | SYSTOLIC BLOOD PRESSURE: 164 MMHG | BODY MASS INDEX: 37.56 KG/M2 | HEIGHT: 64 IN | DIASTOLIC BLOOD PRESSURE: 80 MMHG | RESPIRATION RATE: 16 BRPM | OXYGEN SATURATION: 96 %

## 2019-06-04 DIAGNOSIS — N18.9 CHRONIC RENAL IMPAIRMENT, UNSPECIFIED CKD STAGE: ICD-10-CM

## 2019-06-04 DIAGNOSIS — J06.9 VIRAL URI WITH COUGH: Primary | ICD-10-CM

## 2019-06-04 LAB
ALBUMIN SERPL-MCNC: 3.9 G/DL (ref 3.2–4.6)
ALBUMIN/GLOB SERPL: 1 {RATIO} (ref 1.2–3.5)
ALP SERPL-CCNC: 65 U/L (ref 50–136)
ALT SERPL-CCNC: 26 U/L (ref 12–65)
ANION GAP SERPL CALC-SCNC: 7 MMOL/L (ref 7–16)
AST SERPL-CCNC: 31 U/L (ref 15–37)
BASOPHILS # BLD: 0.1 K/UL (ref 0–0.2)
BASOPHILS NFR BLD: 1 % (ref 0–2)
BILIRUB SERPL-MCNC: 0.4 MG/DL (ref 0.2–1.1)
BUN SERPL-MCNC: 36 MG/DL (ref 8–23)
CALCIUM SERPL-MCNC: 10 MG/DL (ref 8.3–10.4)
CHLORIDE SERPL-SCNC: 98 MMOL/L (ref 98–107)
CO2 SERPL-SCNC: 28 MMOL/L (ref 21–32)
CREAT SERPL-MCNC: 1.71 MG/DL (ref 0.6–1)
DEPRECATED S PYO AG THROAT QL EIA: NEGATIVE
DIFFERENTIAL METHOD BLD: ABNORMAL
EOSINOPHIL # BLD: 0 K/UL (ref 0–0.8)
EOSINOPHIL NFR BLD: 0 % (ref 0.5–7.8)
ERYTHROCYTE [DISTWIDTH] IN BLOOD BY AUTOMATED COUNT: 12.4 % (ref 11.9–14.6)
GLOBULIN SER CALC-MCNC: 4 G/DL (ref 2.3–3.5)
GLUCOSE SERPL-MCNC: 125 MG/DL (ref 65–100)
HCT VFR BLD AUTO: 37.8 % (ref 35.8–46.3)
HGB BLD-MCNC: 12.5 G/DL (ref 11.7–15.4)
IMM GRANULOCYTES # BLD AUTO: 0 K/UL (ref 0–0.5)
IMM GRANULOCYTES NFR BLD AUTO: 0 % (ref 0–5)
LYMPHOCYTES # BLD: 1.6 K/UL (ref 0.5–4.6)
LYMPHOCYTES NFR BLD: 16 % (ref 13–44)
MCH RBC QN AUTO: 32.1 PG (ref 26.1–32.9)
MCHC RBC AUTO-ENTMCNC: 33.1 G/DL (ref 31.4–35)
MCV RBC AUTO: 96.9 FL (ref 79.6–97.8)
MONOCYTES # BLD: 1.1 K/UL (ref 0.1–1.3)
MONOCYTES NFR BLD: 10 % (ref 4–12)
NEUTS SEG # BLD: 7.3 K/UL (ref 1.7–8.2)
NEUTS SEG NFR BLD: 72 % (ref 43–78)
NRBC # BLD: 0 K/UL (ref 0–0.2)
PLATELET # BLD AUTO: 333 K/UL (ref 150–450)
PMV BLD AUTO: 8.9 FL (ref 9.4–12.3)
POTASSIUM SERPL-SCNC: 5 MMOL/L (ref 3.5–5.1)
PROT SERPL-MCNC: 7.9 G/DL (ref 6.3–8.2)
RBC # BLD AUTO: 3.9 M/UL (ref 4.05–5.2)
SODIUM SERPL-SCNC: 133 MMOL/L (ref 136–145)
WBC # BLD AUTO: 10.2 K/UL (ref 4.3–11.1)

## 2019-06-04 PROCEDURE — 71046 X-RAY EXAM CHEST 2 VIEWS: CPT

## 2019-06-04 PROCEDURE — 87081 CULTURE SCREEN ONLY: CPT

## 2019-06-04 PROCEDURE — 87880 STREP A ASSAY W/OPTIC: CPT

## 2019-06-04 PROCEDURE — 99282 EMERGENCY DEPT VISIT SF MDM: CPT | Performed by: EMERGENCY MEDICINE

## 2019-06-04 PROCEDURE — 85025 COMPLETE CBC W/AUTO DIFF WBC: CPT

## 2019-06-04 PROCEDURE — 80053 COMPREHEN METABOLIC PANEL: CPT

## 2019-06-04 NOTE — ED TRIAGE NOTES
Pt reports feeling sick for a couple of days. Pt states sore throat with right ear pain. Pt states some coughing Sunday night with sputum, yellow in color. A febrile in triage. Also reports yellow nasal drainage.

## 2019-06-04 NOTE — ED NOTES
I have reviewed discharge instructions with the patient. The patient verbalized understanding. Patient left ED via Discharge Method: wheelchair to Home with friend). Opportunity for questions and clarification provided. Patient given 0 scripts. To continue your aftercare when you leave the hospital, you may receive an automated call from our care team to check in on how you are doing. This is a free service and part of our promise to provide the best care and service to meet your aftercare needs.  If you have questions, or wish to unsubscribe from this service please call [phone]. Thank you for Choosing our [facility].

## 2019-06-04 NOTE — DISCHARGE INSTRUCTIONS
Patient Education   increase fluids. Tylenol for aches pains and fevers if needed. Over-the-counter saltwater nasal spray for nasal congestion if needed. NyQuil at night for cough congestion and better rest.  Salt water gargles for sore throat if needed. Place 1 teaspoon of salt and baking soda into a 16 ounce bottle of water-shake gargle and spit out as needed for sore throat. Follow-up with your doctor in 5-7 days if not improving. Return if any new, worsening or concerning symptoms. Upper Respiratory Infection (Cold): Care Instructions  Your Care Instructions    An upper respiratory infection, or URI, is an infection of the nose, sinuses, or throat. URIs are spread by coughs, sneezes, and direct contact. The common cold is the most frequent kind of URI. The flu and sinus infections are other kinds of URIs. Almost all URIs are caused by viruses. Antibiotics won't cure them. But you can treat most infections with home care. This may include drinking lots of fluids and taking over-the-counter pain medicine. You will probably feel better in 4 to 10 days. The doctor has checked you carefully, but problems can develop later. If you notice any problems or new symptoms, get medical treatment right away. Follow-up care is a key part of your treatment and safety. Be sure to make and go to all appointments, and call your doctor if you are having problems. It's also a good idea to know your test results and keep a list of the medicines you take. How can you care for yourself at home? · To prevent dehydration, drink plenty of fluids, enough so that your urine is light yellow or clear like water. Choose water and other caffeine-free clear liquids until you feel better. If you have kidney, heart, or liver disease and have to limit fluids, talk with your doctor before you increase the amount of fluids you drink.   · Take an over-the-counter pain medicine, such as acetaminophen (Tylenol), ibuprofen (Advil, Motrin), or naproxen (Aleve). Read and follow all instructions on the label. · Before you use cough and cold medicines, check the label. These medicines may not be safe for young children or for people with certain health problems. · Be careful when taking over-the-counter cold or flu medicines and Tylenol at the same time. Many of these medicines have acetaminophen, which is Tylenol. Read the labels to make sure that you are not taking more than the recommended dose. Too much acetaminophen (Tylenol) can be harmful. · Get plenty of rest.  · Do not smoke or allow others to smoke around you. If you need help quitting, talk to your doctor about stop-smoking programs and medicines. These can increase your chances of quitting for good. When should you call for help? Call 911 anytime you think you may need emergency care. For example, call if:    · You have severe trouble breathing.    Call your doctor now or seek immediate medical care if:    · You seem to be getting much sicker.     · You have new or worse trouble breathing.     · You have a new or higher fever.     · You have a new rash.    Watch closely for changes in your health, and be sure to contact your doctor if:    · You have a new symptom, such as a sore throat, an earache, or sinus pain.     · You cough more deeply or more often, especially if you notice more mucus or a change in the color of your mucus.     · You do not get better as expected. Where can you learn more? Go to http://latanya-cristina.info/. Enter C138 in the search box to learn more about \"Upper Respiratory Infection (Cold): Care Instructions. \"  Current as of: September 5, 2018  Content Version: 11.9  © 0411-4530 [employer]. Care instructions adapted under license by [employer] (which disclaims liability or warranty for this information).  If you have questions about a medical condition or this instruction, always ask your healthcare professional. [employer] disclaims any warranty or liability for your use of this information.

## 2019-06-04 NOTE — ED PROVIDER NOTES
80year-old with 2-3 days history of subjective fever postnasal drip sore throat and cough. No chest pain or trouble breathing. No vomiting or diarrhea. The history is provided by the patient.         Past Medical History:   Diagnosis Date    Arthritis     Erosive Osteoarthritis, sees Dr. Reji Joyner Asthma     sees Dr. Fabi Rivera with urination     Cellulitis     sees wound clinic    Charcot-Evy-Tooth disease-like deformity of foot     Diabetes ([address]     in nursing home and does not know the values when checked     Fibromyalgia     Full dentures     Gastrointestinal disorder     GERD    GERD (gastroesophageal reflux disease)     Hiatal hernia     High cholesterol     Hypertension     Incontinence of urine in female     Neuropathy     Sleep apnea     no C PAP       Past Surgical History:   Procedure Laterality Date    BREAST SURGERY PROCEDURE UNLISTED      bilat lumpectomy    CHEST SURGERY PROCEDURE UNLISTED      HX CHOLECYSTECTOMY      HX COLONOSCOPY      HX GYN      hyst    HX HEENT      HX ORTHOPAEDIC      bilateral knee replacements    HX ORTHOPAEDIC      back    VASCULAR SURGERY PROCEDURE UNLIST Left 09/29/2017    aortogram/arteriogram- PTA x2         Family History:   Problem Relation Age of Onset    No Known Problems Mother     No Known Problems Father        Social History     Socioeconomic History    Marital status:      Spouse name: Not on file    Number of children: Not on file    Years of education: Not on file    Highest education level: Not on file   Occupational History    Not on file   Social Needs    Financial resource strain: Not on file    Food insecurity:     Worry: Not on file     Inability: Not on file    Transportation needs:     Medical: Not on file     Non-medical: Not on file   Tobacco Use    Smoking status: Never Smoker    Smokeless tobacco: Never Used   Substance and Sexual Activity    Alcohol use: No    Drug use: No    Sexual activity: Not on file   Lifestyle    Physical activity:     Days per week: Not on file     Minutes per session: Not on file    Stress: Not on file   Relationships    Social connections:     Talks on phone: Not on file     Gets together: Not on file     Attends [employer] service: Not on file     Active member of club or organization: Not on file     Attends meetings of clubs or organizations: Not on file     Relationship status: Not on file    Intimate partner violence:     Fear of current or ex partner: Not on file     Emotionally abused: Not on file     Physically abused: Not on file     Forced sexual activity: Not on file   Other Topics Concern    Not on file   Social History Narrative    Not on file         ALLERGIES: Aleve [naproxen sodium]; Hydrocodone-acetaminophen; Metformin; Phenobarbital; and Statins-hmg-coa reductase inhibitors    Review of Systems   Constitutional: Positive for fever. Negative for chills. HENT: Positive for postnasal drip, rhinorrhea and sore throat (sore throat improved). Negative for congestion. Respiratory: Positive for cough. Negative for shortness of breath. Cardiovascular: Negative for chest pain. Gastrointestinal: Negative for nausea and vomiting. Genitourinary: Negative for dysuria. Musculoskeletal: Negative for back pain and neck pain. Vitals:    06/04/19 1603   BP: 164/80   Pulse: 91   Resp: 16   Temp: 97.9 °F (36.6 °C)   SpO2: 96%   Weight: 99.8 kg (220 lb)   Height: 5' 4\" (1.626 m)            Physical Exam   Constitutional: She appears well-developed and well-nourished. HENT:   Oropharynx injected, uvula midline   Eyes: Conjunctivae are normal.   Neck: Neck supple. Cardiovascular: Normal rate, regular rhythm and normal heart sounds. Pulmonary/Chest: Effort normal. She has rales (left lower lobe). Abdominal: Soft. She exhibits no distension. There is no tenderness.    Lymphadenopathy:     She has cervical adenopathy (mildly tender bilateral anterior cervical lymphadenopathy). Neurological: She is alert. Nursing note and vitals reviewed. MDM  Number of Diagnoses or Management Options  Diagnosis management comments: Viral URI with cough versus viral pharyngitis versus strep pharyngitis versus bronchitis or pneumonia. 4:53 PM  Strep negative and chest x-ray negative for pneumonia. Viral URI and pharyngitis.        Amount and/or Complexity of Data Reviewed  Clinical lab tests: ordered and reviewed (Results for orders placed or performed during the hospital encounter of 06/04/19  -STREP AG SCREEN, GROUP A       Result                      Value             Ref Range           Group A Strep Ag ID         NEGATIVE          NEG            -CBC WITH AUTOMATED DIFF       Result                      Value             Ref Range           WBC                         10.2              4.3 - 11.1 K*       RBC                         3.90 (L)          4.05 - 5.2 M*       HGB                         12.5              11.7 - 15.4 *       HCT                         37.8              35.8 - 46.3 %       MCV                         96.9              79.6 - 97.8 *       MCH                         32.1              26.1 - 32.9 *       MCHC                        33.1              31.4 - 35.0 *       RDW                         12.4              11.9 - 14.6 %       PLATELET                    333               150 - 450 K/*       MPV                         8.9 (L)           9.4 - 12.3 FL       ABSOLUTE NRBC               0.00              0.0 - 0.2 K/*       DF                          AUTOMATED                             NEUTROPHILS                 72                43 - 78 %           LYMPHOCYTES                 16                13 - 44 %           MONOCYTES                   10                4.0 - 12.0 %        EOSINOPHILS                 0 (L)             0.5 - 7.8 %         BASOPHILS                   1                 0.0 - 2.0 %         IMMATURE GRANULOCYTES       0                 0.0 - 5.0 %         ABS. NEUTROPHILS            7.3               1.7 - 8.2 K/*       ABS. LYMPHOCYTES            1.6               0.5 - 4.6 K/*       ABS. MONOCYTES              1.1               0.1 - 1.3 K/*       ABS. EOSINOPHILS            0.0               0.0 - 0.8 K/*       ABS. BASOPHILS              0.1               0.0 - 0.2 K/*       ABS. IMM. GRANS.            0.0               0.0 - 0.5 K/*  -METABOLIC PANEL, COMPREHENSIVE       Result                      Value             Ref Range           Sodium                      133 (L)           136 - 145 mm*       Potassium                   5.0               3.5 - 5.1 mm*       Chloride                    98                98 - 107 mmo*       CO2                         28                21 - 32 mmol*       Anion gap                   7                 7 - 16 mmol/L       Glucose                     125 (H)           65 - 100 mg/*       BUN                         36 (H)            8 - 23 MG/DL        Creatinine                  1.71 (H)          0.6 - 1.0 MG*       GFR est AA                  36 (L)            >60 ml/min/1*       GFR est non-AA              30 (L)            >60 ml/min/1*       Calcium                     10.0              8.3 - 10.4 M*       Bilirubin, total            0.4               0.2 - 1.1 MG*       ALT (SGPT)                  26                12 - 65 U/L         AST (SGOT)                  31                15 - 37 U/L         Alk. phosphatase            65                50 - 136 U/L        Protein, total              7.9               6.3 - 8.2 g/*       Albumin                     3.9               3.2 - 4.6 g/*       Globulin                    4.0 (H)           2.3 - 3.5 g/*       A-G Ratio                   1.0 (L)           1.2 - 3.5      )  Tests in the radiology section of CPT®: ordered and reviewed (Xr Chest Pa Lat    Result Date: 6/4/2019  PA AND LATERAL CHEST X-RAY.  Clinical Indication: Sore throat and cough Comparison: Chest x-ray dated 2/11/2015 Findings: 2 views of the chest submitted demonstrate the cardiac silhouette and mediastinum to be unremarkable. There is no pleural effusion or pneumothorax. The lung parenchyma is clear. Degenerative spondylosis is noted in the thoracic spine.      IMPRESSION: No acute cardiopulmonary abnormality.    )           Procedures

## 2019-06-06 LAB
BACTERIA SPEC CULT: NORMAL
SERVICE CMNT-IMP: NORMAL

## 2019-08-19 ENCOUNTER — APPOINTMENT (OUTPATIENT)
Dept: GENERAL RADIOLOGY | Age: 84
End: 2019-08-19
Attending: EMERGENCY MEDICINE
Payer: MEDICARE

## 2019-08-19 ENCOUNTER — HOSPITAL ENCOUNTER (OUTPATIENT)
Age: 84
Setting detail: OBSERVATION
Discharge: HOME HEALTH | End: 2019-08-21
Attending: EMERGENCY MEDICINE | Admitting: FAMILY MEDICINE
Payer: MEDICARE

## 2019-08-19 DIAGNOSIS — A41.9 SEPSIS, DUE TO UNSPECIFIED ORGANISM: Primary | ICD-10-CM

## 2019-08-19 LAB
ALBUMIN SERPL-MCNC: 4.2 G/DL (ref 3.2–4.6)
ALBUMIN/GLOB SERPL: 1.2 {RATIO} (ref 1.2–3.5)
ALP SERPL-CCNC: 64 U/L (ref 50–136)
ALT SERPL-CCNC: 29 U/L (ref 12–65)
ANION GAP SERPL CALC-SCNC: 12 MMOL/L (ref 7–16)
AST SERPL-CCNC: 27 U/L (ref 15–37)
ATRIAL RATE: 104 BPM
BACTERIA URNS QL MICRO: ABNORMAL /HPF
BASOPHILS # BLD: 0.1 K/UL (ref 0–0.2)
BASOPHILS NFR BLD: 1 % (ref 0–2)
BILIRUB SERPL-MCNC: 0.5 MG/DL (ref 0.2–1.1)
BUN SERPL-MCNC: 35 MG/DL (ref 8–23)
CALCIUM SERPL-MCNC: 9.6 MG/DL (ref 8.3–10.4)
CALCULATED P AXIS, ECG09: 15 DEGREES
CALCULATED R AXIS, ECG10: -20 DEGREES
CALCULATED T AXIS, ECG11: 85 DEGREES
CASTS URNS QL MICRO: ABNORMAL /LPF
CHLORIDE SERPL-SCNC: 104 MMOL/L (ref 98–107)
CO2 SERPL-SCNC: 24 MMOL/L (ref 21–32)
CREAT SERPL-MCNC: 1.68 MG/DL (ref 0.6–1)
DIAGNOSIS, 93000: NORMAL
DIFFERENTIAL METHOD BLD: ABNORMAL
EOSINOPHIL # BLD: 0.2 K/UL (ref 0–0.8)
EOSINOPHIL NFR BLD: 2 % (ref 0.5–7.8)
EPI CELLS #/AREA URNS HPF: ABNORMAL /HPF
ERYTHROCYTE [DISTWIDTH] IN BLOOD BY AUTOMATED COUNT: 12.4 % (ref 11.9–14.6)
GLOBULIN SER CALC-MCNC: 3.6 G/DL (ref 2.3–3.5)
GLUCOSE SERPL-MCNC: 152 MG/DL (ref 65–100)
HCT VFR BLD AUTO: 40.4 % (ref 35.8–46.3)
HGB BLD-MCNC: 13 G/DL (ref 11.7–15.4)
IMM GRANULOCYTES # BLD AUTO: 0 K/UL (ref 0–0.5)
IMM GRANULOCYTES NFR BLD AUTO: 0 % (ref 0–5)
LACTATE BLD-SCNC: 2.78 MMOL/L (ref 0.5–1.9)
LYMPHOCYTES # BLD: 2.5 K/UL (ref 0.5–4.6)
LYMPHOCYTES NFR BLD: 25 % (ref 13–44)
MCH RBC QN AUTO: 31.6 PG (ref 26.1–32.9)
MCHC RBC AUTO-ENTMCNC: 32.2 G/DL (ref 31.4–35)
MCV RBC AUTO: 98.1 FL (ref 79.6–97.8)
MONOCYTES # BLD: 1 K/UL (ref 0.1–1.3)
MONOCYTES NFR BLD: 10 % (ref 4–12)
NEUTS SEG # BLD: 6.3 K/UL (ref 1.7–8.2)
NEUTS SEG NFR BLD: 63 % (ref 43–78)
NRBC # BLD: 0 K/UL (ref 0–0.2)
P-R INTERVAL, ECG05: 190 MS
PLATELET # BLD AUTO: 324 K/UL (ref 150–450)
PMV BLD AUTO: 8.7 FL (ref 9.4–12.3)
POTASSIUM SERPL-SCNC: 3.9 MMOL/L (ref 3.5–5.1)
PROT SERPL-MCNC: 7.8 G/DL (ref 6.3–8.2)
Q-T INTERVAL, ECG07: 378 MS
QRS DURATION, ECG06: 128 MS
QTC CALCULATION (BEZET), ECG08: 497 MS
RBC # BLD AUTO: 4.12 M/UL (ref 4.05–5.2)
RBC #/AREA URNS HPF: ABNORMAL /HPF
SODIUM SERPL-SCNC: 140 MMOL/L (ref 136–145)
TROPONIN I SERPL-MCNC: 0.04 NG/ML (ref 0.02–0.05)
VENTRICULAR RATE, ECG03: 104 BPM
WBC # BLD AUTO: 10 K/UL (ref 4.3–11.1)
WBC URNS QL MICRO: >100 /HPF

## 2019-08-19 PROCEDURE — 74011250636 HC RX REV CODE- 250/636: Performed by: EMERGENCY MEDICINE

## 2019-08-19 PROCEDURE — 84484 ASSAY OF TROPONIN QUANT: CPT

## 2019-08-19 PROCEDURE — 81015 MICROSCOPIC EXAM OF URINE: CPT

## 2019-08-19 PROCEDURE — 87086 URINE CULTURE/COLONY COUNT: CPT

## 2019-08-19 PROCEDURE — 71046 X-RAY EXAM CHEST 2 VIEWS: CPT

## 2019-08-19 PROCEDURE — 93005 ELECTROCARDIOGRAM TRACING: CPT | Performed by: EMERGENCY MEDICINE

## 2019-08-19 PROCEDURE — 85025 COMPLETE CBC W/AUTO DIFF WBC: CPT

## 2019-08-19 PROCEDURE — 96365 THER/PROPH/DIAG IV INF INIT: CPT | Performed by: EMERGENCY MEDICINE

## 2019-08-19 PROCEDURE — 74011000258 HC RX REV CODE- 258: Performed by: EMERGENCY MEDICINE

## 2019-08-19 PROCEDURE — 83605 ASSAY OF LACTIC ACID: CPT

## 2019-08-19 PROCEDURE — 36415 COLL VENOUS BLD VENIPUNCTURE: CPT

## 2019-08-19 PROCEDURE — 81003 URINALYSIS AUTO W/O SCOPE: CPT | Performed by: EMERGENCY MEDICINE

## 2019-08-19 PROCEDURE — 87186 SC STD MICRODIL/AGAR DIL: CPT

## 2019-08-19 PROCEDURE — 99285 EMERGENCY DEPT VISIT HI MDM: CPT | Performed by: EMERGENCY MEDICINE

## 2019-08-19 PROCEDURE — 87088 URINE BACTERIA CULTURE: CPT

## 2019-08-19 PROCEDURE — 80053 COMPREHEN METABOLIC PANEL: CPT

## 2019-08-19 RX ADMIN — CEFTRIAXONE 1 G: 1 INJECTION, POWDER, FOR SOLUTION INTRAMUSCULAR; INTRAVENOUS at 22:51

## 2019-08-19 RX ADMIN — SODIUM CHLORIDE 500 ML: 900 INJECTION, SOLUTION INTRAVENOUS at 22:51

## 2019-08-20 PROBLEM — E87.20 LACTIC ACID ACIDOSIS: Status: ACTIVE | Noted: 2019-08-20

## 2019-08-20 PROBLEM — N39.0 UTI (URINARY TRACT INFECTION): Status: ACTIVE | Noted: 2019-08-20

## 2019-08-20 LAB
ANION GAP SERPL CALC-SCNC: 10 MMOL/L (ref 7–16)
BASOPHILS # BLD: 0.1 K/UL (ref 0–0.2)
BASOPHILS NFR BLD: 1 % (ref 0–2)
BUN SERPL-MCNC: 29 MG/DL (ref 8–23)
CALCIUM SERPL-MCNC: 8.5 MG/DL (ref 8.3–10.4)
CHLORIDE SERPL-SCNC: 108 MMOL/L (ref 98–107)
CO2 SERPL-SCNC: 22 MMOL/L (ref 21–32)
CREAT SERPL-MCNC: 1.33 MG/DL (ref 0.6–1)
DIFFERENTIAL METHOD BLD: ABNORMAL
EOSINOPHIL # BLD: 0.3 K/UL (ref 0–0.8)
EOSINOPHIL NFR BLD: 3 % (ref 0.5–7.8)
ERYTHROCYTE [DISTWIDTH] IN BLOOD BY AUTOMATED COUNT: 12.5 % (ref 11.9–14.6)
GLUCOSE BLD STRIP.AUTO-MCNC: 153 MG/DL (ref 65–100)
GLUCOSE BLD STRIP.AUTO-MCNC: 158 MG/DL (ref 65–100)
GLUCOSE BLD STRIP.AUTO-MCNC: 158 MG/DL (ref 65–100)
GLUCOSE BLD STRIP.AUTO-MCNC: 172 MG/DL (ref 65–100)
GLUCOSE BLD STRIP.AUTO-MCNC: 173 MG/DL (ref 65–100)
GLUCOSE SERPL-MCNC: 194 MG/DL (ref 65–100)
HCT VFR BLD AUTO: 35.6 % (ref 35.8–46.3)
HGB BLD-MCNC: 11.7 G/DL (ref 11.7–15.4)
IMM GRANULOCYTES # BLD AUTO: 0 K/UL (ref 0–0.5)
IMM GRANULOCYTES NFR BLD AUTO: 0 % (ref 0–5)
LACTATE BLD-SCNC: 3.37 MMOL/L (ref 0.5–1.9)
LACTATE SERPL-SCNC: 2.9 MMOL/L (ref 0.4–2)
LACTATE SERPL-SCNC: 3.3 MMOL/L (ref 0.4–2)
LYMPHOCYTES # BLD: 1.8 K/UL (ref 0.5–4.6)
LYMPHOCYTES NFR BLD: 20 % (ref 13–44)
MCH RBC QN AUTO: 32.3 PG (ref 26.1–32.9)
MCHC RBC AUTO-ENTMCNC: 32.9 G/DL (ref 31.4–35)
MCV RBC AUTO: 98.3 FL (ref 79.6–97.8)
MONOCYTES # BLD: 0.9 K/UL (ref 0.1–1.3)
MONOCYTES NFR BLD: 10 % (ref 4–12)
NEUTS SEG # BLD: 5.8 K/UL (ref 1.7–8.2)
NEUTS SEG NFR BLD: 65 % (ref 43–78)
NRBC # BLD: 0 K/UL (ref 0–0.2)
PLATELET # BLD AUTO: 270 K/UL (ref 150–450)
PMV BLD AUTO: 8.6 FL (ref 9.4–12.3)
POTASSIUM SERPL-SCNC: 3.7 MMOL/L (ref 3.5–5.1)
RBC # BLD AUTO: 3.62 M/UL (ref 4.05–5.2)
SODIUM SERPL-SCNC: 140 MMOL/L (ref 136–145)
WBC # BLD AUTO: 8.8 K/UL (ref 4.3–11.1)

## 2019-08-20 PROCEDURE — 96361 HYDRATE IV INFUSION ADD-ON: CPT

## 2019-08-20 PROCEDURE — 74011000250 HC RX REV CODE- 250: Performed by: FAMILY MEDICINE

## 2019-08-20 PROCEDURE — 74011250636 HC RX REV CODE- 250/636: Performed by: FAMILY MEDICINE

## 2019-08-20 PROCEDURE — 77030020263 HC SOL INJ SOD CL0.9% LFCR 1000ML

## 2019-08-20 PROCEDURE — 94640 AIRWAY INHALATION TREATMENT: CPT

## 2019-08-20 PROCEDURE — 94760 N-INVAS EAR/PLS OXIMETRY 1: CPT

## 2019-08-20 PROCEDURE — 74011250637 HC RX REV CODE- 250/637: Performed by: FAMILY MEDICINE

## 2019-08-20 PROCEDURE — 83605 ASSAY OF LACTIC ACID: CPT

## 2019-08-20 PROCEDURE — 74011000302 HC RX REV CODE- 302: Performed by: INTERNAL MEDICINE

## 2019-08-20 PROCEDURE — 96366 THER/PROPH/DIAG IV INF ADDON: CPT

## 2019-08-20 PROCEDURE — 86580 TB INTRADERMAL TEST: CPT | Performed by: INTERNAL MEDICINE

## 2019-08-20 PROCEDURE — 99218 HC RM OBSERVATION: CPT

## 2019-08-20 PROCEDURE — 36415 COLL VENOUS BLD VENIPUNCTURE: CPT

## 2019-08-20 PROCEDURE — 74011250636 HC RX REV CODE- 250/636: Performed by: EMERGENCY MEDICINE

## 2019-08-20 PROCEDURE — 80048 BASIC METABOLIC PNL TOTAL CA: CPT

## 2019-08-20 PROCEDURE — 96372 THER/PROPH/DIAG INJ SC/IM: CPT

## 2019-08-20 PROCEDURE — 85025 COMPLETE CBC W/AUTO DIFF WBC: CPT

## 2019-08-20 PROCEDURE — 74011000258 HC RX REV CODE- 258: Performed by: FAMILY MEDICINE

## 2019-08-20 PROCEDURE — 82962 GLUCOSE BLOOD TEST: CPT

## 2019-08-20 PROCEDURE — 74011636637 HC RX REV CODE- 636/637: Performed by: FAMILY MEDICINE

## 2019-08-20 RX ORDER — ATENOLOL 25 MG/1
25 TABLET ORAL DAILY
Status: DISCONTINUED | OUTPATIENT
Start: 2019-08-20 | End: 2019-08-21 | Stop reason: HOSPADM

## 2019-08-20 RX ORDER — PREGABALIN 50 MG/1
50 CAPSULE ORAL 2 TIMES DAILY
Status: DISCONTINUED | OUTPATIENT
Start: 2019-08-20 | End: 2019-08-21 | Stop reason: HOSPADM

## 2019-08-20 RX ORDER — SODIUM CHLORIDE 0.9 % (FLUSH) 0.9 %
5-40 SYRINGE (ML) INJECTION AS NEEDED
Status: DISCONTINUED | OUTPATIENT
Start: 2019-08-20 | End: 2019-08-21 | Stop reason: HOSPADM

## 2019-08-20 RX ORDER — ADHESIVE BANDAGE
30 BANDAGE TOPICAL DAILY PRN
Status: DISCONTINUED | OUTPATIENT
Start: 2019-08-20 | End: 2019-08-21 | Stop reason: HOSPADM

## 2019-08-20 RX ORDER — LISINOPRIL 20 MG/1
40 TABLET ORAL DAILY
Status: DISCONTINUED | OUTPATIENT
Start: 2019-08-20 | End: 2019-08-21 | Stop reason: HOSPADM

## 2019-08-20 RX ORDER — HEPARIN SODIUM 5000 [USP'U]/ML
5000 INJECTION, SOLUTION INTRAVENOUS; SUBCUTANEOUS EVERY 8 HOURS
Status: DISCONTINUED | OUTPATIENT
Start: 2019-08-20 | End: 2019-08-21 | Stop reason: HOSPADM

## 2019-08-20 RX ORDER — ASPIRIN 81 MG/1
81 TABLET ORAL DAILY
Status: DISCONTINUED | OUTPATIENT
Start: 2019-08-20 | End: 2019-08-21 | Stop reason: HOSPADM

## 2019-08-20 RX ORDER — SODIUM CHLORIDE 0.9 % (FLUSH) 0.9 %
5-40 SYRINGE (ML) INJECTION EVERY 8 HOURS
Status: DISCONTINUED | OUTPATIENT
Start: 2019-08-20 | End: 2019-08-21 | Stop reason: HOSPADM

## 2019-08-20 RX ORDER — GLIPIZIDE 5 MG/1
10 TABLET, FILM COATED, EXTENDED RELEASE ORAL DAILY
Status: DISCONTINUED | OUTPATIENT
Start: 2019-08-20 | End: 2019-08-21 | Stop reason: HOSPADM

## 2019-08-20 RX ORDER — SODIUM CHLORIDE 9 MG/ML
150 INJECTION, SOLUTION INTRAVENOUS CONTINUOUS
Status: DISCONTINUED | OUTPATIENT
Start: 2019-08-20 | End: 2019-08-21

## 2019-08-20 RX ORDER — FENOFIBRATE 54 MG/1
54 TABLET ORAL DAILY
Status: DISCONTINUED | OUTPATIENT
Start: 2019-08-20 | End: 2019-08-21 | Stop reason: HOSPADM

## 2019-08-20 RX ORDER — QUETIAPINE FUMARATE 25 MG/1
25 TABLET, FILM COATED ORAL
Status: DISCONTINUED | OUTPATIENT
Start: 2019-08-20 | End: 2019-08-21 | Stop reason: HOSPADM

## 2019-08-20 RX ORDER — ONDANSETRON 2 MG/ML
4 INJECTION INTRAMUSCULAR; INTRAVENOUS
Status: DISCONTINUED | OUTPATIENT
Start: 2019-08-20 | End: 2019-08-21 | Stop reason: HOSPADM

## 2019-08-20 RX ORDER — LEVOTHYROXINE SODIUM 50 UG/1
25 TABLET ORAL
Status: DISCONTINUED | OUTPATIENT
Start: 2019-08-20 | End: 2019-08-21 | Stop reason: HOSPADM

## 2019-08-20 RX ORDER — BUDESONIDE 0.5 MG/2ML
500 INHALANT ORAL
Status: DISCONTINUED | OUTPATIENT
Start: 2019-08-20 | End: 2019-08-21 | Stop reason: HOSPADM

## 2019-08-20 RX ORDER — ACETAMINOPHEN 325 MG/1
650 TABLET ORAL
Status: DISCONTINUED | OUTPATIENT
Start: 2019-08-20 | End: 2019-08-21 | Stop reason: HOSPADM

## 2019-08-20 RX ORDER — MONTELUKAST SODIUM 10 MG/1
10 TABLET ORAL DAILY
Status: DISCONTINUED | OUTPATIENT
Start: 2019-08-20 | End: 2019-08-21 | Stop reason: HOSPADM

## 2019-08-20 RX ORDER — AMLODIPINE BESYLATE 10 MG/1
10 TABLET ORAL DAILY
Status: DISCONTINUED | OUTPATIENT
Start: 2019-08-20 | End: 2019-08-21 | Stop reason: HOSPADM

## 2019-08-20 RX ADMIN — LISINOPRIL 40 MG: 20 TABLET ORAL at 09:35

## 2019-08-20 RX ADMIN — LEVOTHYROXINE SODIUM 25 MCG: 50 TABLET ORAL at 05:38

## 2019-08-20 RX ADMIN — Medication 10 ML: at 05:36

## 2019-08-20 RX ADMIN — HEPARIN SODIUM 5000 UNITS: 5000 INJECTION INTRAVENOUS; SUBCUTANEOUS at 14:13

## 2019-08-20 RX ADMIN — SODIUM CHLORIDE 1000 ML: 900 INJECTION, SOLUTION INTRAVENOUS at 02:15

## 2019-08-20 RX ADMIN — CEFTRIAXONE 1 G: 1 INJECTION, POWDER, FOR SOLUTION INTRAMUSCULAR; INTRAVENOUS at 21:40

## 2019-08-20 RX ADMIN — ASPIRIN 81 MG: 81 TABLET ORAL at 09:36

## 2019-08-20 RX ADMIN — SODIUM CHLORIDE 150 ML/HR: 900 INJECTION, SOLUTION INTRAVENOUS at 21:50

## 2019-08-20 RX ADMIN — INSULIN HUMAN 3 UNITS: 100 INJECTION, SOLUTION PARENTERAL at 16:45

## 2019-08-20 RX ADMIN — INSULIN HUMAN 3 UNITS: 100 INJECTION, SOLUTION PARENTERAL at 07:45

## 2019-08-20 RX ADMIN — TIOTROPIUM BROMIDE 18 MCG: 18 CAPSULE ORAL; RESPIRATORY (INHALATION) at 07:34

## 2019-08-20 RX ADMIN — SODIUM CHLORIDE 150 ML/HR: 900 INJECTION, SOLUTION INTRAVENOUS at 05:37

## 2019-08-20 RX ADMIN — ATENOLOL 25 MG: 25 TABLET ORAL at 09:36

## 2019-08-20 RX ADMIN — MONTELUKAST SODIUM 10 MG: 10 TABLET, FILM COATED ORAL at 09:36

## 2019-08-20 RX ADMIN — BUDESONIDE 500 MCG: 0.5 INHALANT RESPIRATORY (INHALATION) at 07:35

## 2019-08-20 RX ADMIN — PREGABALIN 50 MG: 50 CAPSULE ORAL at 18:29

## 2019-08-20 RX ADMIN — AMLODIPINE BESYLATE 10 MG: 10 TABLET ORAL at 09:36

## 2019-08-20 RX ADMIN — SODIUM CHLORIDE 150 ML/HR: 900 INJECTION, SOLUTION INTRAVENOUS at 15:00

## 2019-08-20 RX ADMIN — QUETIAPINE FUMARATE 25 MG: 25 TABLET ORAL at 21:39

## 2019-08-20 RX ADMIN — TUBERCULIN PURIFIED PROTEIN DERIVATIVE 5 UNITS: 5 INJECTION, SOLUTION INTRADERMAL at 18:29

## 2019-08-20 RX ADMIN — FENOFIBRATE 54 MG: 54 TABLET ORAL at 09:36

## 2019-08-20 RX ADMIN — HEPARIN SODIUM 5000 UNITS: 5000 INJECTION INTRAVENOUS; SUBCUTANEOUS at 21:39

## 2019-08-20 RX ADMIN — GLIPIZIDE 10 MG: 5 TABLET, FILM COATED, EXTENDED RELEASE ORAL at 09:34

## 2019-08-20 RX ADMIN — HEPARIN SODIUM 5000 UNITS: 5000 INJECTION INTRAVENOUS; SUBCUTANEOUS at 05:36

## 2019-08-20 RX ADMIN — ACETAMINOPHEN 650 MG: 325 TABLET, FILM COATED ORAL at 21:39

## 2019-08-20 RX ADMIN — Medication 10 ML: at 13:15

## 2019-08-20 RX ADMIN — INSULIN HUMAN 3 UNITS: 100 INJECTION, SOLUTION PARENTERAL at 21:40

## 2019-08-20 RX ADMIN — INSULIN HUMAN 3 UNITS: 100 INJECTION, SOLUTION PARENTERAL at 12:00

## 2019-08-20 RX ADMIN — Medication 10 ML: at 21:40

## 2019-08-20 RX ADMIN — SODIUM CHLORIDE 150 ML/HR: 900 INJECTION, SOLUTION INTRAVENOUS at 09:31

## 2019-08-20 RX ADMIN — PREGABALIN 50 MG: 50 CAPSULE ORAL at 09:36

## 2019-08-20 NOTE — PROGRESS NOTES
Initial visit by  to convey care and concern and encourage patient that  services are available if desired. No needs were voiced during the visit. Provided 's business card for future reference. Chaplains remain available for support.      Ermias Wright 68  Board Certified

## 2019-08-20 NOTE — PROGRESS NOTES
RN recorded 200 UOP now. Pt reports she voided 'at least 3 times' today. Did not see recorded in I/O doc flow chart. Pt reports.

## 2019-08-20 NOTE — PROGRESS NOTES
All hourly rounds performed. Call light within reach. No complaints at this time. Will continue with plan of care and give report to oncoming nurse.

## 2019-08-20 NOTE — ROUTINE PROCESS
TRANSFER - IN REPORT:    Verbal report received from Kole Tellez RN) on [facility]  being received from ED for routine progression of care      Report consisted of patients Situation, Background, Assessment and   Recommendations(SBAR). Information from the following report(s) SBAR was reviewed with the receiving nurse. Opportunity for questions and clarification was provided. Assessment completed upon patients arrival to unit and care assumed.

## 2019-08-20 NOTE — H&P
HOSPITALIST INITIAL HISTORY AND PHYSICAL    NAME:  Bucky Hancock   Age:  80 y.o.  :   1934   MRN:   [id number]  PCP: Markie Larsen MD  Consulting MD:  Treatment Team: Attending Provider: Susan Snell MD; Primary Nurse: Tika Sears COMPLAINT: malaise, dizziness    HISTORY OF PRESENT ILLNESS:   Bucky Hancock is an 80 y.o. female with a past medical history of HTN, asthma who presents to the Er with report of feeling generally unwell for the past 3 days. She admits to intermittent dizziness and weakness. No complaints currently. Denies any fevers, chills, nausea, vomiting. REVIEW OF SYSTEMS: Comprehensive ROS performed. Denies fevers, chills, nausea, vomiting, otherwise negative. Past Medical History:   Diagnosis Date    Arthritis     Erosive Osteoarthritis, sees Dr. aEn Gonzalez Asthma     sees Dr. Keen with urination     Cellulitis     sees wound clinic    Charcot-Evy-Tooth disease-like deformity of foot     Diabetes ([address]     in nursing home and does not know the values when checked     Fibromyalgia     Full dentures     Gastrointestinal disorder     GERD    GERD (gastroesophageal reflux disease)     Hiatal hernia     High cholesterol     Hypertension     Incontinence of urine in female     Neuropathy     Sleep apnea     no C PAP        Past Surgical History:   Procedure Laterality Date    BREAST SURGERY PROCEDURE UNLISTED      bilat lumpectomy    CHEST SURGERY PROCEDURE UNLISTED      HX CHOLECYSTECTOMY      HX COLONOSCOPY      HX GYN      hyst    HX HEENT      HX ORTHOPAEDIC      bilateral knee replacements    HX ORTHOPAEDIC      back    VASCULAR SURGERY PROCEDURE UNLIST Left 2017    aortogram/arteriogram- PTA x2       Prior to Admission Medications   Prescriptions Last Dose Informant Patient Reported? Taking?    Blood-Glucose Meter monitoring kit   No No   Sig: Check sugars once daily   Cholecalciferol, Vitamin D3, (VITAMIN D3) 2,000 unit cap capsule   No No   Sig: Take 2,000 Units by mouth two (2) times a day. Patient taking differently: Take 2,000 Units by mouth two (2) times a day. Stop seven days prior to surgery per anesthesia protocol. FLOVENT HFA 44 mcg/actuation inhaler   Yes No   Sig: Take 2 Puffs by inhalation two (2) times a day. Take day of surgery per anesthesia protocol. Bring DOS  Indications: MAINTENANCE THERAPY FOR ASTHMA   Lancets (ONETOUCH SURESOFT LANCING DEV) misc   No No   Sig: Use to check blood sugar. E11.42   Lancets (ONETOUCH ULTRASOFT LANCETS) misc   No No   Sig: Use to check blood sugar. E11.42   QUEtiapine (SEROQUEL) 25 mg tablet   No No   Sig: Take 1/2 tablet before bedtime   Patient taking differently: Take 25 mg by mouth nightly. Take 1/2 tablet before bedtime   SPIRIVA WITH HANDIHALER 18 mcg inhalation capsule   Yes No   Sig: Take 1 Cap by inhalation every morning. Take day of surgery per anesthesia protocol. Bring DOS  Indications: MAINTENANCE THERAPY FOR ASTHMA   acetaminophen (TYLENOL) 650 mg TbER   Yes No   Sig: Take 650 mg by mouth every six (6) hours as needed for Pain. albuterol (PROAIR HFA) 90 mcg/actuation inhaler   Yes No   Sig: Take 2 Puffs by inhalation every four (4) hours as needed. Take day of surgery per anesthesia protocol. Bring DOS  Indications: Acute Asthma Attack   amLODIPine (NORVASC) 10 mg tablet   No No   Sig: Take 1 Tab by mouth daily. aspirin delayed-release 81 mg tablet   Yes No   Sig: Take 81 mg by mouth every morning. Take day of surgery per anesthesia protocol. atenolol (TENORMIN) 25 mg tablet   No No   Sig: Take 1 Tab by mouth daily. docusate sodium (COLACE) 100 mg capsule   Yes No   Sig: Take 100 mg by mouth two (2) times daily as needed for Constipation. exenatide microspheres (BYDUREON BCISE) 2 mg/0.85 mL atIn   No No   Si Syringe by SubCUTAneous route every seven (7) days.    fenofibrate nanocrystallized (TRICOR) 48 mg tablet   No No Sig: Take 1 Tab by mouth daily. folic acid 278 mcg tablet   Yes No   Sig: Take 400 mcg by mouth two (2) times a day. Stop seven days prior to surgery per anesthesia protocol. glipiZIDE SR (GLUCOTROL XL) 10 mg CR tablet   No No   Sig: Take 1 Tab by mouth daily. glucose blood VI test strips (ONETOUCH ULTRA TEST) strip   No No   Sig: Use to check blood sugar once daily. E11.42   levothyroxine (SYNTHROID) 25 mcg tablet   No No   Sig: Take 1 Tab by mouth Daily (before breakfast). lisinopril (PRINIVIL, ZESTRIL) 40 mg tablet   No No   Sig: Take 1 Tab by mouth daily. montelukast (SINGULAIR) 10 mg tablet   No No   Sig: Take 1 Tab by mouth daily. polyethylene glycol (MIRALAX) 17 gram packet   No No   Sig: Take 1 Packet by mouth daily as needed. Indications: constipation   Patient taking differently: Take 1 Packet by mouth daily as needed (constipation). mix with 8oz water  Indications: constipation   pregabalin (LYRICA) 50 mg capsule   No No   Si tab po bid   Patient taking differently: Take 50 mg by mouth two (2) times a day. 1 tab po bid   traMADol (ULTRAM-ER) 300 mg tablet   No No   Sig: Take 1 Tab by mouth daily for 180 days. Max Daily Amount: 300 mg. Facility-Administered Medications: None       Allergies   Allergen Reactions    Aleve [Naproxen Sodium] Rash    Hydrocodone-Acetaminophen Itching    Metformin Other (comments)     Decreased kidney function    Phenobarbital Unknown (comments)     Doesn't remember - long time ago     Statins-Hmg-Coa Reductase Inhibitors Myalgia     intolerance       FAMILY HISTORY: Reviewed.  Negative except   Family History   Problem Relation Age of Onset    No Known Problems Mother     No Known Problems Father        Social History     Tobacco Use    Smoking status: Never Smoker    Smokeless tobacco: Never Used   Substance Use Topics    Alcohol use: No         Objective:     Visit Vitals  /76 (BP 1 Location: Right arm, BP Patient Position: At rest) Pulse 99   Temp 98.6 °F (37 °C)   Resp 18   Ht 5' 4\" (1.626 m)   Wt 87.5 kg (193 lb)   LMP 1980   SpO2 96%   BMI 33.13 kg/m²      Temp (24hrs), Av.3 °F (36.8 °C), Min:98 °F (36.7 °C), Max:98.6 °F (37 °C)    Oxygen Therapy  O2 Sat (%): 96 % (19)  Pulse via Oximetry: 99 beats per minute (19)  O2 Device: Room air (19)  Physical Exam:  General:    The patient is a pleasant elderly female in no acute distress. Head:   Normocephalic/atraumatic. Eyes:  No palpebral pallor or scleral icterus. ENT:  External auricular and nasal exam within normal limits. Lungs:   No respiratory distress or accessory muscle use. Abdomen:   non-distended   Skin:     Normal color, texture, and turgor. No rashes, lesions, or jaundice. Neurologic: CN II-XII grossly intact and symmetrical.     Moving all four extremities well with no focal deficits. Psychiatric: Pleasant demeanor, appropriate affect. Alert     Data Review:   Recent Results (from the past 24 hour(s))   TROPONIN I    Collection Time: 19  8:38 PM   Result Value Ref Range    Troponin-I, Qt. 0.04 0.02 - 0.05 NG/ML   CBC WITH AUTOMATED DIFF    Collection Time: 19  8:38 PM   Result Value Ref Range    WBC 10.0 4.3 - 11.1 K/uL    RBC 4.12 4.05 - 5.2 M/uL    HGB 13.0 11.7 - 15.4 g/dL    HCT 40.4 35.8 - 46.3 %    MCV 98.1 (H) 79.6 - 97.8 FL    MCH 31.6 26.1 - 32.9 PG    MCHC 32.2 31.4 - 35.0 g/dL    RDW 12.4 11.9 - 14.6 %    PLATELET 102 721 - 597 K/uL    MPV 8.7 (L) 9.4 - 12.3 FL    ABSOLUTE NRBC 0.00 0.0 - 0.2 K/uL    DF AUTOMATED      NEUTROPHILS 63 43 - 78 %    LYMPHOCYTES 25 13 - 44 %    MONOCYTES 10 4.0 - 12.0 %    EOSINOPHILS 2 0.5 - 7.8 %    BASOPHILS 1 0.0 - 2.0 %    IMMATURE GRANULOCYTES 0 0.0 - 5.0 %    ABS. NEUTROPHILS 6.3 1.7 - 8.2 K/UL    ABS. LYMPHOCYTES 2.5 0.5 - 4.6 K/UL    ABS. MONOCYTES 1.0 0.1 - 1.3 K/UL    ABS. EOSINOPHILS 0.2 0.0 - 0.8 K/UL    ABS. BASOPHILS 0.1 0.0 - 0.2 K/UL    ABS. IMM.  GRANS. 0.0 0.0 - 0.5 K/UL   METABOLIC PANEL, COMPREHENSIVE    Collection Time: 08/19/19  8:38 PM   Result Value Ref Range    Sodium 140 136 - 145 mmol/L    Potassium 3.9 3.5 - 5.1 mmol/L    Chloride 104 98 - 107 mmol/L    CO2 24 21 - 32 mmol/L    Anion gap 12 7 - 16 mmol/L    Glucose 152 (H) 65 - 100 mg/dL    BUN 35 (H) 8 - 23 MG/DL    Creatinine 1.68 (H) 0.6 - 1.0 MG/DL    GFR est AA 37 (L) >60 ml/min/1.73m2    GFR est non-AA 31 (L) >60 ml/min/1.73m2    Calcium 9.6 8.3 - 10.4 MG/DL    Bilirubin, total 0.5 0.2 - 1.1 MG/DL    ALT (SGPT) 29 12 - 65 U/L    AST (SGOT) 27 15 - 37 U/L    Alk.  phosphatase 64 50 - 136 U/L    Protein, total 7.8 6.3 - 8.2 g/dL    Albumin 4.2 3.2 - 4.6 g/dL    Globulin 3.6 (H) 2.3 - 3.5 g/dL    A-G Ratio 1.2 1.2 - 3.5     POC LACTIC ACID    Collection Time: 08/19/19  8:43 PM   Result Value Ref Range    Lactic Acid (POC) 2.78 (H) 0.5 - 1.9 mmol/L   EKG, 12 LEAD, INITIAL    Collection Time: 08/19/19  8:43 PM   Result Value Ref Range    Ventricular Rate 104 BPM    Atrial Rate 104 BPM    P-R Interval 190 ms    QRS Duration 128 ms    Q-T Interval 378 ms    QTC Calculation (Bezet) 497 ms    Calculated P Axis 15 degrees    Calculated R Axis -20 degrees    Calculated T Axis 85 degrees    Diagnosis       Sinus tachycardia  Right bundle branch block  Nonspecific T wave abnormality  Abnormal ECG  When compared with ECG of 12-SEP-2017 17:22,  Right bundle branch block is no longer Present  Confirmed by ST PHOEBE MCCRARY MD (), AVIVA PARHAM ([id number]) on 8/19/2019 9:10:13 PM     URINE MICROSCOPIC    Collection Time: 08/19/19 10:32 PM   Result Value Ref Range    WBC >100 (H) 0 /hpf    RBC 0-3 0 /hpf    Epithelial cells 3-5 0 /hpf    Bacteria 4+ (H) 0 /hpf    Casts 10-20 0 /lpf   POC LACTIC ACID    Collection Time: 08/20/19 12:55 AM   Result Value Ref Range    Lactic Acid (POC) 3.37 (H) 0.5 - 1.9 mmol/L       Imaging /Procedures /Studies:  Xr Chest Pa Lat    Result Date: 8/19/2019  IMPRESSION: No acute cardiopulmonary abnormality. Assessment and Plan:     Principal Problem:    UTI (urinary tract infection) (8/20/2019)    IV Rocephin. Urine culture pending. Active Problems:    Lactic acid acidosis (8/20/2019)    Mildly elevated, slightly worse after 1 L IVF in Er. Likely related to dehydration as patient does not meet other criteria for sepsis. Continue IV NS. Asthma ()    Stable. Continue home meds. Stage 3 chronic kidney disease ([address] (9/12/2017)    Stable. Continue home meds. HTN (hypertension) (11/16/2014)    Stable. Continue home meds. DM type 2 with diabetic peripheral neuropathy ([address] (3/24/2017)    Stable. Continue home meds. Type 2 diabetes with nephropathy ([address] (2/26/2018)    Stable. Continue home meds. PAD (peripheral artery disease) ([address] (11/9/2018)    Stable. Continue home meds. Hyperlipidemia (11/16/2014)    Stable. Continue home meds. Presenile dementia, paranoid type (11/7/2016)    Stable. Severe obesity (BMI 35.0-39. 9) with comorbidity ([address] (5/24/2018)    Stable. DVT Prophylaxis: Heparin      Code Status: FULL CODE      Disposition: Observe on med/surg for evaluation and treatment as per above.       Anticipated discharge: < 2 midnights     Signed By: Letty Alvarado MD     August 20, 2019

## 2019-08-20 NOTE — PROGRESS NOTES
Hospitalist Progress Note    2019  Admit Date: 2019  8:44 PM   NAME: Vimal Mccarthy   :  1934   MRN:  [id number]   Attending: Markell King MD  PCP:  Valentine Sepulveda MD    SUBJECTIVE:   Patient placed in observation for elevated lactic acid and suspected UTI. She reports feeling 'much better.'  Denies concerns. Lactic acid 3.3 this AM.      Review of Systems negative with exception of pertinent positives noted above  PHYSICAL EXAM     Visit Vitals  /68 (BP 1 Location: Right arm, BP Patient Position: At rest)   Pulse 97   Temp 98.6 °F (37 °C)   Resp 18   Ht 5' 4\" (1.626 m)   Wt 87.5 kg (193 lb)   LMP 1980   SpO2 95%   BMI 33.13 kg/m²      Temp (24hrs), Av.5 °F (36.9 °C), Min:98 °F (36.7 °C), Max:98.8 °F (37.1 °C)    Patient Vitals for the past 24 hrs:   Temp Pulse Resp BP SpO2   19 1256 98.6 °F (37 °C) 97 18 141/68 95 %   19 0735     96 %   19 0723 98.8 °F (37.1 °C) (!) 106 18 189/89 96 %   19 0258 98.5 °F (36.9 °C) (!) 102 18 181/89 99 %   19 2253 98.6 °F (37 °C) 99  160/76 96 %   19 2230  (!) 103 18 162/86 96 %   19 2107  (!) 101  188/84 96 %   19 2026 98 °F (36.7 °C) (!) 105 20 (!) 185/93 98 %       Oxygen Therapy  O2 Sat (%): 95 % (19 1256)  Pulse via Oximetry: 100 beats per minute (19 0735)  O2 Device: Room air (19 0735)    Intake/Output Summary (Last 24 hours) at 2019 1605  Last data filed at 2019 1259  Gross per 24 hour   Intake 2351 ml   Output    Net 2351 ml      General: No acute distress    Lungs:  CTA Bilaterally.    Heart:  Regular rate and rhythm,  No murmur, rub, or gallop  Abdomen: Soft, Non distended, Non tender, Positive bowel sounds  Extremities: No cyanosis, clubbing or edema  Neurologic:  No focal deficits    ASSESSMENT      Hospital Problems as of 2019 Date Reviewed: 2019          Codes Class Noted - Resolved POA    * (Principal) UTI (urinary tract infection) ICD-10-CM: N39.0  ICD-9-CM: 599.0  8/20/2019 - Present Yes        Lactic acid acidosis ICD-10-CM: E87.2  ICD-9-CM: 276.2  8/20/2019 - Present Yes        PAD (peripheral artery disease) (HCC) ICD-10-CM: I73.9  ICD-9-CM: 443.9  11/9/2018 - Present Yes        Severe obesity (BMI 35.0-39. 9) with comorbidity ([facility] ICD-10-CM: E66.01  ICD-9-CM: 278.01  5/24/2018 - Present Yes        Type 2 diabetes with nephropathy ([facility] ICD-10-CM: E11.21  ICD-9-CM: 250.40, 583.81  2/26/2018 - Present Yes        Stage 3 chronic kidney disease ([facility] ICD-10-CM: N18.3  ICD-9-CM: 585.3  9/12/2017 - Present Yes        DM type 2 with diabetic peripheral neuropathy ([facility] ICD-10-CM: E11.42  ICD-9-CM: 250.60, 357.2  3/24/2017 - Present Yes        Presenile dementia, paranoid type ICD-10-CM: F03.90  ICD-9-CM: 290.12  11/7/2016 - Present Yes        Asthma ICD-10-CM: J45.909  ICD-9-CM: 493.90  Unknown - Present Yes    Overview Signed 1/13/2015  6:41 AM by Analia Baires             HTN (hypertension) ICD-10-CM: I10  ICD-9-CM: 401.9  11/16/2014 - Present Yes        Hyperlipidemia ICD-10-CM: E78.5  ICD-9-CM: 272.4  11/16/2014 - Present Yes            Plan:  · UTI   · Continue rocephin    · DANIELLA on CKD- improved with IVF  · Stop when lactic acid improved    · Elevated lactic acid- recheck     PT/OT consult, Place PPD    DVT Prophylaxis: Heparin    Signed By: Jason Tobar MD     August 20, 2019

## 2019-08-20 NOTE — PROGRESS NOTES
Problem: Urinary Tract Infection - Adult  Goal: *Absence of infection signs and symptoms  Outcome: Not Progressing Towards Goal

## 2019-08-20 NOTE — ED PROVIDER NOTES
59-year-old lady presents with concerns about not feeling well for the last few days. She said that she does not have any real specific symptoms she just does not feel normal.  She says she has had some intermittent dizziness but she denies any nausea, vomiting, or diarrhea. She says she has had some subjective fevers at home. She denies any chest pain and says she has had no abdominal pain. Elements of this note were created using speech recognition software. As such, errors of speech recognition may be present.            Past Medical History:   Diagnosis Date    Arthritis     Erosive Osteoarthritis, sees Dr. Bebe Vallecillo Asthma     sees Dr. Santos Nolan with urination     Cellulitis     sees wound clinic    Charcot-Evy-Tooth disease-like deformity of foot     Diabetes ([address]     in nursing home and does not know the values when checked     Fibromyalgia     Full dentures     Gastrointestinal disorder     GERD    GERD (gastroesophageal reflux disease)     Hiatal hernia     High cholesterol     Hypertension     Incontinence of urine in female     Neuropathy     Sleep apnea     no C PAP       Past Surgical History:   Procedure Laterality Date    BREAST SURGERY PROCEDURE UNLISTED      bilat lumpectomy    CHEST SURGERY PROCEDURE UNLISTED      HX CHOLECYSTECTOMY      HX COLONOSCOPY      HX GYN      hyst    HX HEENT      HX ORTHOPAEDIC      bilateral knee replacements    HX ORTHOPAEDIC      back    VASCULAR SURGERY PROCEDURE UNLIST Left 09/29/2017    aortogram/arteriogram- PTA x2         Family History:   Problem Relation Age of Onset    No Known Problems Mother     No Known Problems Father        Social History     Socioeconomic History    Marital status:      Spouse name: Not on file    Number of children: Not on file    Years of education: Not on file    Highest education level: Not on file   Occupational History    Not on file   Social Needs    Financial resource strain: Not on file    Food insecurity:     Worry: Not on file     Inability: Not on file    Transportation needs:     Medical: Not on file     Non-medical: Not on file   Tobacco Use    Smoking status: Never Smoker    Smokeless tobacco: Never Used   Substance and Sexual Activity    Alcohol use: No    Drug use: No    Sexual activity: Not on file   Lifestyle    Physical activity:     Days per week: Not on file     Minutes per session: Not on file    Stress: Not on file   Relationships    Social connections:     Talks on phone: Not on file     Gets together: Not on file     Attends Restoration service: Not on file     Active member of club or organization: Not on file     Attends meetings of clubs or organizations: Not on file     Relationship status: Not on file    Intimate partner violence:     Fear of current or ex partner: Not on file     Emotionally abused: Not on file     Physically abused: Not on file     Forced sexual activity: Not on file   Other Topics Concern    Not on file   Social History Narrative    Not on file         ALLERGIES: Aleve [naproxen sodium]; Hydrocodone-acetaminophen; Metformin; Phenobarbital; and Statins-hmg-coa reductase inhibitors    Review of Systems   Constitutional: Positive for activity change and fatigue. Negative for chills, diaphoresis and fever. HENT: Negative for congestion, rhinorrhea and sore throat. Eyes: Negative for redness and visual disturbance. Respiratory: Negative for cough, chest tightness, shortness of breath and wheezing. Cardiovascular: Negative for chest pain and palpitations. Gastrointestinal: Negative for abdominal pain, blood in stool, diarrhea, nausea and vomiting. Endocrine: Negative for polydipsia and polyuria. Genitourinary: Negative for dysuria and hematuria. Musculoskeletal: Negative for arthralgias, myalgias and neck stiffness. Skin: Negative for rash.    Allergic/Immunologic: Negative for environmental allergies and food allergies. Neurological: Positive for dizziness. Negative for weakness and headaches. Hematological: Negative for adenopathy. Does not bruise/bleed easily. Psychiatric/Behavioral: Negative for confusion and sleep disturbance. The patient is not nervous/anxious. Vitals:    08/19/19 2026 08/19/19 2107 08/19/19 2230 08/19/19 2253   BP: (!) 185/93 188/84 162/86 160/76   Pulse: (!) 105 (!) 101 (!) 103 99   Resp: 20  18    Temp: 98 °F (36.7 °C)   98.6 °F (37 °C)   SpO2: 98% 96% 96% 96%   Weight: 87.5 kg (193 lb)      Height: 5' 4\" (1.626 m)               Physical Exam   Constitutional: She is oriented to person, place, and time. She appears well-developed and well-nourished. HENT:   Head: Normocephalic and atraumatic. Eyes: Pupils are equal, round, and reactive to light. Conjunctivae and EOM are normal.   Neck: Normal range of motion. Cardiovascular: Normal rate and regular rhythm. Pulmonary/Chest: Effort normal and breath sounds normal. No respiratory distress. She has no wheezes. She has no rales. She exhibits no tenderness. Abdominal: Soft. Bowel sounds are normal. There is no rebound and no guarding. Musculoskeletal: Normal range of motion. She exhibits no edema or tenderness. Lymphadenopathy:     She has no cervical adenopathy. Neurological: She is alert and oriented to person, place, and time. Skin: Skin is warm and dry. Psychiatric: She has a normal mood and affect. Nursing note and vitals reviewed. MDM  Number of Diagnoses or Management Options  Diagnosis management comments: Patient's lactic acid is elevated. Other blood work is essentially unremarkable. Her urine was nitrite positive. I will treat with some IV fluids and IV Rocephin and then repeat her lactic acid. XR CHEST PA LAT   Final Result    IMPRESSION: No acute cardiopulmonary abnormality. END OF WET READ REPORT  I reviewed the complete radiology report. I included only the impression here.     Abnormal Labs Reviewed  CBC WITH AUTOMATED DIFF - Abnormal; Notable for the following components:     MCV                           98.1 (*)               MPV                           8.7 (*)             All other components within normal limits  METABOLIC PANEL, COMPREHENSIVE - Abnormal; Notable for the following components:     Glucose                       152 (*)                BUN                           35 (*)                 Creatinine                    1.68 (*)               GFR est AA                    37 (*)                 GFR est non-AA                31 (*)                 Globulin                      3.6 (*)             All other components within normal limits  POC LACTIC ACID - Abnormal; Notable for the following components:     Lactic Acid (POC)             2.78 (*)            All other components within normal limits    Patient's repeat lactic acid went up. Therefore I will give her another liter of fluid and discussed the case with the hospitalist for admission. 1:06 AM  I spoke with the hospitalist who kindly agreed to see the patient.          Procedures

## 2019-08-20 NOTE — ED NOTES
TRANSFER - OUT REPORT:    Verbal report given to [facility], RN(name) on Jose Jang  being transferred to 6th floor(unit) for routine progression of care       Report consisted of patients Situation, Background, Assessment and   Recommendations(SBAR). Information from the following report(s) SBAR and ED Summary was reviewed with the receiving nurse. Lines:   Peripheral IV 08/19/19 Left Forearm (Active)   Site Assessment Clean, dry, & intact 8/19/2019 10:55 PM   Phlebitis Assessment 0 8/19/2019 10:55 PM   Infiltration Assessment 0 8/19/2019 10:55 PM   Dressing Status Clean, dry, & intact 8/19/2019 10:55 PM   Hub Color/Line Status Blue 8/19/2019 10:55 PM       Peripheral IV 08/20/19 Right Hand (Active)   Site Assessment Clean, dry, & intact 8/20/2019 12:52 AM   Phlebitis Assessment 0 8/20/2019 12:52 AM   Infiltration Assessment 0 8/20/2019 12:52 AM   Dressing Status Clean, dry, & intact 8/20/2019 12:52 AM   Dressing Type Transparent 8/20/2019 12:52 AM   Hub Color/Line Status Blue 8/20/2019 12:52 AM        Opportunity for questions and clarification was provided.       Patient transported with:   T[place]

## 2019-08-20 NOTE — PROGRESS NOTES
08/20/19 0245   Dual Skin Pressure Injury Assessment   Dual Skin Pressure Injury Assessment WDL   Second Care Provider (Based on Facility Policy) Sharon Stephens   Skin Integumentary   Skin Integumentary (WDL) WDL   Skin Color Appropriate for ethnicity   Skin Condition/Temp Warm;Dry   Skin Integrity Intact;Scars (comment)  (scars to bilateral knees from prior surgery)   Turgor Non-tenting   Hair Growth Present   Varicosities Absent   Wound Prevention and Protection Methods   Orientation of Wound Prevention Posterior   Location of Wound Prevention Sacrum/Coccyx   Dressing Present  No   Wound Offloading (Prevention Methods) Bed, pressure redistribution/air

## 2019-08-20 NOTE — ED TRIAGE NOTES
Pt coming in by [facility] from home for weakness x4 days. Intermittent diaphoresis with dizziness. States it happens multiple times a day. Denies nausea/vomiting/diarrhea. Denies chills. Denies chest pain or SHOB. States she does have a cough but denies coughing anything up. Denies fevers at home. Denies pain with urination. Hx of asthma, diabetes, COPD, HTN. EMS assessment- /100 has taken her medication. EKG showed Left BBB. .

## 2019-08-20 NOTE — PROGRESS NOTES
END OF SHIFT NOTE:    INTAKE/OUTPUT  08/19 0701 - 08/20 0700  In: 530 [P.O.:30; I.V.:500]  Out: -   Voiding: YES  Catheter: NO  Color: clear  Drain:              DIET  regular    Flatus: Patient does have flatus present. Stool:  1 occurrences. --BM this AM per pt  Characteristics:       Ambulating  Yes    Emesis: 0 occurrences.     Characteristics:          VITAL SIGNS  Patient Vitals for the past 12 hrs:   Temp Pulse Resp BP SpO2   08/20/19 1932 98 °F (36.7 °C) 90 16 144/80 97 %   08/20/19 1743 98.1 °F (36.7 °C) 93 18 147/74 97 %   08/20/19 1256 98.6 °F (37 °C) 97 18 141/68 95 %       Pain Assessment  Pain Intensity 1: 0 (08/20/19 1500)        Patient Stated Pain Goal: 0            Dulce Knowles RN

## 2019-08-21 ENCOUNTER — HOME HEALTH ADMISSION (OUTPATIENT)
Dept: HOME HEALTH SERVICES | Facility: HOME HEALTH | Age: 84
End: 2019-08-21

## 2019-08-21 VITALS
WEIGHT: 193 LBS | TEMPERATURE: 98.2 F | HEART RATE: 101 BPM | OXYGEN SATURATION: 92 % | RESPIRATION RATE: 18 BRPM | HEIGHT: 64 IN | DIASTOLIC BLOOD PRESSURE: 74 MMHG | BODY MASS INDEX: 32.95 KG/M2 | SYSTOLIC BLOOD PRESSURE: 125 MMHG

## 2019-08-21 PROBLEM — E87.20 LACTIC ACID ACIDOSIS: Status: RESOLVED | Noted: 2019-08-20 | Resolved: 2019-08-21

## 2019-08-21 LAB
ANION GAP SERPL CALC-SCNC: 8 MMOL/L (ref 7–16)
BASOPHILS # BLD: 0.1 K/UL (ref 0–0.2)
BASOPHILS NFR BLD: 1 % (ref 0–2)
BUN SERPL-MCNC: 20 MG/DL (ref 8–23)
CALCIUM SERPL-MCNC: 7.9 MG/DL (ref 8.3–10.4)
CHLORIDE SERPL-SCNC: 112 MMOL/L (ref 98–107)
CO2 SERPL-SCNC: 23 MMOL/L (ref 21–32)
CREAT SERPL-MCNC: 1.1 MG/DL (ref 0.6–1)
DIFFERENTIAL METHOD BLD: ABNORMAL
EOSINOPHIL # BLD: 0.1 K/UL (ref 0–0.8)
EOSINOPHIL NFR BLD: 2 % (ref 0.5–7.8)
ERYTHROCYTE [DISTWIDTH] IN BLOOD BY AUTOMATED COUNT: 12.5 % (ref 11.9–14.6)
EST. AVERAGE GLUCOSE BLD GHB EST-MCNC: 192 MG/DL
GLUCOSE BLD STRIP.AUTO-MCNC: 143 MG/DL (ref 65–100)
GLUCOSE BLD STRIP.AUTO-MCNC: 87 MG/DL (ref 65–100)
GLUCOSE SERPL-MCNC: 101 MG/DL (ref 65–100)
HBA1C MFR BLD: 8.3 % (ref 4.8–6)
HCT VFR BLD AUTO: 34.3 % (ref 35.8–46.3)
HGB BLD-MCNC: 11 G/DL (ref 11.7–15.4)
IMM GRANULOCYTES # BLD AUTO: 0 K/UL (ref 0–0.5)
IMM GRANULOCYTES NFR BLD AUTO: 0 % (ref 0–5)
LACTATE SERPL-SCNC: 1.4 MMOL/L (ref 0.4–2)
LACTATE SERPL-SCNC: 2.5 MMOL/L (ref 0.4–2)
LYMPHOCYTES # BLD: 2.2 K/UL (ref 0.5–4.6)
LYMPHOCYTES NFR BLD: 32 % (ref 13–44)
MCH RBC QN AUTO: 31.7 PG (ref 26.1–32.9)
MCHC RBC AUTO-ENTMCNC: 32.1 G/DL (ref 31.4–35)
MCV RBC AUTO: 98.8 FL (ref 79.6–97.8)
MONOCYTES # BLD: 0.7 K/UL (ref 0.1–1.3)
MONOCYTES NFR BLD: 11 % (ref 4–12)
NEUTS SEG # BLD: 3.7 K/UL (ref 1.7–8.2)
NEUTS SEG NFR BLD: 54 % (ref 43–78)
NRBC # BLD: 0 K/UL (ref 0–0.2)
PLATELET # BLD AUTO: 249 K/UL (ref 150–450)
PLATELET COMMENTS,PCOM: ADEQUATE
PMV BLD AUTO: 8.7 FL (ref 9.4–12.3)
POTASSIUM SERPL-SCNC: 3.7 MMOL/L (ref 3.5–5.1)
RBC # BLD AUTO: 3.47 M/UL (ref 4.05–5.2)
RBC MORPH BLD: ABNORMAL
SODIUM SERPL-SCNC: 143 MMOL/L (ref 136–145)
WBC # BLD AUTO: 6.8 K/UL (ref 4.3–11.1)
WBC MORPH BLD: ABNORMAL

## 2019-08-21 PROCEDURE — 94640 AIRWAY INHALATION TREATMENT: CPT

## 2019-08-21 PROCEDURE — 96372 THER/PROPH/DIAG INJ SC/IM: CPT

## 2019-08-21 PROCEDURE — 74011250637 HC RX REV CODE- 250/637: Performed by: FAMILY MEDICINE

## 2019-08-21 PROCEDURE — 80048 BASIC METABOLIC PNL TOTAL CA: CPT

## 2019-08-21 PROCEDURE — 82962 GLUCOSE BLOOD TEST: CPT

## 2019-08-21 PROCEDURE — 77030020263 HC SOL INJ SOD CL0.9% LFCR 1000ML

## 2019-08-21 PROCEDURE — 85025 COMPLETE CBC W/AUTO DIFF WBC: CPT

## 2019-08-21 PROCEDURE — 96361 HYDRATE IV INFUSION ADD-ON: CPT

## 2019-08-21 PROCEDURE — 97530 THERAPEUTIC ACTIVITIES: CPT

## 2019-08-21 PROCEDURE — 83605 ASSAY OF LACTIC ACID: CPT

## 2019-08-21 PROCEDURE — 74011250636 HC RX REV CODE- 250/636: Performed by: FAMILY MEDICINE

## 2019-08-21 PROCEDURE — 99218 HC RM OBSERVATION: CPT

## 2019-08-21 PROCEDURE — 83036 HEMOGLOBIN GLYCOSYLATED A1C: CPT

## 2019-08-21 PROCEDURE — 97161 PT EVAL LOW COMPLEX 20 MIN: CPT

## 2019-08-21 PROCEDURE — 74011000250 HC RX REV CODE- 250: Performed by: FAMILY MEDICINE

## 2019-08-21 PROCEDURE — 94760 N-INVAS EAR/PLS OXIMETRY 1: CPT

## 2019-08-21 RX ORDER — CEFPODOXIME PROXETIL 100 MG/1
100 TABLET, FILM COATED ORAL 2 TIMES DAILY
Qty: 6 TAB | Refills: 0 | Status: SHIPPED | OUTPATIENT
Start: 2019-08-21 | End: 2019-08-24

## 2019-08-21 RX ORDER — TITANIUM DIOXIDE, OCTINOXATE, ZINC OXIDE 4.61; 1.6; .78 G/40ML; G/40ML; G/40ML
1 CREAM TOPICAL 2 TIMES DAILY WITH MEALS
Qty: 60 CAP | Refills: 0 | Status: SHIPPED
Start: 2019-08-21 | End: 2020-04-22

## 2019-08-21 RX ADMIN — HEPARIN SODIUM 5000 UNITS: 5000 INJECTION INTRAVENOUS; SUBCUTANEOUS at 06:27

## 2019-08-21 RX ADMIN — PREGABALIN 50 MG: 50 CAPSULE ORAL at 08:19

## 2019-08-21 RX ADMIN — LISINOPRIL 40 MG: 20 TABLET ORAL at 08:19

## 2019-08-21 RX ADMIN — ATENOLOL 25 MG: 25 TABLET ORAL at 08:19

## 2019-08-21 RX ADMIN — FENOFIBRATE 54 MG: 54 TABLET ORAL at 08:20

## 2019-08-21 RX ADMIN — BUDESONIDE 500 MCG: 0.5 INHALANT RESPIRATORY (INHALATION) at 09:10

## 2019-08-21 RX ADMIN — AMLODIPINE BESYLATE 10 MG: 10 TABLET ORAL at 08:20

## 2019-08-21 RX ADMIN — GLIPIZIDE 10 MG: 5 TABLET, FILM COATED, EXTENDED RELEASE ORAL at 08:19

## 2019-08-21 RX ADMIN — SODIUM CHLORIDE 150 ML/HR: 900 INJECTION, SOLUTION INTRAVENOUS at 06:30

## 2019-08-21 RX ADMIN — ASPIRIN 81 MG: 81 TABLET ORAL at 08:20

## 2019-08-21 RX ADMIN — MONTELUKAST SODIUM 10 MG: 10 TABLET, FILM COATED ORAL at 08:19

## 2019-08-21 RX ADMIN — TIOTROPIUM BROMIDE 18 MCG: 18 CAPSULE ORAL; RESPIRATORY (INHALATION) at 09:10

## 2019-08-21 RX ADMIN — LEVOTHYROXINE SODIUM 25 MCG: 50 TABLET ORAL at 06:28

## 2019-08-21 NOTE — DISCHARGE INSTRUCTIONS
Start antibiotic (cefpoxidime) this evening and take twice a day until complete. DISCHARGE SUMMARY from Nurse    PATIENT INSTRUCTIONS:    After general anesthesia or intravenous sedation, for 24 hours or while taking prescription Narcotics:  · Limit your activities  · Do not drive and operate hazardous machinery  · Do not make important personal or business decisions  · Do  not drink alcoholic beverages  · If you have not urinated within 8 hours after discharge, please contact your surgeon on call. Report the following to your surgeon:  · Excessive pain, swelling, redness or odor of or around the surgical area  · Temperature over 100.5  · Nausea and vomiting lasting longer than 4 hours or if unable to take medications  · Any signs of decreased circulation or nerve impairment to extremity: change in color, persistent  numbness, tingling, coldness or increase pain  · Any questions    What to do at Home:  Recommended activity: Activity as tolerated    If you experience any of the following symptoms Difficulty urinating, fever greater than 101, increased blood in urine, or pain, please follow up with your primary care provider. *  Please give a list of your current medications to your Primary Care Provider. *  Please update this list whenever your medications are discontinued, doses are      changed, or new medications (including over-the-counter products) are added. *  Please carry medication information at all times in case of emergency situations. These are general instructions for a healthy lifestyle:    No smoking/ No tobacco products/ Avoid exposure to second hand smoke  Surgeon General's Warning:  Quitting smoking now greatly reduces serious risk to your health.     Obesity, smoking, and sedentary lifestyle greatly increases your risk for illness    A healthy diet, regular physical exercise & weight monitoring are important for maintaining a healthy lifestyle    You may be retaining fluid if you have a history of heart failure or if you experience any of the following symptoms:  Weight gain of 3 pounds or more overnight or 5 pounds in a week, increased swelling in our hands or feet or shortness of breath while lying flat in bed. Please call your doctor as soon as you notice any of these symptoms; do not wait until your next office visit. The discharge information has been reviewed with the patient. The patient verbalized understanding. Discharge medications reviewed with the patient and appropriate educational materials and side effects teaching were provided.   ___________________________________________________________________________________________________________________________________

## 2019-08-21 NOTE — PROGRESS NOTES
Problem: Mobility Impaired (Adult and Pediatric)  Goal: *Acute Goals and Plan of Care (Insert Text)  Description  LTG:  (1.)Ms. Mitzi Walsh will move from supine to sit and sit to supine, scoot up and down and roll side to side INDEPENDENTLY with bed flat within 7 treatment day(s). (2.)Ms. Mitzi Walsh will transfer from bed to chair and chair to bed with MODIFIED INDEPENDENCE using the least restrictive device within 7 treatment day(s). (3.)Ms. Mitzi Walsh will ambulate with MODIFIED INDEPENDENCE for 400 feet with the least restrictive device within 7 treatment day(s). (4.)Ms. Mitzi Walsh will perform exercises per HEP independently for 10+ minutes to improve strength and mobility within 7 days. ________________________________________________________________________________________________   Outcome: Progressing Towards Goal     PHYSICAL THERAPY: Initial Assessment and AM 8/21/2019  OBSERVATION: PT Visit Days : 1  Payor: [employer] Cavazos / Plan: [address] [place] [address] HMO / Product Type: [employer] /       NAME/AGE/GENDER: Margy Hankins is a 80 y.o. female   PRIMARY DIAGNOSIS: UTI (urinary tract infection) [N39.0] UTI (urinary tract infection)   UTI (urinary tract infection)          ICD-10: Treatment Diagnosis:    · Generalized Muscle Weakness (M62.81)  · Difficulty in walking, Not elsewhere classified (R26.2)  · Other abnormalities of gait and mobility (R26.89)   Precaution/Allergies:  Aleve [naproxen sodium]; Hydrocodone-acetaminophen; Metformin; Phenobarbital; and Statins-hmg-coa reductase inhibitors      ASSESSMENT:     Ms. Mitzi Walsh is an 80year old female admitted from home for UTI. She lives alone in single story apartment and is mod I with rollator at baseline. Presents sitting up without complaints and is agreeable to therapy assessment. Performs sit-stand transfers with supervision and mobility in room/bathroom with SBA without walker use. Verbal cues for transfer technique and safety. Worked on seated and standing balance activities with CGA/SBA and mild balance deficits without support from walker. Ambulates for an additional 250 ft in hallway with SBA and min verbal cues for gait safety/and body mechanics. Returned to room and up to chair for short rest break, then performs below LE exercises with good participation. Left sitting up with needs in reachYared Harris is functioning only slightly below baseline with strength, balance, and activity tolerance. She is safe to dc home with home health PT to address deficits. Discussed with hospitalist.     This section established at most recent assessment   PROBLEM LIST (Impairments causing functional limitations):  1. Decreased Strength  2. Decreased Transfer Abilities  3. Decreased Ambulation Ability/Technique  4. Decreased Balance  5. Decreased Activity Tolerance   INTERVENTIONS PLANNED: (Benefits and precautions of physical therapy have been discussed with the patient.)  1. Balance Exercise  2. Bed Mobility  3. Gait Training  4. Home Exercise Program (HEP)  5. Therapeutic Activites  6. Therapeutic Exercise/Strengthening  7. Transfer Training     TREATMENT PLAN: Frequency/Duration: 3 times a week for duration of hospital stay  Rehabilitation Potential For Stated Goals: Good     REHAB RECOMMENDATIONS (at time of discharge pending progress):    Placement: It is my opinion, based on this patient's performance to date, that Ms. Mikey Angela may benefit from [address] after discharge due to the functional deficits listed above that are likely to improve with skilled rehabilitation because he/she has multiple medical issues that affect his/her functional mobility in the community.   Equipment:    none               HISTORY:   History of Present Injury/Illness (Reason for Referral):  Per H&P, \"Joana Hassan is an 80 y.o. female with a past medical history of HTN, asthma who presents to the Er with report of feeling generally unwell for the past 3 days. She admits to intermittent dizziness and weakness. No complaints currently. Denies any fevers, chills, nausea, vomiting\"    Past Medical History/Comorbidities:   Ms. Chre Morel  has a past medical history of Arthritis, Asthma, Burning with urination, Cellulitis, Charcot-Evy-Tooth disease-like deformity of foot, Diabetes ([address] Fibromyalgia, Full dentures, Gastrointestinal disorder, GERD (gastroesophageal reflux disease), Hiatal hernia, High cholesterol, Hypertension, Incontinence of urine in female, Neuropathy, and Sleep apnea. She also has no past medical history of Adverse effect of anesthesia, Congestive heart failure ([address] COPD, Difficult intubation, Malignant hyperthermia due to anesthesia, Nausea & vomiting, or Pseudocholinesterase deficiency. Ms. Cher Morel  has a past surgical history that includes hx gyn; pr breast surgery procedure unlisted; hx cholecystectomy; hx orthopaedic; hx orthopaedic; hx colonoscopy; vascular surgery procedure unlist (Left, 09/29/2017); pr chest surgery procedure unlisted; and hx heent. Social History/Living Environment:   Home Environment: Apartment  # Steps to Enter: 0  One/Two Story Residence: One story  Living Alone: Yes  Support Systems: Child(don), Family member(s)  Patient Expects to be Discharged to[de-identified] Private residence  Current DME Used/Available at Home: Thereasa Emmett, straight, Walker, rollator, Grab bars, Shower chair  Tub or Shower Type: Tub/Shower combination  Prior Level of Function/Work/Activity:  Lives alone. Mod I with rollator.  Daughter lives near by and is able to assist with transportation as needed     Number of Personal Factors/Comorbidities that affect the Plan of Care: 1-2: MODERATE COMPLEXITY   EXAMINATION:   Most Recent Physical Functioning:   Gross Assessment:  AROM: Within functional limits  Strength: Generally decreased, functional  Coordination: Within functional limits               Posture:  Posture (WDL): Exceptions to WDL  Posture Assessment: Forward head, Rounded shoulders, Trunk flexion  Balance:  Sitting: Intact  Standing: Impaired  Standing - Static: Fair(+)  Standing - Dynamic : Fair(+) Bed Mobility:     Wheelchair Mobility:     Transfers:  Sit to Stand: Supervision  Stand to Sit: Supervision  Bed to Chair: Stand-by assistance;Supervision  Interventions: Verbal cues; Safety awareness training; Tactile cues  Duration: 10 Minutes  Gait:     Base of Support: Center of gravity altered  Speed/Loan: Pace decreased (<100 feet/min)  Step Length: Left shortened;Right shortened  Gait Abnormalities: Trunk sway increased  Distance (ft): 250 Feet (ft)  Assistive Device: Walker, rolling  Ambulation - Level of Assistance: Stand-by assistance  Interventions: Verbal cues; Safety awareness training      Body Structures Involved:  1. Muscles Body Functions Affected:  1. Movement Related Activities and Participation Affected:  1. General Tasks and Demands  2. Mobility  3. Domestic Life  4. Community, Social and [place]   Number of elements that affect the Plan of Care: 4+: HIGH COMPLEXITY   CLINICAL PRESENTATION:   Presentation: Stable and uncomplicated: LOW COMPLEXITY   CLINICAL DECISION MAKIN [facility] Inpatient Short Form  How much difficulty does the patient currently have. .. Unable A Lot A Little None   1. Turning over in bed (including adjusting bedclothes, sheets and blankets)? ? 1   ? 2   ? 3   ? 4   2. Sitting down on and standing up from a chair with arms ( e.g., wheelchair, bedside commode, etc.)   ? 1   ? 2   ? 3   ? 4   3. Moving from lying on back to sitting on the side of the bed?   ? 1   ? 2   ? 3   ? 4   How much help from another person does the patient currently need. .. Total A Lot A Little None   4. Moving to and from a bed to a chair (including a wheelchair)? ? 1   ? 2   ? 3   ? 4   5. Need to walk in hospital room? ? 1   ? 2   ? 3   ? 4   6.   Climbing 3-5 steps with a railing? ? 1   ? 2   ? 3   ? 4   © 2007, Trustees of [address] [place] [address] [address] [id number], under license to [employer]. All rights reserved      Score:  Initial: 21 Most Recent: X (Date: -- )    Interpretation of Tool:  Represents activities that are increasingly more difficult (i.e. Bed mobility, Transfers, Gait). Medical Necessity:     · Patient demonstrates good  ·  rehab potential due to higher previous functional level. Reason for Services/Other Comments:  · Patient continues to demonstrate capacity to improve strength, balance, activity tolerance which will increase independence, decrease amount of assistance required from caregiver and increase safety  · . Use of outcome tool(s) and clinical judgement create a POC that gives a: Clear prediction of patient's progress: LOW COMPLEXITY            TREATMENT:   (In addition to Assessment/Re-Assessment sessions the following treatments were rendered)   Pre-treatment Symptoms/Complaints:  \"thank you\"  Pain: Initial:   Pain Intensity 1: 0  Post Session:  0/10     Therapeutic Activity: (  10 Minutes ):  Therapeutic activities including Bed transfers, Chair transfers, Toilet transfers, Ambulation on level ground, and seated LE exercises to improve mobility, strength, balance and activity tolerance . Required minimal Verbal cues; Safety awareness training to promote static and dynamic balance in standing and promote motor control of bilateral, lower extremity(s).       Date:  8/20/19 Date:   Date:     Activity/Exercise Parameters Parameters Parameters   LAQ 8x AB     Seated marching 8x AB     Ankle pumps 8x AB     Seated hip aBd 8x AB                           Braces/Orthotics/Lines/Etc:   · O2 Device: Room air  Treatment/Session Assessment:    · Response to Treatment:  pt performs mobility with SBA-supervision using RW  · Interdisciplinary Collaboration:   o Physical Therapist  o Registered Nurse  · After treatment position/precautions:   o Up in chair  o Bed/Chair-wheels locked  o Bed in low position  o Call light within reach   · Compliance with Program/Exercises: Will assess as treatment progresses  · Recommendations/Intent for next treatment session: \"Next visit will focus on advancements to more challenging activities and reduction in assistance provided\".   Total Treatment Duration:  PT Patient Time In/Time Out  Time In: 0844  Time Out: [facility], [facility]

## 2019-08-21 NOTE — DISCHARGE SUMMARY
Hospitalist Discharge Summary     Patient ID:  Joseph Harris  [id number]  32 y.o.  1934  Admit date: 8/19/2019  8:44 PM  Discharge date and time: 8/21/2019  Attending: Melia Mcdaniel MD  PCP:  Chandrakant Leyva MD  Treatment Team: Attending Provider: Melia Mcdaniel MD; Care Manager: Jimmy Fernandez LMSW; Utilization Review: Stefanie Proper; Physical Therapist: Aquiles Loyd DPT; Occupational Therapist: Mignon Bob OT    Principal Diagnosis UTI (urinary tract infection)   Hospital Problems as of 8/21/2019 Date Reviewed: 5/8/2019          Codes Class Noted - Resolved POA    * (Principal) UTI (urinary tract infection) ICD-10-CM: N39.0  ICD-9-CM: 599.0  8/20/2019 - Present Yes        Lactic acid acidosis ICD-10-CM: E87.2  ICD-9-CM: 276.2  8/20/2019 - Present Yes        PAD (peripheral artery disease) ([facility] ICD-10-CM: I73.9  ICD-9-CM: 443.9  11/9/2018 - Present Yes        Severe obesity (BMI 35.0-39. 9) with comorbidity ([facility] ICD-10-CM: E66.01  ICD-9-CM: 278.01  5/24/2018 - Present Yes        Type 2 diabetes with nephropathy ([facility] ICD-10-CM: E11.21  ICD-9-CM: 250.40, 583.81  2/26/2018 - Present Yes        Stage 3 chronic kidney disease ([facility] ICD-10-CM: N18.3  ICD-9-CM: 585.3  9/12/2017 - Present Yes        DM type 2 with diabetic peripheral neuropathy ([facility] ICD-10-CM: E11.42  ICD-9-CM: 250.60, 357.2  3/24/2017 - Present Yes        Presenile dementia, paranoid type ICD-10-CM: F03.90  ICD-9-CM: 290.12  11/7/2016 - Present Yes        Asthma ICD-10-CM: J45.909  ICD-9-CM: 493.90  Unknown - Present Yes    Overview Signed 1/13/2015  6:41 AM by Oleksandr Duenas     seejason Britt             HTN (hypertension) ICD-10-CM: I10  ICD-9-CM: 401.9  11/16/2014 - Present Yes        Hyperlipidemia ICD-10-CM: E78.5  ICD-9-CM: 272.4  11/16/2014 - Present Yes                 HPI:  'Joseph Harris is an 80 y.o. female with a past medical history of HTN, asthma who presents to the Er with report of feeling generally unwell for the past 3 days. She admits to intermittent dizziness and weakness. No complaints currently. Denies any fevers, chills, nausea, vomiting.'    Hospital Course:  Please refer to the admission H&P for details of presentation. In summary, the patient is an 79 yo F placed in observation on 8/20 for general malaise and elevated lactic acid level. She was found to have a GNR UTI (final culture pending at time of discharge). She received 2 doses of IV rocephin and IVF. Her lactic acid normalized, and she felt significantly better. She was seen by PT and recommended for HH/PT. She will be treated with 3 more days of PO vantin on discharge. It is anticipated that bacteria causing UTI is sensitive to antibiotic based on clinical improvement. She will be arranged close follow up with PCP. She is encouraged to start a cranberry supplement to try to help prevent future UTIs. She will be discharged home in an improved and stable condition. Significant Diagnostic Studies:       Labs: Results:       Chemistry Recent Labs     08/21/19 0430 08/20/19 0613 08/19/19 2038   * 194* 152*    140 140   K 3.7 3.7 3.9   * 108* 104   CO2 23 22 24   BUN 20 29* 35*   CREA 1.10* 1.33* 1.68*   CA 7.9* 8.5 9.6   AGAP 8 10 12   AP  --   --  64   TP  --   --  7.8   ALB  --   --  4.2   GLOB  --   --  3.6*   AGRAT  --   --  1.2      CBC w/Diff Recent Labs     08/21/19 0430 08/20/19 0613 08/19/19 2038   WBC 6.8 8.8 10.0   RBC 3.47* 3.62* 4.12   HGB 11.0* 11.7 13.0   HCT 34.3* 35.6* 40.4    270 324   GRANS 54 65 63   LYMPH 32 20 25   EOS 2 3 2      Cardiac Enzymes No results for input(s): CPK, CKND1, WALI in the last 72 hours. No lab exists for component: CKRMB, TROIP   Coagulation No results for input(s): PTP, INR, APTT in the last 72 hours.     No lab exists for component: INREXT    Lipid Panel Lab Results   Component Value Date/Time    Cholesterol, total 205 (H) 12/13/2018 03:53 PM HDL Cholesterol 53 12/13/2018 03:53 PM    LDL, calculated 102 (H) 12/13/2018 03:53 PM    VLDL, calculated 50 (H) 12/13/2018 03:53 PM    Triglyceride 248 (H) 12/13/2018 03:53 PM      BNP No results for input(s): BNPP in the last 72 hours. Liver Enzymes Recent Labs     08/19/19  2038   TP 7.8   ALB 4.2   AP 64   SGOT 27      Thyroid Studies Lab Results   Component Value Date/Time    TSH 3.920 03/07/2019 02:46 PM            Discharge Exam:  Visit Vitals  /74 (BP 1 Location: Right arm, BP Patient Position: Head of bed elevated (Comment degrees))   Pulse (!) 101   Temp 98.2 °F (36.8 °C)   Resp 18   Ht 5' 4\" (1.626 m)   Wt 87.5 kg (193 lb)   LMP 11/12/1980   SpO2 92%   BMI 33.13 kg/m²     General appearance: alert, cooperative, no distress, appears stated age  Lungs: clear to auscultation bilaterally  Heart: regular rate and rhythm, S1, S2 normal, no murmur, click, rub or gallop  Abdomen: soft, non-tender. Bowel sounds normal. No masses,  no organomegaly  Extremities: no cyanosis or edema  Neurologic: Grossly normal    Disposition: home  Discharge Condition: stable  Patient Instructions:   Current Discharge Medication List      START taking these medications    Details   cefpodoxime (VANTIN) 100 mg tablet Take 1 Tab by mouth two (2) times a day for 3 days. Indications: UTI  Qty: 6 Tab, Refills: 0      cranberry 400 mg cap Take 1 Cap by mouth two (2) times daily (with meals). Indications: UTI prevention  Qty: 60 Cap, Refills: 0         CONTINUE these medications which have NOT CHANGED    Details   acetaminophen (TYLENOL) 650 mg TbER Take 650 mg by mouth every six (6) hours as needed for Pain.      levothyroxine (SYNTHROID) 25 mcg tablet Take 1 Tab by mouth Daily (before breakfast). Qty: 90 Tab, Refills: 3      amLODIPine (NORVASC) 10 mg tablet Take 1 Tab by mouth daily.   Qty: 90 Tab, Refills: 3    Associated Diagnoses: Essential hypertension with goal blood pressure less than 140/90      fenofibrate nanocrystallized (TRICOR) 48 mg tablet Take 1 Tab by mouth daily. Qty: 90 Tab, Refills: 3    Associated Diagnoses: Hypertriglyceridemia      glipiZIDE SR (GLUCOTROL XL) 10 mg CR tablet Take 1 Tab by mouth daily. Qty: 90 Tab, Refills: 3      lisinopril (PRINIVIL, ZESTRIL) 40 mg tablet Take 1 Tab by mouth daily. Qty: 90 Tab, Refills: 3      pregabalin (LYRICA) 50 mg capsule 1 tab po bid  Qty: 60 Cap, Refills: 11    Associated Diagnoses: Diabetic polyneuropathy associated with type 2 diabetes mellitus (HCC)      QUEtiapine (SEROQUEL) 25 mg tablet Take 1/2 tablet before bedtime  Qty: 45 Tab, Refills: 3    Associated Diagnoses: Presenile dementia, paranoid type, with behavioral disturbance      traMADol (ULTRAM-ER) 300 mg tablet Take 1 Tab by mouth daily for 180 days. Max Daily Amount: 300 mg. Qty: 30 Tab, Refills: 5    Associated Diagnoses: Low back pain radiating to both legs; Generalized OA      atenolol (TENORMIN) 25 mg tablet Take 1 Tab by mouth daily. Qty: 90 Tab, Refills: 3      montelukast (SINGULAIR) 10 mg tablet Take 1 Tab by mouth daily. Qty: 90 Tab, Refills: 3    Associated Diagnoses: Mild intermittent asthma without complication      docusate sodium (COLACE) 100 mg capsule Take 100 mg by mouth two (2) times daily as needed for Constipation. polyethylene glycol (MIRALAX) 17 gram packet Take 1 Packet by mouth daily as needed. Indications: constipation  Qty: 1 Each, Refills: 0      aspirin delayed-release 81 mg tablet Take 81 mg by mouth every morning. Take day of surgery per anesthesia protocol. FLOVENT HFA 44 mcg/actuation inhaler Take 2 Puffs by inhalation two (2) times a day. Take day of surgery per anesthesia protocol. Bring DOS  Indications: MAINTENANCE THERAPY FOR ASTHMA      SPIRIVA WITH HANDIHALER 18 mcg inhalation capsule Take 1 Cap by inhalation every morning. Take day of surgery per anesthesia protocol.    Bring DOS  Indications: MAINTENANCE THERAPY FOR ASTHMA      albuterol (PROAIR HFA) 90 mcg/actuation inhaler Take 2 Puffs by inhalation every four (4) hours as needed. Take day of surgery per anesthesia protocol. Bring DOS  Indications: Acute Asthma Attack      folic acid 878 mcg tablet Take 400 mcg by mouth two (2) times a day. Stop seven days prior to surgery per anesthesia protocol. Blood-Glucose Meter monitoring kit Check sugars once daily  Qty: 1 Kit, Refills: 1      exenatide microspheres (BYDUREON BCISE) 2 mg/0.85 mL atIn 1 Syringe by SubCUTAneous route every seven (7) days. Qty: 4 Syringe, Refills: 11      glucose blood VI test strips (ONETOUCH ULTRA TEST) strip Use to check blood sugar once daily. E11.42  Qty: 100 Strip, Refills: 3    Associated Diagnoses: DM type 2 with diabetic peripheral neuropathy ([address]      ! ! Lancets (ONETOUCH SURESOFT LANCING DEV) misc Use to check blood sugar. E11.42  Qty: 100 Each, Refills: 3    Associated Diagnoses: DM type 2 with diabetic peripheral neuropathy ([address]      ! ! Lancets (ONETOUCH ULTRASOFT LANCETS) misc Use to check blood sugar. E11.42  Qty: 100 Each, Refills: 3    Associated Diagnoses: DM type 2 with diabetic peripheral neuropathy (HCC)      Cholecalciferol, Vitamin D3, (VITAMIN D3) 2,000 unit cap capsule Take 2,000 Units by mouth two (2) times a day. Qty: 60 capsule, Refills: 5    Associated Diagnoses: Vitamin D deficiency       ! ! - Potential duplicate medications found. Please discuss with provider. Activity: PT/OT per Home Health  Diet: Diabetic Diet    Follow-up      Follow-up Appointments   Procedures    FOLLOW UP VISIT Appointment in: One Week 1) Follow up within 1 week with PCP for hospital follow up and to follow up urine culture     1) Follow up within 1 week with PCP for hospital follow up and to follow up urine culture     Standing Status:   Standing     Number of Occurrences:   1     Order Specific Question:   Appointment in     Answer:    One Week   ·   ·   Time spent to discharge patient 21 minutes  Signed:  Enio Tobar MD  8/21/2019  9:44 AM

## 2019-08-21 NOTE — PROGRESS NOTES
Pt to DC home today with [facility]. Liaisons notified. No other needs identified at this time. Care Management Interventions  PCP Verified by CM:  Yes  Transition of Care Consult (CM Consult): Discharge Planning  Current Support Network: Own Home  Confirm Follow Up Transport: Family  Plan discussed with Pt/Family/Caregiver: Yes  Freedom of Choice Offered: Yes  Discharge Location  Discharge Placement: Home

## 2019-08-21 NOTE — PROGRESS NOTES
[facility] - INPATIENT  Face to Face Encounter    Patients Name: Jun Stern    YOB: 1934    Ordering Physician: Dr. Maria Dolores Bass    Primary Diagnosis: UTI (urinary tract infection) [N39.0]    Date of Face to Face:   8/21/2019                                  Face to Face Encounter findings are related to primary reason for home care:   yes. 1. I certify that the patient needs intermittent care as follows: skilled nursing care:  skilled observation/assessment, patient education  physical therapy: strengthening, stretching/ROM, transfer training, gait/stair training, balance training and pt/caregiver education  occupational therapy:  ADL safety (ie. cooking, bathing, dressing), ROM and pt/caregiver education    2. I certify that this patient is homebound, that is: 1) patient requires the use of a  device, special transportation, or assistance of another to leave the home; or 2) patient's condition makes leaving the home medically contraindicated; and 3) patient has a normal inability to leave the home and leaving the home requires considerable and taxing effort. Patient may leave the home for infrequent and short duration for medical reasons, and occasional absences for non-medical reasons. Homebound status is due to the following functional limitations: Patient with strength deficits limiting the performance of all ADL's without caregiver assistance or the use of an assistive device. 3. I certify that this patient is under my care and that I, or a nurse practitioner or  [id number], or clinical nurse specialist, or certified nurse midwife, working with me, had a Face-to-Face Encounter that meets the physician Face-to-Face Encounter requirements.   The following are the clinical findings from the [address] [place] [address] encounter that support the need for skilled services and is a summary of the encounter: see hospital chart    See summary of the patient's illness      Marilou Shore LMSW  8/21/2019      THE FOLLOWING TO BE COMPLETED BY THE COMMUNITY PHYSICIAN:    I concur with the findings described above from the F2F encounter that this patient is homebound and in need of a skilled service.     Certifying Physician: _____________________________________      Printed Certifying Physician Name: _____________________________________    Date: _________________

## 2019-08-23 LAB
BACTERIA SPEC CULT: ABNORMAL
BACTERIA SPEC CULT: ABNORMAL
SERVICE CMNT-IMP: ABNORMAL

## 2019-10-03 ENCOUNTER — PATIENT OUTREACH (OUTPATIENT)
Dept: CASE MANAGEMENT | Age: 84
End: 2019-10-03

## 2019-10-03 NOTE — PROGRESS NOTES
Medication Adherence Outreach    Date/Time of Call:  10/3/2019     Name of medication and dosage:   LISINOPRIL TAB 40MG   Does the patient know the purpose and dosage of medication? Yes   Are you getting a #30 day or #90 day supply of your medication? #90   Are you still taking this medication? If not, why? Yes   Transportation issues or any problems paying for the medication or other reason? No   What pharmacy are you using to fill your medication and do you know the last time that you got your medication filled? [facility]   Last fill 9/26/2019   Are you having any side effects from taking your medication? No      Any other questions or concerns expressed by the patient. No   Comments:        Discussed Medication Adherence with patient's daughter-she stated that there are no barriers preventing her mother from obtaining or taking her medication. She is getting a 90 day refill from her PCP-No further outreach at this time.      Call Completed By:        Pete Colby, [address] [facility]  [phone]  [email or web address]

## 2019-11-27 ENCOUNTER — HOSPITAL ENCOUNTER (INPATIENT)
Age: 84
LOS: 9 days | Discharge: HOME HEALTH | DRG: 871 | End: 2019-12-06
Attending: EMERGENCY MEDICINE | Admitting: INTERNAL MEDICINE
Payer: MEDICARE

## 2019-11-27 ENCOUNTER — APPOINTMENT (OUTPATIENT)
Dept: GENERAL RADIOLOGY | Age: 84
DRG: 871 | End: 2019-11-27
Attending: EMERGENCY MEDICINE
Payer: MEDICARE

## 2019-11-27 DIAGNOSIS — N17.9 AKI (ACUTE KIDNEY INJURY) (HCC): ICD-10-CM

## 2019-11-27 DIAGNOSIS — A41.9 SEPTIC SHOCK (HCC): Primary | ICD-10-CM

## 2019-11-27 DIAGNOSIS — R65.21 SEPTIC SHOCK (HCC): Primary | ICD-10-CM

## 2019-11-27 DIAGNOSIS — R41.82 ALTERED MENTAL STATUS, UNSPECIFIED ALTERED MENTAL STATUS TYPE: ICD-10-CM

## 2019-11-27 PROBLEM — E11.9 DIABETES MELLITUS TYPE 2, CONTROLLED (HCC): Status: ACTIVE | Noted: 2018-02-26

## 2019-11-27 LAB
ALBUMIN SERPL-MCNC: 3.7 G/DL (ref 3.2–4.6)
ALBUMIN/GLOB SERPL: 1 {RATIO} (ref 1.2–3.5)
ALP SERPL-CCNC: 67 U/L (ref 50–136)
ALT SERPL-CCNC: 23 U/L (ref 12–65)
ANION GAP SERPL CALC-SCNC: 11 MMOL/L (ref 7–16)
APPEARANCE UR: ABNORMAL
AST SERPL-CCNC: 15 U/L (ref 15–37)
ATRIAL RATE: 115 BPM
BACTERIA URNS QL MICRO: ABNORMAL /HPF
BASOPHILS # BLD: 0.1 K/UL (ref 0–0.2)
BASOPHILS NFR BLD: 1 % (ref 0–2)
BILIRUB SERPL-MCNC: 0.5 MG/DL (ref 0.2–1.1)
BILIRUB UR QL: NEGATIVE
BUN SERPL-MCNC: 20 MG/DL (ref 8–23)
CALCIUM SERPL-MCNC: 9.3 MG/DL (ref 8.3–10.4)
CALCULATED T AXIS, ECG11: 61 DEGREES
CHLORIDE SERPL-SCNC: 104 MMOL/L (ref 98–107)
CO2 SERPL-SCNC: 22 MMOL/L (ref 21–32)
COLOR UR: ABNORMAL
CREAT SERPL-MCNC: 1.56 MG/DL (ref 0.6–1)
DIAGNOSIS, 93000: NORMAL
DIFFERENTIAL METHOD BLD: ABNORMAL
EOSINOPHIL # BLD: 0.2 K/UL (ref 0–0.8)
EOSINOPHIL NFR BLD: 2 % (ref 0.5–7.8)
EPI CELLS #/AREA URNS HPF: ABNORMAL /HPF
ERYTHROCYTE [DISTWIDTH] IN BLOOD BY AUTOMATED COUNT: 12.3 % (ref 11.9–14.6)
GLOBULIN SER CALC-MCNC: 3.8 G/DL (ref 2.3–3.5)
GLUCOSE BLD STRIP.AUTO-MCNC: 125 MG/DL (ref 65–100)
GLUCOSE BLD STRIP.AUTO-MCNC: 183 MG/DL (ref 65–100)
GLUCOSE SERPL-MCNC: 309 MG/DL (ref 65–100)
GLUCOSE UR STRIP.AUTO-MCNC: >1000 MG/DL
HCT VFR BLD AUTO: 42.6 % (ref 35.8–46.3)
HGB BLD-MCNC: 14.2 G/DL (ref 11.7–15.4)
HGB UR QL STRIP: NEGATIVE
IMM GRANULOCYTES # BLD AUTO: 0 K/UL (ref 0–0.5)
IMM GRANULOCYTES NFR BLD AUTO: 0 % (ref 0–5)
KETONES UR QL STRIP.AUTO: ABNORMAL MG/DL
LACTATE BLD-SCNC: 4.65 MMOL/L (ref 0.5–1.9)
LACTATE SERPL-SCNC: 4.5 MMOL/L (ref 0.4–2)
LEUKOCYTE ESTERASE UR QL STRIP.AUTO: ABNORMAL
LYMPHOCYTES # BLD: 1.8 K/UL (ref 0.5–4.6)
LYMPHOCYTES NFR BLD: 16 % (ref 13–44)
MCH RBC QN AUTO: 32.6 PG (ref 26.1–32.9)
MCHC RBC AUTO-ENTMCNC: 33.3 G/DL (ref 31.4–35)
MCV RBC AUTO: 97.7 FL (ref 79.6–97.8)
MONOCYTES # BLD: 0.9 K/UL (ref 0.1–1.3)
MONOCYTES NFR BLD: 8 % (ref 4–12)
NEUTS SEG # BLD: 8.2 K/UL (ref 1.7–8.2)
NEUTS SEG NFR BLD: 74 % (ref 43–78)
NITRITE UR QL STRIP.AUTO: NEGATIVE
NRBC # BLD: 0 K/UL (ref 0–0.2)
OTHER OBSERVATIONS,UCOM: ABNORMAL
P-R INTERVAL, ECG05: 120 MS
PH UR STRIP: 5 [PH] (ref 5–9)
PLATELET # BLD AUTO: 302 K/UL (ref 150–450)
PMV BLD AUTO: 8.7 FL (ref 9.4–12.3)
POTASSIUM SERPL-SCNC: 4.3 MMOL/L (ref 3.5–5.1)
PROCALCITONIN SERPL-MCNC: <0.05 NG/ML
PROT SERPL-MCNC: 7.5 G/DL (ref 6.3–8.2)
PROT UR STRIP-MCNC: 100 MG/DL
Q-T INTERVAL, ECG07: 432 MS
QRS DURATION, ECG06: 122 MS
QTC CALCULATION (BEZET), ECG08: 597 MS
RBC # BLD AUTO: 4.36 M/UL (ref 4.05–5.2)
RBC #/AREA URNS HPF: ABNORMAL /HPF
SODIUM SERPL-SCNC: 137 MMOL/L (ref 136–145)
SP GR UR REFRACTOMETRY: 1.03 (ref 1–1.02)
TROPONIN I SERPL-MCNC: 0.05 NG/ML (ref 0.02–0.05)
UROBILINOGEN UR QL STRIP.AUTO: 0.2 EU/DL (ref 0.2–1)
VENTRICULAR RATE, ECG03: 115 BPM
WBC # BLD AUTO: 11.2 K/UL (ref 4.3–11.1)
WBC URNS QL MICRO: >100 /HPF

## 2019-11-27 PROCEDURE — 82962 GLUCOSE BLOOD TEST: CPT

## 2019-11-27 PROCEDURE — 74011636637 HC RX REV CODE- 636/637: Performed by: INTERNAL MEDICINE

## 2019-11-27 PROCEDURE — 85025 COMPLETE CBC W/AUTO DIFF WBC: CPT

## 2019-11-27 PROCEDURE — 93005 ELECTROCARDIOGRAM TRACING: CPT | Performed by: INTERNAL MEDICINE

## 2019-11-27 PROCEDURE — 65270000029 HC RM PRIVATE

## 2019-11-27 PROCEDURE — 83605 ASSAY OF LACTIC ACID: CPT

## 2019-11-27 PROCEDURE — 36415 COLL VENOUS BLD VENIPUNCTURE: CPT

## 2019-11-27 PROCEDURE — 84145 PROCALCITONIN (PCT): CPT

## 2019-11-27 PROCEDURE — 81001 URINALYSIS AUTO W/SCOPE: CPT

## 2019-11-27 PROCEDURE — 87086 URINE CULTURE/COLONY COUNT: CPT

## 2019-11-27 PROCEDURE — 93005 ELECTROCARDIOGRAM TRACING: CPT | Performed by: EMERGENCY MEDICINE

## 2019-11-27 PROCEDURE — 74011000258 HC RX REV CODE- 258: Performed by: EMERGENCY MEDICINE

## 2019-11-27 PROCEDURE — 87040 BLOOD CULTURE FOR BACTERIA: CPT

## 2019-11-27 PROCEDURE — 74011250637 HC RX REV CODE- 250/637: Performed by: INTERNAL MEDICINE

## 2019-11-27 PROCEDURE — 99284 EMERGENCY DEPT VISIT MOD MDM: CPT | Performed by: EMERGENCY MEDICINE

## 2019-11-27 PROCEDURE — 71046 X-RAY EXAM CHEST 2 VIEWS: CPT

## 2019-11-27 PROCEDURE — 74011250636 HC RX REV CODE- 250/636: Performed by: EMERGENCY MEDICINE

## 2019-11-27 PROCEDURE — 74011250636 HC RX REV CODE- 250/636: Performed by: INTERNAL MEDICINE

## 2019-11-27 PROCEDURE — 80053 COMPREHEN METABOLIC PANEL: CPT

## 2019-11-27 PROCEDURE — 84484 ASSAY OF TROPONIN QUANT: CPT

## 2019-11-27 RX ORDER — ALBUTEROL SULFATE 0.83 MG/ML
2.5 SOLUTION RESPIRATORY (INHALATION)
Status: DISCONTINUED | OUTPATIENT
Start: 2019-11-27 | End: 2019-12-06 | Stop reason: HOSPADM

## 2019-11-27 RX ORDER — SODIUM CHLORIDE 9 MG/ML
125 INJECTION, SOLUTION INTRAVENOUS CONTINUOUS
Status: DISCONTINUED | OUTPATIENT
Start: 2019-11-27 | End: 2019-11-29

## 2019-11-27 RX ORDER — INSULIN LISPRO 100 [IU]/ML
INJECTION, SOLUTION INTRAVENOUS; SUBCUTANEOUS
Status: DISCONTINUED | OUTPATIENT
Start: 2019-11-27 | End: 2019-12-06 | Stop reason: HOSPADM

## 2019-11-27 RX ORDER — ASPIRIN 81 MG/1
81 TABLET ORAL
Status: DISCONTINUED | OUTPATIENT
Start: 2019-11-28 | End: 2019-12-06 | Stop reason: HOSPADM

## 2019-11-27 RX ORDER — TRAMADOL HYDROCHLORIDE 300 MG/1
300 TABLET, EXTENDED RELEASE ORAL DAILY
Status: DISCONTINUED | OUTPATIENT
Start: 2019-11-28 | End: 2019-11-27

## 2019-11-27 RX ORDER — PREGABALIN 50 MG/1
50 CAPSULE ORAL 2 TIMES DAILY
Status: DISCONTINUED | OUTPATIENT
Start: 2019-11-27 | End: 2019-12-06 | Stop reason: HOSPADM

## 2019-11-27 RX ORDER — QUETIAPINE FUMARATE 25 MG/1
25 TABLET, FILM COATED ORAL
Status: DISCONTINUED | OUTPATIENT
Start: 2019-11-27 | End: 2019-12-06 | Stop reason: HOSPADM

## 2019-11-27 RX ORDER — FENOFIBRATE 54 MG/1
54 TABLET ORAL DAILY
Status: DISCONTINUED | OUTPATIENT
Start: 2019-11-28 | End: 2019-12-06 | Stop reason: HOSPADM

## 2019-11-27 RX ORDER — LEVOTHYROXINE SODIUM 50 UG/1
25 TABLET ORAL
Status: DISCONTINUED | OUTPATIENT
Start: 2019-11-28 | End: 2019-12-06 | Stop reason: HOSPADM

## 2019-11-27 RX ORDER — SODIUM CHLORIDE 0.9 % (FLUSH) 0.9 %
5-10 SYRINGE (ML) INJECTION AS NEEDED
Status: DISCONTINUED | OUTPATIENT
Start: 2019-11-27 | End: 2019-12-06 | Stop reason: HOSPADM

## 2019-11-27 RX ORDER — FOLIC ACID 1 MG/1
0.5 TABLET ORAL 2 TIMES DAILY
Status: DISCONTINUED | OUTPATIENT
Start: 2019-11-27 | End: 2019-12-06 | Stop reason: HOSPADM

## 2019-11-27 RX ORDER — MONTELUKAST SODIUM 10 MG/1
10 TABLET ORAL DAILY
Status: DISCONTINUED | OUTPATIENT
Start: 2019-11-28 | End: 2019-12-06 | Stop reason: HOSPADM

## 2019-11-27 RX ORDER — ATENOLOL 25 MG/1
25 TABLET ORAL DAILY
Status: DISCONTINUED | OUTPATIENT
Start: 2019-11-28 | End: 2019-12-06 | Stop reason: HOSPADM

## 2019-11-27 RX ORDER — GLIPIZIDE 10 MG/1
10 TABLET, FILM COATED, EXTENDED RELEASE ORAL DAILY
Status: DISCONTINUED | OUTPATIENT
Start: 2019-11-28 | End: 2019-12-02

## 2019-11-27 RX ORDER — TRAMADOL HYDROCHLORIDE 300 MG/1
300 TABLET, EXTENDED RELEASE ORAL DAILY
Status: DISCONTINUED | OUTPATIENT
Start: 2019-11-27 | End: 2019-11-28

## 2019-11-27 RX ORDER — DOCUSATE SODIUM 100 MG/1
100 CAPSULE, LIQUID FILLED ORAL
Status: DISCONTINUED | OUTPATIENT
Start: 2019-11-27 | End: 2019-12-06 | Stop reason: HOSPADM

## 2019-11-27 RX ADMIN — PREGABALIN 50 MG: 50 CAPSULE ORAL at 18:00

## 2019-11-27 RX ADMIN — CEFTRIAXONE 1 G: 1 INJECTION, POWDER, FOR SOLUTION INTRAMUSCULAR; INTRAVENOUS at 14:48

## 2019-11-27 RX ADMIN — SODIUM CHLORIDE 1000 ML: 900 INJECTION, SOLUTION INTRAVENOUS at 14:48

## 2019-11-27 RX ADMIN — SODIUM CHLORIDE 125 ML/HR: 900 INJECTION, SOLUTION INTRAVENOUS at 19:00

## 2019-11-27 RX ADMIN — SODIUM CHLORIDE 125 ML/HR: 900 INJECTION, SOLUTION INTRAVENOUS at 16:48

## 2019-11-27 RX ADMIN — QUETIAPINE FUMARATE 25 MG: 25 TABLET ORAL at 21:40

## 2019-11-27 RX ADMIN — INSULIN LISPRO 2 UNITS: 100 INJECTION, SOLUTION INTRAVENOUS; SUBCUTANEOUS at 21:40

## 2019-11-27 RX ADMIN — FOLIC ACID 0.5 MG: 1 TABLET ORAL at 18:00

## 2019-11-27 NOTE — ED PROVIDER NOTES
presents with complaint of UTI and high heart rate. Patient was seen by PCP because she was having increased urinary frequency and back pain and urinary incontinence because she cannot get up to go to the bathroom fast enough. She denies any nausea vomiting or fever. She has been having night sweats and suprapubic pain. It started last weekend. PCP sent her here. The history is provided by the patient. Bladder Infection    This is a new problem. The current episode started more than 2 days ago. The problem occurs every urination. The problem has been gradually worsening. The quality of the pain is described as burning. The pain is mild. There has been no fever. Associated symptoms include sweats, frequency and urgency. Pertinent negatives include no chills, no nausea and no vomiting. Past Medical History:   Diagnosis Date    Asthma     sees Dr. Taylor Pinzon with urination     Cellulitis     sees wound clinic    Charcot foot due to diabetes mellitus ([address]     bilat    Charcot-Evy-Tooth disease-like deformity of foot     Diabetes ([address]     in nursing home and does not know the values when checked     Erosive osteoarthritis     sees Dr. Oliver Picking Full dentures     GERD (gastroesophageal reflux disease)     High cholesterol     Hypertension     Incontinence of urine in female     Neuropathy     Sleep apnea     no C PAP       Past Surgical History:   Procedure Laterality Date    BREAST SURGERY PROCEDURE UNLISTED      bilat lumpectomy    CHEST SURGERY PROCEDURE UNLISTED      WHAT IS THIS? ??    HX CATARACT REMOVAL Bilateral     HX CHOLECYSTECTOMY      HX COLONOSCOPY      HX HYSTERECTOMY      HX KNEE REPLACEMENT Bilateral     HX ORTHOPAEDIC      back    VASCULAR SURGERY PROCEDURE UNLIST Left 09/29/2017    aortogram/arteriogram- PTA x2         Family History:   Problem Relation Age of Onset    No Known Problems Mother     No Known Problems Father Social History     Socioeconomic History    Marital status:      Spouse name: Not on file    Number of children: Not on file    Years of education: Not on file    Highest education level: Not on file   Occupational History    Not on file   Social Needs    Financial resource strain: Not on file    Food insecurity:     Worry: Not on file     Inability: Not on file    Transportation needs:     Medical: Not on file     Non-medical: Not on file   Tobacco Use    Smoking status: Never Smoker    Smokeless tobacco: Never Used   Substance and Sexual Activity    Alcohol use: No    Drug use: No    Sexual activity: Not on file   Lifestyle    Physical activity:     Days per week: Not on file     Minutes per session: Not on file    Stress: Not on file   Relationships    Social connections:     Talks on phone: Not on file     Gets together: Not on file     Attends Worship service: Not on file     Active member of club or organization: Not on file     Attends meetings of clubs or organizations: Not on file     Relationship status: Not on file    Intimate partner violence:     Fear of current or ex partner: Not on file     Emotionally abused: Not on file     Physically abused: Not on file     Forced sexual activity: Not on file   Other Topics Concern    Not on file   Social History Narrative    Not on file         ALLERGIES: Aleve [naproxen sodium]; Hydrocodone-acetaminophen; Metformin; Phenobarbital; and Statins-hmg-coa reductase inhibitors    Review of Systems   Constitutional: Negative for chills and fever. Gastrointestinal: Negative for nausea and vomiting. Genitourinary: Positive for frequency and urgency. All other systems reviewed and are negative. Vitals:    11/27/19 1205   BP: 186/73   Pulse: (!) 113   Resp: 18   Temp: 98.2 °F (36.8 °C)   SpO2: 99%            Physical Exam  Vitals signs and nursing note reviewed. Constitutional:       General: She is not in acute distress. Appearance: She is well-developed. She is not ill-appearing or diaphoretic. HENT:      Head: Normocephalic and atraumatic. Eyes:      General:         Right eye: No discharge. Left eye: No discharge. Conjunctiva/sclera: Conjunctivae normal.   Neck:      Musculoskeletal: Normal range of motion and neck supple. Cardiovascular:      Rate and Rhythm: Regular rhythm. Tachycardia present. Pulmonary:      Effort: Pulmonary effort is normal. No respiratory distress. Breath sounds: Normal breath sounds. Abdominal:      General: There is no distension. Palpations: Abdomen is soft. Tenderness: There is tenderness (suprapubic). Musculoskeletal: Normal range of motion. Skin:     General: Skin is warm and dry. Capillary Refill: Capillary refill takes less than 2 seconds. Neurological:      General: No focal deficit present. Mental Status: She is alert and oriented to person, place, and time. Cranial Nerves: No cranial nerve deficit. Psychiatric:         Mood and Affect: Mood normal.         Behavior: Behavior normal.          MDM  Number of Diagnoses or Management Options  DANIELLA (acute kidney injury) [facility]):   Septic shock [facility]):   Diagnosis management comments: Treated for UTI sepsis. Discussed with hospitalist for admission. Patient looks well.   Pro Emmett negative blood pressure elevated       Amount and/or Complexity of Data Reviewed  Clinical lab tests: ordered and reviewed  Discuss the patient with other providers: yes    Risk of Complications, Morbidity, and/or Mortality  Presenting problems: high  Diagnostic procedures: moderate  Management options: moderate    Patient Progress  Patient progress: improved         Procedures

## 2019-11-27 NOTE — ED TRIAGE NOTES
Patient sent from PCP for UTI and increased pulse. Family members states that she is tachycardic at office. Patient states that she had been SOB. Denies CP.

## 2019-11-27 NOTE — H&P
Hospitalist H&P Note     Admit Date:  2019 12:58 PM   Name:  Deena Gaffney   Age:  80 y.o.  :  1934   MRN:  [id number]   PCP:  Gemma Carlisle MD  Treatment Team: Attending Provider: Neel Gillis MD; Primary Nurse: Stephon Joy RN      Chief complaint: referred from primary care's office for a UTI and concern for sepsis  HPI:   Mrs. 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 The patient recently moved in with her daughter one 1 month ago because it was unsafe for her to be at home. Over the last several days daughter has noted that the patient has been sweating excessively, a little bit confused and with some complaints of dysuria. They went in to see the primary care physician today and a UTI was confirmed. Unfortunately the patient was tachycardic and concerning for sepsis so she was referred to the ER for further evaluation and management. In the ER the patient was found to be septic with a lactic acid greater than 4 tachycardic with heart rate greater than 100, elevated WBC and a urinary tract infection. She was started on treatment for sepsis protocol and the hospitalist were consulted for admission to the hospital.      The patient and her daughter deny any nausea, no current diarrhea although she suffers from this infrequently, no chest pain. The patient does endorse shortness of breath off and on can occur while she is lying down or moving last episode was last night. 10 systems reviewed and negative except as noted in HPI.   Past Medical History:   Diagnosis Date    Asthma     sees Dr. Fabi Rivera with urination     Cellulitis     sees wound clinic    Charcot foot due to diabetes mellitus ([address]     bilat    Charcot-Evy-Tooth disease-like deformity of foot     Diabetes ([address]     in nursing home and does not know the values when checked     Erosive osteoarthritis     sees Dr. Isma Mart Full dentures     GERD (gastroesophageal reflux disease)     High cholesterol     Hypertension     Incontinence of urine in female     Neuropathy     Sleep apnea     no C PAP      Past Surgical History:   Procedure Laterality Date    BREAST SURGERY PROCEDURE UNLISTED      bilat lumpectomy    CHEST SURGERY PROCEDURE UNLISTED      WHAT IS THIS? ??    HX CATARACT REMOVAL Bilateral     HX CHOLECYSTECTOMY      HX COLONOSCOPY      HX HYSTERECTOMY      HX KNEE REPLACEMENT Bilateral     HX ORTHOPAEDIC      back    VASCULAR SURGERY PROCEDURE UNLIST Left 09/29/2017    aortogram/arteriogram- PTA x2      Allergies   Allergen Reactions    Aleve [Naproxen Sodium] Rash    Hydrocodone-Acetaminophen Itching    Metformin Other (comments)     Decreased kidney function    Phenobarbital Unknown (comments)     Doesn't remember - long time ago     Statins-Hmg-Coa Reductase Inhibitors Myalgia     intolerance      Social History     Tobacco Use    Smoking status: Never Smoker    Smokeless tobacco: Never Used   Substance Use Topics    Alcohol use: No      Family History   Problem Relation Age of Onset    No Known Problems Mother     No Known Problems Father       Immunization History   Administered Date(s) Administered    Influenza High Dose Vaccine PF 09/07/2017, 09/24/2018    Influenza Vaccine 11/08/2017    Influenza Vaccine (Tri) Adjuvanted 09/11/2019    Pneumococcal Conjugate (PCV-13) 03/18/2015, 04/05/2017    Pneumococcal Polysaccharide (PPSV-23) 03/17/2015    TB Skin Test (PPD) Intradermal 09/12/2017, 08/20/2019     PTA Medications:  Prior to Admission Medications   Prescriptions Last Dose Informant Patient Reported? Taking?    Blood-Glucose Meter monitoring kit   No No   Sig: Check sugars once daily   Cholecalciferol, Vitamin D3, (VITAMIN D3) 2,000 unit cap capsule   No No   Sig: Take 2,000 Units by mouth two (2) times a day. Patient taking differently: Take 2,000 Units by mouth two (2) times a day. Stop seven days prior to surgery per anesthesia protocol. FLOVENT HFA 44 mcg/actuation inhaler   Yes No   Sig: Take 2 Puffs by inhalation two (2) times a day. Take day of surgery per anesthesia protocol. Bring DOS  Indications: MAINTENANCE THERAPY FOR ASTHMA   Lancets (ONETOUCH SURESOFT LANCING DEV) misc   No No   Sig: Use to check blood sugar. E11.42   Lancets (ONETOUCH ULTRASOFT LANCETS) misc   No No   Sig: Use to check blood sugar. E11.42   QUEtiapine (SEROQUEL) 25 mg tablet   No No   Sig: Take 1/2 tablet before bedtime   Patient taking differently: Take 25 mg by mouth nightly. Take 1/2 tablet before bedtime   SPIRIVA WITH HANDIHALER 18 mcg inhalation capsule   Yes No   Sig: Take 1 Cap by inhalation every morning. Take day of surgery per anesthesia protocol. Bring DOS  Indications: MAINTENANCE THERAPY FOR ASTHMA   acetaminophen (TYLENOL) 650 mg TbER   Yes No   Sig: Take 650 mg by mouth every six (6) hours as needed for Pain. albuterol (PROAIR HFA) 90 mcg/actuation inhaler   Yes No   Sig: Take 2 Puffs by inhalation every four (4) hours as needed. Take day of surgery per anesthesia protocol. Bring DOS  Indications: Acute Asthma Attack   amLODIPine (NORVASC) 10 mg tablet   No No   Sig: Take 1 Tab by mouth daily. aspirin delayed-release 81 mg tablet   Yes No   Sig: Take 81 mg by mouth every morning. Take day of surgery per anesthesia protocol. atenolol (TENORMIN) 25 mg tablet   No No   Sig: Take 1 Tab by mouth daily. cranberry 400 mg cap   No No   Sig: Take 1 Cap by mouth two (2) times daily (with meals). Indications: UTI prevention   docusate sodium (COLACE) 100 mg capsule   Yes No   Sig: Take 100 mg by mouth two (2) times daily as needed for Constipation. exenatide microspheres (BYDUREON BCISE) 2 mg/0.85 mL atIn   No No   Si Syringe by SubCUTAneous route every seven (7) days.    fenofibrate nanocrystallized (TRICOR) 48 mg tablet   No No   Sig: Take 1 Tab by mouth daily. folic acid 501 mcg tablet   Yes No   Sig: Take 400 mcg by mouth two (2) times a day. Stop seven days prior to surgery per anesthesia protocol. glipiZIDE SR (GLUCOTROL XL) 10 mg CR tablet   No No   Sig: Take 1 Tab by mouth daily. glucose blood VI test strips (ONETOUCH ULTRA TEST) strip   No No   Sig: Use to check blood sugar once daily. E11.42   levothyroxine (SYNTHROID) 25 mcg tablet   No No   Sig: Take 1 Tab by mouth Daily (before breakfast). lisinopril (PRINIVIL, ZESTRIL) 40 mg tablet   No No   Sig: Take 1 Tab by mouth daily. montelukast (SINGULAIR) 10 mg tablet   No No   Sig: Take 1 Tab by mouth daily. polyethylene glycol (MIRALAX) 17 gram packet   No No   Sig: Take 1 Packet by mouth daily as needed. Indications: constipation   Patient taking differently: Take 1 Packet by mouth daily as needed (constipation). mix with 8oz water  Indications: constipation   pregabalin (LYRICA) 50 mg capsule   No No   Si tab po bid   Patient taking differently: Take 50 mg by mouth two (2) times a day. 1 tab po bid   traMADol (ULTRAM-ER) 300 mg tablet   No No   Sig: Take 1 Tab by mouth daily for 180 days. Max Daily Amount: 300 mg. Facility-Administered Medications: None       Objective:     Patient Vitals for the past 24 hrs:   Temp Pulse Resp BP SpO2   19 1503 98.3 °F (36.8 °C) (!) 110 18 172/81 97 %   19 1205 98.2 °F (36.8 °C) (!) 113 18 186/73 99 %     Oxygen Therapy  O2 Sat (%): 97 % (19 1503)  Pulse via Oximetry: 115 beats per minute (19 1205)  O2 Device: Room air (19 1503)  No intake or output data in the 24 hours ending 19 1525    Physical Exam:  General:    Well nourished. Alert. Very pleasant elderly female in no obvious cardiopulmonary distress  Eyes:   Normal sclera. Extraocular movements intact. ENT:  Normocephalic, atraumatic. Moist mucous membranes  CV:   RRR. No m/r/g. Peripheral pulses present. Capillary refill normal  Lungs:  air entry is equal bilaterally. no wheezing, rhonchi, or rales. Abdomen: Soft, nontender, nondistended. Bowel sounds normal.   Extremities: Warm and dry. No cyanosis or edema. Neurologic: CN II-XII grossly intact. No focal neurological deficits appreciated but she has lost sensation in both of her feet. Skin:     No rashes or jaundice. Normal coloration  Psych:  Normal mood and affect. I reviewed the labs, imaging, EKGs, telemetry, and other studies done this admission.   Data Review:   Recent Results (from the past 24 hour(s))   AMB POC URINALYSIS DIP STICK AUTO W/O MICRO    Collection Time: 11/27/19 10:09 AM   Result Value Ref Range    Color (UA POC) Dark Yellow     Clarity (UA POC) Slightly Cloudy     Glucose (UA POC) 1+ Negative    Bilirubin (UA POC) Negative Negative    Ketones (UA POC) Negative Negative    Specific gravity (UA POC) 1.030 1.001 - 1.035    Blood (UA POC) Trace Negative    pH (UA POC) 5.5 4.6 - 8.0    Protein (UA POC) 1+ Negative    Urobilinogen (UA POC) 0.2 mg/dL 0.2 - 1    Nitrites (UA POC) Negative Negative    Leukocyte esterase (UA POC) 1+ Negative   EKG, 12 LEAD, INITIAL    Collection Time: 11/27/19 12:03 PM   Result Value Ref Range    Ventricular Rate 115 BPM    Atrial Rate 115 BPM    P-R Interval 120 ms    QRS Duration 122 ms    Q-T Interval 432 ms    QTC Calculation (Bezet) 597 ms    Calculated T Axis 61 degrees    Diagnosis       Sinus tachycardia  RSR' or QR pattern in V1 suggests right ventricular conduction delay  Septal infarct , age undetermined  ST & T wave abnormality, consider lateral ischemia  Abnormal ECG  When compared with ECG of 19-AUG-2019 20:43,  Septal infarct is now Present  T wave inversion more evident in Lateral leads     CBC WITH AUTOMATED DIFF    Collection Time: 11/27/19 12:17 PM   Result Value Ref Range    WBC 11.2 (H) 4.3 - 11.1 K/uL    RBC 4.36 4.05 - 5.2 M/uL    HGB 14.2 11.7 - 15.4 g/dL HCT 42.6 35.8 - 46.3 %    MCV 97.7 79.6 - 97.8 FL    MCH 32.6 26.1 - 32.9 PG    MCHC 33.3 31.4 - 35.0 g/dL    RDW 12.3 11.9 - 14.6 %    PLATELET 369 087 - 373 K/uL    MPV 8.7 (L) 9.4 - 12.3 FL    ABSOLUTE NRBC 0.00 0.0 - 0.2 K/uL    DF AUTOMATED      NEUTROPHILS 74 43 - 78 %    LYMPHOCYTES 16 13 - 44 %    MONOCYTES 8 4.0 - 12.0 %    EOSINOPHILS 2 0.5 - 7.8 %    BASOPHILS 1 0.0 - 2.0 %    IMMATURE GRANULOCYTES 0 0.0 - 5.0 %    ABS. NEUTROPHILS 8.2 1.7 - 8.2 K/UL    ABS. LYMPHOCYTES 1.8 0.5 - 4.6 K/UL    ABS. MONOCYTES 0.9 0.1 - 1.3 K/UL    ABS. EOSINOPHILS 0.2 0.0 - 0.8 K/UL    ABS. BASOPHILS 0.1 0.0 - 0.2 K/UL    ABS. IMM. GRANS. 0.0 0.0 - 0.5 K/UL   METABOLIC PANEL, COMPREHENSIVE    Collection Time: 11/27/19 12:17 PM   Result Value Ref Range    Sodium 137 136 - 145 mmol/L    Potassium 4.3 3.5 - 5.1 mmol/L    Chloride 104 98 - 107 mmol/L    CO2 22 21 - 32 mmol/L    Anion gap 11 7 - 16 mmol/L    Glucose 309 (H) 65 - 100 mg/dL    BUN 20 8 - 23 MG/DL    Creatinine 1.56 (H) 0.6 - 1.0 MG/DL    GFR est AA 41 (L) >60 ml/min/1.73m2    GFR est non-AA 34 (L) >60 ml/min/1.73m2    Calcium 9.3 8.3 - 10.4 MG/DL    Bilirubin, total 0.5 0.2 - 1.1 MG/DL    ALT (SGPT) 23 12 - 65 U/L    AST (SGOT) 15 15 - 37 U/L    Alk.  phosphatase 67 50 - 136 U/L    Protein, total 7.5 6.3 - 8.2 g/dL    Albumin 3.7 3.2 - 4.6 g/dL    Globulin 3.8 (H) 2.3 - 3.5 g/dL    A-G Ratio 1.0 (L) 1.2 - 3.5     TROPONIN I    Collection Time: 11/27/19 12:17 PM   Result Value Ref Range    Troponin-I, Qt. 0.05 0.02 - 0.05 NG/ML   PROCALCITONIN    Collection Time: 11/27/19 12:17 PM   Result Value Ref Range    Procalcitonin <0.05 ng/mL   POC LACTIC ACID    Collection Time: 11/27/19 12:18 PM   Result Value Ref Range    Lactic Acid (POC) 4.65 (H) 0.5 - 1.9 mmol/L   URINALYSIS W/ RFLX MICROSCOPIC    Collection Time: 11/27/19  2:19 PM   Result Value Ref Range    Color VAISHALI      Appearance TURBID      Specific gravity 1.030 (H) 1.001 - 1.023      pH (UA) 5.0 5.0 - 9.0 Protein 100 (A) NEG mg/dL    Glucose >1,000 mg/dL    Ketone TRACE (A) NEG mg/dL    Bilirubin NEGATIVE  NEG      Blood NEGATIVE  NEG      Urobilinogen 0.2 0.2 - 1.0 EU/dL    Nitrites NEGATIVE  NEG      Leukocyte Esterase MODERATE (A) NEG      WBC >100 0 /hpf    RBC 0-3 0 /hpf    Epithelial cells 10-20 0 /hpf    Bacteria 3+ (H) 0 /hpf    Other observations RESULTS VERIFIED MANUALLY         All Micro Results     Procedure Component Value Units Date/Time    CULTURE, BLOOD [[id number]] Collected:  11/27/19 1458    Order Status:  Completed Specimen:  Blood Updated:  11/27/19 1504    CULTURE, URINE [[id number]]     Order Status:  Sent Specimen:  Cath Urine     CULTURE, BLOOD [[id number]] Collected:  11/27/19 1217    Order Status:  Completed Specimen:  Blood Updated:  11/27/19 1232          Other Studies:  Xr Chest Pa Lat    Result Date: 11/27/2019  TWO-VIEW CHEST: CLINICAL HISTORY: Shortness of breath. COMPARISON:  August 19, 2019. FINDINGS: Erect AP and lateral chest images demonstrate no acute pneumonic infiltrate or significant pleural fluid collection. The heart size is within normal limits without evidence of congestive heart failure or pneumothorax. The bony thorax appears intact on these views. There are overlying radiopaque support devices. IMPRESSION:  NO ACUTE CARDIOPULMONARY DISEASE IDENTIFIED.        Assessment and Plan:     Hospital Problems as of 11/27/2019 Date Reviewed: 8/28/2019          Codes Class Noted - Resolved POA    Sepsis ([facility] ICD-10-CM: A41.9  ICD-9-CM: 038.9, 995.91  11/27/2019 - Present Unknown        UTI (urinary tract infection) ICD-10-CM: N39.0  ICD-9-CM: 599.0  8/20/2019 - Present Unknown        Diabetes mellitus type 2, controlled ([facility] ICD-10-CM: E11.9  ICD-9-CM: 250.00  2/26/2018 - Present Unknown        Acute kidney injury ([facility] ICD-10-CM: N17.9  ICD-9-CM: 584.9  9/12/2017 - Present Unknown        Hypertension ICD-10-CM: I10  ICD-9-CM: 401.9  11/16/2014 - Present Unknown PLAN:  · Sepsiswill admit to the inpatient service and manage per sepsis protocol  · Urinary tract infectionwe will continue IV antibiotics started in the; will follow up her urine cultures and blood cultures until therapy is appropriate  · Diabeteswe will monitor her blood sugars and cover her with insulin  · Acute kidney injurywe will continue gentle IV fluids monitor renal function an dose of her medications for her renal functiond   · Hypertensionwill continue appropriate blood pressure medications with PRN medications as needed in the setting of sepsis  · Continue other appropriate home medications  · Further work-up and management based on clinical course  · PPD placement  · The patient is interested in going to rehab if clinically warranted. She would actually prefer to go into an assisted living facility or SNF but daughter prefers to have her at home with her long-term. DVT ppx: Heparin  Anticipated DC needs: Rehab or home health services depending on official evaluation  Code status:  Full  Estimated LOS:  Greater than 2 midnights  Risk:  high    Signed:  Elizabeth De León MD     *This note was created with the help of Dragon software. Although the note was reviewed for mistakes and corrected when necessary. Other mistakes may exist within the context of this note secondary to the use of voice recognition software. *

## 2019-11-27 NOTE — ROUTINE PROCESS
TRANSFER - OUT REPORT: 
 
Verbal report given to Ryley Mccann RN on Marla Foster  being transferred to [address] [place] [address] for routine progression of care Report consisted of patients Situation, Background, Assessment and  
Recommendations(SBAR). Information from the following report(s) SBAR, ED Summary, [facility] and Recent Results was reviewed with the receiving nurse. Lines:  
Peripheral IV 11/27/19 Left Hand (Active) Site Assessment Clean, dry, & intact 11/27/2019 12:15 PM  
Phlebitis Assessment 0 11/27/2019 12:15 PM  
Infiltration Assessment 0 11/27/2019 12:15 PM  
Dressing Status Clean, dry, & intact 11/27/2019 12:15 PM  
Hub Color/Line Status Blue 11/27/2019 12:15 PM  
   
Peripheral IV 11/27/19 Right Antecubital (Active) Site Assessment Clean, dry, & intact 11/27/2019  3:10 PM  
Phlebitis Assessment 0 11/27/2019  3:10 PM  
Infiltration Assessment 0 11/27/2019  3:10 PM  
Dressing Status Clean, dry, & intact 11/27/2019  3:10 PM  
  
 
Opportunity for questions and clarification was provided. Patient transported with: 
 [employer]

## 2019-11-27 NOTE — PROGRESS NOTES
Pt arrived floor via stretcher from the ED.pt's daughter at bedside. Pt is alert and oriented x 4. Pt oriented to room and call light. Dual skin assessment co,pleted with [facility] HOLDER. Pt has no skin issues noted. Pt denies pain at this time. Will continue to monitor.

## 2019-11-28 LAB
ALBUMIN SERPL-MCNC: 3.1 G/DL (ref 3.2–4.6)
ALBUMIN/GLOB SERPL: 0.9 {RATIO} (ref 1.2–3.5)
ALP SERPL-CCNC: 59 U/L (ref 50–136)
ALT SERPL-CCNC: 18 U/L (ref 12–65)
ANION GAP SERPL CALC-SCNC: 9 MMOL/L (ref 7–16)
AST SERPL-CCNC: 12 U/L (ref 15–37)
BASOPHILS # BLD: 0.1 K/UL (ref 0–0.2)
BASOPHILS NFR BLD: 1 % (ref 0–2)
BILIRUB SERPL-MCNC: 0.5 MG/DL (ref 0.2–1.1)
BUN SERPL-MCNC: 19 MG/DL (ref 8–23)
CALCIUM SERPL-MCNC: 8.5 MG/DL (ref 8.3–10.4)
CHLORIDE SERPL-SCNC: 111 MMOL/L (ref 98–107)
CO2 SERPL-SCNC: 23 MMOL/L (ref 21–32)
CREAT SERPL-MCNC: 1.14 MG/DL (ref 0.6–1)
DIFFERENTIAL METHOD BLD: ABNORMAL
EOSINOPHIL # BLD: 0.2 K/UL (ref 0–0.8)
EOSINOPHIL NFR BLD: 2 % (ref 0.5–7.8)
ERYTHROCYTE [DISTWIDTH] IN BLOOD BY AUTOMATED COUNT: 12.2 % (ref 11.9–14.6)
GLOBULIN SER CALC-MCNC: 3.3 G/DL (ref 2.3–3.5)
GLUCOSE BLD STRIP.AUTO-MCNC: 112 MG/DL (ref 65–100)
GLUCOSE BLD STRIP.AUTO-MCNC: 143 MG/DL (ref 65–100)
GLUCOSE BLD STRIP.AUTO-MCNC: 205 MG/DL (ref 65–100)
GLUCOSE BLD STRIP.AUTO-MCNC: 210 MG/DL (ref 65–100)
GLUCOSE SERPL-MCNC: 114 MG/DL (ref 65–100)
HCT VFR BLD AUTO: 38.8 % (ref 35.8–46.3)
HGB BLD-MCNC: 12.9 G/DL (ref 11.7–15.4)
IMM GRANULOCYTES # BLD AUTO: 0 K/UL (ref 0–0.5)
IMM GRANULOCYTES NFR BLD AUTO: 0 % (ref 0–5)
LACTATE SERPL-SCNC: 2.2 MMOL/L (ref 0.4–2)
LACTATE SERPL-SCNC: 2.3 MMOL/L (ref 0.4–2)
LYMPHOCYTES # BLD: 2.1 K/UL (ref 0.5–4.6)
LYMPHOCYTES NFR BLD: 22 % (ref 13–44)
MCH RBC QN AUTO: 32.6 PG (ref 26.1–32.9)
MCHC RBC AUTO-ENTMCNC: 33.2 G/DL (ref 31.4–35)
MCV RBC AUTO: 98 FL (ref 79.6–97.8)
MONOCYTES # BLD: 0.9 K/UL (ref 0.1–1.3)
MONOCYTES NFR BLD: 9 % (ref 4–12)
NEUTS SEG # BLD: 6.5 K/UL (ref 1.7–8.2)
NEUTS SEG NFR BLD: 66 % (ref 43–78)
NRBC # BLD: 0 K/UL (ref 0–0.2)
PLATELET # BLD AUTO: 300 K/UL (ref 150–450)
PMV BLD AUTO: 8.7 FL (ref 9.4–12.3)
POTASSIUM SERPL-SCNC: 4.1 MMOL/L (ref 3.5–5.1)
PROT SERPL-MCNC: 6.4 G/DL (ref 6.3–8.2)
RBC # BLD AUTO: 3.96 M/UL (ref 4.05–5.2)
SODIUM SERPL-SCNC: 143 MMOL/L (ref 136–145)
WBC # BLD AUTO: 9.8 K/UL (ref 4.3–11.1)

## 2019-11-28 PROCEDURE — 65270000029 HC RM PRIVATE

## 2019-11-28 PROCEDURE — 36415 COLL VENOUS BLD VENIPUNCTURE: CPT

## 2019-11-28 PROCEDURE — 83605 ASSAY OF LACTIC ACID: CPT

## 2019-11-28 PROCEDURE — 94640 AIRWAY INHALATION TREATMENT: CPT

## 2019-11-28 PROCEDURE — 85025 COMPLETE CBC W/AUTO DIFF WBC: CPT

## 2019-11-28 PROCEDURE — 74011000258 HC RX REV CODE- 258: Performed by: INTERNAL MEDICINE

## 2019-11-28 PROCEDURE — 74011250637 HC RX REV CODE- 250/637: Performed by: INTERNAL MEDICINE

## 2019-11-28 PROCEDURE — 74011250636 HC RX REV CODE- 250/636: Performed by: INTERNAL MEDICINE

## 2019-11-28 PROCEDURE — 94760 N-INVAS EAR/PLS OXIMETRY 1: CPT

## 2019-11-28 PROCEDURE — 77030020263 HC SOL INJ SOD CL0.9% LFCR 1000ML

## 2019-11-28 PROCEDURE — 80053 COMPREHEN METABOLIC PANEL: CPT

## 2019-11-28 PROCEDURE — 82962 GLUCOSE BLOOD TEST: CPT

## 2019-11-28 PROCEDURE — 74011636637 HC RX REV CODE- 636/637: Performed by: INTERNAL MEDICINE

## 2019-11-28 RX ORDER — AMLODIPINE BESYLATE 10 MG/1
10 TABLET ORAL DAILY
Status: DISCONTINUED | OUTPATIENT
Start: 2019-11-28 | End: 2019-12-06 | Stop reason: HOSPADM

## 2019-11-28 RX ORDER — HYDRALAZINE HYDROCHLORIDE 20 MG/ML
10 INJECTION INTRAMUSCULAR; INTRAVENOUS
Status: DISCONTINUED | OUTPATIENT
Start: 2019-11-28 | End: 2019-12-06 | Stop reason: HOSPADM

## 2019-11-28 RX ORDER — TRAMADOL HYDROCHLORIDE 50 MG/1
50 TABLET ORAL
Status: DISCONTINUED | OUTPATIENT
Start: 2019-11-28 | End: 2019-12-06 | Stop reason: HOSPADM

## 2019-11-28 RX ADMIN — GLIPIZIDE 10 MG: 10 TABLET, FILM COATED, EXTENDED RELEASE ORAL at 09:07

## 2019-11-28 RX ADMIN — QUETIAPINE FUMARATE 25 MG: 25 TABLET ORAL at 21:20

## 2019-11-28 RX ADMIN — INSULIN LISPRO 4 UNITS: 100 INJECTION, SOLUTION INTRAVENOUS; SUBCUTANEOUS at 17:27

## 2019-11-28 RX ADMIN — CEFTRIAXONE 1 G: 1 INJECTION, POWDER, FOR SOLUTION INTRAMUSCULAR; INTRAVENOUS at 15:05

## 2019-11-28 RX ADMIN — LEVOTHYROXINE SODIUM 25 MCG: 50 TABLET ORAL at 06:24

## 2019-11-28 RX ADMIN — MONTELUKAST SODIUM 10 MG: 10 TABLET, FILM COATED ORAL at 09:06

## 2019-11-28 RX ADMIN — AMLODIPINE BESYLATE 10 MG: 10 TABLET ORAL at 09:06

## 2019-11-28 RX ADMIN — PREGABALIN 50 MG: 50 CAPSULE ORAL at 09:06

## 2019-11-28 RX ADMIN — FOLIC ACID 0.5 MG: 1 TABLET ORAL at 17:24

## 2019-11-28 RX ADMIN — ATENOLOL 25 MG: 25 TABLET ORAL at 09:07

## 2019-11-28 RX ADMIN — ASPIRIN 81 MG: 81 TABLET ORAL at 09:06

## 2019-11-28 RX ADMIN — PREGABALIN 50 MG: 50 CAPSULE ORAL at 17:24

## 2019-11-28 RX ADMIN — FENOFIBRATE 54 MG: 54 TABLET ORAL at 09:06

## 2019-11-28 RX ADMIN — INSULIN LISPRO 4 UNITS: 100 INJECTION, SOLUTION INTRAVENOUS; SUBCUTANEOUS at 21:20

## 2019-11-28 RX ADMIN — TIOTROPIUM BROMIDE 18 MCG: 18 CAPSULE ORAL; RESPIRATORY (INHALATION) at 08:10

## 2019-11-28 RX ADMIN — FOLIC ACID 0.5 MG: 1 TABLET ORAL at 09:07

## 2019-11-28 NOTE — PROGRESS NOTES
Hospitalist Progress Note     Admit Date:  2019 12:58 PM   Name:  Misty Kumar   Age:  80 y.o.  :  1934   MRN:  [id number]   PCP:  Jeana Mendoza MD  Treatment Team: Attending Provider: Bj Galindo MD    Subjective:     2019patient seen and evaluated. No adverse overnight events reported. Cultures with no growth to date. Objective:     Patient Vitals for the past 24 hrs:   Temp Pulse Resp BP SpO2   19 0812     96 %   19 0811 98.6 °F (37 °C) (!) 104 18 (!) 187/99 93 %   19 0410 97.6 °F (36.4 °C) 99 18 (!) 164/95 96 %   19 0103  91  180/81 95 %   19 2340 98.2 °F (36.8 °C) 90 18  95 %   19 1933 98.2 °F (36.8 °C) (!) 104 16 163/82 98 %   19 1626 98.8 °F (37.1 °C) 94 18 147/79 97 %   19 1503 98.3 °F (36.8 °C) (!) 110 18 172/81 97 %   19 1205 98.2 °F (36.8 °C) (!) 113 18 186/73 99 %     Oxygen Therapy  O2 Sat (%): 96 % (19 08)  Pulse via Oximetry: 89 beats per minute (19 08)  O2 Device: Room air (19 08)  No intake or output data in the 24 hours ending 19 1153      General:    Well nourished. Alert. CV:   RRR. No murmur, rub, or gallop. Lungs:   CTAB. No wheezing, rhonchi, or rales. Abdomen:   Soft, nontender, nondistended. Extremities: Warm and dry. No cyanosis or edema. Skin:     No rashes or jaundice. Data Review:  I have reviewed all labs, meds, telemetry events, and studies from the last 24 hours.     Recent Results (from the past 24 hour(s))   EKG, 12 LEAD, INITIAL    Collection Time: 19 12:03 PM   Result Value Ref Range    Ventricular Rate 115 BPM    Atrial Rate 115 BPM    P-R Interval 120 ms    QRS Duration 122 ms    Q-T Interval 432 ms    QTC Calculation (Bezet) 597 ms    Calculated T Axis 61 degrees    Diagnosis       Sinus tachycardia  RSR' or QR pattern in V1 suggests right ventricular conduction delay  Septal infarct , age undetermined  ST & T wave abnormality, consider lateral ischemia  Abnormal ECG  When compared with ECG of 19-AUG-2019 20:43,  Septal infarct is now Present  T wave inversion more evident in Lateral leads     CBC WITH AUTOMATED DIFF    Collection Time: 11/27/19 12:17 PM   Result Value Ref Range    WBC 11.2 (H) 4.3 - 11.1 K/uL    RBC 4.36 4.05 - 5.2 M/uL    HGB 14.2 11.7 - 15.4 g/dL    HCT 42.6 35.8 - 46.3 %    MCV 97.7 79.6 - 97.8 FL    MCH 32.6 26.1 - 32.9 PG    MCHC 33.3 31.4 - 35.0 g/dL    RDW 12.3 11.9 - 14.6 %    PLATELET 540 712 - 032 K/uL    MPV 8.7 (L) 9.4 - 12.3 FL    ABSOLUTE NRBC 0.00 0.0 - 0.2 K/uL    DF AUTOMATED      NEUTROPHILS 74 43 - 78 %    LYMPHOCYTES 16 13 - 44 %    MONOCYTES 8 4.0 - 12.0 %    EOSINOPHILS 2 0.5 - 7.8 %    BASOPHILS 1 0.0 - 2.0 %    IMMATURE GRANULOCYTES 0 0.0 - 5.0 %    ABS. NEUTROPHILS 8.2 1.7 - 8.2 K/UL    ABS. LYMPHOCYTES 1.8 0.5 - 4.6 K/UL    ABS. MONOCYTES 0.9 0.1 - 1.3 K/UL    ABS. EOSINOPHILS 0.2 0.0 - 0.8 K/UL    ABS. BASOPHILS 0.1 0.0 - 0.2 K/UL    ABS. IMM. GRANS. 0.0 0.0 - 0.5 K/UL   METABOLIC PANEL, COMPREHENSIVE    Collection Time: 11/27/19 12:17 PM   Result Value Ref Range    Sodium 137 136 - 145 mmol/L    Potassium 4.3 3.5 - 5.1 mmol/L    Chloride 104 98 - 107 mmol/L    CO2 22 21 - 32 mmol/L    Anion gap 11 7 - 16 mmol/L    Glucose 309 (H) 65 - 100 mg/dL    BUN 20 8 - 23 MG/DL    Creatinine 1.56 (H) 0.6 - 1.0 MG/DL    GFR est AA 41 (L) >60 ml/min/1.73m2    GFR est non-AA 34 (L) >60 ml/min/1.73m2    Calcium 9.3 8.3 - 10.4 MG/DL    Bilirubin, total 0.5 0.2 - 1.1 MG/DL    ALT (SGPT) 23 12 - 65 U/L    AST (SGOT) 15 15 - 37 U/L    Alk.  phosphatase 67 50 - 136 U/L    Protein, total 7.5 6.3 - 8.2 g/dL    Albumin 3.7 3.2 - 4.6 g/dL    Globulin 3.8 (H) 2.3 - 3.5 g/dL    A-G Ratio 1.0 (L) 1.2 - 3.5     TROPONIN I    Collection Time: 11/27/19 12:17 PM   Result Value Ref Range    Troponin-I, Qt. 0.05 0.02 - 0.05 NG/ML   CULTURE, BLOOD    Collection Time: 11/27/19 12:17 PM   Result Value Ref Range Special Requests: LEFT  HAND        Culture result: NO GROWTH AFTER 18 HOURS     PROCALCITONIN    Collection Time: 11/27/19 12:17 PM   Result Value Ref Range    Procalcitonin <0.05 ng/mL   POC LACTIC ACID    Collection Time: 11/27/19 12:18 PM   Result Value Ref Range    Lactic Acid (POC) 4.65 (H) 0.5 - 1.9 mmol/L   CULTURE, URINE    Collection Time: 11/27/19  1:15 PM   Result Value Ref Range    Special Requests: NO SPECIAL REQUESTS      Culture result:        NO GROWTH AFTER SHORT PERIOD OF INCUBATION. FURTHER RESULTS TO FOLLOW AFTER OVERNIGHT INCUBATION.    URINALYSIS W/ RFLX MICROSCOPIC    Collection Time: 11/27/19  2:19 PM   Result Value Ref Range    Color VAISHALI      Appearance TURBID      Specific gravity 1.030 (H) 1.001 - 1.023      pH (UA) 5.0 5.0 - 9.0      Protein 100 (A) NEG mg/dL    Glucose >1,000 mg/dL    Ketone TRACE (A) NEG mg/dL    Bilirubin NEGATIVE  NEG      Blood NEGATIVE  NEG      Urobilinogen 0.2 0.2 - 1.0 EU/dL    Nitrites NEGATIVE  NEG      Leukocyte Esterase MODERATE (A) NEG      WBC >100 0 /hpf    RBC 0-3 0 /hpf    Epithelial cells 10-20 0 /hpf    Bacteria 3+ (H) 0 /hpf    Other observations RESULTS VERIFIED MANUALLY     CULTURE, BLOOD    Collection Time: 11/27/19  2:58 PM   Result Value Ref Range    Special Requests: RIGHT  Antecubital        Culture result: NO GROWTH AFTER 14 HOURS     GLUCOSE, POC    Collection Time: 11/27/19  4:19 PM   Result Value Ref Range    Glucose (POC) 125 (H) 65 - 100 mg/dL   LACTIC ACID    Collection Time: 11/27/19  7:41 PM   Result Value Ref Range    Lactic acid 4.5 (HH) 0.4 - 2.0 MMOL/L   GLUCOSE, POC    Collection Time: 11/27/19  8:52 PM   Result Value Ref Range    Glucose (POC) 183 (H) 65 - 100 mg/dL   LACTIC ACID    Collection Time: 11/28/19 12:35 AM   Result Value Ref Range    Lactic acid 2.3 (HH) 0.4 - 2.0 MMOL/L   METABOLIC PANEL, COMPREHENSIVE    Collection Time: 11/28/19  7:07 AM   Result Value Ref Range    Sodium 143 136 - 145 mmol/L    Potassium 4.1 3.5 - 5.1 mmol/L    Chloride 111 (H) 98 - 107 mmol/L    CO2 23 21 - 32 mmol/L    Anion gap 9 7 - 16 mmol/L    Glucose 114 (H) 65 - 100 mg/dL    BUN 19 8 - 23 MG/DL    Creatinine 1.14 (H) 0.6 - 1.0 MG/DL    GFR est AA 58 (L) >60 ml/min/1.73m2    GFR est non-AA 48 (L) >60 ml/min/1.73m2    Calcium 8.5 8.3 - 10.4 MG/DL    Bilirubin, total 0.5 0.2 - 1.1 MG/DL    ALT (SGPT) 18 12 - 65 U/L    AST (SGOT) 12 (L) 15 - 37 U/L    Alk. phosphatase 59 50 - 136 U/L    Protein, total 6.4 6.3 - 8.2 g/dL    Albumin 3.1 (L) 3.2 - 4.6 g/dL    Globulin 3.3 2.3 - 3.5 g/dL    A-G Ratio 0.9 (L) 1.2 - 3.5     CBC WITH AUTOMATED DIFF    Collection Time: 11/28/19  7:07 AM   Result Value Ref Range    WBC 9.8 4.3 - 11.1 K/uL    RBC 3.96 (L) 4.05 - 5.2 M/uL    HGB 12.9 11.7 - 15.4 g/dL    HCT 38.8 35.8 - 46.3 %    MCV 98.0 (H) 79.6 - 97.8 FL    MCH 32.6 26.1 - 32.9 PG    MCHC 33.2 31.4 - 35.0 g/dL    RDW 12.2 11.9 - 14.6 %    PLATELET 527 912 - 896 K/uL    MPV 8.7 (L) 9.4 - 12.3 FL    ABSOLUTE NRBC 0.00 0.0 - 0.2 K/uL    DF AUTOMATED      NEUTROPHILS 66 43 - 78 %    LYMPHOCYTES 22 13 - 44 %    MONOCYTES 9 4.0 - 12.0 %    EOSINOPHILS 2 0.5 - 7.8 %    BASOPHILS 1 0.0 - 2.0 %    IMMATURE GRANULOCYTES 0 0.0 - 5.0 %    ABS. NEUTROPHILS 6.5 1.7 - 8.2 K/UL    ABS. LYMPHOCYTES 2.1 0.5 - 4.6 K/UL    ABS. MONOCYTES 0.9 0.1 - 1.3 K/UL    ABS. EOSINOPHILS 0.2 0.0 - 0.8 K/UL    ABS. BASOPHILS 0.1 0.0 - 0.2 K/UL    ABS. IMM.  GRANS. 0.0 0.0 - 0.5 K/UL   LACTIC ACID    Collection Time: 11/28/19  7:07 AM   Result Value Ref Range    Lactic acid 2.2 (HH) 0.4 - 2.0 MMOL/L   GLUCOSE, POC    Collection Time: 11/28/19  7:44 AM   Result Value Ref Range    Glucose (POC) 112 (H) 65 - 100 mg/dL   GLUCOSE, POC    Collection Time: 11/28/19 11:32 AM   Result Value Ref Range    Glucose (POC) 143 (H) 65 - 100 mg/dL        All Micro Results     Procedure Component Value Units Date/Time    CULTURE, URINE [[id number]] Collected:  11/27/19 1315    Order Status:  Completed Specimen: Cath Urine Updated:  11/28/19 1015     Special Requests: NO SPECIAL REQUESTS        Culture result:       NO GROWTH AFTER SHORT PERIOD OF INCUBATION. FURTHER RESULTS TO FOLLOW AFTER OVERNIGHT INCUBATION.           CULTURE, BLOOD [[id number]] Collected:  11/27/19 1217    Order Status:  Completed Specimen:  Blood Updated:  11/28/19 0633     Special Requests: --        LEFT  HAND       Culture result: NO GROWTH AFTER 18 HOURS       CULTURE, BLOOD [[id number]] Collected:  11/27/19 1458    Order Status:  Completed Specimen:  Blood Updated:  11/28/19 4367     Special Requests: --        RIGHT  Antecubital       Culture result: NO GROWTH AFTER 14 HOURS             Current Meds:  Current Facility-Administered Medications   Medication Dose Route Frequency    traMADol (ULTRAM) tablet 50 mg  50 mg Oral Q6H PRN    amLODIPine (NORVASC) tablet 10 mg  10 mg Oral DAILY    hydrALAZINE (APRESOLINE) 20 mg/mL injection 10 mg  10 mg IntraVENous Q6H PRN    sodium chloride (NS) flush 5-10 mL  5-10 mL IntraVENous PRN    cefTRIAXone (ROCEPHIN) 1 g in 0.9% sodium chloride (MBP/ADV) 50 mL  1 g IntraVENous Q24H    0.9% sodium chloride infusion  125 mL/hr IntraVENous CONTINUOUS    aspirin delayed-release tablet 81 mg  81 mg Oral 7am    atenolol (TENORMIN) tablet 25 mg  25 mg Oral DAILY    levothyroxine (SYNTHROID) tablet 25 mcg  25 mcg Oral ACB    montelukast (SINGULAIR) tablet 10 mg  10 mg Oral DAILY    pregabalin (LYRICA) capsule 50 mg  50 mg Oral BID    QUEtiapine (SEROquel) tablet 25 mg  25 mg Oral QHS    tiotropium (SPIRIVA) inhalation capsule 18 mcg  1 Cap Inhalation 7am    glipiZIDE SR (GLUCOTROL XL) tablet 10 mg  10 mg Oral DAILY    folic acid (FOLVITE) tablet 0.5 mg  0.5 mg Oral BID    fenofibrate (LOFIBRA) tablet 54 mg  54 mg Oral DAILY    docusate sodium (COLACE) capsule 100 mg  100 mg Oral BID PRN    albuterol (PROVENTIL VENTOLIN) nebulizer solution 2.5 mg  2.5 mg Nebulization Q6H PRN    insulin lispro (HUMALOG) injection   SubCUTAneous AC&HS       Other Studies (last 24 hours):  Xr Chest Pa Lat    Result Date: 11/27/2019  TWO-VIEW CHEST: CLINICAL HISTORY: Shortness of breath. COMPARISON:  August 19, 2019. FINDINGS: Erect AP and lateral chest images demonstrate no acute pneumonic infiltrate or significant pleural fluid collection. The heart size is within normal limits without evidence of congestive heart failure or pneumothorax. The bony thorax appears intact on these views. There are overlying radiopaque support devices. IMPRESSION:  NO ACUTE CARDIOPULMONARY DISEASE IDENTIFIED. Assessment and Plan:     Hospital Problems as of 11/28/2019 Date Reviewed: 8/28/2019          Codes Class Noted - Resolved POA    Sepsis ([facility] ICD-10-CM: A41.9  ICD-9-CM: 038.9, 995.91  11/27/2019 - Present Unknown        UTI (urinary tract infection) ICD-10-CM: N39.0  ICD-9-CM: 599.0  8/20/2019 - Present Unknown        Diabetes mellitus type 2, controlled ([facility] ICD-10-CM: E11.9  ICD-9-CM: 250.00  2/26/2018 - Present Unknown        Acute kidney injury ([facility] ICD-10-CM: N17.9  ICD-9-CM: 584.9  9/12/2017 - Present Unknown        Hypertension ICD-10-CM: I10  ICD-9-CM: 401.9  11/16/2014 - Present Unknown              PLAN:    · Sepsisresolving we will continue management per sepsis protocol;  · UTI continue antibiotics follow-up cultures no growth to date  · Diabetes monitor blood sugar and cover with insulin  · Acute kidney injury continue gentle IV fluids dose medications for renal function-resolving  · Hypertension- continue appropriate home medications add as needed hydralazine  · Continue other appropriate home medications  · Further work-up and management based on her clinical course    DC planning/Dispo: Home with home health services or rehab evaluation  DVT ppx: Heparin    Signed:  Derrell Conway MD     This note was created with the help of Dragon voice recognition software.   Although the note was reviewed for mistakes and corrected were noted. Other mistakes may exist within the context of this note was secondary to the use of voice recognition software. *

## 2019-11-29 LAB
ANION GAP SERPL CALC-SCNC: 8 MMOL/L (ref 7–16)
BASOPHILS # BLD: 0.1 K/UL (ref 0–0.2)
BASOPHILS NFR BLD: 1 % (ref 0–2)
BUN SERPL-MCNC: 15 MG/DL (ref 8–23)
CALCIUM SERPL-MCNC: 8.4 MG/DL (ref 8.3–10.4)
CHLORIDE SERPL-SCNC: 109 MMOL/L (ref 98–107)
CO2 SERPL-SCNC: 25 MMOL/L (ref 21–32)
CREAT SERPL-MCNC: 1.08 MG/DL (ref 0.6–1)
DIFFERENTIAL METHOD BLD: ABNORMAL
EOSINOPHIL # BLD: 0.3 K/UL (ref 0–0.8)
EOSINOPHIL NFR BLD: 3 % (ref 0.5–7.8)
ERYTHROCYTE [DISTWIDTH] IN BLOOD BY AUTOMATED COUNT: 12.3 % (ref 11.9–14.6)
GLUCOSE BLD STRIP.AUTO-MCNC: 146 MG/DL (ref 65–100)
GLUCOSE BLD STRIP.AUTO-MCNC: 186 MG/DL (ref 65–100)
GLUCOSE BLD STRIP.AUTO-MCNC: 221 MG/DL (ref 65–100)
GLUCOSE BLD STRIP.AUTO-MCNC: 83 MG/DL (ref 65–100)
GLUCOSE SERPL-MCNC: 133 MG/DL (ref 65–100)
HCT VFR BLD AUTO: 38.7 % (ref 35.8–46.3)
HGB BLD-MCNC: 12.6 G/DL (ref 11.7–15.4)
IMM GRANULOCYTES # BLD AUTO: 0 K/UL (ref 0–0.5)
IMM GRANULOCYTES NFR BLD AUTO: 0 % (ref 0–5)
LACTATE SERPL-SCNC: 2.5 MMOL/L (ref 0.4–2)
LACTATE SERPL-SCNC: 2.8 MMOL/L (ref 0.4–2)
LYMPHOCYTES # BLD: 1.7 K/UL (ref 0.5–4.6)
LYMPHOCYTES NFR BLD: 20 % (ref 13–44)
MCH RBC QN AUTO: 31.8 PG (ref 26.1–32.9)
MCHC RBC AUTO-ENTMCNC: 32.6 G/DL (ref 31.4–35)
MCV RBC AUTO: 97.7 FL (ref 79.6–97.8)
MONOCYTES # BLD: 0.8 K/UL (ref 0.1–1.3)
MONOCYTES NFR BLD: 9 % (ref 4–12)
NEUTS SEG # BLD: 5.8 K/UL (ref 1.7–8.2)
NEUTS SEG NFR BLD: 67 % (ref 43–78)
NRBC # BLD: 0 K/UL (ref 0–0.2)
PLATELET # BLD AUTO: 269 K/UL (ref 150–450)
PMV BLD AUTO: 8.6 FL (ref 9.4–12.3)
POTASSIUM SERPL-SCNC: 3.5 MMOL/L (ref 3.5–5.1)
RBC # BLD AUTO: 3.96 M/UL (ref 4.05–5.2)
SODIUM SERPL-SCNC: 142 MMOL/L (ref 136–145)
WBC # BLD AUTO: 8.6 K/UL (ref 4.3–11.1)

## 2019-11-29 PROCEDURE — 86580 TB INTRADERMAL TEST: CPT | Performed by: INTERNAL MEDICINE

## 2019-11-29 PROCEDURE — 74011250637 HC RX REV CODE- 250/637: Performed by: INTERNAL MEDICINE

## 2019-11-29 PROCEDURE — 85025 COMPLETE CBC W/AUTO DIFF WBC: CPT

## 2019-11-29 PROCEDURE — 36415 COLL VENOUS BLD VENIPUNCTURE: CPT

## 2019-11-29 PROCEDURE — 94760 N-INVAS EAR/PLS OXIMETRY 1: CPT

## 2019-11-29 PROCEDURE — 74011000258 HC RX REV CODE- 258: Performed by: INTERNAL MEDICINE

## 2019-11-29 PROCEDURE — 74011000302 HC RX REV CODE- 302: Performed by: INTERNAL MEDICINE

## 2019-11-29 PROCEDURE — 94640 AIRWAY INHALATION TREATMENT: CPT

## 2019-11-29 PROCEDURE — 83605 ASSAY OF LACTIC ACID: CPT

## 2019-11-29 PROCEDURE — 65270000029 HC RM PRIVATE

## 2019-11-29 PROCEDURE — 74011636637 HC RX REV CODE- 636/637: Performed by: INTERNAL MEDICINE

## 2019-11-29 PROCEDURE — 74011250636 HC RX REV CODE- 250/636: Performed by: INTERNAL MEDICINE

## 2019-11-29 PROCEDURE — 82962 GLUCOSE BLOOD TEST: CPT

## 2019-11-29 PROCEDURE — 77030020263 HC SOL INJ SOD CL0.9% LFCR 1000ML

## 2019-11-29 PROCEDURE — 80048 BASIC METABOLIC PNL TOTAL CA: CPT

## 2019-11-29 RX ORDER — SODIUM CHLORIDE 9 MG/ML
1000 INJECTION, SOLUTION INTRAVENOUS CONTINUOUS
Status: DISPENSED | OUTPATIENT
Start: 2019-11-29 | End: 2019-11-29

## 2019-11-29 RX ORDER — SODIUM CHLORIDE 9 MG/ML
150 INJECTION, SOLUTION INTRAVENOUS CONTINUOUS
Status: DISPENSED | OUTPATIENT
Start: 2019-11-29 | End: 2019-11-29

## 2019-11-29 RX ORDER — MAG HYDROX/ALUMINUM HYD/SIMETH 200-200-20
30 SUSPENSION, ORAL (FINAL DOSE FORM) ORAL
Status: DISCONTINUED | OUTPATIENT
Start: 2019-11-29 | End: 2019-12-06 | Stop reason: HOSPADM

## 2019-11-29 RX ORDER — SODIUM CHLORIDE 9 MG/ML
100 INJECTION, SOLUTION INTRAVENOUS CONTINUOUS
Status: DISCONTINUED | OUTPATIENT
Start: 2019-11-29 | End: 2019-12-04

## 2019-11-29 RX ORDER — HYDRALAZINE HYDROCHLORIDE 25 MG/1
25 TABLET, FILM COATED ORAL 3 TIMES DAILY
Status: DISCONTINUED | OUTPATIENT
Start: 2019-11-29 | End: 2019-12-06 | Stop reason: HOSPADM

## 2019-11-29 RX ORDER — TRAMADOL HYDROCHLORIDE 300 MG/1
300 CAPSULE ORAL DAILY
Status: DISCONTINUED | OUTPATIENT
Start: 2019-11-30 | End: 2019-12-06 | Stop reason: HOSPADM

## 2019-11-29 RX ORDER — SODIUM CHLORIDE 9 MG/ML
421 INJECTION, SOLUTION INTRAVENOUS CONTINUOUS
Status: DISPENSED | OUTPATIENT
Start: 2019-11-29 | End: 2019-11-29

## 2019-11-29 RX ADMIN — LEVOTHYROXINE SODIUM 25 MCG: 50 TABLET ORAL at 05:28

## 2019-11-29 RX ADMIN — TRAMADOL HYDROCHLORIDE 50 MG: 50 TABLET, FILM COATED ORAL at 17:12

## 2019-11-29 RX ADMIN — SODIUM CHLORIDE 150 ML/HR: 900 INJECTION, SOLUTION INTRAVENOUS at 14:30

## 2019-11-29 RX ADMIN — MONTELUKAST SODIUM 10 MG: 10 TABLET, FILM COATED ORAL at 10:05

## 2019-11-29 RX ADMIN — ASPIRIN 81 MG: 81 TABLET ORAL at 10:06

## 2019-11-29 RX ADMIN — SODIUM CHLORIDE 125 ML/HR: 900 INJECTION, SOLUTION INTRAVENOUS at 10:14

## 2019-11-29 RX ADMIN — QUETIAPINE FUMARATE 25 MG: 25 TABLET ORAL at 22:07

## 2019-11-29 RX ADMIN — AMLODIPINE BESYLATE 10 MG: 10 TABLET ORAL at 10:05

## 2019-11-29 RX ADMIN — PREGABALIN 50 MG: 50 CAPSULE ORAL at 17:10

## 2019-11-29 RX ADMIN — INSULIN LISPRO 2 UNITS: 100 INJECTION, SOLUTION INTRAVENOUS; SUBCUTANEOUS at 22:07

## 2019-11-29 RX ADMIN — TIOTROPIUM BROMIDE 18 MCG: 18 CAPSULE ORAL; RESPIRATORY (INHALATION) at 07:51

## 2019-11-29 RX ADMIN — GLIPIZIDE 10 MG: 10 TABLET, FILM COATED, EXTENDED RELEASE ORAL at 10:05

## 2019-11-29 RX ADMIN — TRAMADOL HYDROCHLORIDE 50 MG: 50 TABLET, FILM COATED ORAL at 12:52

## 2019-11-29 RX ADMIN — FENOFIBRATE 54 MG: 54 TABLET ORAL at 10:05

## 2019-11-29 RX ADMIN — HYDRALAZINE HYDROCHLORIDE 25 MG: 25 TABLET, FILM COATED ORAL at 22:07

## 2019-11-29 RX ADMIN — HYDRALAZINE HYDROCHLORIDE 25 MG: 25 TABLET, FILM COATED ORAL at 15:24

## 2019-11-29 RX ADMIN — FOLIC ACID 0.5 MG: 1 TABLET ORAL at 17:09

## 2019-11-29 RX ADMIN — PREGABALIN 50 MG: 50 CAPSULE ORAL at 10:14

## 2019-11-29 RX ADMIN — ALUMINUM HYDROXIDE, MAGNESIUM HYDROXIDE, AND SIMETHICONE 30 ML: 200; 200; 20 SUSPENSION ORAL at 22:07

## 2019-11-29 RX ADMIN — CEFTRIAXONE 1 G: 1 INJECTION, POWDER, FOR SOLUTION INTRAMUSCULAR; INTRAVENOUS at 15:16

## 2019-11-29 RX ADMIN — ATENOLOL 25 MG: 25 TABLET ORAL at 10:06

## 2019-11-29 RX ADMIN — FOLIC ACID 0.5 MG: 1 TABLET ORAL at 10:05

## 2019-11-29 RX ADMIN — SODIUM CHLORIDE 421 ML/HR: 900 INJECTION, SOLUTION INTRAVENOUS at 12:35

## 2019-11-29 RX ADMIN — TUBERCULIN PURIFIED PROTEIN DERIVATIVE 5 UNITS: 5 INJECTION, SOLUTION INTRADERMAL at 05:00

## 2019-11-29 RX ADMIN — SODIUM CHLORIDE 1000 ML/HR: 900 INJECTION, SOLUTION INTRAVENOUS at 11:00

## 2019-11-29 RX ADMIN — SODIUM CHLORIDE 100 ML/HR: 900 INJECTION, SOLUTION INTRAVENOUS at 19:20

## 2019-11-29 RX ADMIN — INSULIN LISPRO 4 UNITS: 100 INJECTION, SOLUTION INTRAVENOUS; SUBCUTANEOUS at 17:08

## 2019-11-29 NOTE — PROGRESS NOTES
Hospitalist Progress Note     Admit Date:  2019 12:58 PM   Name:  Hubert Chan   Age:  80 y.o.  :  1934   MRN:  [id number]   PCP:  Mary Grace Ferrari MD  Treatment Team: Attending Provider: Ami Cazares MD    Subjective:     2019patient seen and evaluated. No adverse overnight events reported. Cultures with no growth to date. 2019- pt seen and evaluated. No adverse overnight events reported. Objective:     Patient Vitals for the past 24 hrs:   Temp Pulse Resp BP SpO2   19 0701 97.6 °F (36.4 °C) 84 20 179/89 96 %   19 0530    167/79    19 0358 97.5 °F (36.4 °C) 89 18 (!) 172/91 93 %   19 2348 98.5 °F (36.9 °C) 93 20 (!) 163/94 95 %   19 1914 97.9 °F (36.6 °C) 98 20 152/83 95 %   19 1717 98.1 °F (36.7 °C) 92 18 165/70 96 %   19 1233 98.4 °F (36.9 °C) 94 18 170/76 94 %   19 0812     96 %   19 0811 98.6 °F (37 °C) (!) 104 18 (!) 187/99 93 %     Oxygen Therapy  O2 Sat (%): 96 % (19 0701)  Pulse via Oximetry: 89 beats per minute (19 0812)  O2 Device: Room air (19 0997)    Intake/Output Summary (Last 24 hours) at 2019 0737  Last data filed at 2019 0358  Gross per 24 hour   Intake    Output 900 ml   Net -900 ml         General:    Well nourished. Alert. CV:   RRR. No murmur, rub, or gallop. Lungs:   CTAB. No wheezing, rhonchi, or rales. Abdomen:   Soft, nontender, nondistended. Extremities: Warm and dry. No cyanosis or edema. Skin:     No rashes or jaundice. Data Review:  I have reviewed all labs, meds, telemetry events, and studies from the last 24 hours.     Recent Results (from the past 24 hour(s))   GLUCOSE, POC    Collection Time: 19  7:44 AM   Result Value Ref Range    Glucose (POC) 112 (H) 65 - 100 mg/dL   GLUCOSE, POC    Collection Time: 19 11:32 AM   Result Value Ref Range    Glucose (POC) 143 (H) 65 - 100 mg/dL   GLUCOSE, POC    Collection Time: 11/28/19  4:43 PM   Result Value Ref Range    Glucose (POC) 210 (H) 65 - 100 mg/dL   GLUCOSE, POC    Collection Time: 11/28/19  8:14 PM   Result Value Ref Range    Glucose (POC) 205 (H) 65 - 100 mg/dL        All Micro Results     Procedure Component Value Units Date/Time    CULTURE, URINE [[id number]] Collected:  11/27/19 1315    Order Status:  Completed Specimen:  Cath Urine Updated:  11/28/19 1015     Special Requests: NO SPECIAL REQUESTS        Culture result:       NO GROWTH AFTER SHORT PERIOD OF INCUBATION. FURTHER RESULTS TO FOLLOW AFTER OVERNIGHT INCUBATION.           CULTURE, BLOOD [[id number]] Collected:  11/27/19 1217    Order Status:  Completed Specimen:  Blood Updated:  11/28/19 0633     Special Requests: --        LEFT  HAND       Culture result: NO GROWTH AFTER 18 HOURS       CULTURE, BLOOD [[id number]] Collected:  11/27/19 1458    Order Status:  Completed Specimen:  Blood Updated:  11/28/19 4388     Special Requests: --        RIGHT  Antecubital       Culture result: NO GROWTH AFTER 14 HOURS             Current Meds:  Current Facility-Administered Medications   Medication Dose Route Frequency    traMADol (ULTRAM) tablet 50 mg  50 mg Oral Q6H PRN    amLODIPine (NORVASC) tablet 10 mg  10 mg Oral DAILY    hydrALAZINE (APRESOLINE) 20 mg/mL injection 10 mg  10 mg IntraVENous Q6H PRN    sodium chloride (NS) flush 5-10 mL  5-10 mL IntraVENous PRN    cefTRIAXone (ROCEPHIN) 1 g in 0.9% sodium chloride (MBP/ADV) 50 mL  1 g IntraVENous Q24H    0.9% sodium chloride infusion  125 mL/hr IntraVENous CONTINUOUS    aspirin delayed-release tablet 81 mg  81 mg Oral 7am    atenolol (TENORMIN) tablet 25 mg  25 mg Oral DAILY    levothyroxine (SYNTHROID) tablet 25 mcg  25 mcg Oral ACB    montelukast (SINGULAIR) tablet 10 mg  10 mg Oral DAILY    pregabalin (LYRICA) capsule 50 mg  50 mg Oral BID    QUEtiapine (SEROquel) tablet 25 mg  25 mg Oral QHS    tiotropium (SPIRIVA) inhalation capsule 18 mcg  1 Cap Inhalation 7am    glipiZIDE SR (GLUCOTROL XL) tablet 10 mg  10 mg Oral DAILY    folic acid (FOLVITE) tablet 0.5 mg  0.5 mg Oral BID    fenofibrate (LOFIBRA) tablet 54 mg  54 mg Oral DAILY    docusate sodium (COLACE) capsule 100 mg  100 mg Oral BID PRN    albuterol (PROVENTIL VENTOLIN) nebulizer solution 2.5 mg  2.5 mg Nebulization Q6H PRN    insulin lispro (HUMALOG) injection   SubCUTAneous AC&HS       Other Studies (last 24 hours):  No results found. Assessment and Plan:     Hospital Problems as of 11/29/2019 Date Reviewed: 8/28/2019          Codes Class Noted - Resolved POA    Sepsis ([facility] ICD-10-CM: A41.9  ICD-9-CM: 038.9, 995.91  11/27/2019 - Present Unknown        UTI (urinary tract infection) ICD-10-CM: N39.0  ICD-9-CM: 599.0  8/20/2019 - Present Unknown        Diabetes mellitus type 2, controlled ([facility] ICD-10-CM: E11.9  ICD-9-CM: 250.00  2/26/2018 - Present Unknown        Acute kidney injury ([facility] ICD-10-CM: N17.9  ICD-9-CM: 584.9  9/12/2017 - Present Unknown        Hypertension ICD-10-CM: I10  ICD-9-CM: 401.9  11/16/2014 - Present Unknown              PLAN:    · Sepsisresolving we will continue management per sepsis protocol;repeat lactic sharmaine still up- so will bolus again  · UTI continue antibiotics follow-up - cultures awaiting further results  · Diabetes monitor blood sugar and cover with insulin  · Acute kidney injury- resolving -continue  IV fluids and dose medications for renal function-  · Hypertension- continue appropriate home medications, add standing hydralazine continue as needed hydralazine  · Continue other appropriate home medications  · Further work-up and management based on her clinical course    DC planning/Dispo: Home with home health services or rehab evaluation  DVT ppx: Heparin    Signed:  Humberto Lowe MD     This note was created with the help of Dragon voice recognition software. Although the note was reviewed for mistakes and corrected were noted.   Other mistakes may exist within the context of this note was secondary to the use of voice recognition software. *

## 2019-11-29 NOTE — PROGRESS NOTES
Hourly rounds completed this shift, all needs have been met. Pt refused bath today, states that her daughter was going to clean her up. Peripheral IV #22 removed from her L hand, due to swelling. No other needs at this time. Call light within reach.

## 2019-11-29 NOTE — PROGRESS NOTES
Care Management Interventions  PCP Verified by CM: Yes(Dr. Altaf Wray)  Mode of Transport at Discharge: Self  Transition of Care Consult (CM Consult): Discharge Planning  Discharge Durable Medical Equipment: Yes(Rollator, shower chair)  Physical Therapy Consult: Yes  Occupational Therapy Consult: Yes  Speech Therapy Consult: No  Current Support Network: Family Lives [place], Lives with Caregiver(Patient has recently moved in with her daughter)  Confirm Follow Up Transport: Self  Plan discussed with Pt/Family/Caregiver: Yes  Freedom of Choice Offered: Yes  Discharge Location  Discharge Placement: Home    CM met patient in room, patient alert and orient able to verify demographics information to be correct. Patient stated she recently moved in with her daughter in a one story condo that has no steps to enter. DME: Rollator  O2: n/a  CPAP: n/a  HD: n/a  ADL: patient stated she is independent but no longer drives. Patient stated her daughter drives her to appointments and where she needs to go. Patient stated she has used [facility] in the past but cannot remember the name of the company and has used [employer] and was as [employer]. Patient stated she would like to return home at discharge but would be open to [facility] or [employer] at discharge if needed. PT/OT pending. CM will continue to follow patient during hospitalization for discharge planning and needs. Please consult CM for new needs.

## 2019-11-29 NOTE — PROGRESS NOTES
Spoke with Dr Fredo Dale regarding pt's home medication ( Ultram [facility]). Orders received for pt to use their own medication. Called to pharmacy and spoke with Omar Zhang to make him aware.

## 2019-11-29 NOTE — PROGRESS NOTES
Hourly rounds performed. All needs met. Bed is in low position and call light is within reach. Pt had no complaints during night. Will continue to monitor and report to oncoming nurse.

## 2019-11-30 LAB
BACTERIA SPEC CULT: NORMAL
GLUCOSE BLD STRIP.AUTO-MCNC: 223 MG/DL (ref 65–100)
GLUCOSE BLD STRIP.AUTO-MCNC: 244 MG/DL (ref 65–100)
GLUCOSE BLD STRIP.AUTO-MCNC: 275 MG/DL (ref 65–100)
GLUCOSE BLD STRIP.AUTO-MCNC: 76 MG/DL (ref 65–100)
LACTATE SERPL-SCNC: 1.5 MMOL/L (ref 0.4–2)
MM INDURATION POC: 0 MM (ref 0–5)
PPD POC: NEGATIVE NEGATIVE
SERVICE CMNT-IMP: NORMAL

## 2019-11-30 PROCEDURE — 77030040361 HC SLV COMPR DVT MDII -B

## 2019-11-30 PROCEDURE — 77030020263 HC SOL INJ SOD CL0.9% LFCR 1000ML

## 2019-11-30 PROCEDURE — 83605 ASSAY OF LACTIC ACID: CPT

## 2019-11-30 PROCEDURE — 65270000029 HC RM PRIVATE

## 2019-11-30 PROCEDURE — 94760 N-INVAS EAR/PLS OXIMETRY 1: CPT

## 2019-11-30 PROCEDURE — 94640 AIRWAY INHALATION TREATMENT: CPT

## 2019-11-30 PROCEDURE — 74011250636 HC RX REV CODE- 250/636: Performed by: INTERNAL MEDICINE

## 2019-11-30 PROCEDURE — 74011250637 HC RX REV CODE- 250/637: Performed by: INTERNAL MEDICINE

## 2019-11-30 PROCEDURE — 82962 GLUCOSE BLOOD TEST: CPT

## 2019-11-30 PROCEDURE — 74011636637 HC RX REV CODE- 636/637: Performed by: INTERNAL MEDICINE

## 2019-11-30 PROCEDURE — 36415 COLL VENOUS BLD VENIPUNCTURE: CPT

## 2019-11-30 PROCEDURE — 97165 OT EVAL LOW COMPLEX 30 MIN: CPT

## 2019-11-30 PROCEDURE — 74011000258 HC RX REV CODE- 258: Performed by: INTERNAL MEDICINE

## 2019-11-30 PROCEDURE — 77030038269 HC DRN EXT URIN PURWCK BARD -A

## 2019-11-30 PROCEDURE — 97535 SELF CARE MNGMENT TRAINING: CPT

## 2019-11-30 RX ADMIN — HYDRALAZINE HYDROCHLORIDE 25 MG: 25 TABLET, FILM COATED ORAL at 21:34

## 2019-11-30 RX ADMIN — MONTELUKAST SODIUM 10 MG: 10 TABLET, FILM COATED ORAL at 08:14

## 2019-11-30 RX ADMIN — PREGABALIN 50 MG: 50 CAPSULE ORAL at 08:13

## 2019-11-30 RX ADMIN — INSULIN LISPRO 6 UNITS: 100 INJECTION, SOLUTION INTRAVENOUS; SUBCUTANEOUS at 11:30

## 2019-11-30 RX ADMIN — LEVOTHYROXINE SODIUM 25 MCG: 50 TABLET ORAL at 06:01

## 2019-11-30 RX ADMIN — INSULIN LISPRO 4 UNITS: 100 INJECTION, SOLUTION INTRAVENOUS; SUBCUTANEOUS at 21:35

## 2019-11-30 RX ADMIN — ASPIRIN 81 MG: 81 TABLET ORAL at 08:13

## 2019-11-30 RX ADMIN — TRAMADOL HYDROCHLORIDE 300 MG: 300 CAPSULE ORAL at 09:00

## 2019-11-30 RX ADMIN — PREGABALIN 50 MG: 50 CAPSULE ORAL at 17:06

## 2019-11-30 RX ADMIN — INSULIN LISPRO 4 UNITS: 100 INJECTION, SOLUTION INTRAVENOUS; SUBCUTANEOUS at 17:06

## 2019-11-30 RX ADMIN — QUETIAPINE FUMARATE 25 MG: 25 TABLET ORAL at 21:34

## 2019-11-30 RX ADMIN — FENOFIBRATE 54 MG: 54 TABLET ORAL at 08:13

## 2019-11-30 RX ADMIN — FOLIC ACID 0.5 MG: 1 TABLET ORAL at 08:13

## 2019-11-30 RX ADMIN — Medication 10 ML: at 06:02

## 2019-11-30 RX ADMIN — HYDRALAZINE HYDROCHLORIDE 25 MG: 25 TABLET, FILM COATED ORAL at 08:13

## 2019-11-30 RX ADMIN — SODIUM CHLORIDE 100 ML/HR: 900 INJECTION, SOLUTION INTRAVENOUS at 08:25

## 2019-11-30 RX ADMIN — FOLIC ACID 0.5 MG: 1 TABLET ORAL at 17:06

## 2019-11-30 RX ADMIN — GLIPIZIDE 10 MG: 10 TABLET, FILM COATED, EXTENDED RELEASE ORAL at 08:13

## 2019-11-30 RX ADMIN — ATENOLOL 25 MG: 25 TABLET ORAL at 08:13

## 2019-11-30 RX ADMIN — TIOTROPIUM BROMIDE 18 MCG: 18 CAPSULE ORAL; RESPIRATORY (INHALATION) at 07:06

## 2019-11-30 RX ADMIN — HYDRALAZINE HYDROCHLORIDE 25 MG: 25 TABLET, FILM COATED ORAL at 17:06

## 2019-11-30 RX ADMIN — AMLODIPINE BESYLATE 10 MG: 10 TABLET ORAL at 08:13

## 2019-11-30 RX ADMIN — CEFTRIAXONE 1 G: 1 INJECTION, POWDER, FOR SOLUTION INTRAMUSCULAR; INTRAVENOUS at 15:16

## 2019-11-30 NOTE — PROGRESS NOTES
Hourly rounds performed. All needs met. Bed is in low position and call light is within reach. Pt had no complaints since receiving [place] for gas. Will continue to monitor and report to oncoming nurse.

## 2019-11-30 NOTE — PROGRESS NOTES
Hospitalist Progress Note     Admit Date:  2019 12:58 PM   Name:  Oni Clements   Age:  80 y.o.  :  1934   MRN:  [id number]   PCP:  Vic Best MD  Treatment Team: Attending Provider: Rosalva Sung MD; Care Manager: Kathi Ferrari RN; Utilization Review: Monique Bennett RN; Charge Nurse: Tonia Traylor    Subjective:     2019patient seen and evaluated. No adverse overnight events reported. Cultures with no growth to date. 2019- pt seen and evaluated. No adverse overnight events reported. 2019patient seen and evaluated. No adverse overnight events reported. Awaiting physical therapy eval    Objective:     Patient Vitals for the past 24 hrs:   Temp Pulse Resp BP SpO2   19 1448 98.2 °F (36.8 °C) 76 18 156/90 93 %   19 1106 97.8 °F (36.6 °C) 86 20 151/90 96 %   19 0707     95 %   19 0706 97.7 °F (36.5 °C) 73 20 167/85 94 %   19 0332 97.4 °F (36.3 °C) 81 18 157/64 93 %   19 2300 98 °F (36.7 °C) 87 18 153/78 92 %   19 1911 98.4 °F (36.9 °C) 86 18 155/69 95 %     Oxygen Therapy  O2 Sat (%): 93 % (19 1448)  Pulse via Oximetry: 85 beats per minute (19 0707)  O2 Device: Room air (19 0707)    Intake/Output Summary (Last 24 hours) at 2019 1720  Last data filed at 2019 1448  Gross per 24 hour   Intake 1220 ml   Output 2050 ml   Net -830 ml         General:    Well nourished. Alert. CV:   RRR. No murmur, rub, or gallop. Lungs:   CTAB. No wheezing, rhonchi, or rales. Abdomen:   Soft, nontender, nondistended. Extremities: Warm and dry. No cyanosis or edema. Skin:     No rashes or jaundice. Data Review:  I have reviewed all labs, meds, telemetry events, and studies from the last 24 hours.     Recent Results (from the past 24 hour(s))   LACTIC ACID    Collection Time: 19  6:03 PM   Result Value Ref Range    Lactic acid 2.5 (HH) 0.4 - 2.0 MMOL/L   GLUCOSE, POC    Collection Time: 11/29/19  8:54 PM   Result Value Ref Range    Glucose (POC) 186 (H) 65 - 100 mg/dL   LACTIC ACID    Collection Time: 11/30/19  5:34 AM   Result Value Ref Range    Lactic acid 1.5 0.4 - 2.0 MMOL/L   GLUCOSE, POC    Collection Time: 11/30/19  7:05 AM   Result Value Ref Range    Glucose (POC) 76 65 - 100 mg/dL   GLUCOSE, POC    Collection Time: 11/30/19 11:05 AM   Result Value Ref Range    Glucose (POC) 275 (H) 65 - 100 mg/dL   GLUCOSE, POC    Collection Time: 11/30/19  4:14 PM   Result Value Ref Range    Glucose (POC) 223 (H) 65 - 100 mg/dL        All Micro Results     Procedure Component Value Units Date/Time    CULTURE, URINE [[id number]] Collected:  11/27/19 1315    Order Status:  Completed Specimen:  Cath Urine Updated:  11/30/19 1410     Special Requests: NO SPECIAL REQUESTS        Culture result:       50,000-100,000 COLONIES/mL MIXED SKIN MACRINA ISOLATED          CULTURE, BLOOD [[id number]] Collected:  11/27/19 1217    Order Status:  Completed Specimen:  Blood Updated:  11/30/19 0649     Special Requests: --        LEFT  HAND       Culture result: NO GROWTH 3 DAYS       CULTURE, BLOOD [[id number]] Collected:  11/27/19 1458    Order Status:  Completed Specimen:  Blood Updated:  11/30/19 0649     Special Requests: --        RIGHT  Antecubital       Culture result: NO GROWTH 3 DAYS             Current Meds:  Current Facility-Administered Medications   Medication Dose Route Frequency    0.9% sodium chloride infusion  100 mL/hr IntraVENous CONTINUOUS    hydrALAZINE (APRESOLINE) tablet 25 mg  25 mg Oral TID    traMADol BP17 300 mg (Patient Supplied)  300 mg Oral DAILY    alum-mag hydroxide-simeth (MYLANTA) oral suspension 30 mL  30 mL Oral Q6H PRN    traMADol (ULTRAM) tablet 50 mg  50 mg Oral Q6H PRN    amLODIPine (NORVASC) tablet 10 mg  10 mg Oral DAILY    hydrALAZINE (APRESOLINE) 20 mg/mL injection 10 mg  10 mg IntraVENous Q6H PRN    sodium chloride (NS) flush 5-10 mL  5-10 mL IntraVENous PRN    cefTRIAXone (ROCEPHIN) 1 g in 0.9% sodium chloride (MBP/ADV) 50 mL  1 g IntraVENous Q24H    aspirin delayed-release tablet 81 mg  81 mg Oral 7am    atenolol (TENORMIN) tablet 25 mg  25 mg Oral DAILY    levothyroxine (SYNTHROID) tablet 25 mcg  25 mcg Oral ACB    montelukast (SINGULAIR) tablet 10 mg  10 mg Oral DAILY    pregabalin (LYRICA) capsule 50 mg  50 mg Oral BID    QUEtiapine (SEROquel) tablet 25 mg  25 mg Oral QHS    tiotropium (SPIRIVA) inhalation capsule 18 mcg  1 Cap Inhalation 7am    glipiZIDE SR (GLUCOTROL XL) tablet 10 mg  10 mg Oral DAILY    folic acid (FOLVITE) tablet 0.5 mg  0.5 mg Oral BID    fenofibrate (LOFIBRA) tablet 54 mg  54 mg Oral DAILY    docusate sodium (COLACE) capsule 100 mg  100 mg Oral BID PRN    albuterol (PROVENTIL VENTOLIN) nebulizer solution 2.5 mg  2.5 mg Nebulization Q6H PRN    insulin lispro (HUMALOG) injection   SubCUTAneous AC&HS       Other Studies (last 24 hours):  No results found.     Assessment and Plan:     Hospital Problems as of 11/30/2019 Date Reviewed: 8/28/2019          Codes Class Noted - Resolved POA    Sepsis ([facility] ICD-10-CM: A41.9  ICD-9-CM: 038.9, 995.91  11/27/2019 - Present Unknown        UTI (urinary tract infection) ICD-10-CM: N39.0  ICD-9-CM: 599.0  8/20/2019 - Present Unknown        Diabetes mellitus type 2, controlled ([facility] ICD-10-CM: E11.9  ICD-9-CM: 250.00  2/26/2018 - Present Unknown        Acute kidney injury ([facility] ICD-10-CM: N17.9  ICD-9-CM: 584.9  9/12/2017 - Present Unknown        Hypertension ICD-10-CM: I10  ICD-9-CM: 401.9  11/16/2014 - Present Unknown              PLAN:    · Sepsis resolved  · UTI continue antibiotics follow-up - cultures awaiting further results  · Diabetes monitor blood sugar and cover with insulin  · Acute kidney injury-  resolved  · Hypertension- continue appropriate home medications, add standing hydralazine continue as needed hydralazine  · Continue other appropriate home medications  · Further work-up and management based on her clinical course    DC planning/Dispo: Home with home health services or rehab evaluation-pending PT evaluation  DVT ppx: Heparin    Signed:  Darlen Pallas, MD     This note was created with the help of Dragon voice recognition software. Although the note was reviewed for mistakes and corrected were noted. Other mistakes may exist within the context of this note was secondary to the use of voice recognition software. *

## 2019-11-30 NOTE — PROGRESS NOTES
Hourly rounds completed this shift. All needs met. Bed low/locked. No c/o pain. Pt has been up to the chair with OT. Call light in reach. Will continue to monitor pt and give report to oncoming RN. Peripheral IV 11/27/19 Right Antecubital (Active)   Site Assessment Clean, dry, & intact 11/30/2019  2:58 PM   Phlebitis Assessment 0 11/30/2019  2:58 PM   Infiltration Assessment 0 11/30/2019  2:58 PM   Dressing Status Clean, dry, & intact 11/30/2019  2:58 PM   Dressing Type Transparent;Tape 11/30/2019  2:58 PM   Hub Color/Line Status Pink; Infusing;Patent 11/30/2019  2:58 PM   Alcohol Cap Used No 11/30/2019 11:06 AM

## 2019-11-30 NOTE — PROGRESS NOTES
Problem: Self Care Deficits Care Plan (Adult)  Goal: *Acute Goals and Plan of Care (Insert Text)  Description  1. Pt will toilet with SBA   2. Pt will complete functional mobility for ADLs with SBA  3. Pt will complete lower body dressing with SBA using AE as needed  4. Pt will complete grooming and hygiene at sink with SBA  5. Pt will demonstrate independence with HEP to promote increased BUE strength and functional use for ADLs  6. Pt will tolerate 23 minutes functional activity with min or fewer rest breaks to promote increased endurance for ADLs  7. Pt will complete bed mobility with SBA in prep for ADLs  8. Pt will independently demonstrate/ verbalize 2+ fall prevention techniques to promote > safety for ADLs    Timeframe: 7 days     Outcome: Progressing Towards Goal     OCCUPATIONAL THERAPY: Initial Assessment and Daily Note 11/30/2019  INPATIENT: OT Visit Days: 1  Payor: Sofia Colon / Plan: [address] [place] [address] HMO / Product Type: Managed Care Medicare /      NAME/AGE/GENDER: Deena Gaffney is a 80 y.o. female   PRIMARY DIAGNOSIS:  UTI (urinary tract infection) [N39.0]  Sepsis ([facility] [address] [facility] [A41.9] <principal problem not specified> <principal problem not specified>        ICD-10: Treatment Diagnosis:    Generalized Muscle Weakness (M62.81)   Precautions/Allergies:    falls Aleve [naproxen sodium]; Hydrocodone-acetaminophen; Metformin; Phenobarbital; and Statins-hmg-coa reductase inhibitors      ASSESSMENT:     Ms. Dagoberto De Paz was admitted with UTI, sepsis. Pt lives with her daughter who is able to provide 24 hr support, recently moved in with her within the last month. Pt is independent with ADLs, uses a rollator for mobility. Pt endorses several recent falls, also stated that her rollator does not lock. This session, pt presented with decreased mobility, balance, and endurance impacting ADLs. Pt demonstrated ADLs with CGA, mobility for ADLs with CGA using RW.  Pt educated on fall prevention techniques including safe use of RW, hand placement, positioning, and functional approaches during ADLs and mobility for ADLs. Pt would benefit from skilled OT services to address deficits, recommend d/c home with home health. This section established at most recent assessment   PROBLEM LIST (Impairments causing functional limitations):  Decreased Strength  Decreased ADL/Functional Activities  Decreased Transfer Abilities  Decreased Balance  Decreased Activity Tolerance   INTERVENTIONS PLANNED: (Benefits and precautions of occupational therapy have been discussed with the patient.)  Activities of daily living training  Adaptive equipment training  Balance training  Therapeutic activity  Therapeutic exercise     TREATMENT PLAN: Frequency/Duration: Follow patient 3 times/ week to address above goals. Rehabilitation Potential For Stated Goals: Good     REHAB RECOMMENDATIONS (at time of discharge pending progress):    Placement: It is my opinion, based on this patient's performance to date, that Ms. Ajay Lawrence may benefit from d/c home with home health. Equipment:   None at this time              OCCUPATIONAL PROFILE AND HISTORY:   History of Present Injury/Illness (Reason for Referral):  See H&P  Past Medical History/Comorbidities:   Ms. Ajay Lawrence  has a past medical history of Asthma, Burning with urination, Cellulitis, Charcot foot due to diabetes mellitus ([address] Charcot-Evy-Tooth disease-like deformity of foot, Diabetes ([address] Erosive osteoarthritis, Fibromyalgia, Full dentures, GERD (gastroesophageal reflux disease), High cholesterol, Hypertension, Incontinence of urine in female, Neuropathy, and Sleep apnea.   Ms. Ajay Lawrence  has a past surgical history that includes pr breast surgery procedure unlisted; hx cholecystectomy; hx orthopaedic; hx colonoscopy; vascular surgery procedure unlist (Left, 09/29/2017); pr chest surgery procedure unlisted; hx knee replacement (Bilateral); hx hysterectomy; and hx cataract removal (Bilateral).   Social History/Living Environment:   Home Environment: Private residence  Wheelchair Ramp: Yes  One/Two Story Residence: One story  Living Alone: No  Support Systems: Child(don)  Patient Expects to be Discharged to[de-identified] Private residence  Current DME Used/Available at Home: Elwanda Namita, rollator, Walker, Safety frame toliet, Shower chair(grab bars to be installed soon)  Tub or Shower Type: Tub/Shower combination  Prior Level of Function/Work/Activity:  See assessment     Number of Personal Factors/Comorbidities that affect the Plan of Care: Expanded review of therapy/medical records (1-2):  MODERATE COMPLEXITY   ASSESSMENT OF OCCUPATIONAL PERFORMANCE[de-identified]   Activities of Daily Living:   Basic ADLs (From Assessment) Complex ADLs (From Assessment)   Feeding: Independent  Oral Facial Hygiene/Grooming: Contact guard assistance  Bathing: Contact guard assistance  Upper Body Dressing: Setup  Lower Body Dressing: Contact guard assistance  Toileting: Contact guard assistance Instrumental ADL  Meal Preparation: Maximum assistance  Homemaking: Maximum assistance   Grooming/Bathing/Dressing Activities of Daily Living   Grooming  Grooming Assistance: Contact guard assistance             Toileting  Toileting Assistance: Contact guard assistance     Functional Transfers  Bathroom Mobility: Contact guard assistance  Toilet Transfer : Contact guard assistance     Bed/Mat Mobility  Supine to Sit: Stand-by assistance  Sit to Stand: Contact guard assistance  Stand to Sit: Contact guard assistance  Bed to Chair: Contact guard assistance     Most Recent Physical Functioning:   Gross Assessment:  AROM: Within functional limits  Strength: Generally decreased, functional  Coordination: Within functional limits               Posture:     Balance:  Sitting: Intact  Standing: Impaired  Standing - Static: Fair  Standing - Dynamic : Fair Bed Mobility:  Supine to Sit: Stand-by assistance  Wheelchair Mobility:     Transfers:  Sit to Stand: Contact guard assistance  Stand to Sit: Contact guard assistance  Bed to Chair: Contact guard assistance            Patient Vitals for the past 6 hrs:   BP BP Patient Position SpO2 Pulse   19 0706 167/85 At rest 94 % 73   19 0707 -- -- 95 % --       Mental Status  Neurologic State: Alert, Appropriate for age  Orientation Level: Appropriate for age  Cognition: Follows commands, Appropriate decision making                          Physical Skills Involved:  Balance  Strength  Activity Tolerance Cognitive Skills Affected (resulting in the inability to perform in a timely and safe manner):  none  Psychosocial Skills Affected:  Habits/Routines  Environmental Adaptation   Number of elements that affect the Plan of Care: 3-5:  MODERATE COMPLEXITY   CLINICAL DECISION MAKIN[address] [place] [id number] AM-PAC 6 Clicks   Daily Activity Inpatient Short Form  How much help from another person does the patient currently need. .. Total A Lot A Little None   1. Putting on and taking off regular lower body clothing? [] 1   [] 2   [x] 3   [] 4   2. Bathing (including washing, rinsing, drying)? [] 1   [] 2   [x] 3   [] 4   3. Toileting, which includes using toilet, bedpan or urinal?   [] 1   [] 2   [x] 3   [] 4   4. Putting on and taking off regular upper body clothing? [] 1   [] 2   [] 3   [x] 4   5. Taking care of personal grooming such as brushing teeth? [] 1   [] 2   [] 3   [x] 4   6. Eating meals? [] 1   [] 2   [] 3   [x] 4   © , Trustees of [address] [place] [id number], under license to [employer]. All rights reserved      Score:  Initial: 21 Most Recent: X (Date: -- )    Interpretation of Tool:  Represents activities that are increasingly more difficult (i.e. Bed mobility, Transfers, Gait). Medical Necessity:     Patient demonstrates   good   rehab potential due to higher previous functional level.   Reason for Services/Other Comments:  Patient   continues to require present interventions due to patient's inability to complete ADLs at baseline level of function    . Use of outcome tool(s) and clinical judgement create a POC that gives a: LOW COMPLEXITY         TREATMENT:   (In addition to Assessment/Re-Assessment sessions the following treatments were rendered)     Pre-treatment Symptoms/Complaints:    Pain: Initial:   Pain Intensity 1: 0  Post Session:  0     Self Care: (8 min): Procedure(s) (per grid) utilized to improve and/or restore self-care/home management as related to toileting and grooming. Required minimal   cueing to facilitate activities of daily living skills and compensatory activities. Braces/Orthotics/Lines/Etc:   O2 Device: Room air  Treatment/Session Assessment:    Response to Treatment:  no adverse reaction   Interdisciplinary Collaboration:   Occupational Therapist  Registered Nurse  After treatment position/precautions:   Up in chair  Bed/Chair-wheels locked  Call light within reach  RN notified   Compliance with Program/Exercises: Compliant all of the time. Recommendations/Intent for next treatment session: \"Next visit will focus on advancements to more challenging activities and reduction in assistance provided\".   Total Treatment Duration:  OT Patient Time In/Time Out  Time In: 0916  Time Out: Cortez Lamb OT

## 2019-11-30 NOTE — PROGRESS NOTES
Nutrition  Reason for assessment: Referral received from nursing admission Malnutrition Screening Tool   Recently Lost Weight Without Trying: Yes  If Yes, How Much Weight Loss: Unsure  Eating Poorly Due to Decreased Appetite: No    Assessment:   Diet: DIET CARDIAC Regular; Consistent Carb 1800kcal  DIET NUTRITIONAL SUPPLEMENTS All Meals; Ensure Enlive    Food/Nutrition Patient History:  Pt seen in company of her daughter at bedside. Pt reports eating 100% of breakfast + ensure enlive without difficulty this morning. Pt endorses a 20 pound weight loss over 1 month. Per pt, she moved in with her older daughter 1 month ago. She was previously living alone in an apartment complex. Pt states she has had early satiety and is overall eating less at meals. She also mentions that her older daughter is disabled and they often order take out for meals. She reports no barriers to intake with exception of early satiety. Pt reports low BS this morning and states that she does not check blood sugars at home as she does not know how to use her meter. Pt admitted with sepsis secondary to UTI. H/O DM, HTN. Labs: A1C 8.3 (8/21/19)  Anthropometrics: ,  Weight: 82.7 kg (182 lb 4.8 oz), Weight Source: Bed, Body mass index is 31.29 kg/m². BMI class of overweight for age >65 years. WT / BMI 11/30/2019 11/27/2019 11/27/2019 11/27/2019 9/11/2019   WEIGHT 182 lb 4.8 oz  175 lb  196 lb     WT / BMI 8/28/2019 8/19/2019 6/4/2019 5/22/2019 5/8/2019   WEIGHT 199 lb 9.6 oz 193 lb 220 lb 202 lb 202 lb     WT / BMI 4/11/2019 3/7/2019 2/7/2019 12/13/2018 11/8/2018   WEIGHT 202 lb 6.4 oz 194 lb 198 lb 199 lb 200 lb   Per weights listed in EMR, potential for a 38 pound, 17.2% weight loss within 6 months.   Macronutrient needs:(using CBW (Current body weight) 82.7 kg)  EER:  4637-9308 kcal /day (15-20 kcal/kg Current BW)  EPR:  65-82 grams protein/day (1.2-1.5 grams/kg IBW)(GFR 51) - DANIELLA  Intake/Comparative Standards:Average intake for past 3 day(s)/3 recorded meal(s): 63%. This potentially meets ~80-90% of kcal and ~80-90% of protein needs    Nutrition Diagnosis: Inadequate oral intake r/t decreased ability to consume sufficient oral intake as evidenced by report of early satiety and weight loss noted above. Intervention:  Meals and snacks: Continue current diet. Nutrition Supplement Therapy: d/c ensure enlive. Add ensure high protein TID. Pt needs assistance with blood sugar monitoring at home. Will consult CDEs. Discharge nutrition plan: Suggested use of ONS at home.      Alka Squires [address], [address] [place] [address], [address] [place] [address], [address] [place] [address] Ave.

## 2019-11-30 NOTE — PROGRESS NOTES
Doctor notified of pt complaint of gas and her request for medication to help with her issue. Orders were received to give pt Mylanta PRN. Will continue to monitor pt.

## 2019-12-01 ENCOUNTER — APPOINTMENT (OUTPATIENT)
Dept: ULTRASOUND IMAGING | Age: 84
DRG: 871 | End: 2019-12-01
Attending: INTERNAL MEDICINE
Payer: MEDICARE

## 2019-12-01 ENCOUNTER — APPOINTMENT (OUTPATIENT)
Dept: CT IMAGING | Age: 84
DRG: 871 | End: 2019-12-01
Attending: FAMILY MEDICINE
Payer: MEDICARE

## 2019-12-01 ENCOUNTER — APPOINTMENT (OUTPATIENT)
Dept: MRI IMAGING | Age: 84
DRG: 871 | End: 2019-12-01
Attending: INTERNAL MEDICINE
Payer: MEDICARE

## 2019-12-01 LAB
AMPHET UR QL SCN: NEGATIVE
ANION GAP SERPL CALC-SCNC: 10 MMOL/L (ref 7–16)
ANION GAP SERPL CALC-SCNC: 11 MMOL/L (ref 7–16)
ARTERIAL PATENCY WRIST A: ABNORMAL
BARBITURATES UR QL SCN: NEGATIVE
BASE DEFICIT BLD-SCNC: 3 MMOL/L
BASOPHILS # BLD: 0.1 K/UL (ref 0–0.2)
BASOPHILS NFR BLD: 1 % (ref 0–2)
BDY SITE: ABNORMAL
BENZODIAZ UR QL: NEGATIVE
BODY TEMPERATURE: 98.6
BUN SERPL-MCNC: 28 MG/DL (ref 8–23)
BUN SERPL-MCNC: 29 MG/DL (ref 8–23)
CALCIUM SERPL-MCNC: 8.7 MG/DL (ref 8.3–10.4)
CALCIUM SERPL-MCNC: 9.1 MG/DL (ref 8.3–10.4)
CANNABINOIDS UR QL SCN: NEGATIVE
CHLORIDE SERPL-SCNC: 104 MMOL/L (ref 98–107)
CHLORIDE SERPL-SCNC: 106 MMOL/L (ref 98–107)
CK MB CFR SERPL CALC: 7.4 %
CK MB SERPL-MCNC: 2 NG/ML (ref 0.5–3.6)
CK SERPL-CCNC: 27 U/L (ref 21–215)
CO2 BLD-SCNC: 25 MMOL/L
CO2 SERPL-SCNC: 21 MMOL/L (ref 21–32)
CO2 SERPL-SCNC: 23 MMOL/L (ref 21–32)
COCAINE UR QL SCN: NEGATIVE
COLLECT TIME,HTIME: 240
CREAT SERPL-MCNC: 1.23 MG/DL (ref 0.6–1)
CREAT SERPL-MCNC: 1.27 MG/DL (ref 0.6–1)
DIFFERENTIAL METHOD BLD: ABNORMAL
EOSINOPHIL # BLD: 0.4 K/UL (ref 0–0.8)
EOSINOPHIL NFR BLD: 4 % (ref 0.5–7.8)
ERYTHROCYTE [DISTWIDTH] IN BLOOD BY AUTOMATED COUNT: 12.5 % (ref 11.9–14.6)
FLOW RATE ISTAT,IFRATE: 15 L/MIN
GAS FLOW.O2 O2 DELIVERY SYS: ABNORMAL L/MIN
GLUCOSE BLD STRIP.AUTO-MCNC: 140 MG/DL (ref 65–100)
GLUCOSE BLD STRIP.AUTO-MCNC: 145 MG/DL (ref 65–100)
GLUCOSE BLD STRIP.AUTO-MCNC: 151 MG/DL (ref 65–100)
GLUCOSE BLD STRIP.AUTO-MCNC: 154 MG/DL (ref 65–100)
GLUCOSE BLD STRIP.AUTO-MCNC: 213 MG/DL (ref 65–100)
GLUCOSE BLD STRIP.AUTO-MCNC: 99 MG/DL (ref 65–100)
GLUCOSE SERPL-MCNC: 133 MG/DL (ref 65–100)
GLUCOSE SERPL-MCNC: 149 MG/DL (ref 65–100)
HCO3 BLD-SCNC: 23.9 MMOL/L (ref 22–26)
HCT VFR BLD AUTO: 38.4 % (ref 35.8–46.3)
HGB BLD-MCNC: 12.5 G/DL (ref 11.7–15.4)
IMM GRANULOCYTES # BLD AUTO: 0.1 K/UL (ref 0–0.5)
IMM GRANULOCYTES NFR BLD AUTO: 1 % (ref 0–5)
LYMPHOCYTES # BLD: 2.2 K/UL (ref 0.5–4.6)
LYMPHOCYTES NFR BLD: 21 % (ref 13–44)
MAGNESIUM SERPL-MCNC: 1.9 MG/DL (ref 1.8–2.4)
MCH RBC QN AUTO: 31.9 PG (ref 26.1–32.9)
MCHC RBC AUTO-ENTMCNC: 32.6 G/DL (ref 31.4–35)
MCV RBC AUTO: 98 FL (ref 79.6–97.8)
METHADONE UR QL: NEGATIVE
MM INDURATION POC: 0 MM (ref 0–5)
MONOCYTES # BLD: 1 K/UL (ref 0.1–1.3)
MONOCYTES NFR BLD: 9 % (ref 4–12)
NEUTS SEG # BLD: 6.7 K/UL (ref 1.7–8.2)
NEUTS SEG NFR BLD: 65 % (ref 43–78)
NRBC # BLD: 0 K/UL (ref 0–0.2)
OPIATES UR QL: NEGATIVE
PCO2 BLD: 46.8 MMHG (ref 35–45)
PCP UR QL: NEGATIVE
PH BLD: 7.32 [PH] (ref 7.35–7.45)
PHOSPHATE SERPL-MCNC: 3.4 MG/DL (ref 2.3–3.7)
PLATELET # BLD AUTO: 253 K/UL (ref 150–450)
PMV BLD AUTO: 8.5 FL (ref 9.4–12.3)
PO2 BLD: 364 MMHG (ref 75–100)
POTASSIUM SERPL-SCNC: 4 MMOL/L (ref 3.5–5.1)
POTASSIUM SERPL-SCNC: 4.7 MMOL/L (ref 3.5–5.1)
PPD POC: NEGATIVE NEGATIVE
RBC # BLD AUTO: 3.92 M/UL (ref 4.05–5.2)
SAO2 % BLD: 100 % (ref 95–98)
SERVICE CMNT-IMP: 1
SERVICE CMNT-IMP: ABNORMAL
SODIUM SERPL-SCNC: 137 MMOL/L (ref 136–145)
SODIUM SERPL-SCNC: 138 MMOL/L (ref 136–145)
SPECIMEN TYPE: ABNORMAL
TROPONIN I SERPL-MCNC: 0.03 NG/ML (ref 0.02–0.05)
WBC # BLD AUTO: 10.3 K/UL (ref 4.3–11.1)

## 2019-12-01 PROCEDURE — 83735 ASSAY OF MAGNESIUM: CPT

## 2019-12-01 PROCEDURE — 85025 COMPLETE CBC W/AUTO DIFF WBC: CPT

## 2019-12-01 PROCEDURE — 82803 BLOOD GASES ANY COMBINATION: CPT

## 2019-12-01 PROCEDURE — 74011250636 HC RX REV CODE- 250/636: Performed by: INTERNAL MEDICINE

## 2019-12-01 PROCEDURE — 95816 EEG AWAKE AND DROWSY: CPT | Performed by: FAMILY MEDICINE

## 2019-12-01 PROCEDURE — 70450 CT HEAD/BRAIN W/O DYE: CPT

## 2019-12-01 PROCEDURE — 70551 MRI BRAIN STEM W/O DYE: CPT

## 2019-12-01 PROCEDURE — 36415 COLL VENOUS BLD VENIPUNCTURE: CPT

## 2019-12-01 PROCEDURE — 74011636637 HC RX REV CODE- 636/637: Performed by: INTERNAL MEDICINE

## 2019-12-01 PROCEDURE — 36600 WITHDRAWAL OF ARTERIAL BLOOD: CPT

## 2019-12-01 PROCEDURE — 84484 ASSAY OF TROPONIN QUANT: CPT

## 2019-12-01 PROCEDURE — 65270000029 HC RM PRIVATE

## 2019-12-01 PROCEDURE — 74011250637 HC RX REV CODE- 250/637: Performed by: INTERNAL MEDICINE

## 2019-12-01 PROCEDURE — 82550 ASSAY OF CK (CPK): CPT

## 2019-12-01 PROCEDURE — 94760 N-INVAS EAR/PLS OXIMETRY 1: CPT

## 2019-12-01 PROCEDURE — 84100 ASSAY OF PHOSPHORUS: CPT

## 2019-12-01 PROCEDURE — 82962 GLUCOSE BLOOD TEST: CPT

## 2019-12-01 PROCEDURE — 80048 BASIC METABOLIC PNL TOTAL CA: CPT

## 2019-12-01 PROCEDURE — 74011000258 HC RX REV CODE- 258: Performed by: INTERNAL MEDICINE

## 2019-12-01 PROCEDURE — 80307 DRUG TEST PRSMV CHEM ANLYZR: CPT

## 2019-12-01 PROCEDURE — 93971 EXTREMITY STUDY: CPT

## 2019-12-01 PROCEDURE — 82553 CREATINE MB FRACTION: CPT

## 2019-12-01 PROCEDURE — 77010033678 HC OXYGEN DAILY

## 2019-12-01 RX ADMIN — TRAMADOL HYDROCHLORIDE 50 MG: 50 TABLET, FILM COATED ORAL at 22:27

## 2019-12-01 RX ADMIN — PREGABALIN 50 MG: 50 CAPSULE ORAL at 17:16

## 2019-12-01 RX ADMIN — HYDRALAZINE HYDROCHLORIDE 25 MG: 25 TABLET, FILM COATED ORAL at 15:32

## 2019-12-01 RX ADMIN — CEFTRIAXONE 1 G: 1 INJECTION, POWDER, FOR SOLUTION INTRAMUSCULAR; INTRAVENOUS at 15:32

## 2019-12-01 RX ADMIN — Medication 10 ML: at 21:21

## 2019-12-01 RX ADMIN — INSULIN LISPRO 4 UNITS: 100 INJECTION, SOLUTION INTRAVENOUS; SUBCUTANEOUS at 21:33

## 2019-12-01 RX ADMIN — QUETIAPINE FUMARATE 25 MG: 25 TABLET ORAL at 21:19

## 2019-12-01 RX ADMIN — INSULIN LISPRO 2 UNITS: 100 INJECTION, SOLUTION INTRAVENOUS; SUBCUTANEOUS at 17:15

## 2019-12-01 RX ADMIN — TRAMADOL HYDROCHLORIDE 50 MG: 50 TABLET, FILM COATED ORAL at 15:32

## 2019-12-01 RX ADMIN — HYDRALAZINE HYDROCHLORIDE 25 MG: 25 TABLET, FILM COATED ORAL at 21:19

## 2019-12-01 RX ADMIN — FOLIC ACID 0.5 MG: 1 TABLET ORAL at 17:15

## 2019-12-01 NOTE — PROGRESS NOTES
Hourly rounds performed. All needs met. Bed is in low position. Pt has not change since last neuro check. Pt only responds to painful stimuli. Vitals are stabled. Pt has been NPO, unable to perform dysphagia test. Will continue to monitor and report to oncoming nurse.

## 2019-12-01 NOTE — PROGRESS NOTES
This RN in to round on pt. Pt is awake, attempting to get OOB to go to restroom. Alert/oriented x2. Pt states she does not remember what happened last night. She does not report any confusion or pain. Current NIH score is a 2. VSS.

## 2019-12-01 NOTE — PROGRESS NOTES
Date of Outreach Update:  Lelia Gill was seen and assessed. MEWS Score: 1 (12/01/19 0801)  Vitals:    12/01/19 0243 12/01/19 0444 12/01/19 0534 12/01/19 0801   BP: 130/70 134/84  150/80   Pulse: 81 76  74   Resp:  16  18   Temp:  97.2 °F (36.2 °C)  97.4 °F (36.3 °C)   SpO2: 100% 98%  96%   Weight:   85.1 kg (187 lb 9.6 oz)    Height:   5' 4\" (1.626 m)          Pain Assessment  Pain Intensity 1: 0 (11/30/19 0900)  Pain Location 1: Back  Pain Intervention(s) 1: Medication (see MAR)  Patient Stated Pain Goal: 0      Previous Outreach assessment has been reviewed. There have been no significant clinical changes since the completion of the last dated Outreach assessment. Patient wakes to voice. Oriented x2. Moves all extremities purposefully. Denies numbness or tingling. PERRL. VS, labs, and progress notes reviewed. Will continue to follow up per outreach protocol.     Signed By:   Cynthia Cross RN    December 1, 2019 10:41 AM

## 2019-12-01 NOTE — PROGRESS NOTES
Hourly rounds completed this shift. All needs met. Bed low/locked. Daughter at bedside. Pt has remained alert/oriented x2 throughout the day. IV to Right AC removed due to duplex showing nonocclusive thrombus. Pt tolerating diabetic reg diet well. Call light in reach. Will continue to monitor pt and give report to oncoming RN.      Peripheral IV 12/01/19 Anterior;Distal;Left Forearm (Active)   Site Assessment Clean, dry, & intact 12/1/2019  2:50 PM   Phlebitis Assessment 0 12/1/2019  2:50 PM   Infiltration Assessment 0 12/1/2019  2:50 PM   Dressing Status Clean, dry, & intact 12/1/2019  2:50 PM   Dressing Type Transparent;Tape 12/1/2019  2:50 PM   Hub Color/Line Status Blue;Flushed;Patent 12/1/2019  2:50 PM

## 2019-12-01 NOTE — PROGRESS NOTES
Hospitalist Progress Note     Admit Date:  2019 12:58 PM   Name:  Altagracia Hutchins   Age:  80 y.o.  :  1934   MRN:  [id number]   PCP:  Pita Camp MD  Treatment Team: Attending Provider: Romina Montes De Oca MD; Care Manager: Dimitri Burnette RN; Utilization Review: Carey Larson RN; Consulting Provider: Andressa Rmairez MD    Subjective: In summary Ms. Wyatt Ford is a 61-year-old female admitted on 2019 for suspected sepsis secondary to UTI. Her sepsis resolved over several days. She was medically stable with plans to discharge to home after being evaluated by physical therapy. Overnight unfortunately the patient went to the bathroom and apparently was found unresponsive on the toilet. All vital signs were stable. A code asked was called and CT of the head was normal.  There was concern for seizures. And the patient was apparently unresponsive until about 7 AM this morning per reports from staff at which point she appeared completely back to her baseline. 2019at this time she is extremely anxious and tearful she does not recall what happened. MRI brain pending EEG pending. She is soaked in urine. She has baseline bilateral lower extremity per history from her and her family on admission.       Objective:     Patient Vitals for the past 24 hrs:   Temp Pulse Resp BP SpO2   19 1125 97.7 °F (36.5 °C) 78 18 146/74 96 %   19 0801 97.4 °F (36.3 °C) 74 18 150/80 96 %   19 0444 97.2 °F (36.2 °C) 76 16 134/84 98 %   19 0243  81  130/70 100 %   19 0241  83  116/75 100 %   19 0012 98.1 °F (36.7 °C) 72 18 130/77 94 %   19 1949 98.1 °F (36.7 °C) 80 18 134/75 93 %   19 1448 98.2 °F (36.8 °C) 76 18 156/90 93 %     Oxygen Therapy  O2 Sat (%): 96 % (19 1125)  Pulse via Oximetry: 85 beats per minute (19)  O2 Device: Room air (19)    Intake/Output Summary (Last 24 hours) at 2019 [address] filed at 11/30/2019 1448  Gross per 24 hour   Intake 500 ml   Output 250 ml   Net 250 ml         General:    Well nourished. Alert. Shaky anxious; covered in urine  CV:   RRR. No murmur, rub, or gallop. Lungs:   CTAB. No wheezing, rhonchi, or rales. Abdomen:   Soft, nontender, nondistended. Extremities: Warm and dry. Swollen right upper extremity   Otherwise no cyanosis or edema. Skin:     No rashes or jaundice. Data Review:  I have reviewed all labs, meds, telemetry events, and studies from the last 24 hours. Recent Results (from the past 24 hour(s))   GLUCOSE, POC    Collection Time: 11/30/19  4:14 PM   Result Value Ref Range    Glucose (POC) 223 (H) 65 - 100 mg/dL   GLUCOSE, POC    Collection Time: 11/30/19  9:19 PM   Result Value Ref Range    Glucose (POC) 244 (H) 65 - 100 mg/dL   POC G3    Collection Time: 12/01/19  2:46 AM   Result Value Ref Range    Device: Non rebreather      pH (POC) 7.317 (L) 7.35 - 7.45      pCO2 (POC) 46.8 (H) 35 - 45 MMHG    pO2 (POC) 364 (H) 75 - 100 MMHG    HCO3 (POC) 23.9 22 - 26 MMOL/L    sO2 (POC) 100 (H) 95 - 98 %    Base deficit (POC) 3 mmol/L    Allens test (POC) NOT APPLICABLE      Site LEFT BRACHIAL      Patient temp.  98.6      Specimen type (POC) ARTERIAL      Performed by [facility]     CO2, POC 25 MMOL/L    Flow rate (POC) 15.000 L/min    Respiratory comment: 1      COLLECT TIME 152     METABOLIC PANEL, BASIC    Collection Time: 12/01/19  3:25 AM   Result Value Ref Range    Sodium 137 136 - 145 mmol/L    Potassium 4.0 3.5 - 5.1 mmol/L    Chloride 106 98 - 107 mmol/L    CO2 21 21 - 32 mmol/L    Anion gap 10 7 - 16 mmol/L    Glucose 133 (H) 65 - 100 mg/dL    BUN 28 (H) 8 - 23 MG/DL    Creatinine 1.27 (H) 0.6 - 1.0 MG/DL    GFR est AA 51 (L) >60 ml/min/1.73m2    GFR est non-AA 43 (L) >60 ml/min/1.73m2    Calcium 8.7 8.3 - 10.4 MG/DL   CBC WITH AUTOMATED DIFF    Collection Time: 12/01/19  3:25 AM   Result Value Ref Range    WBC 10.3 4.3 - 11.1 K/uL    RBC 3.92 (L) 4.05 - 5.2 M/uL    HGB 12.5 11.7 - 15.4 g/dL    HCT 38.4 35.8 - 46.3 %    MCV 98.0 (H) 79.6 - 97.8 FL    MCH 31.9 26.1 - 32.9 PG    MCHC 32.6 31.4 - 35.0 g/dL    RDW 12.5 11.9 - 14.6 %    PLATELET 829 362 - 584 K/uL    MPV 8.5 (L) 9.4 - 12.3 FL    ABSOLUTE NRBC 0.00 0.0 - 0.2 K/uL    DF AUTOMATED      NEUTROPHILS 65 43 - 78 %    LYMPHOCYTES 21 13 - 44 %    MONOCYTES 9 4.0 - 12.0 %    EOSINOPHILS 4 0.5 - 7.8 %    BASOPHILS 1 0.0 - 2.0 %    IMMATURE GRANULOCYTES 1 0.0 - 5.0 %    ABS. NEUTROPHILS 6.7 1.7 - 8.2 K/UL    ABS. LYMPHOCYTES 2.2 0.5 - 4.6 K/UL    ABS. MONOCYTES 1.0 0.1 - 1.3 K/UL    ABS. EOSINOPHILS 0.4 0.0 - 0.8 K/UL    ABS. BASOPHILS 0.1 0.0 - 0.2 K/UL    ABS. IMM.  GRANS. 0.1 0.0 - 0.5 K/UL   TROPONIN I    Collection Time: 12/01/19  3:25 AM   Result Value Ref Range    Troponin-I, Qt. 0.03 0.02 - 0.05 NG/ML   CK-MB,QT    Collection Time: 12/01/19  3:25 AM   Result Value Ref Range    CK - MB  0.5 - 3.6 ng/ml     CORRECTION TO MEDICAL RECORD-DISREGARD THESE TEST RESULTS    CK-MB Index PENDING %   GLUCOSE, POC    Collection Time: 12/01/19  5:28 AM   Result Value Ref Range    Glucose (POC) 145 (H) 65 - 209 mg/dL   METABOLIC PANEL, BASIC    Collection Time: 12/01/19  5:38 AM   Result Value Ref Range    Sodium 138 136 - 145 mmol/L    Potassium 4.7 3.5 - 5.1 mmol/L    Chloride 104 98 - 107 mmol/L    CO2 23 21 - 32 mmol/L    Anion gap 11 7 - 16 mmol/L    Glucose 149 (H) 65 - 100 mg/dL    BUN 29 (H) 8 - 23 MG/DL    Creatinine 1.23 (H) 0.6 - 1.0 MG/DL    GFR est AA 53 (L) >60 ml/min/1.73m2    GFR est non-AA 44 (L) >60 ml/min/1.73m2    Calcium 9.1 8.3 - 10.4 MG/DL   CK-MB,QT    Collection Time: 12/01/19  7:25 AM   Result Value Ref Range    CK - MB 2.0 0.5 - 3.6 ng/ml    CK-MB Index 7.4 (H) <2.5 %   CK    Collection Time: 12/01/19  7:25 AM   Result Value Ref Range    CK 27 21 - 215 U/L   GLUCOSE, POC    Collection Time: 12/01/19  7:43 AM   Result Value Ref Range    Glucose (POC) 140 (H) 65 - 100 mg/dL   GLUCOSE, POC    Collection Time: 12/01/19  7:58 AM   Result Value Ref Range    Glucose (POC) 151 (H) 65 - 100 mg/dL   DRUG SCREEN, URINE    Collection Time: 12/01/19  9:58 AM   Result Value Ref Range    PCP(PHENCYCLIDINE) NEGATIVE       BENZODIAZEPINES NEGATIVE       COCAINE NEGATIVE       AMPHETAMINES NEGATIVE       METHADONE NEGATIVE       THC (TH-CANNABINOL) NEGATIVE       OPIATES NEGATIVE       BARBITURATES NEGATIVE      GLUCOSE, POC    Collection Time: 12/01/19 11:24 AM   Result Value Ref Range    Glucose (POC) 99 65 - 100 mg/dL        All Micro Results     Procedure Component Value Units Date/Time    CULTURE, BLOOD [[id number]] Collected:  11/27/19 1217    Order Status:  Completed Specimen:  Blood Updated:  12/01/19 0612     Special Requests: --        LEFT  HAND       Culture result: NO GROWTH 4 DAYS       CULTURE, BLOOD [[id number]] Collected:  11/27/19 1458    Order Status:  Completed Specimen:  Blood Updated:  12/01/19 0612     Special Requests: --        RIGHT  Antecubital       Culture result: NO GROWTH 4 DAYS       CULTURE, URINE [[id number]] Collected:  11/27/19 1315    Order Status:  Completed Specimen:  Cath Urine Updated:  11/30/19 1410     Special Requests: NO SPECIAL REQUESTS        Culture result:       50,000-100,000 COLONIES/mL MIXED SKIN MACRINA ISOLATED                Current Meds:  Current Facility-Administered Medications   Medication Dose Route Frequency    0.9% sodium chloride infusion  100 mL/hr IntraVENous CONTINUOUS    hydrALAZINE (APRESOLINE) tablet 25 mg  25 mg Oral TID    traMADol BP17 300 mg (Patient Supplied)  300 mg Oral DAILY    alum-mag hydroxide-simeth (MYLANTA) oral suspension 30 mL  30 mL Oral Q6H PRN    traMADol (ULTRAM) tablet 50 mg  50 mg Oral Q6H PRN    amLODIPine (NORVASC) tablet 10 mg  10 mg Oral DAILY    hydrALAZINE (APRESOLINE) 20 mg/mL injection 10 mg  10 mg IntraVENous Q6H PRN    sodium chloride (NS) flush 5-10 mL  5-10 mL IntraVENous PRN    cefTRIAXone (ROCEPHIN) 1 g in 0.9% sodium chloride (MBP/ADV) 50 mL  1 g IntraVENous Q24H    aspirin delayed-release tablet 81 mg  81 mg Oral 7am    atenolol (TENORMIN) tablet 25 mg  25 mg Oral DAILY    levothyroxine (SYNTHROID) tablet 25 mcg  25 mcg Oral ACB    montelukast (SINGULAIR) tablet 10 mg  10 mg Oral DAILY    pregabalin (LYRICA) capsule 50 mg  50 mg Oral BID    QUEtiapine (SEROquel) tablet 25 mg  25 mg Oral QHS    tiotropium (SPIRIVA) inhalation capsule 18 mcg  1 Cap Inhalation 7am    glipiZIDE SR (GLUCOTROL XL) tablet 10 mg  10 mg Oral DAILY    folic acid (FOLVITE) tablet 0.5 mg  0.5 mg Oral BID    fenofibrate (LOFIBRA) tablet 54 mg  54 mg Oral DAILY    docusate sodium (COLACE) capsule 100 mg  100 mg Oral BID PRN    albuterol (PROVENTIL VENTOLIN) nebulizer solution 2.5 mg  2.5 mg Nebulization Q6H PRN    insulin lispro (HUMALOG) injection   SubCUTAneous AC&HS       Other Studies (last 24 hours):  Ct Code Neuro Head Wo Contrast    Result Date: 12/1/2019  Clinical history: Possible code stroke. Right-sided weakness. TECHNIQUE: Axial CT of the brain without IV contrast. CT dose reduction was achieved through use of a standardized protocol tailored for this examination and automatic exposure control for dose modulation. COMPARISON: None. FINDINGS: Periventricular chronic small vessel changes limit sensitivity for detection of small acute/subacute infarct. No CT evidence for acute territorial infarction. No acute intracranial hemorrhage, mass effect or midline shift. No extra-axial fluid collection. There is small old lacunar infarct in the right basal ganglia. Mild generalized cerebral volume loss, age-related. Cerebellum and brainstem are unremarkable. Included globes appear intact. Paranasal sinuses and the mastoid air cells are aerated. Surrounding bones are intact. IMPRESSION: 1. No acute intracranial hemorrhage or CT evidence for acute territorial infarction.  Note that MRI is more sensitive for detection of acute/subacute infarct. 2. Chronic small vessel changes. Small old/chronic lacunar infarct in the right basal ganglia. Assessment and Plan:     Hospital Problems as of 12/1/2019 Date Reviewed: 8/28/2019          Codes Class Noted - Resolved POA    Sepsis ([facility] ICD-10-CM: A41.9  ICD-9-CM: 038.9, 995.91  11/27/2019 - Present Unknown        UTI (urinary tract infection) ICD-10-CM: N39.0  ICD-9-CM: 599.0  8/20/2019 - Present Unknown        Diabetes mellitus type 2, controlled ([facility] ICD-10-CM: E11.9  ICD-9-CM: 250.00  2/26/2018 - Present Unknown        Acute kidney injury ([facility] ICD-10-CM: N17.9  ICD-9-CM: 584.9  9/12/2017 - Present Unknown        Hypertension ICD-10-CM: I10  ICD-9-CM: 401.9  11/16/2014 - Present Unknown              PLAN:    · Sepsis resolved  · UTI continue antibiotics follow-up - cultures awaiting further results  · Diabetes monitor blood sugar and cover with insulin  · Acute kidney injury-  resolved  · Hypertension- continue appropriate home medications, add standing hydralazine continue as needed hydralazine  · Altered mental status and being found unresponsive last night with concern for seizures; EEG is pending; MRI brain pending. · Right upper extremity swellingwe will get Doppler ultrasound  · Continue other appropriate home medications  · Further work-up and management based on her clinical course    DC planning/Dispo: Unclear given the overnight events; further work-up and management based on her clinical course  DVT ppx: Heparin    Signed:  Artur Sharma MD     This note was created with the help of Dragon voice recognition software. Although the note was reviewed for mistakes and corrected were noted. Other mistakes may exist within the context of this note was secondary to the use of voice recognition software. *

## 2019-12-01 NOTE — PROGRESS NOTES
Date of Outreach Update:  Kirsty Denise was seen and assessed. MEWS Score: 1 (12/01/19 1620)  Vitals:    12/01/19 0534 12/01/19 0801 12/01/19 1125 12/01/19 1620   BP:  150/80 146/74 143/67   Pulse:  74 78 86   Resp:  18 18 18   Temp:  97.4 °F (36.3 °C) 97.7 °F (36.5 °C) 98 °F (36.7 °C)   SpO2:  96% 96% 95%   Weight: 85.1 kg (187 lb 9.6 oz)      Height: 5' 4\" (1.626 m)            Pain Assessment  Pain Intensity 1: 0 (12/01/19 1620)  Pain Location 1: Back  Pain Intervention(s) 1: Medication (see MAR)  Patient Stated Pain Goal: 0      Previous Outreach assessment has been reviewed. There have been no significant clinical changes since the completion of the last dated Outreach assessment. Will continue to follow up per outreach protocol.     Signed By:   Jesica Buckley RN    December 1, 2019 5:12 PM

## 2019-12-01 NOTE — PROGRESS NOTES
[facility] [address] CRITICAL CARE OUTREACH NURSE PROGRESS REPORT      SUBJECTIVE: Called to assess patient secondary to Rapid Response/Code Stroke. MEWS Score: 3 (12/01/19 0444)  Vitals:    12/01/19 0241 12/01/19 0243 12/01/19 0444 12/01/19 0534   BP: 116/75 130/70 134/84    Pulse: 83 81 76    Resp:   16    Temp:   97.2 °F (36.2 °C)    SpO2: 100% 100% 98%    Weight:    85.1 kg (187 lb 9.6 oz)   Height:    5' 4\" (1.626 m)        LAB DATA:    Recent Labs     12/01/19 0325 11/29/19  0910 11/28/19  0707    142 143   K 4.0 3.5 4.1    109* 111*   CO2 21 25 23   AGAP 10 8 9   * 133* 114*   BUN 28* 15 19   CREA 1.27* 1.08* 1.14*   GFRAA 51* >60 58*   GFRNA 43* 51* 48*   CA 8.7 8.4 8.5   ALB  --   --  3.1*   TP  --   --  6.4   GLOB  --   --  3.3   AGRAT  --   --  0.9*   ALT  --   --  18        Recent Labs     12/01/19 0325 11/29/19  0910 11/28/19  0707   WBC 10.3 8.6 9.8   HGB 12.5 12.6 12.9   HCT 38.4 38.7 38.8    269 300          OBJECTIVE: On arrival to room, I found patient to be minimally responsive in bed. Nursing staff and Dr. Eugenia Arnold at bedside. Pt reportedly became unresponsive when returning to bed from restroom. Per primary RN, pt was previously alert and oriented with only noted deficits being slight weakness in BLE. ASSESSMENT:  Pt not interactive, withdraws in all extremities with periodic spontaneous movement noted to BUE. Initially with minimal movement in RLE but improved shortly after. Some effort against gravity noted in all four extremities but unable to follow commands to assess for drift. Pupils equal, round, and reactive to light. . VSS. ABG drawn, no respiratory involvement noted. Pt taken for stat CT per Dr. Eugenia Arnold, no acute events noted. Teleneuro consult completed, no immediate interventions recommended. Possible MRI and EEG in AM if no improvement in neuro status. PLAN:  Will continue to follow per outreach protocol.

## 2019-12-01 NOTE — PROGRESS NOTES
Called to rapid response for unresponsive patient. Became weak after returning from toilet. Examined in bed initially with minimal response to stimuli but then opened eyes and withdrew from pain in b/l UE and LLE. Not moving RLE. BGL 140s, ABG unremarkable, vitals stable. No gross facial asymmetry. No new meds administered tonight, but has continued lyrica and seroquel. Takes long-acting tramadol, last dose 0900. On rocephin for urosepsis, but has been clinically improved and was recommended to [facility] by HOLLAND anderson this afternoon and was cooperative with therapy and exam.   Has hx DM, HTN. Code stroke initiated, pt sent for CT    Back from CT, strength in LLE improved but responsive only to pain. Some spontaneous movements. Discussed with teleneuro, more consistent with encephalopathy or delirium, no acute CVA interventions. Has had some marginal improvement in neuro status, continue to monitor. Consider EEG in AM if not continuing to improve.

## 2019-12-01 NOTE — PROGRESS NOTES
Pt found unresponsive on the toilet. Rapid response was called. Pt was A&O and asked this nurse if she could go to the bathroom which I assisted the pt along with the assistant to the restroom. Pt had mildly bilateral LE weakness when going to the bathroom before pt became unresponsive. Rapid response team came and initiated Code stoke. I went down with pt to CT. Pt returned to room and performed the NIH and initiated frequent neuro checks. Spoke w/ Doctor from Neuro and no further orders were given but to continue monitoring the pt and frequent neuro checks. Will continue to monitor pt.

## 2019-12-02 LAB
ANION GAP SERPL CALC-SCNC: 8 MMOL/L (ref 7–16)
BACTERIA SPEC CULT: NORMAL
BACTERIA SPEC CULT: NORMAL
BASOPHILS # BLD: 0.1 K/UL (ref 0–0.2)
BASOPHILS NFR BLD: 1 % (ref 0–2)
BUN SERPL-MCNC: 24 MG/DL (ref 8–23)
CALCIUM SERPL-MCNC: 8.9 MG/DL (ref 8.3–10.4)
CHLORIDE SERPL-SCNC: 106 MMOL/L (ref 98–107)
CO2 SERPL-SCNC: 27 MMOL/L (ref 21–32)
CREAT SERPL-MCNC: 1.16 MG/DL (ref 0.6–1)
DIFFERENTIAL METHOD BLD: ABNORMAL
EOSINOPHIL # BLD: 0.2 K/UL (ref 0–0.8)
EOSINOPHIL NFR BLD: 3 % (ref 0.5–7.8)
ERYTHROCYTE [DISTWIDTH] IN BLOOD BY AUTOMATED COUNT: 12.7 % (ref 11.9–14.6)
GLUCOSE BLD STRIP.AUTO-MCNC: 122 MG/DL (ref 65–100)
GLUCOSE BLD STRIP.AUTO-MCNC: 221 MG/DL (ref 65–100)
GLUCOSE BLD STRIP.AUTO-MCNC: 236 MG/DL (ref 65–100)
GLUCOSE BLD STRIP.AUTO-MCNC: 241 MG/DL (ref 65–100)
GLUCOSE SERPL-MCNC: 121 MG/DL (ref 65–100)
HCT VFR BLD AUTO: 38.2 % (ref 35.8–46.3)
HGB BLD-MCNC: 12.4 G/DL (ref 11.7–15.4)
IMM GRANULOCYTES # BLD AUTO: 0 K/UL (ref 0–0.5)
IMM GRANULOCYTES NFR BLD AUTO: 0 % (ref 0–5)
LYMPHOCYTES # BLD: 1.5 K/UL (ref 0.5–4.6)
LYMPHOCYTES NFR BLD: 19 % (ref 13–44)
MCH RBC QN AUTO: 32 PG (ref 26.1–32.9)
MCHC RBC AUTO-ENTMCNC: 32.5 G/DL (ref 31.4–35)
MCV RBC AUTO: 98.5 FL (ref 79.6–97.8)
MM INDURATION POC: 0 MM (ref 0–5)
MONOCYTES # BLD: 0.8 K/UL (ref 0.1–1.3)
MONOCYTES NFR BLD: 10 % (ref 4–12)
NEUTS SEG # BLD: 5.2 K/UL (ref 1.7–8.2)
NEUTS SEG NFR BLD: 66 % (ref 43–78)
NRBC # BLD: 0 K/UL (ref 0–0.2)
PLATELET # BLD AUTO: 283 K/UL (ref 150–450)
PMV BLD AUTO: 8.9 FL (ref 9.4–12.3)
POTASSIUM SERPL-SCNC: 4.2 MMOL/L (ref 3.5–5.1)
PPD POC: NEGATIVE NEGATIVE
RBC # BLD AUTO: 3.88 M/UL (ref 4.05–5.2)
SERVICE CMNT-IMP: NORMAL
SERVICE CMNT-IMP: NORMAL
SODIUM SERPL-SCNC: 141 MMOL/L (ref 136–145)
WBC # BLD AUTO: 7.8 K/UL (ref 4.3–11.1)

## 2019-12-02 PROCEDURE — 95816 EEG AWAKE AND DROWSY: CPT | Performed by: PSYCHIATRY & NEUROLOGY

## 2019-12-02 PROCEDURE — 74011636637 HC RX REV CODE- 636/637: Performed by: INTERNAL MEDICINE

## 2019-12-02 PROCEDURE — 82962 GLUCOSE BLOOD TEST: CPT

## 2019-12-02 PROCEDURE — 80048 BASIC METABOLIC PNL TOTAL CA: CPT

## 2019-12-02 PROCEDURE — 65270000029 HC RM PRIVATE

## 2019-12-02 PROCEDURE — 94760 N-INVAS EAR/PLS OXIMETRY 1: CPT

## 2019-12-02 PROCEDURE — 36415 COLL VENOUS BLD VENIPUNCTURE: CPT

## 2019-12-02 PROCEDURE — 74011250637 HC RX REV CODE- 250/637: Performed by: INTERNAL MEDICINE

## 2019-12-02 PROCEDURE — 97161 PT EVAL LOW COMPLEX 20 MIN: CPT

## 2019-12-02 PROCEDURE — 97530 THERAPEUTIC ACTIVITIES: CPT

## 2019-12-02 PROCEDURE — 94640 AIRWAY INHALATION TREATMENT: CPT

## 2019-12-02 PROCEDURE — 74011250636 HC RX REV CODE- 250/636: Performed by: INTERNAL MEDICINE

## 2019-12-02 PROCEDURE — 85025 COMPLETE CBC W/AUTO DIFF WBC: CPT

## 2019-12-02 PROCEDURE — 77030020263 HC SOL INJ SOD CL0.9% LFCR 1000ML

## 2019-12-02 RX ADMIN — FOLIC ACID 0.5 MG: 1 TABLET ORAL at 17:14

## 2019-12-02 RX ADMIN — HYDRALAZINE HYDROCHLORIDE 25 MG: 25 TABLET, FILM COATED ORAL at 09:32

## 2019-12-02 RX ADMIN — QUETIAPINE FUMARATE 25 MG: 25 TABLET ORAL at 21:50

## 2019-12-02 RX ADMIN — TIOTROPIUM BROMIDE 18 MCG: 18 CAPSULE ORAL; RESPIRATORY (INHALATION) at 09:20

## 2019-12-02 RX ADMIN — INSULIN LISPRO 4 UNITS: 100 INJECTION, SOLUTION INTRAVENOUS; SUBCUTANEOUS at 21:50

## 2019-12-02 RX ADMIN — HYDRALAZINE HYDROCHLORIDE 25 MG: 25 TABLET, FILM COATED ORAL at 21:49

## 2019-12-02 RX ADMIN — Medication 10 ML: at 05:06

## 2019-12-02 RX ADMIN — PREGABALIN 50 MG: 50 CAPSULE ORAL at 09:32

## 2019-12-02 RX ADMIN — INSULIN LISPRO 4 UNITS: 100 INJECTION, SOLUTION INTRAVENOUS; SUBCUTANEOUS at 17:13

## 2019-12-02 RX ADMIN — LEVOTHYROXINE SODIUM 25 MCG: 50 TABLET ORAL at 05:05

## 2019-12-02 RX ADMIN — AMLODIPINE BESYLATE 10 MG: 10 TABLET ORAL at 09:32

## 2019-12-02 RX ADMIN — ATENOLOL 25 MG: 25 TABLET ORAL at 09:31

## 2019-12-02 RX ADMIN — FENOFIBRATE 54 MG: 54 TABLET ORAL at 09:32

## 2019-12-02 RX ADMIN — ASPIRIN 81 MG: 81 TABLET ORAL at 05:05

## 2019-12-02 RX ADMIN — INSULIN LISPRO 4 UNITS: 100 INJECTION, SOLUTION INTRAVENOUS; SUBCUTANEOUS at 11:53

## 2019-12-02 RX ADMIN — GLIPIZIDE 10 MG: 10 TABLET, FILM COATED, EXTENDED RELEASE ORAL at 09:32

## 2019-12-02 RX ADMIN — SODIUM CHLORIDE 100 ML/HR: 900 INJECTION, SOLUTION INTRAVENOUS at 17:25

## 2019-12-02 RX ADMIN — MONTELUKAST SODIUM 10 MG: 10 TABLET, FILM COATED ORAL at 09:31

## 2019-12-02 RX ADMIN — PREGABALIN 50 MG: 50 CAPSULE ORAL at 17:14

## 2019-12-02 RX ADMIN — FOLIC ACID 0.5 MG: 1 TABLET ORAL at 09:32

## 2019-12-02 RX ADMIN — TRAMADOL HYDROCHLORIDE 300 MG: 300 CAPSULE ORAL at 09:00

## 2019-12-02 RX ADMIN — HYDRALAZINE HYDROCHLORIDE 25 MG: 25 TABLET, FILM COATED ORAL at 17:14

## 2019-12-02 NOTE — PROGRESS NOTES
Date of Outreach Update:  Shireen Daly was seen and assessed. Patient sleeping but awakened to soft voice, follows commands, oriented to person and place, denies complaint at this time. Respirations even and unlabored on room air, O2 sat 96%. MEWS Score: 1 (12/01/19 2022)  Vitals:    12/01/19 0801 12/01/19 1125 12/01/19 1620 12/01/19 2022   BP: 150/80 146/74 143/67 140/57   Pulse: 74 78 86 92   Resp: 18 18 18 18   Temp: 97.4 °F (36.3 °C) 97.7 °F (36.5 °C) 98 °F (36.7 °C) 98.5 °F (36.9 °C)   SpO2: 96% 96% 95% 96%   Weight:       Height:             Pain Assessment  Pain Intensity 1: 0 (12/01/19 1927)  Pain Location 1: Back  Pain Intervention(s) 1: Medication (see MAR)  Patient Stated Pain Goal: 0    Will continue to follow up per outreach protocol.     Signed By:   Lior Washington RN    December 1, 2019 8:45 PM

## 2019-12-02 NOTE — DIABETES MGMT
Patient admitted 19 from primary care office, Dr. Lenora Benavidez, with sepsis/ UTI. Daughters at bedside. History of HTN, CKD stage 3, charcot foot, PAD, obesity, dementia, asthma, HLD, neuropathy, and osteoarthritis. HbA1c 8.3 (eA). Creatinine 1.16. GFR: 47. . Pt states that prior to admission medications include: Glipizide 10 mg CR and Bydureon 2 mg injection once per week. Pt has missed a dose of her Bydureon due to hospitalization. Last Bydureon dose was on 2019. Patient had recently moved in with her daughter one month ago because it was unsafe for her to be at home alone. Daughter confirms that pt was not checking her blood glucose because she had been given a new blood glucose meter and did not know how to use it. Daughter states that the blood glucose meter has been lost during the recent move. Daughter contacted [employer] in [place] and found that the True metrix blood glucose meter that the pt had previously used would be covered by her insurance. Patient would like to get that meter because it's what she has used in the past and is able to use it. Plan is for daughter to  the True Metrix blood glucose meter and review meter use here at the hospital or during stay at [address] [place] [address]. Reviewed blood glucose targets and discussed signs, symptoms and treatments for hyper/hypoglycemia. Daughter states that pt has has episodes of hypoglycemia in the past and has glucose tablets available to treat. Recommended monitoring blood glucose bid, fasting and hs with occasional 2 hour post prandial checks and to record in log book to bring to PCP appointments to assist with medication titration. Pt and daughters verbalize understanding. Pt given educational material \"Diabetes Self-Management: A Patient Teaching Guide\".  Pt states that she has had outpatient diabetes education in the past.   Educated pt re: current insulin regimen of Humalog sliding scale coverage 4x/day ac and hs,  including type of insulin, timing with meals, onset, duration of effect, and peak of insulin dose. Pt and daughters verbalize understanding. Stressed the importance of follow up care for diabetes management with PCP. Plan per chart review is for pt to go to STR. Pt and daughters have no further questions at this time.

## 2019-12-02 NOTE — PROGRESS NOTES
Interdisciplinary Rounds completed 12/2/19. Nursing, Case Management, Physician and PT present. Plan of care reviewed and updated. Will need rehab at discharge. PPD/PT/OT ordered.

## 2019-12-02 NOTE — PROGRESS NOTES
Chart screened by  for discharge planning. CM to see patient in room, patient currently undergoing an EEG. Patient will likely need STR at discharge. CM will provide patient will choice list of facilities and follow-up with choices. Please consult  if any new issues arise.

## 2019-12-02 NOTE — PROGRESS NOTES
Initial visit by  to convey care and concern and encourage patient that  services are available if desired. Ms. Dahiana Yousif was receiving care from staff. I provided 's card to staff for sharing with patient. Chaplains remain available for support.      Ermias Johnson 68  Board Certified

## 2019-12-02 NOTE — PROGRESS NOTES
Hourly rounds performed. All needs met. Pt remained alert/oriented throughout the shift. Denies any needs at this time. Will continue to monitor and report to oncoming nurse.

## 2019-12-02 NOTE — PROCEDURES
Routine EEG Report    Reason for EEG: Altered mental statues      Current Facility-Administered Medications:     0.9% sodium chloride infusion, 100 mL/hr, IntraVENous, CONTINUOUS, Nathan Underwood MD, Last Rate: 100 mL/hr at 11/30/19 0825, 100 mL/hr at 11/30/19 0825    hydrALAZINE (APRESOLINE) tablet 25 mg, 25 mg, Oral, TID, Nathan Underwood MD, 25 mg at 12/02/19 0932    traMADol BP17 300 mg (Patient Supplied), 300 mg, Oral, DAILY, Nathan Underwood MD, 300 mg at 12/02/19 0900    alum-mag hydroxide-simeth (MYLANTA) oral suspension 30 mL, 30 mL, Oral, Q6H PRN, Lashanda Tabares, DO, 30 mL at 11/29/19 2207    traMADol (ULTRAM) tablet 50 mg, 50 mg, Oral, Q6H PRN, Alecia Hodge DO, 50 mg at 12/01/19 2227    amLODIPine (NORVASC) tablet 10 mg, 10 mg, Oral, DAILY, Nathan CARLSON MD, 10 mg at 12/02/19 0932    hydrALAZINE (APRESOLINE) 20 mg/mL injection 10 mg, 10 mg, IntraVENous, Q6H PRN, Nathan Underwood MD    sodium chloride (NS) flush 5-10 mL, 5-10 mL, IntraVENous, PRN, Nathan Underwood MD, 10 mL at 12/02/19 0506    aspirin delayed-release tablet 81 mg, 81 mg, Oral, 7am, Nathan Underwood MD, 81 mg at 12/02/19 0505    atenolol (TENORMIN) tablet 25 mg, 25 mg, Oral, DAILY, Nathan Underwood MD, 25 mg at 12/02/19 0931    levothyroxine (SYNTHROID) tablet 25 mcg, 25 mcg, Oral, ACB, Nathan Underwood MD, 25 mcg at 12/02/19 0505    montelukast (SINGULAIR) tablet 10 mg, 10 mg, Oral, DAILY, Nathan Underwood MD, 10 mg at 12/02/19 0931    pregabalin (LYRICA) capsule 50 mg, 50 mg, Oral, BID, Nathan Underwood MD, 50 mg at 12/02/19 0932    QUEtiapine (SEROquel) tablet 25 mg, 25 mg, Oral, QHS, Nathan Underwood MD, 25 mg at 12/01/19 2119    tiotropium (SPIRIVA) inhalation capsule 18 mcg, 1 Cap, Inhalation, 7am, Nathan Underwood MD, 18 mcg at 66/41/76 4692    folic acid (FOLVITE) tablet 0.5 mg, 0.5 mg, Oral, BID, Nathan Underwood MD, 0.5 mg at 12/02/19 0932    fenofibrate (LOFIBRA) tablet 54 mg, 54 mg, Oral, DAILY, Héctor Cummings MD, 54 mg at 12/02/19 0932    docusate sodium (COLACE) capsule 100 mg, 100 mg, Oral, BID PRN, Héctor Cummings MD    albuterol (PROVENTIL VENTOLIN) nebulizer solution 2.5 mg, 2.5 mg, Nebulization, Q6H PRN, Veronica Scanlon MD    insulin lispro (HUMALOG) injection, , SubCUTAneous, AC&HS, Héctor Cummings MD, 4 Units at 12/02/19 1153      Technical Summary: This EEG was performed with a 32-channel digital EEG machine with electrodes placed according to the international 10-20 system of placement. Background:   During the maximal awake state a posterior dominant rhythm of 9 Hz was seen. Anterior background consisted of medium amplitude activity in the alpha range with occasional intermixed beta frequencies. There is excessive 60 Hz artifact. Hyperventilation: Hyperventilation was not performed due to medical condition    Photic Stimulation: Photic stimulation was not performed due to medical condition    Sleep: Vertex sharp transients were present     Impression: The results of this EEG are limited due to 60 Hz artifact; however, the background appears normal.  No interictal epileptiform discharges or lateralizing features were seen.

## 2019-12-02 NOTE — PROGRESS NOTES
POA at bedside and would like [facility] as preference if possible d/t location near home.      1834: Report given to Izzy Raman

## 2019-12-02 NOTE — PROGRESS NOTES
CM received choices for STR one being [facility] and the other [facility]. Referrals sent. Pending bed offers.

## 2019-12-02 NOTE — PROGRESS NOTES
Problem: Mobility Impaired (Adult and Pediatric)  Goal: *Acute Goals and Plan of Care  Description  Acute PT Goals:  (1.)Joana Carty  will move from supine to sit and sit to supine , scoot up and down and roll side to side with MODIFIED INDEPENDENCE within 7 treatment day(s). (2.)Joana Carty will transfer from bed to chair and chair to bed with MODIFIED INDEPENDENCE using the least restrictive device within 7 treatment day(s). (3.)Joana Carty will ambulate with MODIFIED INDEPENDENCE for 300+ feet with the least restrictive device within 7 treatment day(s). (4.)Joana Carty will perform standing static and dynamic balance activities x 20 minutes with MODIFIED INDEPENDENCE to improve safety and activity tolerance within 7 treatment day(s). (5.)Joana Carty will perform bilateral lower extremity exercises x 15 min for HEP with INDEPENDENCE to improve strength, endurance, and functional mobility within 7 treatment day(s). PHYSICAL THERAPY: Initial Assessment, Daily Note and AM 12/2/2019  INPATIENT: PT Visit Days : 1  Payor: Beka Lexi / Plan: [address] [place] [address] HMO / Product Type: [employer] /       NAME/AGE/GENDER: Tacos Shankar is a 80 y.o. female   PRIMARY DIAGNOSIS: UTI (urinary tract infection) [N39.0]  Sepsis ([facility] [A41.9] <principal problem not specified> <principal problem not specified>        ICD-10: Treatment Diagnosis:   · Generalized Muscle Weakness (M62.81)  · Difficulty in walking, Not elsewhere classified (R26.2)  · Other abnormalities of gait and mobility (R26.89)  · History of falling (Z91.81)   Precaution/Allergies:  Aleve [naproxen sodium]; Hydrocodone-acetaminophen; Metformin; Phenobarbital; and Statins-hmg-coa reductase inhibitors      ASSESSMENT:     Ms. James Bauman is an 80year old F who presents to hospital from PCP on 11/27/19 with UTI, increased pulse. Admitted with sepsis.  Prior to hospital admission pt lives with her daughter in a one story home with no step(s) to enter. Pt endorses no recent falls, though yesterday (12/1/19) there was a rapid response called as pt was found unresponsive in bathroom and she said she fell then. MRI negative for any acute changes, EEG pending upon therapy evaluation. Prior to admission Ms. Cadence Husain uses a rollator walker for mobility. Relevant PMH includes charcot foot BLE due to diabetes, neuropathy, and osteoarthritis. Upon entering, pt resting in bed, agreeable to PT evaluation. she reports 8/10 pain in her BLE knees at rest. BLE assessment indicates sensation to light touch diminished/absent distal BLE below knee, AROM WFL, and strength diminished. Pt performed supine > sit with additional time CGA, sitting EOB with fair sitting balance control. PT assisted pt to don orthotic shoes BLE due to charcot tooth deformity BLE to provide extra support and stability during mobility. Due to rapid response called, orthostatics were assessed during evaluation:    Position BP Heartrate   Supine 152/73 mm Hg 87 bpm   Sitting 154/80 mm Hg 89 bpm   Standing 116/70 mm Hg 97 bpm     Pt did not report any dizziness or orthostatic symptoms with position changes. Treatment initiated to include sit <> stand with Min A using RW. Pt able to maintain static standing balance with CGA and BUE support at RW. Sit <> stand several trials for functional strengthening, Min A/RW. Pt ambulated x 10 ft CGA/Rw to bedside chair, good control of descent into bedside chair with verbal cues for BUE placement and sequencing for safety. At end of session pt sitting up in chair with all needs within reach, daughter in room, RN notified. Pt presents as functioning below her baseline, with deficits in mobility including transfers, gait, balance, and activity tolerance. Pt will benefit from skilled therapy services to address stated deficits to promote return to highest level of function, independence, and safety.  Will continue to follow. This section established at most recent assessment   PROBLEM LIST (Impairments causing functional limitations):  1. Decreased Strength  2. Decreased Transfer Abilities  3. Decreased Ambulation Ability/Technique  4. Decreased Balance  5. Decreased Activity Tolerance  6. Increased Fatigue  7. Decreased Flexibility/Joint Mobility   INTERVENTIONS PLANNED: (Benefits and precautions of physical therapy have been discussed with the patient.)  1. Balance Exercise  2. Bed Mobility  3. Family Education  4. Gait Training  5. Home Exercise Program (HEP)  6. Neuromuscular Re-education/Strengthening  7. Range of Motion (ROM)  8. Therapeutic Activites  9. Therapeutic Exercise/Strengthening  10. Transfer Training     TREATMENT PLAN: Frequency/Duration: 3 times a week for duration of hospital stay  Rehabilitation Potential For Stated Goals: Good     REHAB RECOMMENDATIONS (at time of discharge pending progress):    Placement: It is my opinion, based on this patient's performance to date, that Ms. Moises Barnes may benefit from intensive therapy at a [address] [place] [address] after discharge due to the functional deficits listed above that are likely to improve with skilled rehabilitation and concerns that he/she may be unsafe to be unsupervised at home due to medical complications and mobility deficits which put her at significant risk of falling and/or functional decline. .  Equipment:    None at this time              HISTORY:   History of Present Injury/Illness (Reason for Referral):    Per H&P: 'In summary Ms. Alex Echevarria is a 80-year-old female admitted on November 27, 2019 for suspected sepsis secondary to UTI. Her sepsis resolved over several days. She was medically stable with plans to discharge to home after being evaluated by physical therapy. Overnight unfortunately the patient went to the bathroom and apparently was found unresponsive on the toilet. All vital signs were stable.   A code asked was called and CT of the head was normal.  There was concern for seizures. And the patient was apparently unresponsive until about 7 AM this morning per reports from staff at which point she appeared completely back to her baseline. 12/01/2019at this time she is extremely anxious and tearful she does not recall what happened. MRI brain pending EEG pending. She is soaked in urine. She has baseline bilateral lower extremity per history from her and her family on admission. \"  Past Medical History/Comorbidities:   Ms. Monique Scruggs  has a past medical history of Asthma, Burning with urination, Cellulitis, Charcot foot due to diabetes mellitus ([facility] Charcot-Evy-Tooth disease-like deformity of foot, Diabetes ([facility] Erosive osteoarthritis, Fibromyalgia, Full dentures, GERD (gastroesophageal reflux disease), High cholesterol, Hypertension, Incontinence of urine in female, Neuropathy, and Sleep apnea. Ms. Monique Scruggs  has a past surgical history that includes pr breast surgery procedure unlisted; hx cholecystectomy; hx orthopaedic; hx colonoscopy; vascular surgery procedure unlist (Left, 09/29/2017); pr chest surgery procedure unlisted; hx knee replacement (Bilateral); hx hysterectomy; and hx cataract removal (Bilateral). Social History/Living Environment:   Home Environment: Private residence  # Steps to Enter: 0  Wheelchair Ramp: Yes  One/Two Story Residence: One story  Living Alone: No  Support Systems: Child(don), Family member(s)  Patient Expects to be Discharged to[de-identified] Rehabilitation facility  Current DME Used/Available at Home: nasima Evans, [address] chair  Tub or Shower Type: Tub/Shower combination  Prior Level of Function/Work/Activity:  Mod I with use of rollator, lives with daughter, apparently has had a recent decline in level of activity per daughter report.    Number of Personal Factors/Comorbidities that affect the Plan of Care: 1-2: MODERATE COMPLEXITY   EXAMINATION:   Most Recent Physical Functioning:   Gross Assessment:  AROM: Within functional limits  Strength: Generally decreased, functional  Coordination: Within functional limits  Sensation: Impaired(absent light touch distal to BLE. CMT foot BLE.)               Posture:     Balance:  Sitting: Intact  Standing: Impaired  Standing - Static: Fair  Standing - Dynamic : Fair Bed Mobility:  Rolling: Contact guard assistance  Supine to Sit: Contact guard assistance  Scooting: Contact guard assistance  Wheelchair Mobility:     Transfers:  Sit to Stand: Minimum assistance  Stand to Sit: Contact guard assistance  Bed to Chair: Contact guard assistance  Interventions: Safety awareness training; Tactile cues; Verbal cues  Gait:     Base of Support: Center of gravity altered  Speed/Loan: Shuffled; Slow  Step Length: Right shortened;Left shortened  Gait Abnormalities: Decreased step clearance;Shuffling gait; Path deviations;Trunk sway increased  Distance (ft): 10 Feet (ft)  Assistive Device: Walker, rolling;Gait belt  Ambulation - Level of Assistance: Contact guard assistance  Interventions: Safety awareness training; Tactile cues; Verbal cues      Body Structures Involved:  1. Nerves  2. Bones  3. Joints  4. Muscles Body Functions Affected:  1. Genitourinary  2. Neuromusculoskeletal  3. Movement Related Activities and Participation Affected:  1. General Tasks and Demands  2. Mobility   Number of elements that affect the Plan of Care: 3: MODERATE COMPLEXITY   CLINICAL PRESENTATION:   Presentation: Stable and uncomplicated: LOW COMPLEXITY   CLINICAL DECISION MAKIN [facility] [address] [id number] AM-PAC 6 Clicks   Basic Mobility Inpatient Short Form  How much difficulty does the patient currently have. .. Unable A Lot A Little None   1. Turning over in bed (including adjusting bedclothes, sheets and blankets)? [] 1   [] 2   [] 3   [x] 4   2. Sitting down on and standing up from a chair with arms ( e.g., wheelchair, bedside commode, etc.)   [] 1   [] 2   [x] 3   [] 4   3.   Moving from lying on back to sitting on the side of the bed? [] 1   [] 2   [x] 3   [] 4   How much help from another person does the patient currently need. .. Total A Lot A Little None   4. Moving to and from a bed to a chair (including a wheelchair)? [] 1   [] 2   [x] 3   [] 4   5. Need to walk in hospital room? [] 1   [] 2   [x] 3   [] 4   6. Climbing 3-5 steps with a railing? [] 1   [x] 2   [] 3   [] 4   © 2007, Trustees of [address] [place] [address] [address] [id number], under license to [employer]. All rights reserved      Score:  Initial: 18 Most Recent: X (Date: -- )    Interpretation of Tool:  Represents activities that are increasingly more difficult (i.e. Bed mobility, Transfers, Gait). Medical Necessity:     · Patient is expected to demonstrate progress in strength, range of motion, balance, coordination and functional technique to improve safety during all mobility. .  Reason for Services/Other Comments:  · Patient continues to require skilled intervention due to medical complications and mobility deficits which impact her level of function, safety, and independence as indicated above. .   Use of outcome tool(s) and clinical judgement create a POC that gives a: Clear prediction of patient's progress: LOW COMPLEXITY        TREATMENT:   (In addition to Assessment/Re-Assessment sessions the following treatments were rendered)   Pre-treatment Symptoms/Complaints:  No specific complaints upon PT entering  Pain: Initial:   Pain Intensity 1: 8  Pain Location 1: Knee  Pain Orientation 1: Left, Right  Post Session:  5/10     Therapeutic Activity: (   26 Minutes): Therapeutic activities including Bed transfers, Chair transfers, Static standing balance, Ambulation on level ground and seated BLE exercises to improve mobility, strength, balance and coordination. Required minimal Safety awareness training; Tactile cues; Verbal cues to promote static and dynamic balance in standing and promote coordination of bilateral, lower extremity(s).       Date:  12/2/19 Date:   Date:     Activity/Exercise Parameters Parameters Parameters   Seated AP 1 x 15 BLE     Seated LAQ 1 x 15 BLE     Seated Marches 1 x 10 BLE                               Braces/Orthotics/Lines/Etc:   · IV  · Purewick  · O2 Device: Room air  Treatment/Session Assessment:    · Response to Treatment:  See above  · Interdisciplinary Collaboration:   o Physical Therapist  o Registered Nurse  · After treatment position/precautions:   o Up in chair  o Bed/Chair-wheels locked  o Bed in low position  o Call light within reach  o RN notified  o Family at bedside   · Compliance with Program/Exercises: Will assess as treatment progresses  · Recommendations/Intent for next treatment session: \"Next visit will focus on advancements to more challenging activities and reduction in assistance provided\".     Total Treatment Duration:  PT Patient Time In/Time Out  Time In: 0930  Time Out: Ter[place] 18, [place]

## 2019-12-02 NOTE — PROGRESS NOTES
Hospitalist Progress Note     Admit Date:  2019 12:58 PM   Name:  Kirsyt Denise   Age:  80 y.o.  :  1934   MRN:  [id number]   PCP:  González Bhatt MD  Treatment Team: Attending Provider: Albert Olvera MD; Care Manager: Cody Long, GALLO; Utilization Review: Chris Webb RN; Consulting Provider: Kenton Johnson MD; Physical Therapist: Julio Donis PT; Tech: Amita Ballesteros RN    Subjective:   HPI and or CC:  80year old CF admitted for sepsis related to UTI. Repeat blood cultures from  [facility], urine culture normal skin myriam. She is afebrile, no leukocytosis. She was going home, on  early morning, however, was found unresponsive on commode. MRI showed chronic changes, EEG pending. Patient requests to go to rehab now. CM assisting with this. Objective:     Patient Vitals for the past 24 hrs:   Temp Pulse Resp BP SpO2   19     96 %   19 98.4 °F (36.9 °C) 86 18 153/73 95 %   19 0408 98.4 °F (36.9 °C) 81 16 101/51 91 %   19 0015 98.1 °F (36.7 °C) 93 18 155/53 93 %   19 98.5 °F (36.9 °C) 92 18 140/57 96 %   19 1620 98 °F (36.7 °C) 86 18 143/67 95 %   19 1125 97.7 °F (36.5 °C) 78 18 146/74 96 %     Oxygen Therapy  O2 Sat (%): 96 % (19)  Pulse via Oximetry: 82 beats per minute (19)  O2 Device: Room air (19)    Intake/Output Summary (Last 24 hours) at 2019 1036  Last data filed at 2019 0414  Gross per 24 hour   Intake    Output 1600 ml   Net -1600 ml         REVIEW OF SYSTEMS: Comprehensive ROS performed and negative except as stated in HPI. Physical Examination:  General:    Well nourished. No gross distress. Head:  Normocephalic, atraumatic, nares patent  Eyes:  Extraocular movements intact, normal sclera  CV:   RRR.   No  Murmurs, clicks, or gallops, distal pulses intact  Lungs:   Unlabored, no cyanosis, no wheeze  Abdomen:   Soft, nondistended, nontender. Extremities: Warm and dry. No cyanosis or edema. Skin:     No rashes or jaundice. Neuro:  No gross focal deficits, no tremor  Psych:  Mood and affect appropriate    Data Review:  I have reviewed all labs, meds, telemetry events, and studies from the last 24 hours. Recent Results (from the past 24 hour(s))   GLUCOSE, POC    Collection Time: 12/01/19 11:24 AM   Result Value Ref Range    Glucose (POC) 99 65 - 100 mg/dL   GLUCOSE, POC    Collection Time: 12/01/19  4:20 PM   Result Value Ref Range    Glucose (POC) 154 (H) 65 - 100 mg/dL   GLUCOSE, POC    Collection Time: 12/01/19  9:16 PM   Result Value Ref Range    Glucose (POC) 213 (H) 65 - 100 mg/dL   CBC WITH AUTOMATED DIFF    Collection Time: 12/02/19  4:48 AM   Result Value Ref Range    WBC 7.8 4.3 - 11.1 K/uL    RBC 3.88 (L) 4.05 - 5.2 M/uL    HGB 12.4 11.7 - 15.4 g/dL    HCT 38.2 35.8 - 46.3 %    MCV 98.5 (H) 79.6 - 97.8 FL    MCH 32.0 26.1 - 32.9 PG    MCHC 32.5 31.4 - 35.0 g/dL    RDW 12.7 11.9 - 14.6 %    PLATELET 397 430 - 840 K/uL    MPV 8.9 (L) 9.4 - 12.3 FL    ABSOLUTE NRBC 0.00 0.0 - 0.2 K/uL    DF AUTOMATED      NEUTROPHILS 66 43 - 78 %    LYMPHOCYTES 19 13 - 44 %    MONOCYTES 10 4.0 - 12.0 %    EOSINOPHILS 3 0.5 - 7.8 %    BASOPHILS 1 0.0 - 2.0 %    IMMATURE GRANULOCYTES 0 0.0 - 5.0 %    ABS. NEUTROPHILS 5.2 1.7 - 8.2 K/UL    ABS. LYMPHOCYTES 1.5 0.5 - 4.6 K/UL    ABS. MONOCYTES 0.8 0.1 - 1.3 K/UL    ABS. EOSINOPHILS 0.2 0.0 - 0.8 K/UL    ABS. BASOPHILS 0.1 0.0 - 0.2 K/UL    ABS. IMM.  GRANS. 0.0 0.0 - 0.5 K/UL   METABOLIC PANEL, BASIC    Collection Time: 12/02/19  4:48 AM   Result Value Ref Range    Sodium 141 136 - 145 mmol/L    Potassium 4.2 3.5 - 5.1 mmol/L    Chloride 106 98 - 107 mmol/L    CO2 27 21 - 32 mmol/L    Anion gap 8 7 - 16 mmol/L    Glucose 121 (H) 65 - 100 mg/dL    BUN 24 (H) 8 - 23 MG/DL    Creatinine 1.16 (H) 0.6 - 1.0 MG/DL    GFR est AA 57 (L) >60 ml/min/1.73m2    GFR est non-AA 47 (L) >60 ml/min/1.73m2 Calcium 8.9 8.3 - 10.4 MG/DL   GLUCOSE, POC    Collection Time: 12/02/19  7:33 AM   Result Value Ref Range    Glucose (POC) 122 (H) 65 - 100 mg/dL        All Micro Results     Procedure Component Value Units Date/Time    CULTURE, BLOOD [[id number]] Collected:  11/27/19 1458    Order Status:  Completed Specimen:  Blood Updated:  12/02/19 0824     Special Requests: --        RIGHT  Antecubital       Culture result: NO GROWTH 5 DAYS       CULTURE, BLOOD [[id number]] Collected:  11/27/19 1217    Order Status:  Completed Specimen:  Blood Updated:  12/02/19 0824     Special Requests: --        LEFT  HAND       Culture result: NO GROWTH 5 DAYS       CULTURE, URINE [[id number]] Collected:  11/27/19 1315    Order Status:  Completed Specimen:  Cath Urine Updated:  11/30/19 1410     Special Requests: NO SPECIAL REQUESTS        Culture result:       50,000-100,000 COLONIES/mL MIXED SKIN MACRINA ISOLATED                Current Meds:  Current Facility-Administered Medications   Medication Dose Route Frequency    0.9% sodium chloride infusion  100 mL/hr IntraVENous CONTINUOUS    hydrALAZINE (APRESOLINE) tablet 25 mg  25 mg Oral TID    traMADol BP17 300 mg (Patient Supplied)  300 mg Oral DAILY    alum-mag hydroxide-simeth (MYLANTA) oral suspension 30 mL  30 mL Oral Q6H PRN    traMADol (ULTRAM) tablet 50 mg  50 mg Oral Q6H PRN    amLODIPine (NORVASC) tablet 10 mg  10 mg Oral DAILY    hydrALAZINE (APRESOLINE) 20 mg/mL injection 10 mg  10 mg IntraVENous Q6H PRN    sodium chloride (NS) flush 5-10 mL  5-10 mL IntraVENous PRN    aspirin delayed-release tablet 81 mg  81 mg Oral 7am    atenolol (TENORMIN) tablet 25 mg  25 mg Oral DAILY    levothyroxine (SYNTHROID) tablet 25 mcg  25 mcg Oral ACB    montelukast (SINGULAIR) tablet 10 mg  10 mg Oral DAILY    pregabalin (LYRICA) capsule 50 mg  50 mg Oral BID    QUEtiapine (SEROquel) tablet 25 mg  25 mg Oral QHS    tiotropium (SPIRIVA) inhalation capsule 18 mcg  1 Cap Inhalation 7am  glipiZIDE SR (GLUCOTROL XL) tablet 10 mg  10 mg Oral DAILY    folic acid (FOLVITE) tablet 0.5 mg  0.5 mg Oral BID    fenofibrate (LOFIBRA) tablet 54 mg  54 mg Oral DAILY    docusate sodium (COLACE) capsule 100 mg  100 mg Oral BID PRN    albuterol (PROVENTIL VENTOLIN) nebulizer solution 2.5 mg  2.5 mg Nebulization Q6H PRN    insulin lispro (HUMALOG) injection   SubCUTAneous AC&HS       Diet:  DIET NUTRITIONAL SUPPLEMENTS  DIET DIABETIC CONSISTENT CARB    Other Studies (last 24 hours):  Mri Brain Wo Cont    Result Date: 12/1/2019  MRI of the brain without contrast. Clinical location: Confusion, neural deficit PROCEDURE: Multiplanar and multisequence MR imaging performed through the brain without the administration of intravenous gadolinium contrast. COMPARISON: Head CT from the same day FINDINGS: No foci of abnormal restricted diffusion appreciated. No acute intracranial hemorrhage, mass, or mass effect. There is moderate to severe bilateral hemispheric atrophy. There is patchy and confluent subcortical, deep, and periventricular white matter T2 hyperintensity. This is nonspecific but most in keeping with chronic microangiopathic changes. There is no hydrocephalus. The basilar cisterns are patent. The dural venous sinus and large intracranial vascular flow voids are maintained. The marrow signal in the calvaria and skull base is unremarkable. The sella is unremarkable. The included paranasal sinuses and mastoid air cells are clear. IMPRESSION: 1. No acute intracranial abnormality. 2. Severe bilateral hemispheric atrophy. 3. Moderate to severe chronic white matter microangiopathic changes    Duplex Upper Ext Venous Right    Result Date: 12/1/2019  Right upper extremity duplex venous study, 12/1/2019. CLINICAL HISTORY: Right upper extremity swelling. On anticoagulation.  Technique: Grayscale, and duplex Doppler images of the right upper extremity venous vascular system were obtained using an 9 MHz transducer. In addition, evaluation of the left upper extremity central venous structures to include the left innominate and subclavian veins was performed. FINDINGS: nonocclusive thrombus is seen in the right cephalic vein at the antecubital IV site. The remaining venous structures of the right upper extremity and central left upper extremity are patent. Normal respiratory variation is seen in the central venous structures. No abnormal fluid collections, or significant soft tissue edematous changes are seen. IMPRESSION: 1. Nonocclusive thrombus within the right cephalic vein in the antecubital fossa at the level of an IV site. Assessment and Plan:     Hospital Problems as of 12/2/2019 Date Reviewed: 8/28/2019          Codes Class Noted - Resolved POA    Sepsis ([facility] ICD-10-CM: A41.9  ICD-9-CM: 038.9, 995.91  11/27/2019 - Present Unknown        UTI (urinary tract infection) ICD-10-CM: N39.0  ICD-9-CM: 599.0  8/20/2019 - Present Unknown        Diabetes mellitus type 2, controlled ([facility] ICD-10-CM: E11.9  ICD-9-CM: 250.00  2/26/2018 - Present Unknown        Acute kidney injury ([facility] ICD-10-CM: N17.9  ICD-9-CM: 584.9  9/12/2017 - Present Unknown        Hypertension ICD-10-CM: I10  ICD-9-CM: 401.9  11/16/2014 - Present Unknown              A/P:    1. Sepsis with UTI:  Antibiotic follow up, cultures pending. 2.DM:  Sliding scale, home medications. 3. DANIELLA:  Resolved    4. Essential HTN:  Home medications. 5. AMS; acute:  MRI shows chronic changes, EEG pending. Patient has remained alert and oriented x3 since. 6. Right upper arm edema:  Nonocclusive thrombus within the right cephalic vein in the antecubital fossa  at the level of an IV site, no intervention needed. Radial pulses 2+, brisk cap refill. Keep arm elevated.             Signed:  ZACK Lizarraga

## 2019-12-03 LAB
ANION GAP SERPL CALC-SCNC: 8 MMOL/L (ref 7–16)
BASOPHILS # BLD: 0.1 K/UL (ref 0–0.2)
BASOPHILS NFR BLD: 1 % (ref 0–2)
BUN SERPL-MCNC: 34 MG/DL (ref 8–23)
CALCIUM SERPL-MCNC: 8.6 MG/DL (ref 8.3–10.4)
CHLORIDE SERPL-SCNC: 106 MMOL/L (ref 98–107)
CK MB SERPL-MCNC: NORMAL NG/ML (ref 0.5–3.6)
CO2 SERPL-SCNC: 25 MMOL/L (ref 21–32)
CREAT SERPL-MCNC: 1.21 MG/DL (ref 0.6–1)
DIFFERENTIAL METHOD BLD: ABNORMAL
EOSINOPHIL # BLD: 0.3 K/UL (ref 0–0.8)
EOSINOPHIL NFR BLD: 4 % (ref 0.5–7.8)
ERYTHROCYTE [DISTWIDTH] IN BLOOD BY AUTOMATED COUNT: 12.6 % (ref 11.9–14.6)
GLUCOSE BLD STRIP.AUTO-MCNC: 159 MG/DL (ref 65–100)
GLUCOSE BLD STRIP.AUTO-MCNC: 166 MG/DL (ref 65–100)
GLUCOSE BLD STRIP.AUTO-MCNC: 292 MG/DL (ref 65–100)
GLUCOSE BLD STRIP.AUTO-MCNC: 90 MG/DL (ref 65–100)
GLUCOSE SERPL-MCNC: 109 MG/DL (ref 65–100)
HCT VFR BLD AUTO: 35.6 % (ref 35.8–46.3)
HGB BLD-MCNC: 11.8 G/DL (ref 11.7–15.4)
IMM GRANULOCYTES # BLD AUTO: 0 K/UL (ref 0–0.5)
IMM GRANULOCYTES NFR BLD AUTO: 1 % (ref 0–5)
LYMPHOCYTES # BLD: 1.8 K/UL (ref 0.5–4.6)
LYMPHOCYTES NFR BLD: 24 % (ref 13–44)
MCH RBC QN AUTO: 32.8 PG (ref 26.1–32.9)
MCHC RBC AUTO-ENTMCNC: 33.1 G/DL (ref 31.4–35)
MCV RBC AUTO: 98.9 FL (ref 79.6–97.8)
MONOCYTES # BLD: 0.8 K/UL (ref 0.1–1.3)
MONOCYTES NFR BLD: 11 % (ref 4–12)
NEUTS SEG # BLD: 4.5 K/UL (ref 1.7–8.2)
NEUTS SEG NFR BLD: 60 % (ref 43–78)
NRBC # BLD: 0 K/UL (ref 0–0.2)
PLATELET # BLD AUTO: 284 K/UL (ref 150–450)
PMV BLD AUTO: 8.8 FL (ref 9.4–12.3)
POTASSIUM SERPL-SCNC: 4 MMOL/L (ref 3.5–5.1)
RBC # BLD AUTO: 3.6 M/UL (ref 4.05–5.2)
SODIUM SERPL-SCNC: 139 MMOL/L (ref 136–145)
WBC # BLD AUTO: 7.5 K/UL (ref 4.3–11.1)

## 2019-12-03 PROCEDURE — 80048 BASIC METABOLIC PNL TOTAL CA: CPT

## 2019-12-03 PROCEDURE — 82962 GLUCOSE BLOOD TEST: CPT

## 2019-12-03 PROCEDURE — 74011250637 HC RX REV CODE- 250/637: Performed by: INTERNAL MEDICINE

## 2019-12-03 PROCEDURE — 77030038269 HC DRN EXT URIN PURWCK BARD -A

## 2019-12-03 PROCEDURE — 85025 COMPLETE CBC W/AUTO DIFF WBC: CPT

## 2019-12-03 PROCEDURE — 65270000029 HC RM PRIVATE

## 2019-12-03 PROCEDURE — 94640 AIRWAY INHALATION TREATMENT: CPT

## 2019-12-03 PROCEDURE — 74011636637 HC RX REV CODE- 636/637: Performed by: INTERNAL MEDICINE

## 2019-12-03 PROCEDURE — 94760 N-INVAS EAR/PLS OXIMETRY 1: CPT

## 2019-12-03 PROCEDURE — 77030020263 HC SOL INJ SOD CL0.9% LFCR 1000ML

## 2019-12-03 PROCEDURE — 74011250636 HC RX REV CODE- 250/636: Performed by: INTERNAL MEDICINE

## 2019-12-03 PROCEDURE — 36415 COLL VENOUS BLD VENIPUNCTURE: CPT

## 2019-12-03 RX ADMIN — QUETIAPINE FUMARATE 25 MG: 25 TABLET ORAL at 21:22

## 2019-12-03 RX ADMIN — INSULIN LISPRO 2 UNITS: 100 INJECTION, SOLUTION INTRAVENOUS; SUBCUTANEOUS at 11:30

## 2019-12-03 RX ADMIN — SODIUM CHLORIDE 100 ML/HR: 900 INJECTION, SOLUTION INTRAVENOUS at 02:26

## 2019-12-03 RX ADMIN — HYDRALAZINE HYDROCHLORIDE 25 MG: 25 TABLET, FILM COATED ORAL at 08:09

## 2019-12-03 RX ADMIN — ATENOLOL 25 MG: 25 TABLET ORAL at 08:08

## 2019-12-03 RX ADMIN — MONTELUKAST SODIUM 10 MG: 10 TABLET, FILM COATED ORAL at 08:08

## 2019-12-03 RX ADMIN — FOLIC ACID 0.5 MG: 1 TABLET ORAL at 08:09

## 2019-12-03 RX ADMIN — ASPIRIN 81 MG: 81 TABLET ORAL at 05:30

## 2019-12-03 RX ADMIN — AMLODIPINE BESYLATE 10 MG: 10 TABLET ORAL at 08:08

## 2019-12-03 RX ADMIN — FENOFIBRATE 54 MG: 54 TABLET ORAL at 08:08

## 2019-12-03 RX ADMIN — HYDRALAZINE HYDROCHLORIDE 25 MG: 25 TABLET, FILM COATED ORAL at 21:22

## 2019-12-03 RX ADMIN — HYDRALAZINE HYDROCHLORIDE 25 MG: 25 TABLET, FILM COATED ORAL at 17:07

## 2019-12-03 RX ADMIN — PREGABALIN 50 MG: 50 CAPSULE ORAL at 17:06

## 2019-12-03 RX ADMIN — LEVOTHYROXINE SODIUM 25 MCG: 50 TABLET ORAL at 05:31

## 2019-12-03 RX ADMIN — FOLIC ACID 0.5 MG: 1 TABLET ORAL at 17:07

## 2019-12-03 RX ADMIN — PREGABALIN 50 MG: 50 CAPSULE ORAL at 08:09

## 2019-12-03 RX ADMIN — TIOTROPIUM BROMIDE 18 MCG: 18 CAPSULE ORAL; RESPIRATORY (INHALATION) at 08:40

## 2019-12-03 RX ADMIN — INSULIN LISPRO 6 UNITS: 100 INJECTION, SOLUTION INTRAVENOUS; SUBCUTANEOUS at 21:22

## 2019-12-03 RX ADMIN — INSULIN LISPRO 2 UNITS: 100 INJECTION, SOLUTION INTRAVENOUS; SUBCUTANEOUS at 16:30

## 2019-12-03 NOTE — DIABETES MGMT
Blood glucose ranged 121-241 yesterday with patient receiving Humalog 12 units and glipizide 10 mg. Blood glucose 90 this morning. Glipizide was discontinued yesterday. Patient has no basal needs as fasting is 90. Spoke with provider and plan is for patient to remain on SSI if discharged to rehab facility to reduce risk for hypoglycemia and resume Bydureon at discharge as this facility does not carry it.

## 2019-12-03 NOTE — PROGRESS NOTES
Hospitalist Progress Note    Subjective:   Daily Progress Note: 12/3/2019 7:52 AM    Patient presented to [facility] office 11/27 with UTI sx x 4-5 days, found to have irregular, rapid heartbeat, SOB and bilateral LE edema x 24 hours. Sent to ER, tachy on arrival, found with UTI, sepsis, DANIELLA. Rocephin begun. 12/1:  Found unresponsive on toilet. RRT, Code S initiated. Not moving right lower extremity. Per Teleneuro, more consistent with encephalopathy or delirium, no acute CVA interventions. Awoke the next am at baseline, does not recall events. Blood and urine cultures negative. 12/2:  Superficial thrombosis left upper extremity    12/3:  Pending insurance approval for rehab at [facility]. At baseline. Alert, oriented. Mild dementia. Daughter at bedside. DM management in, recommendations.      ADDITIONAL HISTORY:  Dementia, frequent falls, asthma, cellulitis, Charcot foot, DM II, Charcot-Evy-Tooth, GERD, hypertension, hyperlipdiemia, neuropathy, ALEXIS without CPAP,     Current Facility-Administered Medications   Medication Dose Route Frequency    0.9% sodium chloride infusion  100 mL/hr IntraVENous CONTINUOUS    hydrALAZINE (APRESOLINE) tablet 25 mg  25 mg Oral TID    traMADol BP17 300 mg (Patient Supplied)  300 mg Oral DAILY    alum-mag hydroxide-simeth (MYLANTA) oral suspension 30 mL  30 mL Oral Q6H PRN    traMADol (ULTRAM) tablet 50 mg  50 mg Oral Q6H PRN    amLODIPine (NORVASC) tablet 10 mg  10 mg Oral DAILY    hydrALAZINE (APRESOLINE) 20 mg/mL injection 10 mg  10 mg IntraVENous Q6H PRN    sodium chloride (NS) flush 5-10 mL  5-10 mL IntraVENous PRN    aspirin delayed-release tablet 81 mg  81 mg Oral 7am    atenolol (TENORMIN) tablet 25 mg  25 mg Oral DAILY    levothyroxine (SYNTHROID) tablet 25 mcg  25 mcg Oral ACB    montelukast (SINGULAIR) tablet 10 mg  10 mg Oral DAILY    pregabalin (LYRICA) capsule 50 mg  50 mg Oral BID    QUEtiapine (SEROquel) tablet 25 mg  25 mg Oral QHS    tiotropium [facility]) inhalation capsule 18 mcg  1 Cap Inhalation 7am    folic acid (FOLVITE) tablet 0.5 mg  0.5 mg Oral BID    fenofibrate (LOFIBRA) tablet 54 mg  54 mg Oral DAILY    docusate sodium (COLACE) capsule 100 mg  100 mg Oral BID PRN    albuterol (PROVENTIL VENTOLIN) nebulizer solution 2.5 mg  2.5 mg Nebulization Q6H PRN    insulin lispro (HUMALOG) injection   SubCUTAneous AC&HS        Review of Systems  A comprehensive review of systems was negative except for that written in the HPI. Objective:     Visit Vitals  /77   Pulse 76   Temp 97.8 °F (36.6 °C)   Resp 18   Ht 5' 4\" (1.626 m)   Wt 85.3 kg (188 lb)   LMP 1980   SpO2 94%   BMI 32.27 kg/m²      O2 Device: Room air    Temp (24hrs), Av.9 °F (36.6 °C), Min:97.2 °F (36.2 °C), Max:98.4 °F (36.9 °C)     1901 -  0700  In: 840 [P.O.:840]  Out: 2000 [Urine:2000]    General appearance: Oriented and alert, cooperative, denies pain or need. Head: Normocephalic, without obvious abnormality, atraumatic  Eyes: conjunctivae/corneas clear. PERRL, EOM's grossly intact. Fundi benign  Neck: supple, symmetrical, trachea midline, and no JVD  Lungs: clear to auscultation bilaterally  Heart: regular rate and rhythm, S1, S2 normal, no murmur, click, rub or gallop  Abdomen: soft, non-tender. Bowel sounds normal. No masses,  no organomegaly  Extremities: Bilateral Charcot foot.   Otherwise all extremities normal, atraumatic, no cyanosis or edema  Skin: Skin color, texture, turgor normal. No rashes or lesions  Neurologic: Grossly normal    Additional comments: Notes,orders, test results, vitals reviewed    Data Review  Recent Results (from the past 24 hour(s))   PLEASE READ & DOCUMENT PPD TEST IN 72 HRS    Collection Time: 19  9:05 AM   Result Value Ref Range    PPD Negative Negative    mm Induration 0 0 - 5 mm   GLUCOSE, POC    Collection Time: 19 11:15 AM   Result Value Ref Range    Glucose (POC) 221 (H) 65 - 100 mg/dL   GLUCOSE, POC Collection Time: 12/02/19  4:39 PM   Result Value Ref Range    Glucose (POC) 236 (H) 65 - 100 mg/dL   GLUCOSE, POC    Collection Time: 12/02/19  9:04 PM   Result Value Ref Range    Glucose (POC) 241 (H) 65 - 100 mg/dL   CBC WITH AUTOMATED DIFF    Collection Time: 12/03/19  4:26 AM   Result Value Ref Range    WBC 7.5 4.3 - 11.1 K/uL    RBC 3.60 (L) 4.05 - 5.2 M/uL    HGB 11.8 11.7 - 15.4 g/dL    HCT 35.6 (L) 35.8 - 46.3 %    MCV 98.9 (H) 79.6 - 97.8 FL    MCH 32.8 26.1 - 32.9 PG    MCHC 33.1 31.4 - 35.0 g/dL    RDW 12.6 11.9 - 14.6 %    PLATELET 727 184 - 507 K/uL    MPV 8.8 (L) 9.4 - 12.3 FL    ABSOLUTE NRBC 0.00 0.0 - 0.2 K/uL    DF AUTOMATED      NEUTROPHILS 60 43 - 78 %    LYMPHOCYTES 24 13 - 44 %    MONOCYTES 11 4.0 - 12.0 %    EOSINOPHILS 4 0.5 - 7.8 %    BASOPHILS 1 0.0 - 2.0 %    IMMATURE GRANULOCYTES 1 0.0 - 5.0 %    ABS. NEUTROPHILS 4.5 1.7 - 8.2 K/UL    ABS. LYMPHOCYTES 1.8 0.5 - 4.6 K/UL    ABS. MONOCYTES 0.8 0.1 - 1.3 K/UL    ABS. EOSINOPHILS 0.3 0.0 - 0.8 K/UL    ABS. BASOPHILS 0.1 0.0 - 0.2 K/UL    ABS. IMM. GRANS. 0.0 0.0 - 0.5 K/UL   METABOLIC PANEL, BASIC    Collection Time: 12/03/19  4:26 AM   Result Value Ref Range    Sodium 139 136 - 145 mmol/L    Potassium 4.0 3.5 - 5.1 mmol/L    Chloride 106 98 - 107 mmol/L    CO2 25 21 - 32 mmol/L    Anion gap 8 7 - 16 mmol/L    Glucose 109 (H) 65 - 100 mg/dL    BUN 34 (H) 8 - 23 MG/DL    Creatinine 1.21 (H) 0.6 - 1.0 MG/DL    GFR est AA 54 (L) >60 ml/min/1.73m2    GFR est non-AA 45 (L) >60 ml/min/1.73m2    Calcium 8.6 8.3 - 10.4 MG/DL   GLUCOSE, POC    Collection Time: 12/03/19  7:19 AM   Result Value Ref Range    Glucose (POC) 90 65 - 100 mg/dL     11/27:  CXR: NO ACUTE CARDIOPULMONARY DISEASE IDENTIFIED. 12/1:  CODE S CT HEAD: No acute intracranial hemorrhage or CT evidence for acute territorial infarction. Note that MRI is more sensitive for detection of acute/subacute infarct. Chronic small vessel changes.  Small old/chronic lacunar infarct in the right basal ganglia    12/1:  UPPER EXTREMITY DOPPLER RIGHT: Nonocclusive thrombus within the right cephalic vein in the antecubital fossa at the level of an IV site.     12/1:  MRI BRAIN: No acute intracranial abnormality. Severe bilateral hemispheric atrophy. Moderate to severe chronic white matter microangiopathic changes    12/2:  EEG:   The results of this EEG are limited due to 60 Hz artifact; however, the background appears normal.  No interictal epileptiform discharges or lateralizing features were seen. Assessment/Plan:   Sepsis due to UTI:  Sepsis protocol    Acute UTI:  Treated with rocephin   Urine and blood cultures w no growth     Altered Mental status: syncopal episode   Unclear etiology:  Possible orthostatic  Hypotension. Orthostatic hypotension:  Systolic dropped from 586 Lying to 116 standing, 10 point diastolic change, HR 10 bpm rise.     Rise slowly   Continue PT, OT     Hypertension:  Continue home meds    Acute kidney injury: Improved     IDDM II:  A1C:  8.3   Continue to hold glipizide on discharge   Reinstate Bydureon at discharge   Continue SSI at rehab    Right upper arm edema:     Nonocclusive thrombus right cephalic vein   No intervention needed    Discharge to rehab when precert complete    Care Plan discussed with: Patient, daughter,  and Nurse      Signed By: Valeria Marcos NP     December 3, 2019

## 2019-12-03 NOTE — PROGRESS NOTES
Interdisciplinary Rounds completed 12/3/19. Nursing, Case Management, Physician and PT present. Plan of care reviewed and updated. Waiting precert for rehab.

## 2019-12-03 NOTE — PROGRESS NOTES
Pt just reported her eyeglasses missing. Pt said her glasses has \"a big scratch\" on them because she dropped them. Pt said she last saw her glasses on the bedside table yesterday. This nurse looked around the room but did not see pt's glasses. Will keep on checking.

## 2019-12-03 NOTE — PROGRESS NOTES
Critical Care Outreach Nurse Progress Report:    Subjective: In to assess pt secondary to recent Code S.   MEWS Score: 1 (12/03/19 0345)    Vitals:    12/03/19 0442 12/03/19 0720 12/03/19 0840 12/03/19 1139   BP:  153/77  153/79   Pulse:  76  84   Resp:  18  18   Temp:  97.8 °F (36.6 °C)  97.9 °F (36.6 °C)   SpO2:  94% 96% 96%   Weight: 85.3 kg (188 lb)      Height:            Objective: Patient lying in bed. Pain Intensity 1: 0 (12/02/19 1936)  Pain Location 1: Knee  Pain Intervention(s) 1: Medication (see MAR)(scheduled)  Patient Stated Pain Goal: 0    Assessment: Patient awakens to voice. No complaints. Denies pain. Vital signs stable. Plan: Will discharge from outreach program. Please call with any needs.

## 2019-12-04 LAB
GLUCOSE BLD STRIP.AUTO-MCNC: 106 MG/DL (ref 65–100)
GLUCOSE BLD STRIP.AUTO-MCNC: 212 MG/DL (ref 65–100)
GLUCOSE BLD STRIP.AUTO-MCNC: 213 MG/DL (ref 65–100)
GLUCOSE BLD STRIP.AUTO-MCNC: 241 MG/DL (ref 65–100)

## 2019-12-04 PROCEDURE — 74011636637 HC RX REV CODE- 636/637: Performed by: INTERNAL MEDICINE

## 2019-12-04 PROCEDURE — 82962 GLUCOSE BLOOD TEST: CPT

## 2019-12-04 PROCEDURE — 94760 N-INVAS EAR/PLS OXIMETRY 1: CPT

## 2019-12-04 PROCEDURE — 51798 US URINE CAPACITY MEASURE: CPT

## 2019-12-04 PROCEDURE — 77030038269 HC DRN EXT URIN PURWCK BARD -A

## 2019-12-04 PROCEDURE — 94640 AIRWAY INHALATION TREATMENT: CPT

## 2019-12-04 PROCEDURE — 74011250637 HC RX REV CODE- 250/637: Performed by: INTERNAL MEDICINE

## 2019-12-04 PROCEDURE — 65270000029 HC RM PRIVATE

## 2019-12-04 RX ADMIN — HYDRALAZINE HYDROCHLORIDE 25 MG: 25 TABLET, FILM COATED ORAL at 09:21

## 2019-12-04 RX ADMIN — ASPIRIN 81 MG: 81 TABLET ORAL at 05:24

## 2019-12-04 RX ADMIN — INSULIN LISPRO 4 UNITS: 100 INJECTION, SOLUTION INTRAVENOUS; SUBCUTANEOUS at 21:35

## 2019-12-04 RX ADMIN — MONTELUKAST SODIUM 10 MG: 10 TABLET, FILM COATED ORAL at 09:21

## 2019-12-04 RX ADMIN — FOLIC ACID 0.5 MG: 1 TABLET ORAL at 09:22

## 2019-12-04 RX ADMIN — AMLODIPINE BESYLATE 10 MG: 10 TABLET ORAL at 09:21

## 2019-12-04 RX ADMIN — FOLIC ACID 0.5 MG: 1 TABLET ORAL at 17:17

## 2019-12-04 RX ADMIN — FENOFIBRATE 54 MG: 54 TABLET ORAL at 09:22

## 2019-12-04 RX ADMIN — HYDRALAZINE HYDROCHLORIDE 25 MG: 25 TABLET, FILM COATED ORAL at 21:35

## 2019-12-04 RX ADMIN — PREGABALIN 50 MG: 50 CAPSULE ORAL at 17:16

## 2019-12-04 RX ADMIN — ATENOLOL 25 MG: 25 TABLET ORAL at 09:21

## 2019-12-04 RX ADMIN — PREGABALIN 50 MG: 50 CAPSULE ORAL at 09:21

## 2019-12-04 RX ADMIN — HYDRALAZINE HYDROCHLORIDE 25 MG: 25 TABLET, FILM COATED ORAL at 17:17

## 2019-12-04 RX ADMIN — TIOTROPIUM BROMIDE 18 MCG: 18 CAPSULE ORAL; RESPIRATORY (INHALATION) at 07:32

## 2019-12-04 RX ADMIN — INSULIN LISPRO 4 UNITS: 100 INJECTION, SOLUTION INTRAVENOUS; SUBCUTANEOUS at 12:12

## 2019-12-04 RX ADMIN — INSULIN LISPRO 4 UNITS: 100 INJECTION, SOLUTION INTRAVENOUS; SUBCUTANEOUS at 17:16

## 2019-12-04 RX ADMIN — LEVOTHYROXINE SODIUM 25 MCG: 50 TABLET ORAL at 05:24

## 2019-12-04 RX ADMIN — QUETIAPINE FUMARATE 25 MG: 25 TABLET ORAL at 21:35

## 2019-12-04 RX ADMIN — Medication 10 ML: at 21:35

## 2019-12-04 NOTE — PROGRESS NOTES
Interdisciplinary Rounds completed 12/4/19. Nursing, Case Management, Physician and PT present. Plan of care reviewed and updated. Waiting for precert.

## 2019-12-04 NOTE — PROGRESS NOTES
Hospitalist Progress Note    Subjective:   Daily Progress Note: 12/4/2019 5:46 PM    Patient presented to [facility] office 11/27 with UTI sx x 4-5 days, found to have irregular, rapid heartbeat, SOB and bilateral LE edema x 24 hours. Sent to ER, tachy on arrival, found with UTI, sepsis, DANIELLA. Rocephin begun. 12/1:  Found unresponsive on toilet. RRT, Code S initiated. Not moving right lower extremity. Per Teleneuro, more consistent with encephalopathy or delirium, no acute CVA interventions. Awoke the next am at baseline, does not recall events. Blood and urine cultures negative. 12/2:  Superficial thrombosis left upper extremity   12/3:  Pending insurance approval for rehab at [facility]. At baseline. Alert, oriented. Mild dementia. Daughter at bedside. DM management in, recs to hold glipizide on discharge to reduce risk for hypoglycemia. Will continue SSI at rehab and resume Bydureon. 12/4:   Improving. Daughter reports mentation at baseline. No complaints.   Up in chair today      ADDITIONAL HISTORY:  Dementia, frequent falls, asthma, cellulitis, Charcot foot, DM II, Charcot-Evy-Tooth, GERD, hypertension, hyperlipdiemia, neuropathy, ALEXIS without CPAP,     Current Facility-Administered Medications   Medication Dose Route Frequency    0.9% sodium chloride infusion  100 mL/hr IntraVENous CONTINUOUS    hydrALAZINE (APRESOLINE) tablet 25 mg  25 mg Oral TID    traMADol BP17 300 mg (Patient Supplied)  300 mg Oral DAILY    alum-mag hydroxide-simeth (MYLANTA) oral suspension 30 mL  30 mL Oral Q6H PRN    traMADol (ULTRAM) tablet 50 mg  50 mg Oral Q6H PRN    amLODIPine (NORVASC) tablet 10 mg  10 mg Oral DAILY    hydrALAZINE (APRESOLINE) 20 mg/mL injection 10 mg  10 mg IntraVENous Q6H PRN    sodium chloride (NS) flush 5-10 mL  5-10 mL IntraVENous PRN    aspirin delayed-release tablet 81 mg  81 mg Oral 7am    atenolol (TENORMIN) tablet 25 mg  25 mg Oral DAILY    levothyroxine (SYNTHROID) tablet 25 mcg  25 mcg Oral ACB    montelukast (SINGULAIR) tablet 10 mg  10 mg Oral DAILY    pregabalin (LYRICA) capsule 50 mg  50 mg Oral BID    QUEtiapine (SEROquel) tablet 25 mg  25 mg Oral QHS    tiotropium (SPIRIVA) inhalation capsule 18 mcg  1 Cap Inhalation 7am    folic acid (FOLVITE) tablet 0.5 mg  0.5 mg Oral BID    fenofibrate (LOFIBRA) tablet 54 mg  54 mg Oral DAILY    docusate sodium (COLACE) capsule 100 mg  100 mg Oral BID PRN    albuterol (PROVENTIL VENTOLIN) nebulizer solution 2.5 mg  2.5 mg Nebulization Q6H PRN    insulin lispro (HUMALOG) injection   SubCUTAneous AC&HS        Review of Systems  Patient is unable. Objective:     Visit Vitals  /61 (BP 1 Location: Left arm, BP Patient Position: At rest)   Pulse 88   Temp 98.7 °F (37.1 °C)   Resp 20   Ht 5' 4\" (1.626 m)   Wt 84.7 kg (186 lb 11.2 oz)   LMP 1980   SpO2 97%   BMI 32.05 kg/m²      O2 Device: Room air    Temp (24hrs), Av.1 °F (36.7 °C), Min:97.4 °F (36.3 °C), Max:98.7 °F (37.1 °C)    701 - 1900  In: -   Out: 6331 [NOABK:3057]  1901 -  07  In: 840 [P.O.:840]  Out: 3654 [REFC]    General appearance: Oriented and alert, forgetful but more clear today. Cooperative, denies pain or need. Head: Normocephalic, without obvious abnormality, atraumatic  Eyes: conjunctivae/corneas clear. PERRL, EOM's grossly intact. Fundi benign  Neck: supple, symmetrical, trachea midline, and no JVD  Lungs: clear to auscultation bilaterally  Heart: regular rate and rhythm, S1, S2 normal, no murmur, click, rub or gallop  Abdomen: soft, non-tender. Bowel sounds normal. No masses,  no organomegaly  Extremities: Bilateral Charcot foot.   Otherwise all extremities normal, atraumatic, no cyanosis or edema  Skin: Skin color, texture, turgor normal. No rashes or lesions  Neurologic: Grossly normal     Additional comments: Notes,orders, test results, vitals reviewed    Data Review  Recent Results (from the past 24 hour(s))   GLUCOSE, POC    Collection Time: 12/03/19  8:43 PM   Result Value Ref Range    Glucose (POC) 292 (H) 65 - 100 mg/dL   GLUCOSE, POC    Collection Time: 12/04/19  7:37 AM   Result Value Ref Range    Glucose (POC) 106 (H) 65 - 100 mg/dL   GLUCOSE, POC    Collection Time: 12/04/19 10:56 AM   Result Value Ref Range    Glucose (POC) 212 (H) 65 - 100 mg/dL   GLUCOSE, POC    Collection Time: 12/04/19  4:09 PM   Result Value Ref Range    Glucose (POC) 241 (H) 65 - 100 mg/dL   11/27:  CXR: NO ACUTE CARDIOPULMONARY DISEASE IDENTIFIED.     12/1:  CODE S CT HEAD: No acute intracranial hemorrhage or CT evidence for acute territorial infarction. Note that MRI is more sensitive for detection of acute/subacute infarct. Chronic small vessel changes. Small old/chronic lacunar infarct in the right basal ganglia     12/1:  UPPER EXTREMITY DOPPLER RIGHT: Nonocclusive thrombus within the right cephalic vein in the antecubital fossa at the level of an IV site.     12/1:  MRI BRAIN: No acute intracranial abnormality. Severe bilateral hemispheric atrophy. Moderate to severe chronic white matter microangiopathic changes     12/2:  EEG:   The results of this EEG are limited due to 60 Hz artifact; however, the background appears normal.  No interictal epileptiform discharges or lateralizing features were seen. Assessment/Plan:   Sepsis due to UTI:  Sepsis protocol:  Improving     Blood and urine cultures NGTD    Acute UTI:  Treated with rocephin              Urine and blood cultures w no growth to date     Altered Mental status: syncopal episode              Unclear etiology:  Possible orthostatic           Hypotension.     Orthostatic hypotension:  Systolic dropped from 735 Lying to 116 standing, 10 point diastolic change, HR 10 bpm rise.                Rise slowly              Continue PT, OT    Plans for rehab      Hypertension:  Continue home meds     Acute kidney injury: Improved      IDDM II:  A1C:  8.3              Continue to hold glipizide on discharge              Reinstate Bydureon at discharge              Continue SSI at rehab     Right upper arm edema:                Nonocclusive thrombus right cephalic vein              No intervention needed     Discharge to rehab when precert complete, hopefully tomorrow.      Care Plan discussed with: Patient, daughter, and Nurse    Signed By: Bell Condon NP     December 4, 2019

## 2019-12-04 NOTE — PROGRESS NOTES
12/03/19 1605   Vital Signs   /59   MAP (Calculated) 90   BP 1 Method Automatic   BP 1 Location Left arm   BP Patient Position Supine   Additional Blood Pressure/Pulse Data   BP 2 152/78   BP Method 2 Automatic   BP 2 Location Left arm   Patient Position 2 Sitting   Pulse 2 96   BP Method 3 Automatic   Patient Position 3 Standing   BP 3 153/63   BP 3 Location Left arm   Pulse 3 99       This nurse was going over pt's orders, saw that ortho bps needed to be put in a note. This was done during day shift by the assistant.

## 2019-12-05 LAB
ANION GAP SERPL CALC-SCNC: 8 MMOL/L (ref 7–16)
BASOPHILS # BLD: 0.1 K/UL (ref 0–0.2)
BASOPHILS NFR BLD: 1 % (ref 0–2)
BUN SERPL-MCNC: 30 MG/DL (ref 8–23)
CALCIUM SERPL-MCNC: 9.6 MG/DL (ref 8.3–10.4)
CHLORIDE SERPL-SCNC: 108 MMOL/L (ref 98–107)
CO2 SERPL-SCNC: 27 MMOL/L (ref 21–32)
CREAT SERPL-MCNC: 1.19 MG/DL (ref 0.6–1)
DIFFERENTIAL METHOD BLD: ABNORMAL
EOSINOPHIL # BLD: 0.2 K/UL (ref 0–0.8)
EOSINOPHIL NFR BLD: 2 % (ref 0.5–7.8)
ERYTHROCYTE [DISTWIDTH] IN BLOOD BY AUTOMATED COUNT: 12.5 % (ref 11.9–14.6)
GLUCOSE BLD STRIP.AUTO-MCNC: 130 MG/DL (ref 65–100)
GLUCOSE BLD STRIP.AUTO-MCNC: 184 MG/DL (ref 65–100)
GLUCOSE BLD STRIP.AUTO-MCNC: 301 MG/DL (ref 65–100)
GLUCOSE BLD STRIP.AUTO-MCNC: 304 MG/DL (ref 65–100)
GLUCOSE SERPL-MCNC: 129 MG/DL (ref 65–100)
HCT VFR BLD AUTO: 38.3 % (ref 35.8–46.3)
HGB BLD-MCNC: 12.5 G/DL (ref 11.7–15.4)
IMM GRANULOCYTES # BLD AUTO: 0 K/UL (ref 0–0.5)
IMM GRANULOCYTES NFR BLD AUTO: 0 % (ref 0–5)
LYMPHOCYTES # BLD: 2.3 K/UL (ref 0.5–4.6)
LYMPHOCYTES NFR BLD: 23 % (ref 13–44)
MAGNESIUM SERPL-MCNC: 1.8 MG/DL (ref 1.8–2.4)
MCH RBC QN AUTO: 31.9 PG (ref 26.1–32.9)
MCHC RBC AUTO-ENTMCNC: 32.6 G/DL (ref 31.4–35)
MCV RBC AUTO: 97.7 FL (ref 79.6–97.8)
MONOCYTES # BLD: 0.9 K/UL (ref 0.1–1.3)
MONOCYTES NFR BLD: 10 % (ref 4–12)
NEUTS SEG # BLD: 6.1 K/UL (ref 1.7–8.2)
NEUTS SEG NFR BLD: 63 % (ref 43–78)
NRBC # BLD: 0 K/UL (ref 0–0.2)
PLATELET # BLD AUTO: 319 K/UL (ref 150–450)
PMV BLD AUTO: 8.9 FL (ref 9.4–12.3)
POTASSIUM SERPL-SCNC: 4 MMOL/L (ref 3.5–5.1)
RBC # BLD AUTO: 3.92 M/UL (ref 4.05–5.2)
SODIUM SERPL-SCNC: 143 MMOL/L (ref 136–145)
WBC # BLD AUTO: 9.6 K/UL (ref 4.3–11.1)

## 2019-12-05 PROCEDURE — 82962 GLUCOSE BLOOD TEST: CPT

## 2019-12-05 PROCEDURE — 36415 COLL VENOUS BLD VENIPUNCTURE: CPT

## 2019-12-05 PROCEDURE — 74011636637 HC RX REV CODE- 636/637: Performed by: INTERNAL MEDICINE

## 2019-12-05 PROCEDURE — 74011250637 HC RX REV CODE- 250/637: Performed by: INTERNAL MEDICINE

## 2019-12-05 PROCEDURE — 65270000029 HC RM PRIVATE

## 2019-12-05 PROCEDURE — 94640 AIRWAY INHALATION TREATMENT: CPT

## 2019-12-05 PROCEDURE — 80048 BASIC METABOLIC PNL TOTAL CA: CPT

## 2019-12-05 PROCEDURE — 85025 COMPLETE CBC W/AUTO DIFF WBC: CPT

## 2019-12-05 PROCEDURE — 94760 N-INVAS EAR/PLS OXIMETRY 1: CPT

## 2019-12-05 PROCEDURE — 83735 ASSAY OF MAGNESIUM: CPT

## 2019-12-05 RX ADMIN — ATENOLOL 25 MG: 25 TABLET ORAL at 08:50

## 2019-12-05 RX ADMIN — MONTELUKAST SODIUM 10 MG: 10 TABLET, FILM COATED ORAL at 08:50

## 2019-12-05 RX ADMIN — INSULIN LISPRO 2 UNITS: 100 INJECTION, SOLUTION INTRAVENOUS; SUBCUTANEOUS at 17:33

## 2019-12-05 RX ADMIN — HYDRALAZINE HYDROCHLORIDE 25 MG: 25 TABLET, FILM COATED ORAL at 21:54

## 2019-12-05 RX ADMIN — QUETIAPINE FUMARATE 25 MG: 25 TABLET ORAL at 21:54

## 2019-12-05 RX ADMIN — ASPIRIN 81 MG: 81 TABLET ORAL at 05:06

## 2019-12-05 RX ADMIN — FOLIC ACID 0.5 MG: 1 TABLET ORAL at 08:50

## 2019-12-05 RX ADMIN — INSULIN LISPRO 8 UNITS: 100 INJECTION, SOLUTION INTRAVENOUS; SUBCUTANEOUS at 11:42

## 2019-12-05 RX ADMIN — HYDRALAZINE HYDROCHLORIDE 25 MG: 25 TABLET, FILM COATED ORAL at 08:50

## 2019-12-05 RX ADMIN — PREGABALIN 50 MG: 50 CAPSULE ORAL at 17:30

## 2019-12-05 RX ADMIN — TRAMADOL HYDROCHLORIDE 300 MG: 300 CAPSULE ORAL at 08:55

## 2019-12-05 RX ADMIN — AMLODIPINE BESYLATE 10 MG: 10 TABLET ORAL at 08:50

## 2019-12-05 RX ADMIN — PREGABALIN 50 MG: 50 CAPSULE ORAL at 08:50

## 2019-12-05 RX ADMIN — TIOTROPIUM BROMIDE 18 MCG: 18 CAPSULE ORAL; RESPIRATORY (INHALATION) at 07:14

## 2019-12-05 RX ADMIN — INSULIN LISPRO 8 UNITS: 100 INJECTION, SOLUTION INTRAVENOUS; SUBCUTANEOUS at 22:03

## 2019-12-05 RX ADMIN — FENOFIBRATE 54 MG: 54 TABLET ORAL at 08:50

## 2019-12-05 RX ADMIN — HYDRALAZINE HYDROCHLORIDE 25 MG: 25 TABLET, FILM COATED ORAL at 17:30

## 2019-12-05 RX ADMIN — FOLIC ACID 0.5 MG: 1 TABLET ORAL at 17:31

## 2019-12-05 RX ADMIN — LEVOTHYROXINE SODIUM 25 MCG: 50 TABLET ORAL at 05:07

## 2019-12-05 NOTE — PROGRESS NOTES
Interdisciplinary Rounds completed 12/5/19. Nursing, Case Management, Physician and PT present. Plan of care reviewed and updated.     Continue to wait for pre-cert

## 2019-12-05 NOTE — PROGRESS NOTES
CM called [facility] [employer] to get an update on the pre-authorization. Stephen Crook stated she is going to forward the information to their MD and would notify CM today.

## 2019-12-05 NOTE — PROGRESS NOTES
CM received a call from [facility] stating patient has been denied to be discharged to [facility] due to CMS guidelines patient does not fall within those guidelines. Patient will be provided with a denial letter which will give her the opportunity to appeal the denial.  CM will notify and provide patient and family with denial letter.

## 2019-12-06 ENCOUNTER — HOME HEALTH ADMISSION (OUTPATIENT)
Dept: HOME HEALTH SERVICES | Facility: HOME HEALTH | Age: 84
End: 2019-12-06

## 2019-12-06 VITALS
OXYGEN SATURATION: 95 % | DIASTOLIC BLOOD PRESSURE: 59 MMHG | TEMPERATURE: 98.1 F | RESPIRATION RATE: 18 BRPM | BODY MASS INDEX: 30.85 KG/M2 | WEIGHT: 180.7 LBS | HEART RATE: 82 BPM | SYSTOLIC BLOOD PRESSURE: 142 MMHG | HEIGHT: 64 IN

## 2019-12-06 LAB
ANION GAP SERPL CALC-SCNC: 10 MMOL/L (ref 7–16)
BUN SERPL-MCNC: 38 MG/DL (ref 8–23)
CALCIUM SERPL-MCNC: 9.1 MG/DL (ref 8.3–10.4)
CHLORIDE SERPL-SCNC: 103 MMOL/L (ref 98–107)
CO2 SERPL-SCNC: 25 MMOL/L (ref 21–32)
CREAT SERPL-MCNC: 1.35 MG/DL (ref 0.6–1)
ERYTHROCYTE [DISTWIDTH] IN BLOOD BY AUTOMATED COUNT: 12.6 % (ref 11.9–14.6)
EST. AVERAGE GLUCOSE BLD GHB EST-MCNC: 186 MG/DL
GLUCOSE BLD STRIP.AUTO-MCNC: 132 MG/DL (ref 65–100)
GLUCOSE BLD STRIP.AUTO-MCNC: 252 MG/DL (ref 65–100)
GLUCOSE SERPL-MCNC: 136 MG/DL (ref 65–100)
HBA1C MFR BLD: 8.1 % (ref 4.8–6)
HCT VFR BLD AUTO: 36.3 % (ref 35.8–46.3)
HGB BLD-MCNC: 11.7 G/DL (ref 11.7–15.4)
MCH RBC QN AUTO: 31.8 PG (ref 26.1–32.9)
MCHC RBC AUTO-ENTMCNC: 32.2 G/DL (ref 31.4–35)
MCV RBC AUTO: 98.6 FL (ref 79.6–97.8)
NRBC # BLD: 0 K/UL (ref 0–0.2)
PLATELET # BLD AUTO: 305 K/UL (ref 150–450)
PMV BLD AUTO: 8.7 FL (ref 9.4–12.3)
POTASSIUM SERPL-SCNC: 4 MMOL/L (ref 3.5–5.1)
RBC # BLD AUTO: 3.68 M/UL (ref 4.05–5.2)
SODIUM SERPL-SCNC: 138 MMOL/L (ref 136–145)
WBC # BLD AUTO: 9 K/UL (ref 4.3–11.1)

## 2019-12-06 PROCEDURE — 74011636637 HC RX REV CODE- 636/637: Performed by: INTERNAL MEDICINE

## 2019-12-06 PROCEDURE — 85027 COMPLETE CBC AUTOMATED: CPT

## 2019-12-06 PROCEDURE — 94760 N-INVAS EAR/PLS OXIMETRY 1: CPT

## 2019-12-06 PROCEDURE — 94640 AIRWAY INHALATION TREATMENT: CPT

## 2019-12-06 PROCEDURE — 80048 BASIC METABOLIC PNL TOTAL CA: CPT

## 2019-12-06 PROCEDURE — 82962 GLUCOSE BLOOD TEST: CPT

## 2019-12-06 PROCEDURE — 74011250637 HC RX REV CODE- 250/637: Performed by: INTERNAL MEDICINE

## 2019-12-06 PROCEDURE — 83036 HEMOGLOBIN GLYCOSYLATED A1C: CPT

## 2019-12-06 PROCEDURE — 36415 COLL VENOUS BLD VENIPUNCTURE: CPT

## 2019-12-06 RX ORDER — INSULIN LISPRO 100 [IU]/ML
INJECTION, SOLUTION INTRAVENOUS; SUBCUTANEOUS
Qty: 1 VIAL | Refills: 0 | Status: SHIPPED | OUTPATIENT
Start: 2019-12-06 | End: 2020-01-16

## 2019-12-06 RX ORDER — INSULIN PUMP SYRINGE, 3 ML
EACH MISCELLANEOUS
Qty: 1 KIT | Refills: 0 | Status: SHIPPED | OUTPATIENT
Start: 2019-12-06

## 2019-12-06 RX ORDER — HYDRALAZINE HYDROCHLORIDE 25 MG/1
25 TABLET, FILM COATED ORAL 3 TIMES DAILY
Qty: 90 TAB | Refills: 1 | Status: SHIPPED | OUTPATIENT
Start: 2019-12-06 | End: 2020-01-16 | Stop reason: SDUPTHER

## 2019-12-06 RX ADMIN — TIOTROPIUM BROMIDE 18 MCG: 18 CAPSULE ORAL; RESPIRATORY (INHALATION) at 08:08

## 2019-12-06 RX ADMIN — ATENOLOL 25 MG: 25 TABLET ORAL at 09:03

## 2019-12-06 RX ADMIN — PREGABALIN 50 MG: 50 CAPSULE ORAL at 09:03

## 2019-12-06 RX ADMIN — LEVOTHYROXINE SODIUM 25 MCG: 50 TABLET ORAL at 04:52

## 2019-12-06 RX ADMIN — FENOFIBRATE 54 MG: 54 TABLET ORAL at 09:03

## 2019-12-06 RX ADMIN — TRAMADOL HYDROCHLORIDE 300 MG: 300 CAPSULE ORAL at 09:24

## 2019-12-06 RX ADMIN — INSULIN LISPRO 6 UNITS: 100 INJECTION, SOLUTION INTRAVENOUS; SUBCUTANEOUS at 12:15

## 2019-12-06 RX ADMIN — FOLIC ACID 0.5 MG: 1 TABLET ORAL at 09:03

## 2019-12-06 RX ADMIN — HYDRALAZINE HYDROCHLORIDE 25 MG: 25 TABLET, FILM COATED ORAL at 09:03

## 2019-12-06 RX ADMIN — AMLODIPINE BESYLATE 10 MG: 10 TABLET ORAL at 09:03

## 2019-12-06 RX ADMIN — MONTELUKAST SODIUM 10 MG: 10 TABLET, FILM COATED ORAL at 09:03

## 2019-12-06 RX ADMIN — ASPIRIN 81 MG: 81 TABLET ORAL at 04:52

## 2019-12-06 NOTE — PROGRESS NOTES
Discharge instructions and prescriptions given and reviewed with pt and daughter, verbalizes understanding, pt discharged home with family.

## 2019-12-06 NOTE — PROGRESS NOTES
Call received from [facility] at [place] and [address]  stating Humalog is not covered on pts insurance plan but Novolog is fully covered,  Ask Xi Wills NP is it was ok to change prescription, she stated that was fine, [facility] notified.

## 2019-12-06 NOTE — PROGRESS NOTES
Nutrition follow-up:  Assessment:   Diet: DIET NUTRITIONAL SUPPLEMENTS All Meals; Ensure High Protein  DIET DIABETIC CONSISTENT CARB Regular    Food/Nutrition Patient History:     Pt admitted with sepsis secondary to UTI. H/O DM, HTN. Labs: A1C 8.3 (8/21/19) POC glucose 132-304mg/dL. Pt up to chair at RD visit. Reports her intake overall is improving. She generally eats all of am meal, varied amounts of others depending on her preference. She drinks the ensure high protein. Noted pt insurance declined STR and planning to d/chome with home health. Anthropometrics: ,  Weight: 82.7 kg (182 lb 4.8 oz), Weight Source: Bed, Body mass index is 31.29 kg/m². BMI class of overweight for age >65 years. Last 3 Recorded Weights in this Encounter    12/04/19 0345 12/05/19 0457 12/06/19 0359   Weight: 84.7 kg (186 lb 11.2 oz) 84.1 kg (185 lb 6.4 oz) 82 kg (180 lb 11.2 oz)      WT / BMI 11/30/2019 11/27/2019 11/27/2019 11/27/2019 9/11/2019   WEIGHT 182 lb 4.8 oz   175 lb   196 lb      WT / BMI 8/28/2019 8/19/2019 6/4/2019 5/22/2019 5/8/2019   WEIGHT 199 lb 9.6 oz 193 lb 220 lb 202 lb 202 lb      WT / BMI 4/11/2019 3/7/2019 2/7/2019 12/13/2018 11/8/2018   WEIGHT 202 lb 6.4 oz 194 lb 198 lb 199 lb 200 lb   Per weights listed in EMR, potential for a 38 pound, 17.2% weight loss within 6 months. Macronutrient needs:(using CBW (Current body weight) 82.7 kg)  EER:  7025-4893 kcal /day (15-20 kcal/kg Current BW)  EPR:  65-82 grams protein/day (1.2-1.5 grams/kg IBW)(GFR 51) - DANIELLA  Intake/Comparative Standards: Recorded meal(s): %. This potentially meets ~100% of kcal and ~100% of protein needs        Intervention:  Meals and snacks: Continue current diet. Nutrition Supplement Therapy: Continue ensure high protein TID per pt request and history of weight loss.     Discharge nutrition plan: Suggested use of ONS at home secondary to her intake at home is primarily ordered foods per her previous report of limited nutrition quality.      [place], [address], [address]

## 2019-12-06 NOTE — PROGRESS NOTES
[address] [facility]  Face to Face Encounter    Patients Name: Oni Clements    YOB: 1934    Ordering Physician: Dr. Charles Ozuna    Primary Diagnosis: UTI (urinary tract infection) [N39.0]  Sepsis [facility]) [A41.9]    Date of Face to Face:   12/6/2019                                  Face to Face Encounter findings are related to primary reason for home care:   yes. 1. I certify that the patient needs intermittent care as follows: skilled nursing care:  skilled observation/assessment, patient education  physical therapy: strengthening, transfer training and balance training    2. I certify that this patient is homebound, that is: 1) patient requires the use of a walker device, special transportation, or assistance of another to leave the home; or 2) patient's condition makes leaving the home medically contraindicated; and 3) patient has a normal inability to leave the home and leaving the home requires considerable and taxing effort. Patient may leave the home for infrequent and short duration for medical reasons, and occasional absences for non-medical reasons. Homebound status is due to the following functional limitations: Patient with strength deficits limiting the performance of all ADL's without caregiver assistance or the use of an assistive device. Patient with poor safety awareness and is at risk for falls without assistance of another person and the use of an assistive device. Patient with poor ambulation endurance limiting their safe ability to ascend/descend the required number of steps to leave the home. 3. I certify that this patient is under my care and that I, or a nurse practitioner or [facility], or clinical nurse specialist, or certified nurse midwife, working with me, had a Face-to-Face Encounter that meets the physician Face-to-Face Encounter requirements.   The following are the clinical findings from the [address] [place] [address] encounter that support the need for skilled services and is a summary of the encounter: Medical Record    See discharge summary      Vanda Low RN  12/6/2019      THE FOLLOWING TO BE COMPLETED BY THE COMMUNITY PHYSICIAN:    I concur with the findings described above from the F2F encounter that this patient is homebound and in need of a skilled service.     Certifying Physician: _____________________________________      Printed Certifying Physician Name: _____________________________________    Date: _________________

## 2019-12-06 NOTE — PROGRESS NOTES
CM provided patient with a denial letter and informed her of her rights to appeal the denial to STR. I explained to patient I would reach out to her daughter as well.

## 2019-12-06 NOTE — DISCHARGE INSTRUCTIONS
DISCHARGE SUMMARY from Nurse    PATIENT INSTRUCTIONS:    After general anesthesia or intravenous sedation, for 24 hours or while taking prescription Narcotics:  · Limit your activities  · Do not drive and operate hazardous machinery  · Do not make important personal or business decisions  · Do  not drink alcoholic beverages  · If you have not urinated within 8 hours after discharge, please contact your surgeon on call. Report the following to your surgeon:  · Excessive pain, swelling, redness or odor of or around the surgical area  · Temperature over 100.5  · Nausea and vomiting lasting longer than 4 hours or if unable to take medications  · Any signs of decreased circulation or nerve impairment to extremity: change in color, persistent  numbness, tingling, coldness or increase pain  · Any questions    What to do at Home:  Recommended activity: Activity as tolerated. CHECK GLUCOSE BEFORE EVERY MEAL AND AT BED TIME AND RECORD.  TAKE INSULIN PER SCALE AND RECORD THIS ALSO.  TAKE ALL RECORDINGS WITH YOU TO APPOINTMENT.  THEY WILL ADJUST INSULIN SO YOU DONT HAVE TO CHECK IT SO OFTEN. DO NOT TAKE GLIPIZIDE UNLESS THEY THEY YOU TO. If you experience any of the following symptoms fever greater than 101, nausea/vomiting, chills, or difficulty urianting, please follow up with your primary care provider. *  Please give a list of your current medications to your Primary Care Provider. *  Please update this list whenever your medications are discontinued, doses are      changed, or new medications (including over-the-counter products) are added. *  Please carry medication information at all times in case of emergency situations. These are general instructions for a healthy lifestyle:    No smoking/ No tobacco products/ Avoid exposure to second hand smoke  Surgeon General's Warning:  Quitting smoking now greatly reduces serious risk to your health.     Obesity, smoking, and sedentary lifestyle greatly increases your risk for illness    A healthy diet, regular physical exercise & weight monitoring are important for maintaining a healthy lifestyle    You may be retaining fluid if you have a history of heart failure or if you experience any of the following symptoms:  Weight gain of 3 pounds or more overnight or 5 pounds in a week, increased swelling in our hands or feet or shortness of breath while lying flat in bed. Please call your doctor as soon as you notice any of these symptoms; do not wait until your next office visit. The discharge information has been reviewed with the patient. The patient verbalized understanding. Discharge medications reviewed with the patient and appropriate educational materials and side effects teaching were provided.   ___________________________________________________________________________________________________________________________________

## 2019-12-06 NOTE — PROGRESS NOTES
Call received from [address] [place] [address] Abbe Bailon Nurse regarding patient discharge status. Hemoglobin A1C needed. Quirino Castellanos NP made aware and order put in.

## 2019-12-06 NOTE — DIABETES MGMT
. Blood glucose range yesterday 129-304 with pt receiving a total 20 units of Humalog. Pt states that her appetite is good and she has been eating all of her meals. Pt plans on resuming her Bydureon at discharge. Recommended monitoring blood glucose bid and recording in log book to bring to PCP appointments to assist with medication titration. Pt verbalizes understanding. Stressed the importance of follow up care for diabetes management with PCP. Plan is for pt to return home with daughter with home health. Pt has no further questions at this time.

## 2019-12-06 NOTE — DISCHARGE SUMMARY
Hospitalist Discharge Summary     Admit Date:  2019 12:58 PM   Name:  Oni Clements   Age:  80 y.o.  :  1934   MRN:  [id number]   PCP:  Vic Best MD  Treatment Team: Attending Provider: Lorna Severin, Cliffton Bunch, MD; Care Manager: Kathi Ferrari RN; Utilization Review: Monique Bennett RN; Consulting Provider: Ana Cristina Willis MD; Hospitalist: Clint Jimenez NP; Primary Nurse: Brandon James RN; Occupational Therapy Assistant: Richmond Ko    PROBLEMS:   Sepsis due to UTI: Resolved   Acute UTI, treated with rocephin  Acute toxic encephalopathy due to UTI: Improved back to baseline on discharge  Hypotension on admission:  Improved   Orthostatic hypotension   Hypertension:  Continue home meds on discharge   Acute kidney injury: Improved   IDDM II:  A1C:  8.3  Right upper arm nonocclusive thrombus:  Improved   Slip and fall on buttocks due to persistent weakness   Frequent falls  Asthma  Charocot foot  Hyperlipidemia  Neuropathy  ALEXIS without CPAP     Hospital Course:  Patient presented to [facility] office  with UTI sx x 4-5 days, found to have irregular, rapid heartbeat, SOB and bilateral LE edema x 24 hours. Sent to ER, tachy on arrival, found with UTI, sepsis, DANIELLA. Rocephin begun. :  Found unresponsive on toilet.  RRT, Code S initiated. Not moving right lower extremity. Per Teleneuro, more consistent with encephalopathy or delirium, no acute CVA interventions. Awoke the next am at baseline, does not recall events.  Blood and urine cultures negative. :  Superficial thrombosis left upper extremity, no treatment needed.    12/3:  Pending insurance approval for rehab at [place] [id number], oriented.  Mild dementia.  Daughter at bedside.  DM management in, recs to hold glipizide on discharge to reduce risk for hypoglycemia. Will continue SSI at home and resume Bydureon.   Patient to check glucose 2-4 times per day, take all readings to PHP so they can adjust meds.     12/4:   Improving.  Daughter reports mentation at baseline.  No complaints. Janeth Bond in chair today    12/5:  CM informs me patient denied STR per her insurance. Daughter not here today, patient reports she is ill at home. Will speak with her regarding alternate plans tomorrow. Will need close follow up for multiple conditions. Slipped and fell, sitting hard on buttocks when attempting to rise from chair to go to bathroom, very weak. Denies injury. Disposition: Home Health Care Svc  Activity: Activity as tolerated and as instructed by home PT  Diet: DIET NUTRITIONAL SUPPLEMENTS All Meals; Ensure High Protein  DIET DIABETIC CONSISTENT CARB Regular  Code Status: Prior     Concern for readmission given multiple comorbid conditions. Follow up instructions, discharge meds at bottom of this note. Plan was discussed with patient, daughter, RN, YADIRA. All questions answered. Patient was stable at time of discharge. Patient will call a physician or return if any concerns. Diagnostic Imaging/Tests:    11/27:  CXR: NO ACUTE CARDIOPULMONARY DISEASE IDENTIFIED.     12/1:  CODE S CT HEAD: No acute intracranial hemorrhage or CT evidence for acute territorial infarction. Note that MRI is more sensitive for detection of acute/subacute infarct. Chronic small vessel changes. Small old/chronic lacunar infarct in the right basal ganglia     12/1:  UPPER EXTREMITY DOPPLER RIGHT: Nonocclusive thrombus within the right cephalic vein in the antecubital fossa at the level of an IV site.     12/1:  MRI BRAIN: No acute intracranial abnormality. Severe bilateral hemispheric atrophy. Moderate to severe chronic white matter microangiopathic changes     12/2:  EEG:   The results of this EEG are limited due to 60 Hz artifact; however, the background appears normal.  No interictal epileptiform discharges or lateralizing features were seen.      MICRO:    BLOOD CULTURES:  2/2:  NO GROWTH TO DATE   URINE CULTURE:  NO GROWTH TO DATE (Collected after onset of antibiotic tx)     Labs: Results:       BMP, Mg, Phos Recent Labs     12/06/19  0516 12/05/19  0458    143   K 4.0 4.0    108*   CO2 25 27   AGAP 10 8   BUN 38* 30*   CREA 1.35* 1.19*   CA 9.1 9.6   * 129*   MG  --  1.8      CBC Recent Labs     12/06/19  0516 12/05/19  0458   WBC 9.0 9.6   RBC 3.68* 3.92*   HGB 11.7 12.5   HCT 36.3 38.3    319   GRANS  --  63   LYMPH  --  23   EOS  --  2   MONOS  --  10   BASOS  --  1   IG  --  0   ANEU  --  6.1   ABL  --  2.3   KITTY  --  0.2   ABM  --  0.9   ABB  --  0.1   AIG  --  0.0      Cardiac Testing Lab Results   Component Value Date/Time    BNP 12 02/11/2015 09:20 AM    BNP 16 11/16/2010 10:13 AM    CK 27 12/01/2019 07:25 AM    CK - MB 2.0 12/01/2019 07:25 AM    CK - MB  12/01/2019 03:25 AM     CORRECTION TO MEDICAL RECORD-DISREGARD THESE TEST RESULTS    CK-MB Index 7.4 (H) 12/01/2019 07:25 AM    Troponin-I <0.05 11/16/2010 10:13 AM    Troponin-I, Qt. 0.03 12/01/2019 03:25 AM    Troponin-I, Qt. 0.05 11/27/2019 12:17 PM    Troponin-I, Qt. 0.04 08/19/2019 08:38 PM      A1c Lab Results   Component Value Date/Time    Hemoglobin A1c 8.1 (H) 12/06/2019 05:16 AM    Hemoglobin A1c 8.3 (H) 08/21/2019 04:30 AM    Hemoglobin A1c 8.1 (H) 03/07/2019 02:46 PM      Lipid Panel Lab Results   Component Value Date/Time    Cholesterol, total 205 (H) 12/13/2018 03:53 PM    HDL Cholesterol 53 12/13/2018 03:53 PM    LDL, calculated 102 (H) 12/13/2018 03:53 PM    VLDL, calculated 50 (H) 12/13/2018 03:53 PM    Triglyceride 248 (H) 12/13/2018 03:53 PM      Thyroid Panel Lab Results   Component Value Date/Time    TSH 3.920 03/07/2019 02:46 PM    TSH 6.470 (H) 02/07/2019 04:15 PM    T4, Free 0.99 03/07/2019 02:46 PM    T4, Free 0.92 02/07/2019 04:15 PM        Most Recent UA Lab Results   Component Value Date/Time    Color VAISHALI 11/27/2019 02:19 PM    Appearance TURBID 11/27/2019 02:19 PM    Specific gravity 1.030 (H) 11/27/2019 02:19 PM    pH (UA) 5.0 11/27/2019 02:19 PM    Protein 100 (A) 11/27/2019 02:19 PM    Glucose >1,000 11/27/2019 02:19 PM    Ketone TRACE (A) 11/27/2019 02:19 PM    Bilirubin NEGATIVE  11/27/2019 02:19 PM    Blood NEGATIVE  11/27/2019 02:19 PM    Urobilinogen 0.2 11/27/2019 02:19 PM    Nitrites NEGATIVE  11/27/2019 02:19 PM    Leukocyte Esterase MODERATE (A) 11/27/2019 02:19 PM    WBC >100 11/27/2019 02:19 PM    RBC 0-3 11/27/2019 02:19 PM    Epithelial cells 10-20 11/27/2019 02:19 PM    Bacteria 3+ (H) 11/27/2019 02:19 PM    Casts 10-20 08/19/2019 10:32 PM    Other observations RESULTS VERIFIED MANUALLY 11/27/2019 02:19 PM        Allergies   Allergen Reactions    Aleve [Naproxen Sodium] Rash    Hydrocodone-Acetaminophen Itching    Metformin Other (comments)     Decreased kidney function    Phenobarbital Unknown (comments)     Doesn't remember - long time ago     Statins-Hmg-Coa Reductase Inhibitors Myalgia     intolerance     Immunization History   Administered Date(s) Administered    Influenza High Dose Vaccine PF 09/07/2017, 09/24/2018    Influenza Vaccine 11/08/2017    Influenza Vaccine (Tri) Adjuvanted 09/11/2019    Pneumococcal Conjugate (PCV-13) 03/18/2015, 04/05/2017    Pneumococcal Polysaccharide (PPSV-23) 03/17/2015    TB Skin Test (PPD) Intradermal 09/12/2017, 08/20/2019, 11/29/2019     All Labs from Last 24 Hrs:  Recent Results (from the past 24 hour(s))   GLUCOSE, POC    Collection Time: 12/05/19  4:39 PM   Result Value Ref Range    Glucose (POC) 184 (H) 65 - 100 mg/dL   GLUCOSE, POC    Collection Time: 12/05/19  9:01 PM   Result Value Ref Range    Glucose (POC) 304 (H) 65 - 100 mg/dL   CBC W/O DIFF    Collection Time: 12/06/19  5:16 AM   Result Value Ref Range    WBC 9.0 4.3 - 11.1 K/uL    RBC 3.68 (L) 4.05 - 5.2 M/uL    HGB 11.7 11.7 - 15.4 g/dL    HCT 36.3 35.8 - 46.3 %    MCV 98.6 (H) 79.6 - 97.8 FL    MCH 31.8 26.1 - 32.9 PG    MCHC 32.2 31.4 - 35.0 g/dL    RDW 12.6 11.9 - 14.6 %    PLATELET 305 150 - 450 K/uL    MPV 8.7 (L) 9.4 - 12.3 FL    ABSOLUTE NRBC 0.00 0.0 - 0.2 K/uL   METABOLIC PANEL, BASIC    Collection Time: 12/06/19  5:16 AM   Result Value Ref Range    Sodium 138 136 - 145 mmol/L    Potassium 4.0 3.5 - 5.1 mmol/L    Chloride 103 98 - 107 mmol/L    CO2 25 21 - 32 mmol/L    Anion gap 10 7 - 16 mmol/L    Glucose 136 (H) 65 - 100 mg/dL    BUN 38 (H) 8 - 23 MG/DL    Creatinine 1.35 (H) 0.6 - 1.0 MG/DL    GFR est AA 48 (L) >60 ml/min/1.73m2    GFR est non-AA 40 (L) >60 ml/min/1.73m2    Calcium 9.1 8.3 - 10.4 MG/DL   HEMOGLOBIN A1C WITH EAG    Collection Time: 12/06/19  5:16 AM   Result Value Ref Range    Hemoglobin A1c 8.1 (H) 4.8 - 6.0 %    Est. average glucose 186 mg/dL   GLUCOSE, POC    Collection Time: 12/06/19  7:25 AM   Result Value Ref Range    Glucose (POC) 132 (H) 65 - 100 mg/dL   GLUCOSE, POC    Collection Time: 12/06/19 11:12 AM   Result Value Ref Range    Glucose (POC) 252 (H) 65 - 100 mg/dL     Current Med List in Hospital:   Current Facility-Administered Medications   Medication Dose Route Frequency    hydrALAZINE (APRESOLINE) tablet 25 mg  25 mg Oral TID    traMADol BP17 300 mg (Patient Supplied)  300 mg Oral DAILY    alum-mag hydroxide-simeth (MYLANTA) oral suspension 30 mL  30 mL Oral Q6H PRN    traMADol (ULTRAM) tablet 50 mg  50 mg Oral Q6H PRN    amLODIPine (NORVASC) tablet 10 mg  10 mg Oral DAILY    hydrALAZINE (APRESOLINE) 20 mg/mL injection 10 mg  10 mg IntraVENous Q6H PRN    sodium chloride (NS) flush 5-10 mL  5-10 mL IntraVENous PRN    aspirin delayed-release tablet 81 mg  81 mg Oral 7am    atenolol (TENORMIN) tablet 25 mg  25 mg Oral DAILY    levothyroxine (SYNTHROID) tablet 25 mcg  25 mcg Oral ACB    montelukast (SINGULAIR) tablet 10 mg  10 mg Oral DAILY    pregabalin (LYRICA) capsule 50 mg  50 mg Oral BID    QUEtiapine (SEROquel) tablet 25 mg  25 mg Oral QHS    tiotropium (SPIRIVA) inhalation capsule 18 mcg  1 Cap Inhalation 7am    folic acid (FOLVITE) tablet 0.5 mg  0.5 mg Oral BID    fenofibrate (LOFIBRA) tablet 54 mg  54 mg Oral DAILY    docusate sodium (COLACE) capsule 100 mg  100 mg Oral BID PRN    albuterol (PROVENTIL VENTOLIN) nebulizer solution 2.5 mg  2.5 mg Nebulization Q6H PRN    insulin lispro (HUMALOG) injection   SubCUTAneous AC&HS     Discharge Exam:  Patient Vitals for the past 24 hrs:   Temp Pulse Resp BP SpO2   12/06/19 1204 98.1 °F (36.7 °C) 82 18 142/59 95 %   12/06/19 0813 98.1 °F (36.7 °C) 80 16 135/60 93 %   12/06/19 0808     97 %   12/06/19 0359 98.2 °F (36.8 °C) 83 16 137/67 94 %   12/05/19 2315 97.9 °F (36.6 °C) 76 16 129/62 91 %   12/05/19 1928 97.8 °F (36.6 °C) 76 18 158/73 96 %     Oxygen Therapy  O2 Sat (%): 95 % (12/06/19 1204)  Pulse via Oximetry: 77 beats per minute (12/06/19 0808)  O2 Device: Room air (12/06/19 0808)    Estimated body mass index is 31.02 kg/m² as calculated from the following:    Height as of this encounter: 5' 4\" (1.626 m). Weight as of this encounter: 82 kg (180 lb 11.2 oz). Intake/Output Summary (Last 24 hours) at 12/6/2019 1351  Last data filed at 12/5/2019 1828  Gross per 24 hour   Intake 240 ml   Output    Net 240 ml       General appearance: Oriented and alert, forgetful but more clear today.  Cooperative, denies pain or need.    Head: Normocephalic, without obvious abnormality, atraumatic  Eyes: conjunctivae/corneas clear. PERRL, EOM's grossly intact. Fundi benign  Neck: supple, symmetrical, trachea midline, and no JVD  Lungs: clear to auscultation bilaterally  Heart: regular rate and rhythm, S1, S2 normal, no murmur, click, rub or gallop  Abdomen: soft, non-tender.  Bowel sounds normal. No masses,  no organomegaly  Extremities: Bilateral Charcot foot.  Otherwise all extremities normal, atraumatic, no cyanosis or edema  Skin: Skin color, texture, turgor normal. No rashes or lesions  Neurologic: Grossly normal    Discharge Info:   Current Discharge Medication List      START taking these medications    Details   hydrALAZINE (APRESOLINE) 25 mg tablet Take 1 Tab by mouth three (3) times daily. Qty: 90 Tab, Refills: 1      insulin lispro (HUMALOG) 100 unit/mL injection Check glucose before every meal and at bedtime. If glucose is:   Less than 150: take 0 units insulin  150-199:  Take 2 units insulin  200-249:  Take 4 units insulin  250-299:  Take 6 units insulin   If 300-349: take 8 units insulin   Greater than 350: take 10 units of insulin  Qty: 1 Vial, Refills: 0         CONTINUE these medications which have NOT CHANGED    Details   traMADol (ULTRAM-ER) 300 mg tablet Take 1 Tab by mouth daily for 180 days. Max Daily Amount: 300 mg. Qty: 30 Tab, Refills: 5    Associated Diagnoses: Low back pain radiating to both legs; Generalized OA      cranberry 400 mg cap Take 1 Cap by mouth two (2) times daily (with meals). Indications: UTI prevention  Qty: 60 Cap, Refills: 0      acetaminophen (TYLENOL) 650 mg TbER Take 650 mg by mouth every six (6) hours as needed for Pain. Blood-Glucose Meter monitoring kit Check sugars once daily  Qty: 1 Kit, Refills: 1      levothyroxine (SYNTHROID) 25 mcg tablet Take 1 Tab by mouth Daily (before breakfast). Qty: 90 Tab, Refills: 3      amLODIPine (NORVASC) 10 mg tablet Take 1 Tab by mouth daily. Qty: 90 Tab, Refills: 3    Associated Diagnoses: Essential hypertension with goal blood pressure less than 140/90      fenofibrate nanocrystallized (TRICOR) 48 mg tablet Take 1 Tab by mouth daily. Qty: 90 Tab, Refills: 3    Associated Diagnoses: Hypertriglyceridemia      lisinopril (PRINIVIL, ZESTRIL) 40 mg tablet Take 1 Tab by mouth daily.   Qty: 90 Tab, Refills: 3      pregabalin (LYRICA) 50 mg capsule 1 tab po bid  Qty: 60 Cap, Refills: 11    Associated Diagnoses: Diabetic polyneuropathy associated with type 2 diabetes mellitus (HCC)      QUEtiapine (SEROQUEL) 25 mg tablet Take 1/2 tablet before bedtime  Qty: 45 Tab, Refills: 3    Associated Diagnoses: Presenile dementia, paranoid type, with behavioral disturbance (HCC)      atenolol (TENORMIN) 25 mg tablet Take 1 Tab by mouth daily. Qty: 90 Tab, Refills: 3      exenatide microspheres (BYDUREON BCISE) 2 mg/0.85 mL atIn 1 Syringe by SubCUTAneous route every seven (7) days. Qty: 4 Syringe, Refills: 11      montelukast (SINGULAIR) 10 mg tablet Take 1 Tab by mouth daily. Qty: 90 Tab, Refills: 3    Associated Diagnoses: Mild intermittent asthma without complication      glucose blood VI test strips (ONETOUCH ULTRA TEST) strip Use to check blood sugar once daily. E11.42  Qty: 100 Strip, Refills: 3    Associated Diagnoses: DM type 2 with diabetic peripheral neuropathy (HCC)      Lancets (ONETOUCH SURESOFT LANCING DEV) misc Use to check blood sugar. E11.42  Qty: 100 Each, Refills: 3    Associated Diagnoses: DM type 2 with diabetic peripheral neuropathy (HCC)      docusate sodium (COLACE) 100 mg capsule Take 100 mg by mouth two (2) times daily as needed for Constipation. polyethylene glycol (MIRALAX) 17 gram packet Take 1 Packet by mouth daily as needed. Indications: constipation  Qty: 1 Each, Refills: 0      aspirin delayed-release 81 mg tablet Take 81 mg by mouth every morning. Take day of surgery per anesthesia protocol. Cholecalciferol, Vitamin D3, (VITAMIN D3) 2,000 unit cap capsule Take 2,000 Units by mouth two (2) times a day. Qty: 60 capsule, Refills: 5    Associated Diagnoses: Vitamin D deficiency      FLOVENT HFA 44 mcg/actuation inhaler Take 2 Puffs by inhalation two (2) times a day. Take day of surgery per anesthesia protocol. Bring DOS  Indications: MAINTENANCE THERAPY FOR ASTHMA      SPIRIVA WITH HANDIHALER 18 mcg inhalation capsule Take 1 Cap by inhalation every morning. Take day of surgery per anesthesia protocol. Bring DOS  Indications: MAINTENANCE THERAPY FOR ASTHMA      albuterol (PROAIR HFA) 90 mcg/actuation inhaler Take 2 Puffs by inhalation every four (4) hours as needed.  Take day of surgery per anesthesia protocol. Bring DOS  Indications: Acute Asthma Attack      folic acid 126 mcg tablet Take 400 mcg by mouth two (2) times a day. Stop seven days prior to surgery per anesthesia protocol. STOP taking these medications       glipiZIDE SR (GLUCOTROL XL) 10 mg CR tablet Comments:   Reason for Stopping: Follow Up Orders:    FOLLOW UP WITH DR Angi Suresh NEXT WEEK. CHECK GLUCOSE BEFORE EVERY MEAL AND AT BED TIME AND RECORD. TAKE INSULIN PER SCALE AND RECORD THIS ALSO. TAKE ALL RECORDINGS WITH YOU TO APPOINTMENT. THEY WILL ADJUST INSULIN SO YOU DONT HAVE TO CHECK IT SO OFTEN. DO NOT TAKE GLIPIZIDE UNLESS THEY THEY YOU TO.     [facility] care will be following you. They will call you soon. Time spent in patient discharge planning and coordination 45 minutes.     Signed:  [facility], NP

## 2019-12-06 NOTE — PROGRESS NOTES
Hospitalist Progress Note    Subjective:   Daily Progress Note: 12/5/2019 9:22 PM    Patient presented to [facility] office 11/27 with UTI sx x 4-5 days, found to have irregular, rapid heartbeat, SOB and bilateral LE edema x 24 hours. Sent to ER, tachy on arrival, found with UTI, sepsis, DANIELLA. Rocephin begun. 12/1:  Found unresponsive on toilet.  RRT, Code S initiated. Not moving right lower extremity.  Per Teleneuro, more consistent with encephalopathy or delirium, no acute CVA interventions. Awoke the next am at baseline, does not recall events.  Blood and urine cultures negative. 12/2:  Superficial thrombosis left upper extremity   12/3: "[employer]" [facility] approval for rehab at [facility], oriented.  Mild dementia.  Daughter at bedside.  DM management in, recs to hold glipizide on discharge to reduce risk for hypoglycemia. Will continue SSI at rehab and resume Bydureon.    12/4:   Improving. Daughter reports mentation at baseline. No complaints. Up in chair today      12/5:  CM informs me patient denied STR per her insurance. Daughter not here today, patient reports she is ill at home. Will speak with her regarding alternate plans tomorrow. Slipped and fell, sitting hard on buttocks when attempting to rise from chair to go to bathroom. Denies injury. No complaints, at baseline.      ADDITIONAL HISTORY:  Dementia, frequent falls, asthma, cellulitis, Charcot foot, DM II, Charcot-Evy-Tooth, GERD, hypertension, hyperlipdiemia, neuropathy, ALEXIS without CPAP,     Current Facility-Administered Medications   Medication Dose Route Frequency    hydrALAZINE (APRESOLINE) tablet 25 mg  25 mg Oral TID    traMADol BP17 300 mg (Patient Supplied)  300 mg Oral DAILY    alum-mag hydroxide-simeth (MYLANTA) oral suspension 30 mL  30 mL Oral Q6H PRN    traMADol (ULTRAM) tablet 50 mg  50 mg Oral Q6H PRN    amLODIPine (NORVASC) tablet 10 mg  10 mg Oral DAILY    hydrALAZINE (APRESOLINE) 20 mg/mL injection 10 mg  10 mg IntraVENous Q6H PRN    sodium chloride (NS) flush 5-10 mL  5-10 mL IntraVENous PRN    aspirin delayed-release tablet 81 mg  81 mg Oral 7am    atenolol (TENORMIN) tablet 25 mg  25 mg Oral DAILY    levothyroxine (SYNTHROID) tablet 25 mcg  25 mcg Oral ACB    montelukast (SINGULAIR) tablet 10 mg  10 mg Oral DAILY    pregabalin (LYRICA) capsule 50 mg  50 mg Oral BID    QUEtiapine (SEROquel) tablet 25 mg  25 mg Oral QHS    tiotropium (SPIRIVA) inhalation capsule 18 mcg  1 Cap Inhalation 7am    folic acid (FOLVITE) tablet 0.5 mg  0.5 mg Oral BID    fenofibrate (LOFIBRA) tablet 54 mg  54 mg Oral DAILY    docusate sodium (COLACE) capsule 100 mg  100 mg Oral BID PRN    albuterol (PROVENTIL VENTOLIN) nebulizer solution 2.5 mg  2.5 mg Nebulization Q6H PRN    insulin lispro (HUMALOG) injection   SubCUTAneous AC&HS        Review of Systems  A comprehensive review of systems was negative except for that written in the HPI. Objective:     Visit Vitals  /73 (BP 1 Location: Left arm, BP Patient Position: At rest)   Pulse 76   Temp 97.8 °F (36.6 °C)   Resp 18   Ht 5' 4\" (1.626 m)   Wt 84.1 kg (185 lb 6.4 oz)   LMP 1980   SpO2 96%   BMI 31.82 kg/m²      O2 Device: Room air    Temp (24hrs), Av.3 °F (36.8 °C), Min:97.8 °F (36.6 °C), Max:98.9 °F (37.2 °C)     07 -  1900  In: 650 [P.O.:720]  Out: 2800 [Urine:2800]     General appearance: Oriented and alert, forgetful but more clear today. Cooperative, denies pain or need.    Head: Normocephalic, without obvious abnormality, atraumatic  Eyes: conjunctivae/corneas clear. PERRL, EOM's grossly intact. Fundi benign  Neck: supple, symmetrical, trachea midline, and no JVD  Lungs: clear to auscultation bilaterally  Heart: regular rate and rhythm, S1, S2 normal, no murmur, click, rub or gallop  Abdomen: soft, non-tender.  Bowel sounds normal. No masses,  no organomegaly  Extremities: Bilateral Charcot foot.  Otherwise all extremities normal, atraumatic, no cyanosis or edema  Skin: Skin color, texture, turgor normal. No rashes or lesions  Neurologic: Grossly normal    Additional comments: Notes,orders, test results, vitals reviewed    Data Review  Recent Results (from the past 24 hour(s))   CBC WITH AUTOMATED DIFF    Collection Time: 12/05/19  4:58 AM   Result Value Ref Range    WBC 9.6 4.3 - 11.1 K/uL    RBC 3.92 (L) 4.05 - 5.2 M/uL    HGB 12.5 11.7 - 15.4 g/dL    HCT 38.3 35.8 - 46.3 %    MCV 97.7 79.6 - 97.8 FL    MCH 31.9 26.1 - 32.9 PG    MCHC 32.6 31.4 - 35.0 g/dL    RDW 12.5 11.9 - 14.6 %    PLATELET 433 295 - 990 K/uL    MPV 8.9 (L) 9.4 - 12.3 FL    ABSOLUTE NRBC 0.00 0.0 - 0.2 K/uL    DF AUTOMATED      NEUTROPHILS 63 43 - 78 %    LYMPHOCYTES 23 13 - 44 %    MONOCYTES 10 4.0 - 12.0 %    EOSINOPHILS 2 0.5 - 7.8 %    BASOPHILS 1 0.0 - 2.0 %    IMMATURE GRANULOCYTES 0 0.0 - 5.0 %    ABS. NEUTROPHILS 6.1 1.7 - 8.2 K/UL    ABS. LYMPHOCYTES 2.3 0.5 - 4.6 K/UL    ABS. MONOCYTES 0.9 0.1 - 1.3 K/UL    ABS. EOSINOPHILS 0.2 0.0 - 0.8 K/UL    ABS. BASOPHILS 0.1 0.0 - 0.2 K/UL    ABS. IMM.  GRANS. 0.0 0.0 - 0.5 K/UL   METABOLIC PANEL, BASIC    Collection Time: 12/05/19  4:58 AM   Result Value Ref Range    Sodium 143 136 - 145 mmol/L    Potassium 4.0 3.5 - 5.1 mmol/L    Chloride 108 (H) 98 - 107 mmol/L    CO2 27 21 - 32 mmol/L    Anion gap 8 7 - 16 mmol/L    Glucose 129 (H) 65 - 100 mg/dL    BUN 30 (H) 8 - 23 MG/DL    Creatinine 1.19 (H) 0.6 - 1.0 MG/DL    GFR est AA 55 (L) >60 ml/min/1.73m2    GFR est non-AA 46 (L) >60 ml/min/1.73m2    Calcium 9.6 8.3 - 10.4 MG/DL   MAGNESIUM    Collection Time: 12/05/19  4:58 AM   Result Value Ref Range    Magnesium 1.8 1.8 - 2.4 mg/dL   GLUCOSE, POC    Collection Time: 12/05/19  7:24 AM   Result Value Ref Range    Glucose (POC) 130 (H) 65 - 100 mg/dL   GLUCOSE, POC    Collection Time: 12/05/19 11:13 AM   Result Value Ref Range    Glucose (POC) 301 (H) 65 - 100 mg/dL   GLUCOSE, POC    Collection Time: 12/05/19  4:39 PM Result Value Ref Range    Glucose (POC) 184 (H) 65 - 100 mg/dL   GLUCOSE, POC    Collection Time: 12/05/19  9:01 PM   Result Value Ref Range    Glucose (POC) 304 (H) 65 - 100 mg/dL     11/27:  CXR: NO ACUTE CARDIOPULMONARY DISEASE IDENTIFIED.     12/1:  CODE S CT HEAD: No acute intracranial hemorrhage or CT evidence for acute territorial infarction. Note that MRI is more sensitive for detection of acute/subacute infarct. Chronic small vessel changes. Small old/chronic lacunar infarct in the right basal ganglia     12/1:  UPPER EXTREMITY DOPPLER RIGHT: Nonocclusive thrombus within the right cephalic vein in the antecubital fossa at the level of an IV site.     12/1:  MRI BRAIN: No acute intracranial abnormality. Severe bilateral hemispheric atrophy. Moderate to severe chronic white matter microangiopathic changes     12/2:  EEG:   The results of this EEG are limited due to 60 Hz artifact; however, the background appears normal.  No interictal epileptiform discharges or lateralizing features were seen.      Assessment/Plan:   Sepsis due to UTI:  Sepsis protocol:  Improved              Blood and urine cultures NGTD     Acute UTI:  Treated with rocephin              Urine and blood cultures w no growth to date     Altered Mental status: syncopal episode: back to baseline              Unclear etiology:  Possible orthostatic           Hypotension.     Orthostatic hypotension:  Systolic dropped from 295 Lying to 116 standing, 10 point diastolic change, HR 10 bpm rise.               Rise slowly              Continue PT, OT               Plans for rehab      Hypertension:  Continue home meds     Acute kidney injury: Improved      IDDM II:  A1C:  8.3              Continue to hold glipizide on discharge              Reinstate Bydureon at discharge              Continue SSI at rehab     Right upper arm edema:  Improving               Nonocclusive thrombus right cephalic vein              No intervention needed     Slip and fall on buttocks   Encourage to call for assist    Denies other injury    Needs Rehab, insurance denied 12/5    Discharge plans up in air due to insurance causing discharge delay.      Care Plan discussed with: Patient, CM and Nurse    Signed By: Lacy Araujo NP     December 5, 2019

## 2019-12-09 ENCOUNTER — PATIENT OUTREACH (OUTPATIENT)
Dept: CASE MANAGEMENT | Age: 84
End: 2019-12-09

## 2019-12-09 NOTE — PROGRESS NOTES
This note will not be viewable in [address] Second ARELY outreach attempt made to patient's home number was unsuccessful. Left message to return call. Will attempt third outreach within 5 business days.

## 2019-12-09 NOTE — PROGRESS NOTES
This note will not be viewable in [address] Initial ARELY outreach attempt to patient's home number was unsuccessful. Left message to return call. Will attempt second outreach within 24 hours.

## 2019-12-10 ENCOUNTER — PATIENT OUTREACH (OUTPATIENT)
Dept: CASE MANAGEMENT | Age: 84
End: 2019-12-10

## 2019-12-10 NOTE — PROGRESS NOTES
This note will not be viewable in [address] Transition of Care Discharge Follow-up Questionnaire   Date/Time of Call:   12/10/19   956am   What was the patient hospitalized for? UTI      Does the patient understand his/her diagnosis and/or treatment and what happened during the hospitalization? Yes, spoke with patients daughter, she states understanding of diagnosis and treatment; and is agreeable to call. Davonte Luciano states patient is doing well, Charlie Hernandez says she feels Sherrine Ro   Did the patient receive discharge instructions? Yes    CM Assessed Risk for Readmission:       Patient stated Risk for Readmission:      low r/t diagnosis and/or comorbidities       None stated   Review any discharge instructions (see discharge instructions/AVS in [facility]). Ask patient if they understand these. Do they have any questions? Reviewed, understanding is stated, no questions at this time       Were home services ordered (nursing, PT, OT, ST, etc.)? Yes, [facility] SN/PT     If so, has the first visit occurred? If not, why? (Assist with coordination of services if necessary.)   [facility] 12/13/19, Davonte Luciano stated, she called when I was fixing us something to eat, I told her she could call back   Was any DME ordered? No   Patient has rollator and sc      If so, has it been received? If not, why?  (Assist patient in obtaining DME orders &/or equipment if necessary.) N/A    Complete a review of all medications (new, continued and discontinued meds per the D/C instructions and medication tab in [facility]). Completed  START taking:  hydrALAZINE 25 mg tablet (APRESOLINE)  insulin lispro 100 unit/mL injection (HUMALOG)  STOP taking:  glipiZIDE SR 10 mg CR tablet (GLUCOTROL XL)   Were all new prescriptions filled?   If not, why?  (Assist patient in obtaining medications if necessary  escalate for CCM &/or SW if ongoing issues are verbalized by pt or anticipated)   Yes    Does the patient understand the purpose and dosing instructions for all medications? (If patient has questions, provide explanation and education.)   Yes      Does the patient have any problems in performing ADLs? (If patient is unable to perform ADLs  what is the limiting factor(s)? Do they have a support system that can assist? If no support system is present, discuss possible assistance that they may be able to obtain. Escalate for CCM/SW if ongoing issues are verbalized by pt or anticipated)   Independent with ADLs, lives with Amber Conteh         Does the patient have all follow-up appointments scheduled? 7 day f/up with PCP?   (f/up with PCP may be w/in 14 days if patient has a f/up with their specialist w/in 7 days)    7-14 day f/up with specialist?   (or per discharge instructions)    If f/up has not been made  what actions has the care coordinator made to accomplish this? Has transportation been arranged? Yes        Dr. Melanie Mcdowell 12/11/19                      Yes, no transportation needs are identified at this time   Any other questions or concerns expressed by the patient? No other needs or concerns identified. Amber Conteh states her gratitude for follow up. Contact information for Care Coordinator was given, instructed to call with new questions or concerns. Schedule next appointment with KATERINA Nieves or refer to RN Case Manager/ per the workflow guidelines. When is care coordinators next follow-up call scheduled? If referred for CCM  what RN care manager was the referral assigned? Care Coordinator will follow per workflow guidelines.           Within 14 days   ARELY Call Completed By: Alfonso Ray LPN  Care Coordinator

## 2019-12-11 ENCOUNTER — HOSPITAL ENCOUNTER (EMERGENCY)
Age: 84
Discharge: HOME | End: 2019-12-11
Attending: EMERGENCY MEDICINE
Payer: MEDICARE

## 2019-12-11 VITALS
SYSTOLIC BLOOD PRESSURE: 152 MMHG | HEART RATE: 76 BPM | HEIGHT: 64 IN | TEMPERATURE: 97.9 F | OXYGEN SATURATION: 95 % | DIASTOLIC BLOOD PRESSURE: 72 MMHG | BODY MASS INDEX: 30.39 KG/M2 | WEIGHT: 178 LBS | RESPIRATION RATE: 22 BRPM

## 2019-12-11 DIAGNOSIS — I10 ESSENTIAL HYPERTENSION: Primary | ICD-10-CM

## 2019-12-11 LAB
ALBUMIN SERPL-MCNC: 3.9 G/DL (ref 3.2–4.6)
ALBUMIN/GLOB SERPL: 1 {RATIO} (ref 1.2–3.5)
ALP SERPL-CCNC: 78 U/L (ref 50–130)
ALT SERPL-CCNC: 30 U/L (ref 12–65)
ANION GAP SERPL CALC-SCNC: 9 MMOL/L (ref 7–16)
AST SERPL-CCNC: 32 U/L (ref 15–37)
BASOPHILS # BLD: 0.1 K/UL (ref 0–0.2)
BASOPHILS NFR BLD: 1 % (ref 0–2)
BILIRUB SERPL-MCNC: 0.4 MG/DL (ref 0.2–1.1)
BUN SERPL-MCNC: 17 MG/DL (ref 8–23)
CALCIUM SERPL-MCNC: 9.3 MG/DL (ref 8.3–10.4)
CHLORIDE SERPL-SCNC: 103 MMOL/L (ref 98–107)
CO2 SERPL-SCNC: 25 MMOL/L (ref 21–32)
CREAT SERPL-MCNC: 1.45 MG/DL (ref 0.6–1)
DIFFERENTIAL METHOD BLD: ABNORMAL
EOSINOPHIL # BLD: 0.1 K/UL (ref 0–0.8)
EOSINOPHIL NFR BLD: 1 % (ref 0.5–7.8)
ERYTHROCYTE [DISTWIDTH] IN BLOOD BY AUTOMATED COUNT: 12.4 % (ref 11.9–14.6)
GLOBULIN SER CALC-MCNC: 3.8 G/DL (ref 2.3–3.5)
GLUCOSE SERPL-MCNC: 188 MG/DL (ref 65–100)
HCT VFR BLD AUTO: 38.8 % (ref 35.8–46.3)
HGB BLD-MCNC: 12.7 G/DL (ref 11.7–15.4)
IMM GRANULOCYTES # BLD AUTO: 0 K/UL (ref 0–0.5)
IMM GRANULOCYTES NFR BLD AUTO: 0 % (ref 0–5)
LYMPHOCYTES # BLD: 1.5 K/UL (ref 0.5–4.6)
LYMPHOCYTES NFR BLD: 12 % (ref 13–44)
MCH RBC QN AUTO: 31.7 PG (ref 26.1–32.9)
MCHC RBC AUTO-ENTMCNC: 32.7 G/DL (ref 31.4–35)
MCV RBC AUTO: 96.8 FL (ref 79.6–97.8)
MONOCYTES # BLD: 0.8 K/UL (ref 0.1–1.3)
MONOCYTES NFR BLD: 7 % (ref 4–12)
NEUTS SEG # BLD: 9.7 K/UL (ref 1.7–8.2)
NEUTS SEG NFR BLD: 79 % (ref 43–78)
NRBC # BLD: 0 K/UL (ref 0–0.2)
PLATELET # BLD AUTO: 368 K/UL (ref 150–450)
PMV BLD AUTO: 8.5 FL (ref 9.4–12.3)
POTASSIUM SERPL-SCNC: 3.9 MMOL/L (ref 3.5–5.1)
PROT SERPL-MCNC: 7.7 G/DL (ref 6.3–8.2)
RBC # BLD AUTO: 4.01 M/UL (ref 4.05–5.2)
SODIUM SERPL-SCNC: 137 MMOL/L (ref 136–145)
WBC # BLD AUTO: 12.2 K/UL (ref 4.3–11.1)

## 2019-12-11 PROCEDURE — 85025 COMPLETE CBC W/AUTO DIFF WBC: CPT

## 2019-12-11 PROCEDURE — 99284 EMERGENCY DEPT VISIT MOD MDM: CPT | Performed by: EMERGENCY MEDICINE

## 2019-12-11 PROCEDURE — 80053 COMPREHEN METABOLIC PANEL: CPT

## 2019-12-11 PROCEDURE — 74011250637 HC RX REV CODE- 250/637: Performed by: EMERGENCY MEDICINE

## 2019-12-11 RX ORDER — HYDRALAZINE HYDROCHLORIDE 25 MG/1
25 TABLET, FILM COATED ORAL
Status: COMPLETED | OUTPATIENT
Start: 2019-12-11 | End: 2019-12-11

## 2019-12-11 RX ADMIN — HYDRALAZINE HYDROCHLORIDE 25 MG: 25 TABLET, FILM COATED ORAL at 16:51

## 2019-12-11 NOTE — DISCHARGE INSTRUCTIONS
Continue discharge medications  Sliding scale from discharge:  nsulin lispro (HUMALOG) 100 unit/mL injection Check glucose before every meal and at bedtime.  If glucose is:   Less than 150: take 0 units insulin  150-199:  Take 2 units insulin  200-249:  Take 4 units insulin  250-299:  Take 6 units insulin   If 300-349: take 8 units insulin   Greater than 350: take 10 units of insulin  Qty: 1 Vial, Refills: 0

## 2019-12-11 NOTE — ED PROVIDER NOTES
History from patient and accompanying daughter. Patient lives with daughter. She had gone to her primary care physician for recheck after hospital discharge. Is found to have an elevated blood pressure at that time. Patient had not been taking medications prescribed on discharge. Daughter reports that several of them are still in bags that are sealed. Denies any fever. Daughter and patient do not know how to give insulin or draw up for sliding scale. Has not taken her meds. Could not find sliding scale given at discharge    The history is provided by the patient and a relative (daughter). Hypertension    This is a chronic problem. Pertinent negatives include no chest pain, no PND, no anxiety, no headaches, no nausea and no vomiting. There are no associated agents to hypertension. Past Medical History:   Diagnosis Date    Asthma     sees Dr. Mir Herrera with urination     Cellulitis     sees wound clinic    Charcot foot due to diabetes mellitus ([address]     bilat    Charcot-Evy-Tooth disease-like deformity of foot     Diabetes ([address]     in nursing home and does not know the values when checked     Erosive osteoarthritis     sees Dr. Blaire Samaniego Full dentures     GERD (gastroesophageal reflux disease)     High cholesterol     Hypertension     Incontinence of urine in female     Neuropathy     Sleep apnea     no C PAP       Past Surgical History:   Procedure Laterality Date    BREAST SURGERY PROCEDURE UNLISTED      bilat lumpectomy    CHEST SURGERY PROCEDURE UNLISTED      WHAT IS THIS? ??    HX CATARACT REMOVAL Bilateral     HX CHOLECYSTECTOMY      HX COLONOSCOPY      HX HYSTERECTOMY      HX KNEE REPLACEMENT Bilateral     HX ORTHOPAEDIC      back    VASCULAR SURGERY PROCEDURE UNLIST Left 09/29/2017    aortogram/arteriogram- PTA x2         Family History:   Problem Relation Age of Onset    No Known Problems Mother     No Known Problems Father        Social History     Socioeconomic History    Marital status:      Spouse name: Not on file    Number of children: Not on file    Years of education: Not on file    Highest education level: Not on file   Occupational History    Not on file   Social Needs    Financial resource strain: Not on file    Food insecurity:     Worry: Not on file     Inability: Not on file    Transportation needs:     Medical: Not on file     Non-medical: Not on file   Tobacco Use    Smoking status: Never Smoker    Smokeless tobacco: Never Used   Substance and Sexual Activity    Alcohol use: No    Drug use: No    Sexual activity: Not on file   Lifestyle    Physical activity:     Days per week: Not on file     Minutes per session: Not on file    Stress: Not on file   Relationships    Social connections:     Talks on phone: Not on file     Gets together: Not on file     Attends Jainism service: Not on file     Active member of club or organization: Not on file     Attends meetings of clubs or organizations: Not on file     Relationship status: Not on file    Intimate partner violence:     Fear of current or ex partner: Not on file     Emotionally abused: Not on file     Physically abused: Not on file     Forced sexual activity: Not on file   Other Topics Concern    Not on file   Social History Narrative    Not on file         ALLERGIES: Aleve [naproxen sodium]; Hydrocodone-acetaminophen; Metformin; Phenobarbital; and Statins-hmg-coa reductase inhibitors    Review of Systems   Constitutional: Negative for chills and fever. Respiratory: Negative. Cardiovascular: Negative for chest pain and PND. Gastrointestinal: Negative for nausea and vomiting. Neurological: Negative for headaches. All other systems reviewed and are negative.       Vitals:    12/11/19 1614 12/11/19 1631 12/11/19 1651 12/11/19 1722   BP: 159/87 192/88 192/88 157/74   Pulse: 78 74 76 75   Resp: 16 9  12   Temp: 97.9 °F (36.6 °C)      SpO2: 95% 97% 95%   Weight: 80.7 kg (178 lb)      Height: 5' 4\" (1.626 m)               Physical Exam  Vitals signs and nursing note reviewed. Constitutional:       General: She is not in acute distress. Appearance: Normal appearance. She is well-developed. She is not ill-appearing, toxic-appearing or diaphoretic. HENT:      Head: Atraumatic. Right Ear: External ear normal.      Nose: Nose normal.      Mouth/Throat:      Mouth: Mucous membranes are moist.   Eyes:      General: No scleral icterus. Neck:      Musculoskeletal: Neck supple. Cardiovascular:      Rate and Rhythm: Normal rate. Pulmonary:      Effort: Pulmonary effort is normal. No respiratory distress. Abdominal:      General: Abdomen is flat. Musculoskeletal:         General: No deformity or signs of injury. Skin:     General: Skin is warm and dry. Coloration: Skin is not jaundiced or pale. Findings: No rash. Neurological:      General: No focal deficit present. Mental Status: Mental status is at baseline. Psychiatric:         Thought Content: Thought content normal.          MDM  Number of Diagnoses or Management Options  Diagnosis management comments: Overall stable. Patient states she feels normal. BP better after meds nursing has given instruction on insulin uses/administration.  Able to reprint her sliding scale       Amount and/or Complexity of Data Reviewed  Clinical lab tests: ordered and reviewed  Decide to obtain previous medical records or to obtain history from someone other than the patient: yes  Obtain history from someone other than the patient: yes    Risk of Complications, Morbidity, and/or Mortality  Presenting problems: moderate  Diagnostic procedures: low  Management options: moderate    Patient Progress  Patient progress: improved         Procedures      Recent Results (from the past 12 hour(s))   CBC WITH AUTOMATED DIFF    Collection Time: 12/11/19  4:37 PM   Result Value Ref Range    WBC 12.2 (H) 4.3 - 11.1 K/uL    RBC 4.01 (L) 4.05 - 5.2 M/uL    HGB 12.7 11.7 - 15.4 g/dL    HCT 38.8 35.8 - 46.3 %    MCV 96.8 79.6 - 97.8 FL    MCH 31.7 26.1 - 32.9 PG    MCHC 32.7 31.4 - 35.0 g/dL    RDW 12.4 11.9 - 14.6 %    PLATELET 882 648 - 229 K/uL    MPV 8.5 (L) 9.4 - 12.3 FL    ABSOLUTE NRBC 0.00 0.0 - 0.2 K/uL    DF AUTOMATED      NEUTROPHILS 79 (H) 43 - 78 %    LYMPHOCYTES 12 (L) 13 - 44 %    MONOCYTES 7 4.0 - 12.0 %    EOSINOPHILS 1 0.5 - 7.8 %    BASOPHILS 1 0.0 - 2.0 %    IMMATURE GRANULOCYTES 0 0.0 - 5.0 %    ABS. NEUTROPHILS 9.7 (H) 1.7 - 8.2 K/UL    ABS. LYMPHOCYTES 1.5 0.5 - 4.6 K/UL    ABS. MONOCYTES 0.8 0.1 - 1.3 K/UL    ABS. EOSINOPHILS 0.1 0.0 - 0.8 K/UL    ABS. BASOPHILS 0.1 0.0 - 0.2 K/UL    ABS. IMM. GRANS. 0.0 0.0 - 0.5 K/UL   METABOLIC PANEL, COMPREHENSIVE    Collection Time: 12/11/19  4:37 PM   Result Value Ref Range    Sodium 137 136 - 145 mmol/L    Potassium 3.9 3.5 - 5.1 mmol/L    Chloride 103 98 - 107 mmol/L    CO2 25 21 - 32 mmol/L    Anion gap 9 7 - 16 mmol/L    Glucose 188 (H) 65 - 100 mg/dL    BUN 17 8 - 23 MG/DL    Creatinine 1.45 (H) 0.6 - 1.0 MG/DL    GFR est AA 44 (L) >60 ml/min/1.73m2    GFR est non-AA 36 (L) >60 ml/min/1.73m2    Calcium 9.3 8.3 - 10.4 MG/DL    Bilirubin, total 0.4 0.2 - 1.1 MG/DL    ALT (SGPT) 30 12 - 65 U/L    AST (SGOT) 32 15 - 37 U/L    Alk.  phosphatase 78 50 - 130 U/L    Protein, total 7.7 6.3 - 8.2 g/dL    Albumin 3.9 3.2 - 4.6 g/dL    Globulin 3.8 (H) 2.3 - 3.5 g/dL    A-G Ratio 1.0 (L) 1.2 - 3.5

## 2019-12-11 NOTE — ED TRIAGE NOTES
Patient was recently admitted for UTI sepsis, prior to discharge patient was placed on hydralazine 25 mg, Patient was having some issues of not feeling well with some nausea and dizziness today. Patient was found with HTN at 190/100 at MD. 159/87 in triage and in 489F systolic with EMS. Patient denies any complaints at this time, stating she is feeling better.

## 2019-12-11 NOTE — PROGRESS NOTES
Location of Assessment: ER [facility]    Socioevironmental:  SW met with patient who states that she lives with her daughter Diana Stuart ([phone]). Patient states that she discharged from inpatient admission on the 6th of December. Patient also reports that she has an aide three days a week for two hours a day. Ambulation/ADLs:  Patient states that she uses a Rollator to ambulate outside of the home and a cane when inside. Patient states that she fell two weeks ago in the hospital, denies any additional falls. Patient also states that she's independent at home but has an aide who helps her. Primary Care:  Patient sees Dr. Joan Ventura for primary care, last appointment was today. Needs:   Patient is current with [facility] PT/RN. Patient states that so far she's only met with an aide. Patient is also being followed by ambulatory  Reva Humphreys. Per her PCP note \"Daughter has not administered insulin, has not given her her blood pressure medication including her hydralazine for past few days since discharge. Patient lives with daughter and suffers from dementia but daughter did not intervene/take care of her, states that she herself had a sinus infection and was not feeling well. \" SW will ensure ambulatory case management is made aware as the daughter reported yesterday she had obtained the patient's medications and was administrating it.    Elma Gonzalez, [address] [facility]    [address] [facility]  [email or web address]

## 2019-12-11 NOTE — ED NOTES
I have reviewed discharge instructions with the patient and daughter. The patient and family verbalized understanding. Patient left ED via Discharge Method: wheelchair to Home with family. Opportunity for questions and clarification provided. Patient given 0 scripts. To continue your aftercare when you leave the hospital, you may receive an automated call from our care team to check in on how you are doing. This is a free service and part of our promise to provide the best care and service to meet your aftercare needs.  If you have questions, or wish to unsubscribe from this service please call [phone]. Thank you for Choosing our [facility] Emergency Department.

## 2019-12-24 ENCOUNTER — PATIENT OUTREACH (OUTPATIENT)
Dept: CASE MANAGEMENT | Age: 84
End: 2019-12-24

## 2019-12-24 NOTE — PROGRESS NOTES
This note will not be viewable in [address] Transitions of Care  Follow up Outreach Note   Outreach type Phone call: spoke with patients daughter  Home visit:   Date/Time of Outreach: 12/24/19 1002m     Has patient attended PCP or specialist follow-up appointments since last contact? What was outcome of appointment? When is next follow-up scheduled? Jorge Benedict states patient is doing well now. Patient had return visit to ED as BP medicine and insulin were not started due to Jorge Benedict having been ill herself and she didnt realize patient was not taking medication. Jorge Benedict states she is supervising medication. Patient attended follow up with Dr. Norbetro Blanco and Jorge Benedict will call Monday for next visit. Review medications. Any medication changes since last outreach? Does patient have any questions or issues related to their medications? No changes stated. Not at this time. Home health active? If yes  any issue? Progress? [facility] referral was transferred to [facility]     Referrals needed?  (CM, SW, HH, etc. )   No.  Discussed referral to CTN, discussed goal of team is to help reduce/alleviate recurrent admissions. Jorge Benedict declines feels that since medication is being administered as ordered things have improved and no need at present. Other issues/Miscellaneous? (Transportation, access to meals, ability to perform ADLs, adequate caregiver support, etc.) No other needs or concerns at this time. Jorge Benedict states her gratitude for follow up. Next Outreach Scheduled?     Graduation from program?   N/A    Yes       Next Steps/Goals (if applicable):   N/A     Outreach completed by:   Harmeet Mosquera LPN  Care Coordinator

## 2019-12-24 NOTE — CDMP QUERY
Pt admitted with a UTI, DANIELLA  and Sepsis /Pt noted to have AMS that returned to baseline after treatment . \" Per Teleneuro, more consistent with encephalopathy or delirium, no acute CVA interventions. \"  If possible, please document in the progress notes and d/c summary if you are evaluating and / or treating any of the following: ? Hypertensive Encephalopathy ? Metabolic Encephalopathy ? Septic Encephalopathy ? Toxic Encephalopathy 
? Encephalopathy due to medications or drugs (please specify) ? Toxic Metabolic Encephalopathy 
? Other Encephalopathy 
? Other, please specify ? Clinically unable to determine The medical record reflects the following: 
 
   Risk Factors: Sepsis, DANIELLA, UTI, Clinical Indicators: MRI brain- negative. CT head- no acute changes. Cr-1.45, UA- Moderate LE's,wbc>100, bacteria +3. AMS returning to baseline with treatment Treatment: xray, ct/mri, iv abx. Iv fluids, labs, cultures Thank you, Miesha Jasso [address] [place] [address] 
[phone]

## 2020-01-11 ENCOUNTER — HOSPITAL ENCOUNTER (EMERGENCY)
Age: 85
Discharge: HOME | End: 2020-01-11
Attending: EMERGENCY MEDICINE
Payer: MEDICARE

## 2020-01-11 ENCOUNTER — APPOINTMENT (OUTPATIENT)
Dept: GENERAL RADIOLOGY | Age: 85
End: 2020-01-11
Attending: EMERGENCY MEDICINE
Payer: MEDICARE

## 2020-01-11 VITALS
RESPIRATION RATE: 15 BRPM | OXYGEN SATURATION: 97 % | BODY MASS INDEX: 30.39 KG/M2 | SYSTOLIC BLOOD PRESSURE: 193 MMHG | TEMPERATURE: 97.9 F | DIASTOLIC BLOOD PRESSURE: 81 MMHG | HEART RATE: 103 BPM | WEIGHT: 178 LBS | HEIGHT: 64 IN

## 2020-01-11 DIAGNOSIS — M54.9 UPPER BACK PAIN: Primary | ICD-10-CM

## 2020-01-11 PROCEDURE — 72040 X-RAY EXAM NECK SPINE 2-3 VW: CPT

## 2020-01-11 PROCEDURE — 99283 EMERGENCY DEPT VISIT LOW MDM: CPT | Performed by: EMERGENCY MEDICINE

## 2020-01-11 PROCEDURE — 72070 X-RAY EXAM THORAC SPINE 2VWS: CPT

## 2020-01-11 NOTE — ED NOTES
On return from x-ray now states that she also is having some neck pain which was not present initially will x-ray before discharging

## 2020-01-11 NOTE — ED TRIAGE NOTES
Pt arrives via EMS after she slipped while bending over to put her shoes on this morning. States thoracic back pain. Did not hit head. No blood thinners.  VSS

## 2020-01-11 NOTE — ED PROVIDER NOTES
This morning she was going to put on her shoes she slid out her bed. She since that time has some discomfort to approximately the thoracic spine region. This is poorly localized. No associated shortness of breath. Did not strike her head. No neck pain. No lumbar pain. No rib pain. No upper or lower extremity injury. She has done this before this week each time to get out of bed. She Ambulates with a walker for a rollator. After returning from initial x-ray stated that she now had some soreness to her neck. No nerd deficits    The history is provided by the patient and a relative (Daughters). Fall   The accident occurred less than 1 hour ago. Impact surface: Slid out of bed onto the floor. There was no blood loss. Pain location: Thoracic spine. The pain is moderate. There was no drug use involved in the accident. There was no alcohol use involved in the accident. Pertinent negatives include no fever, no numbness, no nausea, no vomiting, no headaches, no extremity weakness, no loss of consciousness, no tingling and no laceration. Exacerbated by: Movement. She has tried nothing for the symptoms. Past Medical History:   Diagnosis Date    Asthma     sees Dr. Bryce Parra with urination     Cellulitis     sees wound clinic    Charcot foot due to diabetes mellitus ([address]     bilat    Charcot-Evy-Tooth disease-like deformity of foot     Diabetes ([address]     in nursing home and does not know the values when checked     Erosive osteoarthritis     sees Dr. Simona Berrios Full dentures     GERD (gastroesophageal reflux disease)     High cholesterol     Hypertension     Incontinence of urine in female     Neuropathy     Sleep apnea     no C PAP       Past Surgical History:   Procedure Laterality Date    BREAST SURGERY PROCEDURE UNLISTED      bilat lumpectomy    CHEST SURGERY PROCEDURE UNLISTED      WHAT IS THIS? ??    HX CATARACT REMOVAL Bilateral     HX CHOLECYSTECTOMY      HX COLONOSCOPY      HX HYSTERECTOMY      HX KNEE REPLACEMENT Bilateral     HX ORTHOPAEDIC      back    VASCULAR SURGERY PROCEDURE UNLIST Left 09/29/2017    aortogram/arteriogram- PTA x2         Family History:   Problem Relation Age of Onset    No Known Problems Mother     No Known Problems Father        Social History     Socioeconomic History    Marital status:      Spouse name: Not on file    Number of children: Not on file    Years of education: Not on file    Highest education level: Not on file   Occupational History    Not on file   Social Needs    Financial resource strain: Not on file    Food insecurity:     Worry: Not on file     Inability: Not on file    Transportation needs:     Medical: Not on file     Non-medical: Not on file   Tobacco Use    Smoking status: Never Smoker    Smokeless tobacco: Never Used   Substance and Sexual Activity    Alcohol use: No    Drug use: No    Sexual activity: Not on file   Lifestyle    Physical activity:     Days per week: Not on file     Minutes per session: Not on file    Stress: Not on file   Relationships    Social connections:     Talks on phone: Not on file     Gets together: Not on file     Attends Amish service: Not on file     Active member of club or organization: Not on file     Attends meetings of clubs or organizations: Not on file     Relationship status: Not on file    Intimate partner violence:     Fear of current or ex partner: Not on file     Emotionally abused: Not on file     Physically abused: Not on file     Forced sexual activity: Not on file   Other Topics Concern    Not on file   Social History Narrative    Not on file         ALLERGIES: Aleve [naproxen sodium]; Hydrocodone-acetaminophen; Metformin; Phenobarbital; and Statins-hmg-coa reductase inhibitors    Review of Systems   Constitutional: Negative for chills and fever. HENT: Negative. Respiratory: Negative for shortness of breath.     Cardiovascular: Negative for chest pain. Gastrointestinal: Negative for nausea and vomiting. Musculoskeletal: Negative for extremity weakness. Thoracic spine region   Neurological: Negative for tingling, loss of consciousness, numbness and headaches. Psychiatric/Behavioral: Negative. All other systems reviewed and are negative. Vitals:    01/11/20 0912 01/11/20 0923   BP: (!) 160/101 (!) 195/93   Pulse: (!) 111    Resp: 16    Temp: 97.9 °F (36.6 °C)    SpO2: 96%    Weight: 80.7 kg (178 lb)    Height: 5' 4\" (1.626 m)             Physical Exam  Vitals signs and nursing note reviewed. Constitutional:       General: She is not in acute distress. Appearance: She is well-developed. Comments: Elderly, age-appropriate   HENT:      Head: Atraumatic. Right Ear: External ear normal.      Left Ear: External ear normal.      Nose: Nose normal.   Eyes:      General: No scleral icterus. Neck:      Musculoskeletal: Neck supple. Cardiovascular:      Rate and Rhythm: Normal rate and regular rhythm. Pulmonary:      Effort: Pulmonary effort is normal. No respiratory distress. Breath sounds: Normal breath sounds. Abdominal:      General: There is no distension. Palpations: Abdomen is soft. Musculoskeletal: Normal range of motion. Comments: Moves neck freely  At most mild soreness to the mid thoracic spine with no focal area of pain   Skin:     General: Skin is warm and dry. Findings: No laceration. Neurological:      General: No focal deficit present. Mental Status: She is alert. Psychiatric:         Thought Content:  Thought content normal.         Judgment: Judgment normal.          MDM  Number of Diagnoses or Management Options  Upper back pain:   Diagnosis management comments: X-rays are done today to evaluaate for compression fracture or other injury       Amount and/or Complexity of Data Reviewed  Tests in the radiology section of CPT®: reviewed and ordered  Obtain history from someone other than the patient: yes  Independent visualization of images, tracings, or specimens: yes    Risk of Complications, Morbidity, and/or Mortality  Presenting problems: moderate  Diagnostic procedures: minimal  Management options: low    Patient Progress  Patient progress: stable         Procedures

## 2020-01-21 ENCOUNTER — HOSPITAL ENCOUNTER (EMERGENCY)
Age: 85
Discharge: HOME | End: 2020-01-22
Attending: EMERGENCY MEDICINE
Payer: MEDICARE

## 2020-01-21 ENCOUNTER — APPOINTMENT (OUTPATIENT)
Dept: GENERAL RADIOLOGY | Age: 85
End: 2020-01-21
Attending: EMERGENCY MEDICINE
Payer: MEDICARE

## 2020-01-21 ENCOUNTER — APPOINTMENT (OUTPATIENT)
Dept: CT IMAGING | Age: 85
End: 2020-01-21
Attending: EMERGENCY MEDICINE
Payer: MEDICARE
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
## 2020-01-21 DIAGNOSIS — N28.9 RENAL INSUFFICIENCY: ICD-10-CM

## 2020-01-21 DIAGNOSIS — R73.9 HYPERGLYCEMIA: ICD-10-CM

## 2020-01-21 DIAGNOSIS — E86.0 DEHYDRATION: ICD-10-CM

## 2020-01-21 LAB
ALBUMIN SERPL-MCNC: 4.1 G/DL (ref 3.2–4.6)
ALBUMIN/GLOB SERPL: 1.1 {RATIO} (ref 1.2–3.5)
ALP SERPL-CCNC: 96 U/L (ref 50–136)
ALT SERPL-CCNC: 22 U/L (ref 12–65)
ANION GAP SERPL CALC-SCNC: 10 MMOL/L (ref 7–16)
AST SERPL-CCNC: 16 U/L (ref 15–37)
BASOPHILS # BLD: 0.1 K/UL (ref 0–0.2)
BASOPHILS NFR BLD: 1 % (ref 0–2)
BILIRUB SERPL-MCNC: 0.3 MG/DL (ref 0.2–1.1)
BUN SERPL-MCNC: 33 MG/DL (ref 8–23)
CALCIUM SERPL-MCNC: 10 MG/DL (ref 8.3–10.4)
CHLORIDE SERPL-SCNC: 103 MMOL/L (ref 98–107)
CO2 SERPL-SCNC: 25 MMOL/L (ref 21–32)
CREAT SERPL-MCNC: 1.7 MG/DL (ref 0.6–1)
DIFFERENTIAL METHOD BLD: ABNORMAL
EOSINOPHIL # BLD: 0.5 K/UL (ref 0–0.8)
EOSINOPHIL NFR BLD: 4 % (ref 0.5–7.8)
ERYTHROCYTE [DISTWIDTH] IN BLOOD BY AUTOMATED COUNT: 12.3 % (ref 11.9–14.6)
GLOBULIN SER CALC-MCNC: 3.9 G/DL (ref 2.3–3.5)
GLUCOSE SERPL-MCNC: 245 MG/DL (ref 65–100)
HCT VFR BLD AUTO: 42.2 % (ref 35.8–46.3)
HGB BLD-MCNC: 13.8 G/DL (ref 11.7–15.4)
IMM GRANULOCYTES # BLD AUTO: 0 K/UL (ref 0–0.5)
IMM GRANULOCYTES NFR BLD AUTO: 0 % (ref 0–5)
LYMPHOCYTES # BLD: 1.8 K/UL (ref 0.5–4.6)
LYMPHOCYTES NFR BLD: 17 % (ref 13–44)
MCH RBC QN AUTO: 32.3 PG (ref 26.1–32.9)
MCHC RBC AUTO-ENTMCNC: 32.7 G/DL (ref 31.4–35)
MCV RBC AUTO: 98.8 FL (ref 79.6–97.8)
MONOCYTES # BLD: 0.8 K/UL (ref 0.1–1.3)
MONOCYTES NFR BLD: 8 % (ref 4–12)
NEUTS SEG # BLD: 7.1 K/UL (ref 1.7–8.2)
NEUTS SEG NFR BLD: 70 % (ref 43–78)
NRBC # BLD: 0 K/UL (ref 0–0.2)
PLATELET # BLD AUTO: 315 K/UL (ref 150–450)
PMV BLD AUTO: 8.5 FL (ref 9.4–12.3)
POTASSIUM SERPL-SCNC: 4.2 MMOL/L (ref 3.5–5.1)
PROT SERPL-MCNC: 8 G/DL (ref 6.3–8.2)
RBC # BLD AUTO: 4.27 M/UL (ref 4.05–5.2)
SODIUM SERPL-SCNC: 138 MMOL/L (ref 136–145)
WBC # BLD AUTO: 10.2 K/UL (ref 4.3–11.1)

## 2020-01-21 PROCEDURE — 72070 X-RAY EXAM THORAC SPINE 2VWS: CPT

## 2020-01-21 PROCEDURE — 99284 EMERGENCY DEPT VISIT MOD MDM: CPT

## 2020-01-21 PROCEDURE — 81003 URINALYSIS AUTO W/O SCOPE: CPT

## 2020-01-21 PROCEDURE — 80053 COMPREHEN METABOLIC PANEL: CPT

## 2020-01-21 PROCEDURE — 70450 CT HEAD/BRAIN W/O DYE: CPT

## 2020-01-21 PROCEDURE — 85025 COMPLETE CBC W/AUTO DIFF WBC: CPT

## 2020-01-22 VITALS
HEIGHT: 64 IN | TEMPERATURE: 98.2 F | OXYGEN SATURATION: 96 % | WEIGHT: 173 LBS | HEART RATE: 106 BPM | DIASTOLIC BLOOD PRESSURE: 69 MMHG | BODY MASS INDEX: 29.53 KG/M2 | SYSTOLIC BLOOD PRESSURE: 186 MMHG | RESPIRATION RATE: 17 BRPM

## 2020-01-22 NOTE — ED TRIAGE NOTES
Arrives from home via [facility] s/p fall. Reports brushing hair at time when lost balance striking back of head on wall and tile floor. Denies loss of consciousness. Denies midline cervical pain. Reports posterior head pain and mid back pain. Hx chronic back problems r/t arthritis however states pain worse since fall. A/o x1, hx dementia however daughter states increased today prior to fall. Denies urinary symptoms. bgl 306 with EMS. Denies blood thinners.

## 2020-01-22 NOTE — DISCHARGE INSTRUCTIONS
Increase water intake over the next week and stay well-hydrated. Monitor your blood sugar. If you begin developing any new or concerning symptoms return to the ED at that time.

## 2020-01-22 NOTE — ED NOTES
I have reviewed discharge instructions with the patient. The patient verbalized understanding. Patient left ED via Discharge Method: wheelchair to Home with self  Opportunity for questions and clarification provided. Patient given 0 scripts. To continue your aftercare when you leave the hospital, you may receive an automated call from our care team to check in on how you are doing. This is a free service and part of our promise to provide the best care and service to meet your aftercare needs.  If you have questions, or wish to unsubscribe from this service please call [phone]. Thank you for Choosing our [facility] Emergency Department.

## 2020-01-22 NOTE — ED PROVIDER NOTES
The patient is an 77-year-old female who was brought to the emergency department after a fall from standing. The patient was in the bathroom washing her hands when she lost her balance and fell over backwards landing on her bottom and hitting her on the wall on the way down. The patient did not have a loss of consciousness. She does have a history of some arthritis and chronic neck pain. The patient is accompanied to the ER with her daughter whom she lives with and her daughter says that she has been a little fuse than normal throughout the day today. She has had decreased water intake and just prior to coming to the ER the daughter notes that they had cheesecake and that is why she thinks her blood sugar may be high. Patient has no other acute complaints. Past Medical History:   Diagnosis Date    Asthma     sees Dr. Dennise Burns with urination     Cellulitis     sees wound clinic    Charcot foot due to diabetes mellitus ([address]     bilat    Charcot-Evy-Tooth disease-like deformity of foot     Diabetes ([address]     in nursing home and does not know the values when checked     Erosive osteoarthritis     sees Dr. Brasher Sobapple Full dentures     GERD (gastroesophageal reflux disease)     High cholesterol     Hypertension     Incontinence of urine in female     Neuropathy     Sleep apnea     no C PAP       Past Surgical History:   Procedure Laterality Date    BREAST SURGERY PROCEDURE UNLISTED      bilat lumpectomy    CHEST SURGERY PROCEDURE UNLISTED      WHAT IS THIS? ??    HX CATARACT REMOVAL Bilateral     HX CHOLECYSTECTOMY      HX COLONOSCOPY      HX HYSTERECTOMY      HX KNEE REPLACEMENT Bilateral     HX ORTHOPAEDIC      back    VASCULAR SURGERY PROCEDURE UNLIST Left 09/29/2017    aortogram/arteriogram- PTA x2         Family History:   Problem Relation Age of Onset    No Known Problems Mother     No Known Problems Father        Social History     Socioeconomic History    Marital status:      Spouse name: Not on file    Number of children: Not on file    Years of education: Not on file    Highest education level: Not on file   Occupational History    Not on file   Social Needs    Financial resource strain: Not on file    Food insecurity:     Worry: Not on file     Inability: Not on file    Transportation needs:     Medical: Not on file     Non-medical: Not on file   Tobacco Use    Smoking status: Never Smoker    Smokeless tobacco: Never Used   Substance and Sexual Activity    Alcohol use: No    Drug use: No    Sexual activity: Not on file   Lifestyle    Physical activity:     Days per week: Not on file     Minutes per session: Not on file    Stress: Not on file   Relationships    Social connections:     Talks on phone: Not on file     Gets together: Not on file     Attends Orthodox service: Not on file     Active member of club or organization: Not on file     Attends meetings of clubs or organizations: Not on file     Relationship status: Not on file    Intimate partner violence:     Fear of current or ex partner: Not on file     Emotionally abused: Not on file     Physically abused: Not on file     Forced sexual activity: Not on file   Other Topics Concern    Not on file   Social History Narrative    Not on file         ALLERGIES: Aleve [naproxen sodium]; Hydrocodone-acetaminophen; Metformin; Phenobarbital; and Statins-hmg-coa reductase inhibitors    Review of Systems   Constitutional: Negative. Gastrointestinal: Negative. Genitourinary: Negative. Musculoskeletal: Positive for neck pain. Skin: Negative. Neurological: Positive for headaches. Hematological: Negative. Vitals:    01/21/20 2151   BP: 189/89   Pulse: (!) 108   Resp: 16   Temp: 98.2 °F (36.8 °C)   SpO2: 96%   Weight: 78.5 kg (173 lb)   Height: 5' 4\" (1.626 m)            Physical Exam     GENERAL:The patient is overweight, and mild dehydratation.   VITAL SIGNS: Heart rate, blood pressure, respiratory rate reviewed as recorded in  nurse's notes  EYES: Pupils reactive. Extraocular motion intact. No conjunctival redness or drainage. EARS: No external masses or lesions. NOSE: No nasal drainage or epistaxis. MOUTH/THROAT: Pharynx clear; airway patent. Mucous membranes are dry  NECK: Supple, no meningeal signs. Trachea midline. No masses or thyromegaly. LUNGS: Breath sounds clear and equal bilaterally no accessory muscle use  CARDIOVASCULAR: Regular rate and rhythm  ABDOMEN: Soft without tenderness. No palpable masses or organomegaly. No  peritoneal signs. No rigidity. EXTREMITIES: No clubbing or cyanosis. No joint swelling. Normal muscle tone. No  restricted range of motion appreciated. NEUROLOGIC: Sensation is grossly intact. Cranial nerve exam reveals face is  symmetrical, tongue is midline speech is clear. SKIN: No rash or petechiae. Good skin turgor palpated. PSYCHIATRIC: Alert and oriented. Appropriate behavior and judgment. MDM  Number of Diagnoses or Management Options  Diagnosis management comments: Electrolyte abnormality, dehydration, renal failure, dementia,    Sprain, strain, tendon injury, contusion,    Abrasion, laceration, neurovascular injury, foreign body    Fracture, open fracture, dislocation, joint separation, articular surface injury,         Amount and/or Complexity of Data Reviewed  Clinical lab tests: reviewed and ordered  Tests in the radiology section of CPT®: reviewed and ordered  Tests in the medicine section of CPT®: ordered and reviewed  Decide to obtain previous medical records or to obtain history from someone other than the patient: yes  Obtain history from someone other than the patient: yes  Independent visualization of images, tracings, or specimens: yes      ED Course as of Jan 22 0122 Tue Jan 21, 2020   7790 IMPRESSION:     No acute intracranial abnormalities.    CT HEAD WO CONT [KH]   Wed Jan 22, 2020   0118 The patient's IV infiltrated and she did not want to start another line so she multiple glasses of water here in the ER and drink them without difficulty. I talked her about the need to hydrate well over the week secondary to her acute renal insufficiency.     [KH]      ED Course User Index  [KH] Zack Santiago, DO       Procedures

## 2020-01-29 PROBLEM — M15.4 EROSIVE OSTEOARTHRITIS OF BOTH HANDS: Status: ACTIVE | Noted: 2017-07-18

## 2020-01-29 PROBLEM — Z91.81 HISTORY OF RECENT FALL: Status: ACTIVE | Noted: 2018-07-19

## 2020-01-30 ENCOUNTER — APPOINTMENT (OUTPATIENT)
Dept: GENERAL RADIOLOGY | Age: 85
End: 2020-01-30
Attending: EMERGENCY MEDICINE
Payer: MEDICARE

## 2020-01-30 ENCOUNTER — APPOINTMENT (OUTPATIENT)
Dept: CT IMAGING | Age: 85
End: 2020-01-30
Attending: EMERGENCY MEDICINE
Payer: MEDICARE

## 2020-01-30 ENCOUNTER — HOSPITAL ENCOUNTER (EMERGENCY)
Age: 85
Discharge: HOME | End: 2020-01-31
Attending: EMERGENCY MEDICINE
Payer: MEDICARE

## 2020-01-30 DIAGNOSIS — F03.90 DEMENTIA WITHOUT BEHAVIORAL DISTURBANCE, UNSPECIFIED DEMENTIA TYPE: Primary | ICD-10-CM

## 2020-01-30 LAB
ALBUMIN SERPL-MCNC: 3.5 G/DL (ref 3.2–4.6)
ALBUMIN/GLOB SERPL: 1 {RATIO} (ref 1.2–3.5)
ALP SERPL-CCNC: 79 U/L (ref 50–136)
ALT SERPL-CCNC: 20 U/L (ref 12–65)
ANION GAP SERPL CALC-SCNC: 9 MMOL/L (ref 7–16)
AST SERPL-CCNC: 20 U/L (ref 15–37)
BASOPHILS # BLD: 0.1 K/UL (ref 0–0.2)
BASOPHILS NFR BLD: 1 % (ref 0–2)
BILIRUB DIRECT SERPL-MCNC: 0.1 MG/DL
BILIRUB SERPL-MCNC: 0.4 MG/DL (ref 0.2–1.1)
BUN SERPL-MCNC: 28 MG/DL (ref 8–23)
CALCIUM SERPL-MCNC: 9.4 MG/DL (ref 8.3–10.4)
CHLORIDE SERPL-SCNC: 104 MMOL/L (ref 98–107)
CO2 SERPL-SCNC: 24 MMOL/L (ref 21–32)
CREAT SERPL-MCNC: 1.45 MG/DL (ref 0.6–1)
DIFFERENTIAL METHOD BLD: ABNORMAL
EOSINOPHIL # BLD: 0.2 K/UL (ref 0–0.8)
EOSINOPHIL NFR BLD: 2 % (ref 0.5–7.8)
ERYTHROCYTE [DISTWIDTH] IN BLOOD BY AUTOMATED COUNT: 12.2 % (ref 11.9–14.6)
GLOBULIN SER CALC-MCNC: 3.5 G/DL (ref 2.3–3.5)
GLUCOSE SERPL-MCNC: 233 MG/DL (ref 65–100)
HCT VFR BLD AUTO: 40.7 % (ref 35.8–46.3)
HGB BLD-MCNC: 13.3 G/DL (ref 11.7–15.4)
IMM GRANULOCYTES # BLD AUTO: 0 K/UL (ref 0–0.5)
IMM GRANULOCYTES NFR BLD AUTO: 0 % (ref 0–5)
LYMPHOCYTES # BLD: 1.9 K/UL (ref 0.5–4.6)
LYMPHOCYTES NFR BLD: 23 % (ref 13–44)
MCH RBC QN AUTO: 32.5 PG (ref 26.1–32.9)
MCHC RBC AUTO-ENTMCNC: 32.7 G/DL (ref 31.4–35)
MCV RBC AUTO: 99.5 FL (ref 79.6–97.8)
MONOCYTES # BLD: 0.8 K/UL (ref 0.1–1.3)
MONOCYTES NFR BLD: 9 % (ref 4–12)
NEUTS SEG # BLD: 5.4 K/UL (ref 1.7–8.2)
NEUTS SEG NFR BLD: 65 % (ref 43–78)
NRBC # BLD: 0 K/UL (ref 0–0.2)
PLATELET # BLD AUTO: 281 K/UL (ref 150–450)
PMV BLD AUTO: 8.9 FL (ref 9.4–12.3)
POTASSIUM SERPL-SCNC: 4.4 MMOL/L (ref 3.5–5.1)
PROT SERPL-MCNC: 7 G/DL (ref 6.3–8.2)
RBC # BLD AUTO: 4.09 M/UL (ref 4.05–5.2)
SODIUM SERPL-SCNC: 137 MMOL/L (ref 136–145)
WBC # BLD AUTO: 8.2 K/UL (ref 4.3–11.1)

## 2020-01-30 PROCEDURE — 80076 HEPATIC FUNCTION PANEL: CPT

## 2020-01-30 PROCEDURE — 81003 URINALYSIS AUTO W/O SCOPE: CPT

## 2020-01-30 PROCEDURE — 71046 X-RAY EXAM CHEST 2 VIEWS: CPT

## 2020-01-30 PROCEDURE — 80048 BASIC METABOLIC PNL TOTAL CA: CPT

## 2020-01-30 PROCEDURE — 93005 ELECTROCARDIOGRAM TRACING: CPT | Performed by: EMERGENCY MEDICINE

## 2020-01-30 PROCEDURE — 85025 COMPLETE CBC W/AUTO DIFF WBC: CPT

## 2020-01-30 PROCEDURE — 99285 EMERGENCY DEPT VISIT HI MDM: CPT

## 2020-01-30 PROCEDURE — 70450 CT HEAD/BRAIN W/O DYE: CPT

## 2020-01-30 PROCEDURE — 51701 INSERT BLADDER CATHETER: CPT

## 2020-01-30 PROCEDURE — 94762 N-INVAS EAR/PLS OXIMTRY CONT: CPT

## 2020-01-30 RX ORDER — SODIUM CHLORIDE 0.9 % (FLUSH) 0.9 %
5-40 SYRINGE (ML) INJECTION EVERY 8 HOURS
Status: DISCONTINUED | OUTPATIENT
Start: 2020-01-30 | End: 2020-01-31 | Stop reason: HOSPADM

## 2020-01-30 RX ORDER — SODIUM CHLORIDE 0.9 % (FLUSH) 0.9 %
5-40 SYRINGE (ML) INJECTION AS NEEDED
Status: DISCONTINUED | OUTPATIENT
Start: 2020-01-30 | End: 2020-01-31 | Stop reason: HOSPADM

## 2020-01-31 VITALS
BODY MASS INDEX: 29.19 KG/M2 | WEIGHT: 171 LBS | DIASTOLIC BLOOD PRESSURE: 77 MMHG | TEMPERATURE: 98 F | HEART RATE: 92 BPM | SYSTOLIC BLOOD PRESSURE: 188 MMHG | RESPIRATION RATE: 18 BRPM | HEIGHT: 64 IN | OXYGEN SATURATION: 97 %

## 2020-01-31 LAB
ATRIAL RATE: 87 BPM
CALCULATED P AXIS, ECG09: 110 DEGREES
CALCULATED R AXIS, ECG10: 10 DEGREES
CALCULATED T AXIS, ECG11: 70 DEGREES
DIAGNOSIS, 93000: NORMAL
P-R INTERVAL, ECG05: 232 MS
Q-T INTERVAL, ECG07: 414 MS
QRS DURATION, ECG06: 126 MS
QTC CALCULATION (BEZET), ECG08: 498 MS
VENTRICULAR RATE, ECG03: 87 BPM

## 2020-01-31 NOTE — ED NOTES
I have reviewed discharge instructions with the caregiver. The caregiver verbalized understanding. Patient left ED via Discharge Method: wheelchair to Home with daughter    Opportunity for questions and clarification provided. Patient given 0 scripts. To continue your aftercare when you leave the hospital, you may receive an automated call from our care team to check in on how you are doing. This is a free service and part of our promise to provide the best care and service to meet your aftercare needs.  If you have questions, or wish to unsubscribe from this service please call [phone]. Thank you for Choosing our [facility] Emergency Department.

## 2020-01-31 NOTE — DISCHARGE INSTRUCTIONS
Patient Education        Dementia: Care Instructions  Your Care Instructions    Dementia is a loss of mental skills that affects your daily life. It is different than the occasional trouble with memory that is part of aging. You may find it hard to remember things that you feel you should be able to remember. Or you may feel that your mind is just not working as well as usual.  Finding out that you have dementia is a shock. You may be afraid and worried about how the condition will change your life. Although there is no cure at this time, medicine may slow memory loss and improve thinking for a while. Other medicines may be able to help you sleep or cope with depression and behavior changes. Dementia often gets worse slowly. But it can get worse quickly. As dementia gets worse, it may become harder to do common things that take planning, like making a list and going shopping. Over time, the disease may make it hard for you to take care of yourself. Some people with dementia need others to help care for them. Dementia is different for everyone. You may be able to function well for a long time. In the early stage of the condition, you can do things at home to make life easier and safer. You also can keep doing your hobbies and other activities. Many people find comfort in planning now for their future needs. Follow-up care is a key part of your treatment and safety. Be sure to make and go to all appointments, and call your doctor if you are having problems. It's also a good idea to know your test results and keep a list of the medicines you take. How can you care for yourself at home? · Take your medicines exactly as prescribed. Call your doctor if you think you are having a problem with your medicine. · Eat healthy foods. Eat lots of whole grains, fruits, and vegetables every day.  If you are not hungry, try snacks or nutritional drinks such as Boost, Ensure, or Sustacal.  · If you have problems sleeping:  ? Try not to nap too close to your bedtime. ? Exercise regularly. Walking is a good choice. ? Try a glass of warm milk or caffeine-free herbal tea before bed. · Do tasks and activities during the time of day when you feel your best. It may help to develop a daily routine. · Post labels, lists, and sticky notes to help you remember things. Write your activities on a calendar you can easily find. Put your clock where you can easily see it. · Stay active. Take walks in familiar places, or with friends or loved ones. Try to stay active mentally too. Read and work crossword puzzles if you enjoy these activities. · Do not drive unless you can pass an on-road driving test. If you are not sure if you are safe to drive, your state 's license bureau can test you. · Keep a cordless phone and a flashlight with new batteries by your bed. If possible, put a phone in each of the main rooms of your house, or carry a cell phone in case you fall and cannot reach a phone. Or, you can wear a device around your neck or wrist. You push a button that sends a signal for help. Acknowledge your emotions and plan for the future  · Talk openly and honestly with your doctor. · Let yourself grieve. It is common to feel angry, scared, frustrated, anxious, or depressed. · Get emotional support from family, friends, a support group, or a counselor experienced in working with people who have dementia. · Ask for help if you need it. · Tell your doctor how you feel. You may feel upset, angry, or worried at times. Many things can cause this, including poor sleep, medicine side effects, confusion, and pain. Your doctor may be able to help you. · Plan for the future. ? Talk to your family and doctor about preparing a living will and other important papers while you can make decisions. These papers tell your doctors how to care for you at the end of your life.   ? Consider naming a person to make decisions about your care if you are not able to.  When should you call for help? Call 911 anytime you think you may need emergency care. For example, call if:    · You are lost and do not know whom to call.     · You are injured and do not know whom to call.    Call your doctor now or seek immediate medical care if:    · You are more confused or upset than usual.     · You feel like you could hurt yourself because your mind is not working well.   Scar Peña closely for changes in your health, and be sure to contact your doctor if you have any problems. Where can you learn more? Go to http://latanya-cristina.info/. Enter B752 in the search box to learn more about \"Dementia: Care Instructions. \"  Current as of: May 28, 2019  Content Version: 12.2  © 0694-0208 [employer]. Care instructions adapted under license by [employer] (which disclaims liability or warranty for this information). If you have questions about a medical condition or this instruction, always ask your healthcare professional. [address] any warranty or liability for your use of this information.

## 2020-01-31 NOTE — ED TRIAGE NOTES
Patient arrives via [facility] from home. Daughter called EMS due to increasing altered mental status for several days. Patient seen at pcp office yesterday. EMS reports patient with several episodes of heart rate changing from  and back to NSR, maybe 10 seconds of heart racing; rhythm strips provided to MD.  EMS reports patient hypertensive 178/92.  bgl 191. Temp 99. Daughter reports patient with recent falls. Went to pcp yesterday. Daughter reports increasing confusion at home. Patient does not have any complaints at this time.

## 2020-01-31 NOTE — ED PROVIDER NOTES
60-year-old white female history of dementia brought in by EMS increasing episodes of confusion over the past several days. Family reports that she seems to have difficulty with simple tasks such as getting out of a car or eating. Tonight she also had some periods of disorganized speech. She has had 4 falls in the past 2 weeks. Family says she has struck her head with the falls. She had no fever or vomiting. She has had some intermittent diarrhea. Patient is currently awake and alert and voices no specific complaints. She denies pain. The history is provided by the patient. Altered mental status           Past Medical History:   Diagnosis Date    Asthma     sees Dr. Garcia Toxey with urination     Cellulitis     sees wound clinic    Charcot foot due to diabetes mellitus ([address]     bilat    Charcot-Evy-Tooth disease-like deformity of foot     Diabetes ([address]     in nursing home and does not know the values when checked     Erosive osteoarthritis     sees Dr. Dominique Nation Full dentures     GERD (gastroesophageal reflux disease)     High cholesterol     Hypertension     Incontinence of urine in female     Neuropathy     Sleep apnea     no C PAP       Past Surgical History:   Procedure Laterality Date    BREAST SURGERY PROCEDURE UNLISTED      bilat lumpectomy    CHEST SURGERY PROCEDURE UNLISTED      WHAT IS THIS? ??    HX CATARACT REMOVAL Bilateral     HX CHOLECYSTECTOMY      HX COLONOSCOPY      HX HYSTERECTOMY      HX KNEE REPLACEMENT Bilateral     HX ORTHOPAEDIC      back    VASCULAR SURGERY PROCEDURE UNLIST Left 09/29/2017    aortogram/arteriogram- PTA x2         Family History:   Problem Relation Age of Onset    No Known Problems Mother     No Known Problems Father        Social History     Socioeconomic History    Marital status:      Spouse name: Not on file    Number of children: Not on file    Years of education: Not on file    Highest education level: Not on file   Occupational History    Not on file   Social Needs    Financial resource strain: Not on file    Food insecurity:     Worry: Not on file     Inability: Not on file    Transportation needs:     Medical: Not on file     Non-medical: Not on file   Tobacco Use    Smoking status: Never Smoker    Smokeless tobacco: Never Used   Substance and Sexual Activity    Alcohol use: No    Drug use: No    Sexual activity: Not on file   Lifestyle    Physical activity:     Days per week: Not on file     Minutes per session: Not on file    Stress: Not on file   Relationships    Social connections:     Talks on phone: Not on file     Gets together: Not on file     Attends Zoroastrianism service: Not on file     Active member of club or organization: Not on file     Attends meetings of clubs or organizations: Not on file     Relationship status: Not on file    Intimate partner violence:     Fear of current or ex partner: Not on file     Emotionally abused: Not on file     Physically abused: Not on file     Forced sexual activity: Not on file   Other Topics Concern    Not on file   Social History Narrative    Not on file         ALLERGIES: Aleve [naproxen sodium]; Hydrocodone-acetaminophen; Metformin; Phenobarbital; and Statins-hmg-coa reductase inhibitors    Review of Systems   Constitutional: Negative for fever. HENT: Negative for congestion. Respiratory: Negative for cough and shortness of breath. Cardiovascular: Negative for chest pain. Gastrointestinal: Positive for diarrhea. Negative for abdominal pain, nausea and vomiting. Genitourinary: Negative for dysuria. Musculoskeletal: Negative for back pain and neck pain. Skin: Negative for rash. Neurological: Negative for headaches.        Vitals:    01/30/20 2145 01/30/20 2149   BP: 188/85 188/85   Pulse: 85    Resp: 16    Temp: 97.9 °F (36.6 °C)    SpO2: 96% 96%   Weight: 77.6 kg (171 lb)    Height: 5' 4\" (1.626 m) Physical Exam  Vitals signs and nursing note reviewed. Constitutional:       General: She is not in acute distress. Appearance: She is well-developed. She is not ill-appearing or toxic-appearing. HENT:      Head: Normocephalic and atraumatic. Nose: Nose normal.      Mouth/Throat:      Mouth: Mucous membranes are moist.      Pharynx: Oropharynx is clear. Eyes:      Extraocular Movements: Extraocular movements intact. Conjunctiva/sclera: Conjunctivae normal.      Pupils: Pupils are equal, round, and reactive to light. Neck:      Musculoskeletal: Normal range of motion and neck supple. Cardiovascular:      Rate and Rhythm: Normal rate and regular rhythm. Heart sounds: No murmur. Pulmonary:      Effort: Pulmonary effort is normal.      Breath sounds: Normal breath sounds. Abdominal:      General: There is no distension. Palpations: Abdomen is soft. Tenderness: There is no abdominal tenderness. Musculoskeletal: Normal range of motion. Skin:     General: Skin is warm and dry. Neurological:      Mental Status: She is alert and oriented to person, place, and time. Cranial Nerves: No cranial nerve deficit. Sensory: No sensory deficit. Coordination: Coordination normal.   Psychiatric:         Mood and Affect: Mood normal.         Behavior: Behavior normal.          MDM  Number of Diagnoses or Management Options  Diagnosis management comments: Work and urine are unremarkable. Head CT shows no acute abnormality. Chest x-ray normal.  Patient has remained awake alert and comfortable. She has had no signs of confusion while being observed. Suspect her symptoms are related to underlying dementia. Do not feel admission or further emergent work-up are necessary. Patient had an MRI performed last month and this showed no acute abnormality.        Amount and/or Complexity of Data Reviewed  Clinical lab tests: ordered and reviewed  Tests in the radiology section of CPT®: ordered and reviewed  Independent visualization of images, tracings, or specimens: yes    Risk of Complications, Morbidity, and/or Mortality  Presenting problems: moderate  Diagnostic procedures: moderate  Management options: moderate           Procedures

## 2020-02-25 ENCOUNTER — HOSPITAL ENCOUNTER (EMERGENCY)
Age: 85
Discharge: HOME | End: 2020-02-25
Attending: EMERGENCY MEDICINE
Payer: MEDICARE

## 2020-02-25 ENCOUNTER — APPOINTMENT (OUTPATIENT)
Dept: GENERAL RADIOLOGY | Age: 85
End: 2020-02-25
Attending: NURSE PRACTITIONER
Payer: MEDICARE

## 2020-02-25 VITALS
HEART RATE: 94 BPM | HEIGHT: 64 IN | TEMPERATURE: 98.4 F | SYSTOLIC BLOOD PRESSURE: 148 MMHG | DIASTOLIC BLOOD PRESSURE: 87 MMHG | RESPIRATION RATE: 20 BRPM | OXYGEN SATURATION: 97 % | BODY MASS INDEX: 30.52 KG/M2 | WEIGHT: 178.8 LBS

## 2020-02-25 DIAGNOSIS — J06.9 ACUTE URI: Primary | ICD-10-CM

## 2020-02-25 PROCEDURE — 99283 EMERGENCY DEPT VISIT LOW MDM: CPT

## 2020-02-25 PROCEDURE — 71046 X-RAY EXAM CHEST 2 VIEWS: CPT

## 2020-02-25 RX ORDER — DEXTROMETHORPHAN POLISTIREX 30 MG/5ML
60 SUSPENSION ORAL 2 TIMES DAILY
Qty: 200 ML | Refills: 0 | Status: SHIPPED | OUTPATIENT
Start: 2020-02-25 | End: 2020-03-06

## 2020-02-25 NOTE — ED PROVIDER NOTES
Patient presents with cough, congestion, sore throat, and sneezing that started yesterday. Patient's family denies fever but states she had to [place] her a blanket because she was cold last night\". The history is provided by the patient. Cough   This is a new problem. The current episode started yesterday. The problem occurs constantly. The problem has not changed since onset. The cough is non-productive. There has been no fever. Associated symptoms include sore throat. Pertinent negatives include no chills, no sweats, no weight loss, no eye redness, no ear congestion, no rhinorrhea, no myalgias, no wheezing, no nausea and no confusion. Treatments tried: cold and cough OTC. The treatment provided no relief. Her past medical history is significant for asthma. Past Medical History:   Diagnosis Date    Asthma     sees Dr. Donna Travis with urination     Cellulitis     sees wound clinic    Charcot foot due to diabetes mellitus ([address]     bilat    Charcot-Evy-Tooth disease-like deformity of foot     Diabetes ([address]     in nursing home and does not know the values when checked     Erosive osteoarthritis     sees Dr. Prajapati Mix Full dentures     GERD (gastroesophageal reflux disease)     High cholesterol     Hypertension     Incontinence of urine in female     Neuropathy     Sleep apnea     no C PAP       Past Surgical History:   Procedure Laterality Date    BREAST SURGERY PROCEDURE UNLISTED      bilat lumpectomy    CHEST SURGERY PROCEDURE UNLISTED      WHAT IS THIS? ??    HX CATARACT REMOVAL Bilateral     HX CHOLECYSTECTOMY      HX COLONOSCOPY      HX HYSTERECTOMY      HX KNEE REPLACEMENT Bilateral     HX ORTHOPAEDIC      back    VASCULAR SURGERY PROCEDURE UNLIST Left 09/29/2017    aortogram/arteriogram- PTA x2         Family History:   Problem Relation Age of Onset    No Known Problems Mother     No Known Problems Father        Social History     Socioeconomic History    Marital status:      Spouse name: Not on file    Number of children: Not on file    Years of education: Not on file    Highest education level: Not on file   Occupational History    Not on file   Social Needs    Financial resource strain: Not on file    Food insecurity:     Worry: Not on file     Inability: Not on file    Transportation needs:     Medical: Not on file     Non-medical: Not on file   Tobacco Use    Smoking status: Never Smoker    Smokeless tobacco: Never Used   Substance and Sexual Activity    Alcohol use: No    Drug use: No    Sexual activity: Not on file   Lifestyle    Physical activity:     Days per week: Not on file     Minutes per session: Not on file    Stress: Not on file   Relationships    Social connections:     Talks on phone: Not on file     Gets together: Not on file     Attends Spiritism service: Not on file     Active member of club or organization: Not on file     Attends meetings of clubs or organizations: Not on file     Relationship status: Not on file    Intimate partner violence:     Fear of current or ex partner: Not on file     Emotionally abused: Not on file     Physically abused: Not on file     Forced sexual activity: Not on file   Other Topics Concern    Not on file   Social History Narrative    Not on file         ALLERGIES: Aleve [naproxen sodium]; Hydrocodone-acetaminophen; Metformin; Phenobarbital; and Statins-hmg-coa reductase inhibitors    Review of Systems   Constitutional: Negative for chills, fever and weight loss. HENT: Positive for congestion, sneezing and sore throat. Negative for rhinorrhea. Eyes: Negative for redness. Respiratory: Positive for cough. Negative for wheezing. Gastrointestinal: Negative for nausea. Musculoskeletal: Negative for myalgias. Psychiatric/Behavioral: Negative for confusion.        Vitals:    02/25/20 1442 02/25/20 1634   BP: 144/65    Pulse: (!) 114 98   Resp: 20 18   Temp: 97.5 °F (36.4 °C)    SpO2: 96% 96%   Weight: 81.1 kg (178 lb 12.8 oz)    Height: 5' 4\" (1.626 m)             Physical Exam  Vitals signs and nursing note reviewed. Constitutional:       Appearance: Normal appearance. She is well-developed. HENT:      Head: Normocephalic and atraumatic. Right Ear: Tympanic membrane is erythematous. Tympanic membrane is not injected. Left Ear: Tympanic membrane is erythematous. Tympanic membrane is not injected. Nose: Mucosal edema present. Mouth/Throat:      Mouth: Mucous membranes are moist.      Pharynx: Oropharynx is clear. Neck:      Musculoskeletal: Normal range of motion and neck supple. Cardiovascular:      Rate and Rhythm: Normal rate and regular rhythm. Pulses: Normal pulses. Heart sounds: Normal heart sounds. Pulmonary:      Effort: Pulmonary effort is normal.      Breath sounds: Normal breath sounds. No wheezing. Abdominal:      General: Abdomen is flat. There is no distension. Skin:     General: Skin is warm and dry. Neurological:      General: No focal deficit present. Mental Status: She is alert and oriented to person, place, and time. Psychiatric:         Mood and Affect: Mood normal.         Behavior: Behavior normal.        Xr Chest Pa Lat    Result Date: 2/25/2020  Two view chest History: cough and chills times a couple of days. Comparison: 01/30/2020 Findings: The heart and mediastinal silhouette are normal in size and configuration. The lungs and pleural spaces are clear. The pulmonary vascularity is within normal limits. The visualized osseous structures are unremarkable. Impression: No active disease in the chest.     Xr Chest Pa Lat    Result Date: 1/30/2020  EXAM: Chest x-ray. INDICATION: Mental status changes. COMPARISON: November 27, 2019. TECHNIQUE: Frontal and lateral x-rays of the chest were obtained. FINDINGS: The cardiac size, mediastinal contour and pulmonary vasculature are within normal limits.  The lungs are clear. No pneumothorax or pleural effusion is seen. Note is made of atherosclerotic calcifications in the thoracic aorta and degenerative changes in the spine. IMPRESSION: No acute process. Ct Head Wo Cont    Result Date: 1/30/2020  EXAM: Noncontrast CT head. INDICATION: Acute mental status changes. COMPARISON: Prior CT head on January 21, 2020. TECHNIQUE: Noncontrast CT images of the head were obtained. Radiation dose reduction techniques were used for this study. Our CT scanners use one or all of the following:  Automated exposure control, adjustment of the mA or kV according to patient size, iterative reconstruction. 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 No acute infarct, hemorrhage or mass is identified. There is no mass effect, midline shift or depressed fracture. The visualized paranasal sinuses and mastoid air cells are clear. IMPRESSION: No acute process. MDM  Number of Diagnoses or Management Options  Acute URI:   Diagnosis management comments: Chest xray negative for pneumonia.         Amount and/or Complexity of Data Reviewed  Tests in the radiology section of CPT®: ordered and reviewed    Patient Progress  Patient progress: stable         Procedures

## 2020-02-25 NOTE — ED NOTES
I have reviewed discharge instructions with the patient. The patient verbalized understanding. Patient to follow up with PMD as referred and RTED with any changes/concerns. Patient from ED via wheelchair in NAD with Rx x 2. paitnet family member to drive patinet home.

## 2020-02-25 NOTE — DISCHARGE INSTRUCTIONS
Zyrtec at night as prescribed. Delsym as prescribed. Follow up with your primary care provider for a recheck if symptoms fail to improve or worsen. Return to the emergency department as needed.

## 2020-02-25 NOTE — ED TRIAGE NOTES
Pt presents with a sore throat, coughing and sneezing since Sunday. Pt took some cold and flu medicine OTC and cough drops but no relief.

## 2020-03-04 PROBLEM — E66.01 SEVERE OBESITY (BMI 35.0-39.9) WITH COMORBIDITY (HCC): Status: RESOLVED | Noted: 2018-05-24 | Resolved: 2020-03-04

## 2020-04-18 ENCOUNTER — HOSPITAL ENCOUNTER (OUTPATIENT)
Age: 85
Setting detail: OBSERVATION
Discharge: NURSING, REHAB OR LONG-TERM CARE | End: 2020-04-22
Attending: EMERGENCY MEDICINE | Admitting: INTERNAL MEDICINE
Payer: MEDICARE

## 2020-04-18 ENCOUNTER — APPOINTMENT (OUTPATIENT)
Dept: CT IMAGING | Age: 85
End: 2020-04-18
Attending: EMERGENCY MEDICINE
Payer: MEDICARE

## 2020-04-18 ENCOUNTER — APPOINTMENT (OUTPATIENT)
Dept: GENERAL RADIOLOGY | Age: 85
End: 2020-04-18
Attending: EMERGENCY MEDICINE
Payer: MEDICARE

## 2020-04-18 DIAGNOSIS — M79.604 LOW BACK PAIN RADIATING TO BOTH LEGS: ICD-10-CM

## 2020-04-18 DIAGNOSIS — M54.50 LOW BACK PAIN RADIATING TO BOTH LEGS: ICD-10-CM

## 2020-04-18 DIAGNOSIS — M15.9 GENERALIZED OA: ICD-10-CM

## 2020-04-18 DIAGNOSIS — N39.0 URINARY TRACT INFECTION WITHOUT HEMATURIA, SITE UNSPECIFIED: ICD-10-CM

## 2020-04-18 DIAGNOSIS — E11.42 DIABETIC POLYNEUROPATHY ASSOCIATED WITH TYPE 2 DIABETES MELLITUS (HCC): ICD-10-CM

## 2020-04-18 DIAGNOSIS — M79.605 LOW BACK PAIN RADIATING TO BOTH LEGS: ICD-10-CM

## 2020-04-18 DIAGNOSIS — R53.1 WEAKNESS: Primary | ICD-10-CM

## 2020-04-18 DIAGNOSIS — F03.918: ICD-10-CM

## 2020-04-18 DIAGNOSIS — F03.92: ICD-10-CM

## 2020-04-18 DIAGNOSIS — F03.90 DEMENTIA WITHOUT BEHAVIORAL DISTURBANCE, UNSPECIFIED DEMENTIA TYPE: ICD-10-CM

## 2020-04-18 PROBLEM — A41.9 SEPSIS (HCC): Status: ACTIVE | Noted: 2020-04-18

## 2020-04-18 LAB
ALBUMIN SERPL-MCNC: 3.7 G/DL (ref 3.2–4.6)
ALBUMIN/GLOB SERPL: 1 {RATIO} (ref 1.2–3.5)
ALP SERPL-CCNC: 82 U/L (ref 50–136)
ALT SERPL-CCNC: 16 U/L (ref 12–65)
ANION GAP SERPL CALC-SCNC: 9 MMOL/L (ref 7–16)
APPEARANCE UR: ABNORMAL
AST SERPL-CCNC: 20 U/L (ref 15–37)
ATRIAL RATE: 106 BPM
BACTERIA URNS QL MICRO: 0 /HPF
BASOPHILS # BLD: 0.1 K/UL (ref 0–0.2)
BASOPHILS NFR BLD: 1 % (ref 0–2)
BILIRUB SERPL-MCNC: 0.5 MG/DL (ref 0.2–1.1)
BILIRUB UR QL: NEGATIVE
BUN SERPL-MCNC: 31 MG/DL (ref 8–23)
CALCIUM SERPL-MCNC: 8.4 MG/DL (ref 8.3–10.4)
CALCULATED P AXIS, ECG09: 106 DEGREES
CALCULATED R AXIS, ECG10: -6 DEGREES
CALCULATED T AXIS, ECG11: 66 DEGREES
CASTS URNS QL MICRO: 0 /LPF
CHLORIDE SERPL-SCNC: 110 MMOL/L (ref 98–107)
CO2 SERPL-SCNC: 21 MMOL/L (ref 21–32)
COLOR UR: YELLOW
CREAT SERPL-MCNC: 1.65 MG/DL (ref 0.6–1)
CRYSTALS URNS QL MICRO: 0 /LPF
DIAGNOSIS, 93000: NORMAL
DIFFERENTIAL METHOD BLD: ABNORMAL
EOSINOPHIL # BLD: 0 K/UL (ref 0–0.8)
EOSINOPHIL NFR BLD: 0 % (ref 0.5–7.8)
EPI CELLS #/AREA URNS HPF: NORMAL /HPF
ERYTHROCYTE [DISTWIDTH] IN BLOOD BY AUTOMATED COUNT: 12.3 % (ref 11.9–14.6)
GLOBULIN SER CALC-MCNC: 3.7 G/DL (ref 2.3–3.5)
GLUCOSE BLD STRIP.AUTO-MCNC: 198 MG/DL (ref 65–100)
GLUCOSE BLD STRIP.AUTO-MCNC: 221 MG/DL (ref 65–100)
GLUCOSE SERPL-MCNC: 182 MG/DL (ref 65–100)
GLUCOSE UR STRIP.AUTO-MCNC: 250 MG/DL
HCT VFR BLD AUTO: 43.7 % (ref 35.8–46.3)
HGB BLD-MCNC: 14.3 G/DL (ref 11.7–15.4)
HGB UR QL STRIP: NEGATIVE
IMM GRANULOCYTES # BLD AUTO: 0.1 K/UL (ref 0–0.5)
IMM GRANULOCYTES NFR BLD AUTO: 1 % (ref 0–5)
INR PPP: 0.9
KETONES UR QL STRIP.AUTO: ABNORMAL MG/DL
LACTATE SERPL-SCNC: 2.5 MMOL/L (ref 0.4–2)
LEUKOCYTE ESTERASE UR QL STRIP.AUTO: ABNORMAL
LYMPHOCYTES # BLD: 1.3 K/UL (ref 0.5–4.6)
LYMPHOCYTES NFR BLD: 9 % (ref 13–44)
MCH RBC QN AUTO: 31.6 PG (ref 26.1–32.9)
MCHC RBC AUTO-ENTMCNC: 32.7 G/DL (ref 31.4–35)
MCV RBC AUTO: 96.7 FL (ref 79.6–97.8)
MONOCYTES # BLD: 1 K/UL (ref 0.1–1.3)
MONOCYTES NFR BLD: 7 % (ref 4–12)
MUCOUS THREADS URNS QL MICRO: 0 /LPF
NEUTS SEG # BLD: 11.4 K/UL (ref 1.7–8.2)
NEUTS SEG NFR BLD: 82 % (ref 43–78)
NITRITE UR QL STRIP.AUTO: NEGATIVE
NRBC # BLD: 0 K/UL (ref 0–0.2)
OTHER OBSERVATIONS,UCOM: NORMAL
P-R INTERVAL, ECG05: 182 MS
PH UR STRIP: 5 [PH] (ref 5–9)
PLATELET # BLD AUTO: 311 K/UL (ref 150–450)
PMV BLD AUTO: 9.2 FL (ref 9.4–12.3)
POTASSIUM SERPL-SCNC: 3.9 MMOL/L (ref 3.5–5.1)
PROT SERPL-MCNC: 7.4 G/DL (ref 6.3–8.2)
PROT UR STRIP-MCNC: 100 MG/DL
PROTHROMBIN TIME: 12.5 SEC (ref 12–14.7)
Q-T INTERVAL, ECG07: 368 MS
QRS DURATION, ECG06: 128 MS
QTC CALCULATION (BEZET), ECG08: 488 MS
RBC # BLD AUTO: 4.52 M/UL (ref 4.05–5.2)
RBC #/AREA URNS HPF: NORMAL /HPF
SODIUM SERPL-SCNC: 140 MMOL/L (ref 136–145)
SP GR UR REFRACTOMETRY: 1.02 (ref 1–1.02)
UROBILINOGEN UR QL STRIP.AUTO: 0.2 EU/DL (ref 0.2–1)
VENTRICULAR RATE, ECG03: 106 BPM
WBC # BLD AUTO: 13.9 K/UL (ref 4.3–11.1)
WBC URNS QL MICRO: NORMAL /HPF
YEAST URNS QL MICRO: NORMAL

## 2020-04-18 PROCEDURE — 96375 TX/PRO/DX INJ NEW DRUG ADDON: CPT

## 2020-04-18 PROCEDURE — 81001 URINALYSIS AUTO W/SCOPE: CPT

## 2020-04-18 PROCEDURE — 74011250637 HC RX REV CODE- 250/637: Performed by: INTERNAL MEDICINE

## 2020-04-18 PROCEDURE — 74011250636 HC RX REV CODE- 250/636: Performed by: INTERNAL MEDICINE

## 2020-04-18 PROCEDURE — 87086 URINE CULTURE/COLONY COUNT: CPT

## 2020-04-18 PROCEDURE — 82962 GLUCOSE BLOOD TEST: CPT

## 2020-04-18 PROCEDURE — 71045 X-RAY EXAM CHEST 1 VIEW: CPT

## 2020-04-18 PROCEDURE — 70450 CT HEAD/BRAIN W/O DYE: CPT

## 2020-04-18 PROCEDURE — 80053 COMPREHEN METABOLIC PANEL: CPT

## 2020-04-18 PROCEDURE — 96374 THER/PROPH/DIAG INJ IV PUSH: CPT

## 2020-04-18 PROCEDURE — 65270000029 HC RM PRIVATE

## 2020-04-18 PROCEDURE — 81015 MICROSCOPIC EXAM OF URINE: CPT

## 2020-04-18 PROCEDURE — 65390000012 HC CONDITION CODE 44 OBSERVATION

## 2020-04-18 PROCEDURE — 85025 COMPLETE CBC W/AUTO DIFF WBC: CPT

## 2020-04-18 PROCEDURE — 96365 THER/PROPH/DIAG IV INF INIT: CPT

## 2020-04-18 PROCEDURE — 74011636637 HC RX REV CODE- 636/637: Performed by: INTERNAL MEDICINE

## 2020-04-18 PROCEDURE — 99285 EMERGENCY DEPT VISIT HI MDM: CPT

## 2020-04-18 PROCEDURE — 83605 ASSAY OF LACTIC ACID: CPT

## 2020-04-18 PROCEDURE — 51702 INSERT TEMP BLADDER CATH: CPT

## 2020-04-18 PROCEDURE — 94760 N-INVAS EAR/PLS OXIMETRY 1: CPT

## 2020-04-18 PROCEDURE — 74011250636 HC RX REV CODE- 250/636: Performed by: EMERGENCY MEDICINE

## 2020-04-18 PROCEDURE — 87106 FUNGI IDENTIFICATION YEAST: CPT

## 2020-04-18 PROCEDURE — 74011000250 HC RX REV CODE- 250: Performed by: INTERNAL MEDICINE

## 2020-04-18 PROCEDURE — 85610 PROTHROMBIN TIME: CPT

## 2020-04-18 PROCEDURE — 93005 ELECTROCARDIOGRAM TRACING: CPT | Performed by: EMERGENCY MEDICINE

## 2020-04-18 PROCEDURE — 94640 AIRWAY INHALATION TREATMENT: CPT

## 2020-04-18 PROCEDURE — 74011000258 HC RX REV CODE- 258: Performed by: EMERGENCY MEDICINE

## 2020-04-18 PROCEDURE — 87040 BLOOD CULTURE FOR BACTERIA: CPT

## 2020-04-18 RX ORDER — GLIPIZIDE 10 MG/1
10 TABLET ORAL DAILY
COMMUNITY
End: 2020-07-20 | Stop reason: SDUPTHER

## 2020-04-18 RX ORDER — LANOLIN ALCOHOL/MO/W.PET/CERES
400 CREAM (GRAM) TOPICAL 2 TIMES DAILY
Status: DISCONTINUED | OUTPATIENT
Start: 2020-04-18 | End: 2020-04-19 | Stop reason: DRUGHIGH

## 2020-04-18 RX ORDER — IPRATROPIUM BROMIDE AND ALBUTEROL SULFATE 2.5; .5 MG/3ML; MG/3ML
3 SOLUTION RESPIRATORY (INHALATION)
Status: DISCONTINUED | OUTPATIENT
Start: 2020-04-18 | End: 2020-04-22 | Stop reason: HOSPADM

## 2020-04-18 RX ORDER — ASPIRIN 81 MG/1
81 TABLET ORAL DAILY
Status: DISCONTINUED | OUTPATIENT
Start: 2020-04-19 | End: 2020-04-22 | Stop reason: HOSPADM

## 2020-04-18 RX ORDER — SODIUM CHLORIDE 0.9 % (FLUSH) 0.9 %
5-10 SYRINGE (ML) INJECTION AS NEEDED
Status: DISCONTINUED | OUTPATIENT
Start: 2020-04-18 | End: 2020-04-22 | Stop reason: HOSPADM

## 2020-04-18 RX ORDER — SODIUM CHLORIDE 9 MG/ML
50 INJECTION, SOLUTION INTRAVENOUS CONTINUOUS
Status: DISCONTINUED | OUTPATIENT
Start: 2020-04-18 | End: 2020-04-21

## 2020-04-18 RX ORDER — SODIUM CHLORIDE 9 MG/ML
250 INJECTION, SOLUTION INTRAVENOUS CONTINUOUS
Status: DISPENSED | OUTPATIENT
Start: 2020-04-18 | End: 2020-04-18

## 2020-04-18 RX ORDER — HYDRALAZINE HYDROCHLORIDE 20 MG/ML
20 INJECTION INTRAMUSCULAR; INTRAVENOUS
Status: DISCONTINUED | OUTPATIENT
Start: 2020-04-18 | End: 2020-04-22 | Stop reason: HOSPADM

## 2020-04-18 RX ORDER — TRAMADOL HYDROCHLORIDE 300 MG/1
300 TABLET, FILM COATED, EXTENDED RELEASE ORAL DAILY
Status: DISCONTINUED | OUTPATIENT
Start: 2020-04-19 | End: 2020-04-22 | Stop reason: HOSPADM

## 2020-04-18 RX ORDER — PREGABALIN 25 MG/1
25 CAPSULE ORAL 2 TIMES DAILY
Status: DISCONTINUED | OUTPATIENT
Start: 2020-04-18 | End: 2020-04-22 | Stop reason: HOSPADM

## 2020-04-18 RX ORDER — SODIUM CHLORIDE 9 MG/ML
500 INJECTION, SOLUTION INTRAVENOUS CONTINUOUS
Status: DISPENSED | OUTPATIENT
Start: 2020-04-18 | End: 2020-04-18

## 2020-04-18 RX ORDER — FLUCONAZOLE 100 MG/1
200 TABLET ORAL EVERY EVENING
Status: DISCONTINUED | OUTPATIENT
Start: 2020-04-19 | End: 2020-04-22 | Stop reason: HOSPADM

## 2020-04-18 RX ORDER — INSULIN LISPRO 100 [IU]/ML
0-10 INJECTION, SOLUTION INTRAVENOUS; SUBCUTANEOUS
Status: DISCONTINUED | OUTPATIENT
Start: 2020-04-18 | End: 2020-04-22 | Stop reason: HOSPADM

## 2020-04-18 RX ORDER — HYDRALAZINE HYDROCHLORIDE 25 MG/1
12.5 TABLET, FILM COATED ORAL 4 TIMES DAILY
Status: DISCONTINUED | OUTPATIENT
Start: 2020-04-18 | End: 2020-04-22 | Stop reason: HOSPADM

## 2020-04-18 RX ORDER — LEVOTHYROXINE SODIUM 50 UG/1
25 TABLET ORAL
Status: DISCONTINUED | OUTPATIENT
Start: 2020-04-19 | End: 2020-04-22 | Stop reason: HOSPADM

## 2020-04-18 RX ORDER — QUETIAPINE FUMARATE 25 MG/1
12.5 TABLET, FILM COATED ORAL
Status: DISCONTINUED | OUTPATIENT
Start: 2020-04-18 | End: 2020-04-22 | Stop reason: HOSPADM

## 2020-04-18 RX ORDER — SODIUM CHLORIDE 9 MG/ML
100 INJECTION, SOLUTION INTRAVENOUS CONTINUOUS
Status: DISPENSED | OUTPATIENT
Start: 2020-04-18 | End: 2020-04-19

## 2020-04-18 RX ORDER — FLUCONAZOLE 2 MG/ML
400 INJECTION, SOLUTION INTRAVENOUS EVERY 24 HOURS
Status: DISCONTINUED | OUTPATIENT
Start: 2020-04-18 | End: 2020-04-18 | Stop reason: DRUGHIGH

## 2020-04-18 RX ORDER — ATENOLOL 25 MG/1
25 TABLET ORAL DAILY
Status: DISCONTINUED | OUTPATIENT
Start: 2020-04-19 | End: 2020-04-22 | Stop reason: HOSPADM

## 2020-04-18 RX ORDER — BUDESONIDE 0.5 MG/2ML
500 INHALANT ORAL
Status: DISCONTINUED | OUTPATIENT
Start: 2020-04-18 | End: 2020-04-22 | Stop reason: HOSPADM

## 2020-04-18 RX ORDER — FLUCONAZOLE 2 MG/ML
400 INJECTION, SOLUTION INTRAVENOUS ONCE
Status: COMPLETED | OUTPATIENT
Start: 2020-04-18 | End: 2020-04-18

## 2020-04-18 RX ORDER — MONTELUKAST SODIUM 10 MG/1
10 TABLET ORAL
Status: DISCONTINUED | OUTPATIENT
Start: 2020-04-18 | End: 2020-04-22 | Stop reason: HOSPADM

## 2020-04-18 RX ORDER — IPRATROPIUM BROMIDE AND ALBUTEROL SULFATE 2.5; .5 MG/3ML; MG/3ML
3 SOLUTION RESPIRATORY (INHALATION)
Status: COMPLETED | OUTPATIENT
Start: 2020-04-18 | End: 2020-04-19

## 2020-04-18 RX ADMIN — PREGABALIN 25 MG: 25 CAPSULE ORAL at 17:57

## 2020-04-18 RX ADMIN — QUETIAPINE FUMARATE 12.5 MG: 25 TABLET ORAL at 21:24

## 2020-04-18 RX ADMIN — SODIUM CHLORIDE 500 ML/HR: 900 INJECTION, SOLUTION INTRAVENOUS at 18:00

## 2020-04-18 RX ADMIN — MONTELUKAST 10 MG: 10 TABLET, FILM COATED ORAL at 21:26

## 2020-04-18 RX ADMIN — FLUCONAZOLE 400 MG: 400 INJECTION, SOLUTION INTRAVENOUS at 18:10

## 2020-04-18 RX ADMIN — IPRATROPIUM BROMIDE AND ALBUTEROL SULFATE 3 ML: .5; 3 SOLUTION RESPIRATORY (INHALATION) at 20:43

## 2020-04-18 RX ADMIN — SODIUM CHLORIDE 100 ML/HR: 900 INJECTION, SOLUTION INTRAVENOUS at 23:58

## 2020-04-18 RX ADMIN — INSULIN LISPRO 4 UNITS: 100 INJECTION, SOLUTION INTRAVENOUS; SUBCUTANEOUS at 21:27

## 2020-04-18 RX ADMIN — HYDRALAZINE HYDROCHLORIDE 12.5 MG: 25 TABLET, FILM COATED ORAL at 17:57

## 2020-04-18 RX ADMIN — HYDRALAZINE HYDROCHLORIDE 12.5 MG: 25 TABLET, FILM COATED ORAL at 21:26

## 2020-04-18 RX ADMIN — CEFTRIAXONE 1 G: 1 INJECTION, POWDER, FOR SOLUTION INTRAMUSCULAR; INTRAVENOUS at 14:50

## 2020-04-18 RX ADMIN — BUDESONIDE 500 MCG: 0.5 INHALANT RESPIRATORY (INHALATION) at 20:43

## 2020-04-18 NOTE — ED TRIAGE NOTES
Pt arrives via ems from home with weakness. Per ems pt had left arm drift. Pt has equal  strength. No drift appreciated at this time. EMS VS: 190/100 hr 110 ST  temp 98.5 rr 18 spo2 95%. Daughter called ems this morning for pt not being able to ambulate well. Pt last known normal  Last night,. Pt hx of dementia. Pt also being tx for UTI. Dr. Pennie Dominguez at bedside. Pt a/o x 3. Pt unsure what year it is.

## 2020-04-18 NOTE — ED NOTES
TRANSFER - OUT REPORT:    Verbal report given to Angusantonio Carranza on José Manuel Spruce  being transferred to 6th floor for routine progression of care       Report consisted of patients Situation, Background, Assessment and   Recommendations(SBAR). Information from the following report(s) ED Summary was reviewed with the receiving nurse. Lines:   Peripheral IV 04/18/20 Left Wrist (Active)       Peripheral IV 04/18/20 Right Hand (Active)        Opportunity for questions and clarification was provided.       Patient transported with:   O2 @ 2 liters and belongings with mask on

## 2020-04-18 NOTE — ED NOTES
Pt placed on 2L NC per verbal order Dr. Lana Arteaga for o2 sat of 92-93%. Pt improved to 98% at this time.

## 2020-04-18 NOTE — PROGRESS NOTES
...TRANSFER - IN REPORT:    Verbal report received from Yeimy ortiz on Cesar Fernandez  being received from ER. Report consisted of patients Situation, Background, Assessment and   Recommendations     Information from the following report  was reviewed with the receiving nurse. Opportunity for questions and clarification was provided. Assessment completed upon patients arrival to unit and care assumed.

## 2020-04-18 NOTE — PROGRESS NOTES
04/18/20 1701   Dual Skin Pressure Injury Assessment   Dual Skin Pressure Injury Assessment X   Second Care Provider (Based on [address] [place] [address]) Janet Leap. Sacrum  Mid   Skin Integumentary   Skin Integumentary (WDL) X   Skin Color Appropriate for ethnicity   Skin Condition/Temp Warm   Skin Integrity Intact   Turgor Tenting   Hair Growth Present   Varicosities Absent   Wound Prevention and Protection Methods   Orientation of Wound Prevention Posterior   Location of Wound Prevention Sacrum/Coccyx   Dressing Present  No   Wound Offloading (Prevention Methods) Bed, pressure redistribution/air;Bed, pressure reduction mattress; Chair cushion;Repositioning     Redness on sacral area; skin intact, completed skin assessment with Janet Leap.

## 2020-04-18 NOTE — ED PROVIDER NOTES
Patient is an 80-year-old female with a history of dementia who is brought to the emergency department today via EMS from her home. Paramedics state that they were called out to the home for weakness and inability to walk. I spoke with the patient's daughter over the telephone as patient's history is very limited because of her dementia. Daughter tells me that patient has had recent urinary tract infection and had to have multiple rounds of antibiotics which were switched last week. The past 1 week she has had increased weakness and increased confusion. Yesterday she was walking abnormal.  Today the daughter overslept and then awoke and found her mother standing in the hallway holding onto the wall stating she could not move her legs or walk. Daughter got her a chair to sit on and called EMS. Stroke alert was not called on my initial evaluation because we have no obvious focal deficits at this time and no clear time of onset. The history is provided by the EMS personnel and a relative. History limited by: dementia. Extremity Weakness           Past Medical History:   Diagnosis Date    Asthma     sees Dr. Marissa Adamson     Full dentures     GERD (gastroesophageal reflux disease)     Hypertension     Incontinence of urine in female     Neuropathy     Sleep apnea     no C PAP       Past Surgical History:   Procedure Laterality Date    BREAST SURGERY PROCEDURE UNLISTED      bilat lumpectomy    CHEST SURGERY PROCEDURE UNLISTED      WHAT IS THIS? ??    HX CATARACT REMOVAL Bilateral     HX CHOLECYSTECTOMY      HX COLONOSCOPY      HX HYSTERECTOMY      HX KNEE REPLACEMENT Bilateral     HX ORTHOPAEDIC      back    VASCULAR SURGERY PROCEDURE UNLIST Left 09/29/2017    aortogram/arteriogram- PTA x2         Family History:   Problem Relation Age of Onset    No Known Problems Mother     No Known Problems Father        Social History     Socioeconomic History    Marital status:  Spouse name: Not on file    Number of children: Not on file    Years of education: Not on file    Highest education level: Not on file   Occupational History    Not on file   Social Needs    Financial resource strain: Not on file    Food insecurity     Worry: Not on file     Inability: Not on file    Transportation needs     Medical: Not on file     Non-medical: Not on file   Tobacco Use    Smoking status: Never Smoker    Smokeless tobacco: Never Used   Substance and Sexual Activity    Alcohol use: No    Drug use: No    Sexual activity: Not on file   Lifestyle    Physical activity     Days per week: Not on file     Minutes per session: Not on file    Stress: Not on file   Relationships    Social connections     Talks on phone: Not on file     Gets together: Not on file     Attends Mormonism service: Not on file     Active member of club or organization: Not on file     Attends meetings of clubs or organizations: Not on file     Relationship status: Not on file    Intimate partner violence     Fear of current or ex partner: Not on file     Emotionally abused: Not on file     Physically abused: Not on file     Forced sexual activity: Not on file   Other Topics Concern    Not on file   Social History Narrative    Not on file         ALLERGIES: Aleve [naproxen sodium]; Hydrocodone-acetaminophen; Metformin; Phenobarbital; and Statins-hmg-coa reductase inhibitors    Review of Systems   Unable to perform ROS: Dementia   Neurological: Positive for weakness. Vitals:    04/18/20 1131 04/18/20 1201   BP: 184/88    Pulse: (!) 107    Resp: 18    Temp:  98.9 °F (37.2 °C)   SpO2: 93%    Weight: 77.1 kg (170 lb)    Height: 5' 4\" (1.626 m)             Physical Exam  Vitals signs and nursing note reviewed. Constitutional:       Appearance: Normal appearance. She is well-developed. Comments: Chronically ill-appearing   HENT:      Head: Normocephalic and atraumatic.    Eyes:      Conjunctiva/sclera: Conjunctivae normal.      Pupils: Pupils are equal, round, and reactive to light. Neck:      Musculoskeletal: Normal range of motion and neck supple. Cardiovascular:      Rate and Rhythm: Regular rhythm. Tachycardia present. Pulmonary:      Effort: Pulmonary effort is normal.      Breath sounds: Normal breath sounds. Abdominal:      Palpations: Abdomen is soft. Tenderness: There is no abdominal tenderness. There is no guarding or rebound. Musculoskeletal: Normal range of motion. General: No deformity. Right lower leg: Edema present. Left lower leg: Edema present. Lymphadenopathy:      Cervical: No cervical adenopathy. Skin:     General: Skin is warm and dry. Findings: No rash. Neurological:      General: No focal deficit present. Mental Status: She is alert. She is confused. GCS: GCS eye subscore is 4. GCS verbal subscore is 5. GCS motor subscore is 6. Cranial Nerves: Facial asymmetry present. No cranial nerve deficit. Sensory: No sensory deficit. Motor: Motor function is intact. No tremor, seizure activity or pronator drift. Comments: Possible slight left facial droop    Difficulty cooperating with exam because of her dementia          MDM  Number of Diagnoses or Management Options  Diagnosis management comments: EKG reviewed by me shows sinus tachycardia at a rate of 106, no acute ischemia    1:47 PM  White count elevated at 13,000  Chemistries show chronic renal insufficiency but at her baseline  Chest x-ray clear  CAT scan head negative  Urinalysis has 20-50 white blood cells however this may be clearing infection as daughter told me the antibiotics were switched last week. Patient is unable to stand or ambulate under her own power. Again no obvious focal deficits or clear time of onset to qualify for stroke alert or thrombolytics. I will discuss the case with the hospitalist for admission and further management.     I will discuss the results and plan of care with her daughter via the telephone    Voice dictation software was used during the making of this note. This software is not perfect and grammatical and other typographical errors may be present. This note has been proofread, but may still contain errors.   Jo Liang MD; 4/18/2020 @1:48 PM   ===================================================================         Amount and/or Complexity of Data Reviewed  Clinical lab tests: ordered and reviewed  Tests in the radiology section of CPT®: ordered and reviewed  Tests in the medicine section of CPT®: ordered and reviewed  Obtain history from someone other than the patient: yes  Review and summarize past medical records: yes  Discuss the patient with other providers: yes  Independent visualization of images, tracings, or specimens: yes    Risk of Complications, Morbidity, and/or Mortality  Presenting problems: moderate  Diagnostic procedures: low  Management options: low    Patient Progress  Patient progress: stable         Procedures

## 2020-04-18 NOTE — H&P
Hospitalist H&P Note     Admit Date:  2020 11:23 AM   Name:  Timi Born   Age:  80 y.o.  :  1934   MRN:  [id number]   PCP:  Rachele Pimentel MD  Treatment Team: Primary Nurse: Nati Pena RN; Primary Nurse: Yolande Mistry  CC: confusion at home x 1 day  HPI:   80yr old female with pmhx sig for dm, htn and dementia who has been treated for a UTI who woke up and was found wondering around her daughters home complaining that her legs don't work. Daughter realized she was confused and called EMS to bring her to the hospital. She was non focal but noted to be septic with a uti. .. hospitalist asked to admit for further mgt; the patient can not give a hx secondary to her confusion. No fevers or chills; no contact with anyone who was exposed to or treated for COVID 19; no recent travel. 10 systems reviewed and negative except as noted in HPI. Past Medical History:   Diagnosis Date    Asthma     sees Dr. Huan Pina Full dentures     GERD (gastroesophageal reflux disease)     Hypertension     Incontinence of urine in female     Neuropathy     Sleep apnea     no C PAP      Past Surgical History:   Procedure Laterality Date    BREAST SURGERY PROCEDURE UNLISTED      bilat lumpectomy    CHEST SURGERY PROCEDURE UNLISTED      WHAT IS THIS? ??    HX CATARACT REMOVAL Bilateral     HX CHOLECYSTECTOMY      HX COLONOSCOPY      HX HYSTERECTOMY      HX KNEE REPLACEMENT Bilateral     HX ORTHOPAEDIC      back    VASCULAR SURGERY PROCEDURE UNLIST Left 2017    aortogram/arteriogram- PTA x2      Allergies   Allergen Reactions    Aleve [Naproxen Sodium] Rash    Hydrocodone-Acetaminophen Itching    Metformin Other (comments)     Decreased kidney function    Phenobarbital Unknown (comments)     Doesn't remember - long time ago     Statins-Hmg-Coa Reductase Inhibitors Myalgia     intolerance      Social History     Tobacco Use    Smoking status: Never Smoker  Smokeless tobacco: Never Used   Substance Use Topics    Alcohol use: No      Family History   Problem Relation Age of Onset    No Known Problems Mother     No Known Problems Father       Immunization History   Administered Date(s) Administered    Influenza High Dose Vaccine PF 09/07/2017, 09/18/2018    Influenza Vaccine 11/08/2017    Influenza Vaccine (Tri) Adjuvanted 09/11/2019    Pneumococcal Conjugate (PCV-13) 03/18/2015, 04/05/2017    Pneumococcal Polysaccharide (PPSV-23) 03/17/2015    TB Skin Test (PPD) Intradermal 09/12/2017, 08/20/2019, 11/29/2019     PTA Medications:  Prior to Admission Medications   Prescriptions Last Dose Informant Patient Reported? Taking? Blood-Glucose Meter monitoring kit   No No   Sig: Check glucose before every meal and at bedtime. Cholecalciferol, Vitamin D3, (VITAMIN D3) 2,000 unit cap capsule   No No   Sig: Take 2,000 Units by mouth two (2) times a day. Patient taking differently: Take 2,000 Units by mouth two (2) times a day. Stop seven days prior to surgery per anesthesia protocol. Insulin Needles, Disposable, (DIANA PEN NEEDLE) 32 gauge x 5/32\" ndle   No No   Sig: Use to administer Novolog TID   Lancets (ONETOUCH ULTRASOFT LANCETS) misc   No No   Sig: Use to check blood sugar. E11.42   NOVOLOG U-100 INSULIN ASPART 100 unit/mL injection   No No   Sig: Inject 4 units sc 4 times a day with meals and bedtime. QUEtiapine (SEROQUEL) 25 mg tablet   No No   Sig: Take 1/2 tablet before bedtime   TRUEPLUS LANCETS 33 gauge misc   No No   Sig: Check BS 3-4 x daily   acetaminophen (TYLENOL) 650 mg TbER   Yes No   Sig: Take 650 mg by mouth every six (6) hours as needed for Pain. albuterol (PROAIR HFA) 90 mcg/actuation inhaler   No No   Sig: Take 2 Puffs by inhalation every four (4) hours as needed for Wheezing, Shortness of Breath or Cough. Indications: asthma attack   amLODIPine (NORVASC) 10 mg tablet   No No   Sig: Take 1 Tab by mouth daily.    aspirin delayed-release 81 mg tablet   Yes No   Sig: Take 81 mg by mouth every morning. Take day of surgery per anesthesia protocol. atenoloL (TENORMIN) 25 mg tablet   No No   Sig: Take 1 Tab by mouth daily. cranberry 400 mg cap   No No   Sig: Take 1 Cap by mouth two (2) times daily (with meals). Indications: UTI prevention   docusate sodium (COLACE) 100 mg capsule   Yes No   Sig: Take 100 mg by mouth two (2) times daily as needed for Constipation. exenatide microspheres (Bydureon BCise) 2 mg/0.85 mL atIn   No No   Si Syringe by SubCUTAneous route every seven (7) days. fenofibrate nanocrystallized (TRICOR) 48 mg tablet   No No   Sig: Take 1 Tab by mouth daily. fluticasone propionate (FLOVENT HFA) 44 mcg/actuation inhaler   No No   Sig: Take 2 Puffs by inhalation two (2) times a day. Indications: controller medication for asthma   folic acid 729 mcg tablet   Yes No   Sig: Take 400 mcg by mouth two (2) times a day. Stop seven days prior to surgery per anesthesia protocol. glucose blood VI test strips (ONETOUCH ULTRA TEST) strip   No No   Sig: Use to check blood sugar once daily. E11.42   hydrALAZINE (APRESOLINE) 25 mg tablet   No No   Sig: Take 1 Tab by mouth three (3) times daily. insulin lispro (HUMALOG) 100 unit/mL kwikpen   No No   Sig: Inject 4 units subcutaneously with each meal   lancets (ONETOUCH SURESOFT LANCING DEV) misc   No No   Sig: Use to check blood sugar. E11.42   levothyroxine (SYNTHROID) 25 mcg tablet   No No   Sig: Take 1 Tab by mouth Daily (before breakfast). lisinopril (PRINIVIL, ZESTRIL) 40 mg tablet   No No   Sig: Take 1 Tab by mouth daily. montelukast (SINGULAIR) 10 mg tablet   No No   Sig: Take 1 Tab by mouth daily. polyethylene glycol (MIRALAX) 17 gram packet   No No   Sig: Take 1 Packet by mouth daily as needed. Indications: constipation   Patient taking differently: Take 1 Packet by mouth daily as needed (constipation).  mix with 8oz water  Indications: constipation   pregabalin (LYRICA) 50 mg capsule   No No   Sig: Take 1 Cap by mouth two (2) times a day. Max Daily Amount: 100 mg. 1 tab po bid   tiotropium (SPIRIVA WITH HANDIHALER) 18 mcg inhalation capsule   No No   Sig: Take 1 Cap by inhalation every morning. Indications: controller medication for asthma   traMADol (ULTRAM-ER) 300 mg tablet   No No   Sig: Take 1 Tab by mouth daily for 180 days. Max Daily Amount: 300 mg.   trimethoprim-sulfamethoxazole (BACTRIM DS, SEPTRA DS) 160-800 mg per tablet   No No   Sig: Take 1 Tab by mouth two (2) times a day. Facility-Administered Medications: None       Objective:     Patient Vitals for the past 24 hrs:   Temp Pulse Resp BP SpO2   04/18/20 1449  94  178/84 90 %   04/18/20 1201 98.9 °F (37.2 °C)       04/18/20 1201  100 14 186/90 98 %   04/18/20 1140  (!) 106  187/89 92 %   04/18/20 1131  (!) 107 18 184/88 93 %     Oxygen Therapy  O2 Sat (%): 90 % (04/18/20 1449)  Pulse via Oximetry: 101 beats per minute (04/18/20 1201)  O2 Device: Nasal cannula (04/18/20 1204)  O2 Flow Rate (L/min): 2 l/min (04/18/20 1204)  No intake or output data in the 24 hours ending 04/18/20 1508    Physical Exam:  General:    Well nourished. Frail,  Alert. Pleasantly confused  Eyes:   Normal sclera. Extraocular movements intact. ENT:  Normocephalic, atraumatic. Moist mucous membranes  CV:   RRR. No m/r/g. Peripheral pulses present. Capillary refill normal  Lungs:  A/e = bl  No wheezing, rhonchi, or rales. Abdomen: Soft, nontender, nondistended. Bowel sounds normal.   Extremities: Warm and dry. No cyanosis or edema. Neurologic: CN II-XII grossly intact. Sensation intact. Skin:     No rashes or jaundice. Normal coloration  Psych:  Normal mood and affect. I reviewed the labs, imaging, EKGs, telemetry, and other studies done this admission.   Data Review:   Recent Results (from the past 24 hour(s))   GLUCOSE, POC    Collection Time: 04/18/20 11:33 AM   Result Value Ref Range    Glucose (POC) 198 (H) 65 - 100 mg/dL   EKG, 12 LEAD, INITIAL    Collection Time: 04/18/20 11:37 AM   Result Value Ref Range    Ventricular Rate 106 BPM    Atrial Rate 106 BPM    P-R Interval 182 ms    QRS Duration 128 ms    Q-T Interval 368 ms    QTC Calculation (Bezet) 488 ms    Calculated P Axis 106 degrees    Calculated R Axis -6 degrees    Calculated T Axis 66 degrees    Diagnosis       !! AGE AND GENDER SPECIFIC ECG ANALYSIS !! Sinus tachycardia  Non-specific intra-ventricular conduction block  Nonspecific T wave abnormality  Abnormal ECG  When compared with ECG of 30-JAN-2020 21:57,  MT interval has decreased  Minimal criteria for Septal infarct are no longer Present  Nonspecific T wave abnormality now evident in Anterior leads     CBC WITH AUTOMATED DIFF    Collection Time: 04/18/20 11:40 AM   Result Value Ref Range    WBC 13.9 (H) 4.3 - 11.1 K/uL    RBC 4.52 4.05 - 5.2 M/uL    HGB 14.3 11.7 - 15.4 g/dL    HCT 43.7 35.8 - 46.3 %    MCV 96.7 79.6 - 97.8 FL    MCH 31.6 26.1 - 32.9 PG    MCHC 32.7 31.4 - 35.0 g/dL    RDW 12.3 11.9 - 14.6 %    PLATELET 829 984 - 234 K/uL    MPV 9.2 (L) 9.4 - 12.3 FL    ABSOLUTE NRBC 0.00 0.0 - 0.2 K/uL    DF AUTOMATED      NEUTROPHILS 82 (H) 43 - 78 %    LYMPHOCYTES 9 (L) 13 - 44 %    MONOCYTES 7 4.0 - 12.0 %    EOSINOPHILS 0 (L) 0.5 - 7.8 %    BASOPHILS 1 0.0 - 2.0 %    IMMATURE GRANULOCYTES 1 0.0 - 5.0 %    ABS. NEUTROPHILS 11.4 (H) 1.7 - 8.2 K/UL    ABS. LYMPHOCYTES 1.3 0.5 - 4.6 K/UL    ABS. MONOCYTES 1.0 0.1 - 1.3 K/UL    ABS. EOSINOPHILS 0.0 0.0 - 0.8 K/UL    ABS. BASOPHILS 0.1 0.0 - 0.2 K/UL    ABS. IMM.  GRANS. 0.1 0.0 - 0.5 K/UL   METABOLIC PANEL, COMPREHENSIVE    Collection Time: 04/18/20 11:40 AM   Result Value Ref Range    Sodium 140 136 - 145 mmol/L    Potassium 3.9 3.5 - 5.1 mmol/L    Chloride 110 (H) 98 - 107 mmol/L    CO2 21 21 - 32 mmol/L    Anion gap 9 7 - 16 mmol/L    Glucose 182 (H) 65 - 100 mg/dL    BUN 31 (H) 8 - 23 MG/DL    Creatinine 1.65 (H) 0.6 - 1.0 MG/DL    GFR est AA 38 (L) >60 ml/min/1.73m2    GFR est non-AA 31 (L) >60 ml/min/1.73m2    Calcium 8.4 8.3 - 10.4 MG/DL    Bilirubin, total 0.5 0.2 - 1.1 MG/DL    ALT (SGPT) 16 12 - 65 U/L    AST (SGOT) 20 15 - 37 U/L    Alk.  phosphatase 82 50 - 136 U/L    Protein, total 7.4 6.3 - 8.2 g/dL    Albumin 3.7 3.2 - 4.6 g/dL    Globulin 3.7 (H) 2.3 - 3.5 g/dL    A-G Ratio 1.0 (L) 1.2 - 3.5     PROTHROMBIN TIME + INR    Collection Time: 04/18/20 11:40 AM   Result Value Ref Range    Prothrombin time 12.5 12.0 - 14.7 sec    INR 0.9     URINALYSIS W/ RFLX MICROSCOPIC    Collection Time: 04/18/20 11:40 AM   Result Value Ref Range    Color YELLOW      Appearance CLOUDY      Specific gravity 1.023 1.001 - 1.023      pH (UA) 5.0 5.0 - 9.0      Protein 100 (A) NEG mg/dL    Glucose 250 mg/dL    Ketone TRACE (A) NEG mg/dL    Bilirubin Negative NEG      Blood Negative NEG      Urobilinogen 0.2 0.2 - 1.0 EU/dL    Nitrites Negative NEG      Leukocyte Esterase SMALL (A) NEG     URINE MICROSCOPIC    Collection Time: 04/18/20 11:40 AM   Result Value Ref Range    WBC 20-50 0 /hpf    RBC 0-3 0 /hpf    Epithelial cells 0-3 0 /hpf    Bacteria 0 0 /hpf    Casts 0 0 /lpf    Crystals, urine 0 0 /LPF    Mucus 0 0 /lpf    Yeast MARKED      Other observations RESULTS VERIFIED MANUALLY     LACTIC ACID    Collection Time: 04/18/20  1:18 PM   Result Value Ref Range    Lactic acid 2.5 (HH) 0.4 - 2.0 MMOL/L       All Micro Results     Procedure Component Value Units Date/Time    CULTURE, BLOOD [[id number]] Collected:  04/18/20 1429    Order Status:  Completed Specimen:  Blood Updated:  04/18/20 1434    CULTURE, BLOOD [[id number]] Collected:  04/18/20 1429    Order Status:  Completed Specimen:  Blood Updated:  04/18/20 1434          Other Studies:  Ct Head Wo Cont    Result Date: 4/18/2020  EXAMINATION: HEAD CT WITHOUT CONTRAST 4/18/2020 12:42 PM ACCESSION NUMBER: [id number] INDICATION: weakness and altered mental status COMPARISON: Head CT 1/30/2020 TECHNIQUE: Multiple-row detector helical CT examination of the head without intravenous contrast. Radiation dose reduction techniques were used for this study:  Our CT scanners use one or all of the following: Automated exposure control, adjustment of the mA and/or kVp according to patient's size, iterative reconstruction. FINDINGS: The ventricles are stable in caliber in comparison to the prior exam and are appropriate for the mild degree of symmetric global brain parenchymal volume loss. There is no midline shift. The basilar cisterns are patent. There is no cerebellar tonsillar ectopia or herniation. Hypodensities in the periventricular and subcortical white matter are nonspecific, but they are most likely to represent chronic microangiopathy. Prior bilateral lens replacement. Unchanged prominence of the extra-axial spaces along the posterior left parietal convexity. Chronic lacunar infarction in the right basal ganglia is unchanged. There is no evidence of acute intracranial hemorrhage or extra-axial collections. There is no CT evidence of acute infarction. The paranasal sinuses and mastoid air cells are well aerated and clear. IMPRESSION: 1. No acute intracranial abnormality. 2. Chronic features as above. VOICE DICTATED BY: Dr. Sheri Lu     Xr Chest Port    Result Date: 4/18/2020  EXAMINATION: CHEST RADIOGRAPH 4/18/2020 12:17 PM ACCESSION NUMBER: [id number] COMPARISON: 02/25/2020 INDICATION: weakness TECHNIQUE: A single view of the chest was obtained. FINDINGS: Support Devices: None Cardiac Silhouette: Within normal limits in size. Mediastinum: Calcifications are seen in the aorta. Lungs: No focal airspace disease. Upper Abdomen: Normal Osseous Structures: There are no lytic and blastic lesions.      IMPRESSION: No acute abnormality Atherosclerosis        Assessment and Plan:     Hospital Problems as of 4/18/2020 Date Reviewed: 3/4/2020          Codes Class Noted - Resolved POA    UTI (urinary tract infection) ICD-10-CM: N39.0  ICD-9-CM: 599.0  4/18/2020 - Present Unknown        Sepsis ([facility] ICD-10-CM: A41.9  ICD-9-CM: 038.9, 995.91  4/18/2020 - Present Unknown        Diabetes mellitus type 2, controlled ([facility] ICD-10-CM: E11.9  ICD-9-CM: 250.00  2/26/2018 - Present Yes        Acute kidney injury ([facility] ICD-10-CM: N17.9  ICD-9-CM: 584.9  9/12/2017 - Present Yes        Stage 3 chronic kidney disease ([facility] ICD-10-CM: N18.3  ICD-9-CM: 585.3  9/12/2017 - Present Yes        DM type 2 with diabetic peripheral neuropathy (HCC) ICD-10-CM: E11.42  ICD-9-CM: 250.60, 357.2  3/24/2017 - Present Yes        Diabetic Charcot foot ([facility] ICD-10-CM: E11.610  ICD-9-CM: 250.60, 713.5  3/24/2017 - Present Yes        Presenile dementia, paranoid type ([facility] ICD-10-CM: F03.90  ICD-9-CM: 290.12  11/7/2016 - Present Yes        Asthma ICD-10-CM: J45.909  ICD-9-CM: 493.90  Unknown - Present Yes    Overview Signed 1/13/2015  6:41 AM by Gavin Wang     seejason Estrada             Hypertension ICD-10-CM: I10  ICD-9-CM: 401.9  11/16/2014 - Present Yes              PLAN:  · uti- admit to medical floor- continue abx started in the er; will also cover for yeast given their marked presence on the urine sample; f/up cultures  · Sepsis- supportive care- will give the bolus over a longer period of time; monitor for volume overload  · maría- monitor renal fxn and dose meds for fxn  · Patient was placed on 2l in er because she was satting 92% on room air- does not use home )2 - should have cpap per her daughter but unable to tolerate it; uses her inhalers  · Dm- monitor bg and cover with insulin  · htn- monitor bp- prn meds as needed  · Continue appropriate home meds  · Further w/up and mgt based on her clinical course      DVT ppx:  Heparin   Anticipated DC needs: none   Code status:  Full  Estimated LOS:  Greater than 2 midnights  Risk:  high    Signed:  Alysia Evans MD

## 2020-04-18 NOTE — PROGRESS NOTES
Dilan Wills TRANSFER - IN REPORT:    Verbal report received from [facility] on Ochoa Sibley  being received from ED for routine progression of care      Report consisted of patients Situation, Background, Assessment and   Recommendations(SBAR). Information from the following report(s) SBAR was reviewed with the receiving nurse. Opportunity for questions and clarification was provided. Assessment completed upon patients arrival to unit and care assumed.

## 2020-04-18 NOTE — ED NOTES
Pt daughter stated pt has been gradually getting weaker over past few days. Pt hx of uti and recent change of abx. Pt found in hallways standing stating \"my legs don't work\". Pt has severe dementia per daughter.

## 2020-04-18 NOTE — PROGRESS NOTES
Patient rounds completed, bed in low/locked position with call light within reach. She is bed rest due to leg weakness, with purewick in place. Provided opportunity for questions, concerns and there are none at this time. She is up eating in bed.

## 2020-04-19 PROBLEM — G93.41 ACUTE METABOLIC ENCEPHALOPATHY: Status: ACTIVE | Noted: 2020-04-18

## 2020-04-19 LAB
ALBUMIN SERPL-MCNC: 2.6 G/DL (ref 3.2–4.6)
ALBUMIN/GLOB SERPL: 0.8 {RATIO} (ref 1.2–3.5)
ALP SERPL-CCNC: 67 U/L (ref 50–136)
ALT SERPL-CCNC: 13 U/L (ref 12–65)
ANION GAP SERPL CALC-SCNC: 9 MMOL/L (ref 7–16)
AST SERPL-CCNC: 9 U/L (ref 15–37)
BASOPHILS # BLD: 0.1 K/UL (ref 0–0.2)
BASOPHILS NFR BLD: 1 % (ref 0–2)
BILIRUB SERPL-MCNC: 0.7 MG/DL (ref 0.2–1.1)
BUN SERPL-MCNC: 27 MG/DL (ref 8–23)
CALCIUM SERPL-MCNC: 8 MG/DL (ref 8.3–10.4)
CHLORIDE SERPL-SCNC: 112 MMOL/L (ref 98–107)
CO2 SERPL-SCNC: 21 MMOL/L (ref 21–32)
CREAT SERPL-MCNC: 1.08 MG/DL (ref 0.6–1)
DIFFERENTIAL METHOD BLD: ABNORMAL
EOSINOPHIL # BLD: 0.2 K/UL (ref 0–0.8)
EOSINOPHIL NFR BLD: 2 % (ref 0.5–7.8)
ERYTHROCYTE [DISTWIDTH] IN BLOOD BY AUTOMATED COUNT: 12.4 % (ref 11.9–14.6)
GLOBULIN SER CALC-MCNC: 3.3 G/DL (ref 2.3–3.5)
GLUCOSE BLD STRIP.AUTO-MCNC: 102 MG/DL (ref 65–100)
GLUCOSE BLD STRIP.AUTO-MCNC: 145 MG/DL (ref 65–100)
GLUCOSE BLD STRIP.AUTO-MCNC: 196 MG/DL (ref 65–100)
GLUCOSE BLD STRIP.AUTO-MCNC: 237 MG/DL (ref 65–100)
GLUCOSE SERPL-MCNC: 99 MG/DL (ref 65–100)
HCT VFR BLD AUTO: 35.2 % (ref 35.8–46.3)
HGB BLD-MCNC: 11.5 G/DL (ref 11.7–15.4)
IMM GRANULOCYTES # BLD AUTO: 0 K/UL (ref 0–0.5)
IMM GRANULOCYTES NFR BLD AUTO: 0 % (ref 0–5)
LACTATE SERPL-SCNC: 1.2 MMOL/L (ref 0.4–2)
LYMPHOCYTES # BLD: 0.9 K/UL (ref 0.5–4.6)
LYMPHOCYTES NFR BLD: 11 % (ref 13–44)
MCH RBC QN AUTO: 31.7 PG (ref 26.1–32.9)
MCHC RBC AUTO-ENTMCNC: 32.7 G/DL (ref 31.4–35)
MCV RBC AUTO: 97 FL (ref 79.6–97.8)
MONOCYTES # BLD: 0.7 K/UL (ref 0.1–1.3)
MONOCYTES NFR BLD: 8 % (ref 4–12)
NEUTS SEG # BLD: 7 K/UL (ref 1.7–8.2)
NEUTS SEG NFR BLD: 78 % (ref 43–78)
NRBC # BLD: 0 K/UL (ref 0–0.2)
PLATELET # BLD AUTO: 231 K/UL (ref 150–450)
PMV BLD AUTO: 8.6 FL (ref 9.4–12.3)
POTASSIUM SERPL-SCNC: 3.9 MMOL/L (ref 3.5–5.1)
PROT SERPL-MCNC: 5.9 G/DL (ref 6.3–8.2)
RBC # BLD AUTO: 3.63 M/UL (ref 4.05–5.2)
SODIUM SERPL-SCNC: 142 MMOL/L (ref 136–145)
WBC # BLD AUTO: 9 K/UL (ref 4.3–11.1)

## 2020-04-19 PROCEDURE — 65390000012 HC CONDITION CODE 44 OBSERVATION

## 2020-04-19 PROCEDURE — 83605 ASSAY OF LACTIC ACID: CPT

## 2020-04-19 PROCEDURE — 94640 AIRWAY INHALATION TREATMENT: CPT

## 2020-04-19 PROCEDURE — 51798 US URINE CAPACITY MEASURE: CPT

## 2020-04-19 PROCEDURE — 80053 COMPREHEN METABOLIC PANEL: CPT

## 2020-04-19 PROCEDURE — 96361 HYDRATE IV INFUSION ADD-ON: CPT

## 2020-04-19 PROCEDURE — 96376 TX/PRO/DX INJ SAME DRUG ADON: CPT

## 2020-04-19 PROCEDURE — 77030040361 HC SLV COMPR DVT MDII -B

## 2020-04-19 PROCEDURE — 74011250636 HC RX REV CODE- 250/636: Performed by: INTERNAL MEDICINE

## 2020-04-19 PROCEDURE — 97166 OT EVAL MOD COMPLEX 45 MIN: CPT

## 2020-04-19 PROCEDURE — 74011636637 HC RX REV CODE- 636/637: Performed by: INTERNAL MEDICINE

## 2020-04-19 PROCEDURE — 77030019905 HC CATH URETH INTMIT MDII -A

## 2020-04-19 PROCEDURE — 85025 COMPLETE CBC W/AUTO DIFF WBC: CPT

## 2020-04-19 PROCEDURE — 36415 COLL VENOUS BLD VENIPUNCTURE: CPT

## 2020-04-19 PROCEDURE — 77010033678 HC OXYGEN DAILY

## 2020-04-19 PROCEDURE — 94760 N-INVAS EAR/PLS OXIMETRY 1: CPT

## 2020-04-19 PROCEDURE — 65270000029 HC RM PRIVATE

## 2020-04-19 PROCEDURE — 97535 SELF CARE MNGMENT TRAINING: CPT

## 2020-04-19 PROCEDURE — 74011250637 HC RX REV CODE- 250/637: Performed by: INTERNAL MEDICINE

## 2020-04-19 PROCEDURE — 74011000258 HC RX REV CODE- 258: Performed by: INTERNAL MEDICINE

## 2020-04-19 PROCEDURE — 74011000250 HC RX REV CODE- 250: Performed by: INTERNAL MEDICINE

## 2020-04-19 PROCEDURE — 77030038269 HC DRN EXT URIN PURWCK BARD -A

## 2020-04-19 PROCEDURE — 77030040830 HC CATH URETH FOL MDII -A

## 2020-04-19 PROCEDURE — 82962 GLUCOSE BLOOD TEST: CPT

## 2020-04-19 RX ORDER — AMLODIPINE BESYLATE 10 MG/1
10 TABLET ORAL DAILY
Status: DISCONTINUED | OUTPATIENT
Start: 2020-04-20 | End: 2020-04-22 | Stop reason: HOSPADM

## 2020-04-19 RX ORDER — LISINOPRIL 20 MG/1
40 TABLET ORAL DAILY
Status: DISCONTINUED | OUTPATIENT
Start: 2020-04-20 | End: 2020-04-22 | Stop reason: HOSPADM

## 2020-04-19 RX ORDER — FOLIC ACID 1 MG/1
1 TABLET ORAL DAILY
Status: DISCONTINUED | OUTPATIENT
Start: 2020-04-19 | End: 2020-04-22 | Stop reason: HOSPADM

## 2020-04-19 RX ADMIN — BUDESONIDE 500 MCG: 0.5 INHALANT RESPIRATORY (INHALATION) at 21:06

## 2020-04-19 RX ADMIN — PREGABALIN 25 MG: 25 CAPSULE ORAL at 17:29

## 2020-04-19 RX ADMIN — LEVOTHYROXINE SODIUM 25 MCG: 0.05 TABLET ORAL at 05:26

## 2020-04-19 RX ADMIN — HYDRALAZINE HYDROCHLORIDE 12.5 MG: 25 TABLET, FILM COATED ORAL at 17:29

## 2020-04-19 RX ADMIN — BUDESONIDE 500 MCG: 0.5 INHALANT RESPIRATORY (INHALATION) at 08:28

## 2020-04-19 RX ADMIN — FOLIC ACID 1 MG: 1 TABLET ORAL at 09:25

## 2020-04-19 RX ADMIN — IPRATROPIUM BROMIDE AND ALBUTEROL SULFATE 3 ML: .5; 3 SOLUTION RESPIRATORY (INHALATION) at 08:28

## 2020-04-19 RX ADMIN — ASPIRIN 81 MG: 81 TABLET ORAL at 09:25

## 2020-04-19 RX ADMIN — HYDRALAZINE HYDROCHLORIDE 12.5 MG: 25 TABLET, FILM COATED ORAL at 09:24

## 2020-04-19 RX ADMIN — IPRATROPIUM BROMIDE AND ALBUTEROL SULFATE 3 ML: .5; 3 SOLUTION RESPIRATORY (INHALATION) at 02:14

## 2020-04-19 RX ADMIN — ATENOLOL 25 MG: 25 TABLET ORAL at 09:25

## 2020-04-19 RX ADMIN — FLUCONAZOLE 200 MG: 100 TABLET ORAL at 17:29

## 2020-04-19 RX ADMIN — HYDRALAZINE HYDROCHLORIDE 12.5 MG: 25 TABLET, FILM COATED ORAL at 21:20

## 2020-04-19 RX ADMIN — INSULIN LISPRO 2 UNITS: 100 INJECTION, SOLUTION INTRAVENOUS; SUBCUTANEOUS at 11:15

## 2020-04-19 RX ADMIN — TRAMADOL HYDROCHLORIDE 300 MG: 300 TABLET, FILM COATED, EXTENDED RELEASE ORAL at 09:00

## 2020-04-19 RX ADMIN — QUETIAPINE FUMARATE 12.5 MG: 25 TABLET ORAL at 21:20

## 2020-04-19 RX ADMIN — INSULIN LISPRO 4 UNITS: 100 INJECTION, SOLUTION INTRAVENOUS; SUBCUTANEOUS at 16:33

## 2020-04-19 RX ADMIN — MONTELUKAST 10 MG: 10 TABLET, FILM COATED ORAL at 21:20

## 2020-04-19 RX ADMIN — SODIUM CHLORIDE 50 ML/HR: 900 INJECTION, SOLUTION INTRAVENOUS at 12:56

## 2020-04-19 RX ADMIN — CEFTRIAXONE 1 G: 1 INJECTION, POWDER, FOR SOLUTION INTRAMUSCULAR; INTRAVENOUS at 14:27

## 2020-04-19 RX ADMIN — PREGABALIN 25 MG: 25 CAPSULE ORAL at 09:23

## 2020-04-19 RX ADMIN — IPRATROPIUM BROMIDE AND ALBUTEROL SULFATE 3 ML: .5; 3 SOLUTION RESPIRATORY (INHALATION) at 14:41

## 2020-04-19 RX ADMIN — HYDRALAZINE HYDROCHLORIDE 12.5 MG: 25 TABLET, FILM COATED ORAL at 12:18

## 2020-04-19 NOTE — PROGRESS NOTES
Hospitalist Progress Note     Admit Date:  2020 11:23 AM   Name:  Eren Malone   Age:  80 y.o.  :  1934   MRN:  [id number]   PCP:  Vineet Benjamin MD  Treatment Team: Attending Provider: Long Neal MD; Primary Nurse: Gregory Meyer  CC: confusion at home x 1 day  HPI:   80yr old female with pmhx sig for dm, htn and dementia who has been treated for a UTI who woke up and was found wondering around her daughters home complaining that her legs don't work. Daughter realized she was confused and called EMS to bring her to the hospital. She was non focal but noted to be septic with a uti. .. hospitalist asked to admit for further mgt; the patient can not give a hx secondary to her confusion. No fevers or chills; no contact with anyone who was exposed to or treated for COVID 19; no recent travel. 2020- pt seen; no adverse overnight events reported- more alert- shaking says she she shakes when she is sick    10 systems reviewed and negative except as noted in HPI. Past Medical History:   Diagnosis Date    Asthma     sees Dr. Panda Rowland Full dentures     GERD (gastroesophageal reflux disease)     Hypertension     Incontinence of urine in female     Neuropathy     Sleep apnea     no C PAP      Past Surgical History:   Procedure Laterality Date    BREAST SURGERY PROCEDURE UNLISTED      bilat lumpectomy    CHEST SURGERY PROCEDURE UNLISTED      WHAT IS THIS? ??    HX CATARACT REMOVAL Bilateral     HX CHOLECYSTECTOMY      HX COLONOSCOPY      HX HYSTERECTOMY      HX KNEE REPLACEMENT Bilateral     HX ORTHOPAEDIC      back    VASCULAR SURGERY PROCEDURE UNLIST Left 2017    aortogram/arteriogram- PTA x2      Allergies   Allergen Reactions    Aleve [Naproxen Sodium] Rash    Hydrocodone-Acetaminophen Itching    Metformin Other (comments)     Decreased kidney function    Phenobarbital Unknown (comments)     Doesn't remember - long time ago    Alivia Wilson Statins-Hmg-Coa Reductase Inhibitors Myalgia     intolerance      Social History     Tobacco Use    Smoking status: Never Smoker    Smokeless tobacco: Never Used   Substance Use Topics    Alcohol use: No      Family History   Problem Relation Age of Onset    No Known Problems Mother     No Known Problems Father       Immunization History   Administered Date(s) Administered    Influenza High Dose Vaccine PF 09/07/2017, 09/18/2018    Influenza Vaccine 11/08/2017    Influenza Vaccine (Tri) Adjuvanted 09/11/2019    Pneumococcal Conjugate (PCV-13) 03/18/2015, 04/05/2017    Pneumococcal Polysaccharide (PPSV-23) 03/17/2015    TB Skin Test (PPD) Intradermal 09/12/2017, 08/20/2019, 11/29/2019     PTA Medications:  Prior to Admission Medications   Prescriptions Last Dose Informant Patient Reported? Taking? Blood-Glucose Meter monitoring kit   No No   Sig: Check glucose before every meal and at bedtime. Cholecalciferol, Vitamin D3, (VITAMIN D3) 2,000 unit cap capsule   No No   Sig: Take 2,000 Units by mouth two (2) times a day. Patient taking differently: Take 2,000 Units by mouth two (2) times a day. Stop seven days prior to surgery per anesthesia protocol. Insulin Needles, Disposable, (DIANA PEN NEEDLE) 32 gauge x 5/32\" ndle   No No   Sig: Use to administer Novolog TID   Lancets (ONETOUCH ULTRASOFT LANCETS) misc   No No   Sig: Use to check blood sugar. E11.42   NOVOLOG U-100 INSULIN ASPART 100 unit/mL injection   No No   Sig: Inject 4 units sc 4 times a day with meals and bedtime. QUEtiapine (SEROQUEL) 25 mg tablet   No No   Sig: Take 1/2 tablet before bedtime   TRUEPLUS LANCETS 33 gauge misc   No No   Sig: Check BS 3-4 x daily   acetaminophen (TYLENOL) 650 mg TbER   Yes No   Sig: Take 650 mg by mouth every six (6) hours as needed for Pain. albuterol (PROAIR HFA) 90 mcg/actuation inhaler   No No   Sig: Take 2 Puffs by inhalation every four (4) hours as needed for Wheezing, Shortness of Breath or Cough. Indications: asthma attack   amLODIPine (NORVASC) 10 mg tablet   No No   Sig: Take 1 Tab by mouth daily. aspirin delayed-release 81 mg tablet   Yes No   Sig: Take 81 mg by mouth every morning. Take day of surgery per anesthesia protocol. atenoloL (TENORMIN) 25 mg tablet   No No   Sig: Take 1 Tab by mouth daily. cranberry 400 mg cap   No No   Sig: Take 1 Cap by mouth two (2) times daily (with meals). Indications: UTI prevention   docusate sodium (COLACE) 100 mg capsule   Yes No   Sig: Take 100 mg by mouth two (2) times daily as needed for Constipation. exenatide microspheres (Bydureon BCise) 2 mg/0.85 mL atIn   No No   Si Syringe by SubCUTAneous route every seven (7) days. fenofibrate nanocrystallized (TRICOR) 48 mg tablet   No No   Sig: Take 1 Tab by mouth daily. fluticasone propionate (FLOVENT HFA) 44 mcg/actuation inhaler   No No   Sig: Take 2 Puffs by inhalation two (2) times a day. Indications: controller medication for asthma   folic acid 662 mcg tablet   Yes No   Sig: Take 400 mcg by mouth two (2) times a day. Stop seven days prior to surgery per anesthesia protocol. glucose blood VI test strips (ONETOUCH ULTRA TEST) strip   No No   Sig: Use to check blood sugar once daily. E11.42   hydrALAZINE (APRESOLINE) 25 mg tablet   No No   Sig: Take 1 Tab by mouth three (3) times daily. insulin lispro (HUMALOG) 100 unit/mL kwikpen   No No   Sig: Inject 4 units subcutaneously with each meal   lancets (ONETOUCH SURESOFT LANCING DEV) misc   No No   Sig: Use to check blood sugar. E11.42   levothyroxine (SYNTHROID) 25 mcg tablet   No No   Sig: Take 1 Tab by mouth Daily (before breakfast). lisinopril (PRINIVIL, ZESTRIL) 40 mg tablet   No No   Sig: Take 1 Tab by mouth daily. montelukast (SINGULAIR) 10 mg tablet   No No   Sig: Take 1 Tab by mouth daily. polyethylene glycol (MIRALAX) 17 gram packet   No No   Sig: Take 1 Packet by mouth daily as needed.  Indications: constipation   Patient taking differently: Take 1 Packet by mouth daily as needed (constipation). mix with 8oz water  Indications: constipation   pregabalin (LYRICA) 50 mg capsule   No No   Sig: Take 1 Cap by mouth two (2) times a day. Max Daily Amount: 100 mg. 1 tab po bid   tiotropium (SPIRIVA WITH HANDIHALER) 18 mcg inhalation capsule   No No   Sig: Take 1 Cap by inhalation every morning. Indications: controller medication for asthma   traMADol (ULTRAM-ER) 300 mg tablet   No No   Sig: Take 1 Tab by mouth daily for 180 days. Max Daily Amount: 300 mg.   trimethoprim-sulfamethoxazole (BACTRIM DS, SEPTRA DS) 160-800 mg per tablet   No No   Sig: Take 1 Tab by mouth two (2) times a day. Facility-Administered Medications: None       Objective:     Patient Vitals for the past 24 hrs:   Temp Pulse Resp BP SpO2   04/19/20 0512 98.2 °F (36.8 °C) (!) 103 19 176/83 95 %   04/19/20 0214     97 %   04/18/20 2244 98.1 °F (36.7 °C) 97 19 162/81 99 %   04/18/20 2043     97 %   04/18/20 1932 98.9 °F (37.2 °C) 96 20 156/82 98 %   04/18/20 1701 98.8 °F (37.1 °C) 98 20 (!) 181/97 98 %   04/18/20 1605  96  174/89 100 %   04/18/20 1449  94  178/84 90 %   04/18/20 1201 98.9 °F (37.2 °C)       04/18/20 1201  100 14 186/90 98 %   04/18/20 1140  (!) 106  187/89 92 %   04/18/20 1131  (!) 107 18 184/88 93 %     Oxygen Therapy  O2 Sat (%): 95 % (04/19/20 0512)  Pulse via Oximetry: 93 beats per minute (04/19/20 0214)  O2 Device: Nasal cannula (04/19/20 0214)  O2 Flow Rate (L/min): 2 l/min (04/19/20 0214)  FIO2 (%): 28 % (04/19/20 0214)    Intake/Output Summary (Last 24 hours) at 4/19/2020 0751  Last data filed at 4/19/2020 0630  Gross per 24 hour   Intake 50 ml   Output 540 ml   Net -490 ml       Physical Exam:  General:    Well nourished. Frail,  Alert. Pleasantly confused  Eyes:   Normal sclera. Extraocular movements intact. ENT:  Normocephalic, atraumatic. Moist mucous membranes  CV:   RRR. No m/r/g. Peripheral pulses present.  Capillary refill normal  Lungs:  A/e = bl  No wheezing, rhonchi, or rales. Abdomen: Soft, nontender, nondistended. Bowel sounds normal.   Extremities: Warm and dry. No cyanosis or edema. Neurologic: CN II-XII grossly intact. Sensation intact. Skin:     No rashes or jaundice. Normal coloration  Psych:  Normal mood and affect. I reviewed the labs, imaging, EKGs, telemetry, and other studies done this admission. Data Review:   Recent Results (from the past 24 hour(s))   GLUCOSE, POC    Collection Time: 04/18/20 11:33 AM   Result Value Ref Range    Glucose (POC) 198 (H) 65 - 100 mg/dL   EKG, 12 LEAD, INITIAL    Collection Time: 04/18/20 11:37 AM   Result Value Ref Range    Ventricular Rate 106 BPM    Atrial Rate 106 BPM    P-R Interval 182 ms    QRS Duration 128 ms    Q-T Interval 368 ms    QTC Calculation (Bezet) 488 ms    Calculated P Axis 106 degrees    Calculated R Axis -6 degrees    Calculated T Axis 66 degrees    Diagnosis       !! AGE AND GENDER SPECIFIC ECG ANALYSIS !!   Sinus tachycardia  Non-specific intra-ventricular conduction block  Nonspecific T wave abnormality  Abnormal ECG  When compared with ECG of 30-JAN-2020 21:57,  DE interval has decreased  Minimal criteria for Septal infarct are no longer Present  Nonspecific T wave abnormality now evident in Anterior leads  Confirmed by Mckayla Adams ([id number]) on 4/18/2020 5:45:46 PM     CBC WITH AUTOMATED DIFF    Collection Time: 04/18/20 11:40 AM   Result Value Ref Range    WBC 13.9 (H) 4.3 - 11.1 K/uL    RBC 4.52 4.05 - 5.2 M/uL    HGB 14.3 11.7 - 15.4 g/dL    HCT 43.7 35.8 - 46.3 %    MCV 96.7 79.6 - 97.8 FL    MCH 31.6 26.1 - 32.9 PG    MCHC 32.7 31.4 - 35.0 g/dL    RDW 12.3 11.9 - 14.6 %    PLATELET 997 282 - 365 K/uL    MPV 9.2 (L) 9.4 - 12.3 FL    ABSOLUTE NRBC 0.00 0.0 - 0.2 K/uL    DF AUTOMATED      NEUTROPHILS 82 (H) 43 - 78 %    LYMPHOCYTES 9 (L) 13 - 44 %    MONOCYTES 7 4.0 - 12.0 %    EOSINOPHILS 0 (L) 0.5 - 7.8 %    BASOPHILS 1 0.0 - 2.0 %    IMMATURE GRANULOCYTES 1 0.0 - 5.0 %    ABS. NEUTROPHILS 11.4 (H) 1.7 - 8.2 K/UL    ABS. LYMPHOCYTES 1.3 0.5 - 4.6 K/UL    ABS. MONOCYTES 1.0 0.1 - 1.3 K/UL    ABS. EOSINOPHILS 0.0 0.0 - 0.8 K/UL    ABS. BASOPHILS 0.1 0.0 - 0.2 K/UL    ABS. IMM. GRANS. 0.1 0.0 - 0.5 K/UL   METABOLIC PANEL, COMPREHENSIVE    Collection Time: 04/18/20 11:40 AM   Result Value Ref Range    Sodium 140 136 - 145 mmol/L    Potassium 3.9 3.5 - 5.1 mmol/L    Chloride 110 (H) 98 - 107 mmol/L    CO2 21 21 - 32 mmol/L    Anion gap 9 7 - 16 mmol/L    Glucose 182 (H) 65 - 100 mg/dL    BUN 31 (H) 8 - 23 MG/DL    Creatinine 1.65 (H) 0.6 - 1.0 MG/DL    GFR est AA 38 (L) >60 ml/min/1.73m2    GFR est non-AA 31 (L) >60 ml/min/1.73m2    Calcium 8.4 8.3 - 10.4 MG/DL    Bilirubin, total 0.5 0.2 - 1.1 MG/DL    ALT (SGPT) 16 12 - 65 U/L    AST (SGOT) 20 15 - 37 U/L    Alk.  phosphatase 82 50 - 136 U/L    Protein, total 7.4 6.3 - 8.2 g/dL    Albumin 3.7 3.2 - 4.6 g/dL    Globulin 3.7 (H) 2.3 - 3.5 g/dL    A-G Ratio 1.0 (L) 1.2 - 3.5     PROTHROMBIN TIME + INR    Collection Time: 04/18/20 11:40 AM   Result Value Ref Range    Prothrombin time 12.5 12.0 - 14.7 sec    INR 0.9     URINALYSIS W/ RFLX MICROSCOPIC    Collection Time: 04/18/20 11:40 AM   Result Value Ref Range    Color YELLOW      Appearance CLOUDY      Specific gravity 1.023 1.001 - 1.023      pH (UA) 5.0 5.0 - 9.0      Protein 100 (A) NEG mg/dL    Glucose 250 mg/dL    Ketone TRACE (A) NEG mg/dL    Bilirubin Negative NEG      Blood Negative NEG      Urobilinogen 0.2 0.2 - 1.0 EU/dL    Nitrites Negative NEG      Leukocyte Esterase SMALL (A) NEG     URINE MICROSCOPIC    Collection Time: 04/18/20 11:40 AM   Result Value Ref Range    WBC 20-50 0 /hpf    RBC 0-3 0 /hpf    Epithelial cells 0-3 0 /hpf    Bacteria 0 0 /hpf    Casts 0 0 /lpf    Crystals, urine 0 0 /LPF    Mucus 0 0 /lpf    Yeast MARKED      Other observations RESULTS VERIFIED MANUALLY     LACTIC ACID    Collection Time: 04/18/20  1:18 PM   Result Value Ref Range    Lactic acid 2.5 (HH) 0.4 - 2.0 MMOL/L   CULTURE, BLOOD    Collection Time: 04/18/20  2:29 PM   Result Value Ref Range    Special Requests: RIGHT  FOREARM        Culture result: NO GROWTH AFTER 14 HOURS     CULTURE, BLOOD    Collection Time: 04/18/20  2:29 PM   Result Value Ref Range    Special Requests: LEFT  HAND        Culture result: NO GROWTH AFTER 14 HOURS     GLUCOSE, POC    Collection Time: 04/18/20  8:09 PM   Result Value Ref Range    Glucose (POC) 221 (H) 65 - 172 mg/dL   METABOLIC PANEL, COMPREHENSIVE    Collection Time: 04/19/20  5:20 AM   Result Value Ref Range    Sodium 142 136 - 145 mmol/L    Potassium 3.9 3.5 - 5.1 mmol/L    Chloride 112 (H) 98 - 107 mmol/L    CO2 21 21 - 32 mmol/L    Anion gap 9 7 - 16 mmol/L    Glucose 99 65 - 100 mg/dL    BUN 27 (H) 8 - 23 MG/DL    Creatinine 1.08 (H) 0.6 - 1.0 MG/DL    GFR est AA >60 >60 ml/min/1.73m2    GFR est non-AA 51 (L) >60 ml/min/1.73m2    Calcium 8.0 (L) 8.3 - 10.4 MG/DL    Bilirubin, total 0.7 0.2 - 1.1 MG/DL    ALT (SGPT) 13 12 - 65 U/L    AST (SGOT) 9 (L) 15 - 37 U/L    Alk. phosphatase 67 50 - 136 U/L    Protein, total 5.9 (L) 6.3 - 8.2 g/dL    Albumin 2.6 (L) 3.2 - 4.6 g/dL    Globulin 3.3 2.3 - 3.5 g/dL    A-G Ratio 0.8 (L) 1.2 - 3.5     CBC WITH AUTOMATED DIFF    Collection Time: 04/19/20  5:20 AM   Result Value Ref Range    WBC 9.0 4.3 - 11.1 K/uL    RBC 3.63 (L) 4.05 - 5.2 M/uL    HGB 11.5 (L) 11.7 - 15.4 g/dL    HCT 35.2 (L) 35.8 - 46.3 %    MCV 97.0 79.6 - 97.8 FL    MCH 31.7 26.1 - 32.9 PG    MCHC 32.7 31.4 - 35.0 g/dL    RDW 12.4 11.9 - 14.6 %    PLATELET 262 925 - 058 K/uL    MPV 8.6 (L) 9.4 - 12.3 FL    ABSOLUTE NRBC 0.00 0.0 - 0.2 K/uL    DF AUTOMATED      NEUTROPHILS 78 43 - 78 %    LYMPHOCYTES 11 (L) 13 - 44 %    MONOCYTES 8 4.0 - 12.0 %    EOSINOPHILS 2 0.5 - 7.8 %    BASOPHILS 1 0.0 - 2.0 %    IMMATURE GRANULOCYTES 0 0.0 - 5.0 %    ABS. NEUTROPHILS 7.0 1.7 - 8.2 K/UL    ABS. LYMPHOCYTES 0.9 0.5 - 4.6 K/UL    ABS.  MONOCYTES 0.7 0.1 - 1.3 K/UL    ABS. EOSINOPHILS 0.2 0.0 - 0.8 K/UL    ABS. BASOPHILS 0.1 0.0 - 0.2 K/UL    ABS. IMM. GRANS. 0.0 0.0 - 0.5 K/UL   LACTIC ACID    Collection Time: 04/19/20  5:20 AM   Result Value Ref Range    Lactic acid 1.2 0.4 - 2.0 MMOL/L   GLUCOSE, POC    Collection Time: 04/19/20  7:30 AM   Result Value Ref Range    Glucose (POC) 102 (H) 65 - 100 mg/dL       All Micro Results     Procedure Component Value Units Date/Time    CULTURE, BLOOD [[id number]] Collected:  04/18/20 1429    Order Status:  Completed Specimen:  Blood Updated:  04/19/20 0641     Special Requests: --        LEFT  HAND       Culture result: NO GROWTH AFTER 14 HOURS       CULTURE, BLOOD [[id number]] Collected:  04/18/20 1429    Order Status:  Completed Specimen:  Blood Updated:  04/19/20 0640     Special Requests: --        RIGHT  FOREARM       Culture result: NO GROWTH AFTER 14 HOURS             Other Studies:  Ct Head Wo Cont    Result Date: 4/18/2020  EXAMINATION: HEAD CT WITHOUT CONTRAST 4/18/2020 12:42 PM ACCESSION NUMBER: [id number] INDICATION: weakness and altered mental status COMPARISON: Head CT 1/30/2020 TECHNIQUE: Multiple-row detector helical CT examination of the head without intravenous contrast. Radiation dose reduction techniques were used for this study:  Our CT scanners use one or all of the following: Automated exposure control, adjustment of the mA and/or kVp according to patient's size, iterative reconstruction. FINDINGS: The ventricles are stable in caliber in comparison to the prior exam and are appropriate for the mild degree of symmetric global brain parenchymal volume loss. There is no midline shift. The basilar cisterns are patent. There is no cerebellar tonsillar ectopia or herniation. Hypodensities in the periventricular and subcortical white matter are nonspecific, but they are most likely to represent chronic microangiopathy. Prior bilateral lens replacement.  Unchanged prominence of the extra-axial spaces along the posterior left parietal convexity. Chronic lacunar infarction in the right basal ganglia is unchanged. There is no evidence of acute intracranial hemorrhage or extra-axial collections. There is no CT evidence of acute infarction. The paranasal sinuses and mastoid air cells are well aerated and clear. IMPRESSION: 1. No acute intracranial abnormality. 2. Chronic features as above. VOICE DICTATED BY: Dr. Willie Licona     Xr Chest Port    Result Date: 4/18/2020  EXAMINATION: CHEST RADIOGRAPH 4/18/2020 12:17 PM ACCESSION NUMBER: [id number] COMPARISON: 02/25/2020 INDICATION: weakness TECHNIQUE: A single view of the chest was obtained. FINDINGS: Support Devices: None Cardiac Silhouette: Within normal limits in size. Mediastinum: Calcifications are seen in the aorta. Lungs: No focal airspace disease. Upper Abdomen: Normal Osseous Structures: There are no lytic and blastic lesions.      IMPRESSION: No acute abnormality Atherosclerosis        Assessment and Plan:     Hospital Problems as of 4/19/2020 Date Reviewed: 3/4/2020          Codes Class Noted - Resolved POA    UTI (urinary tract infection) ICD-10-CM: N39.0  ICD-9-CM: 599.0  4/18/2020 - Present Unknown        Sepsis ([facility] ICD-10-CM: A41.9  ICD-9-CM: 038.9, 995.91  4/18/2020 - Present Unknown        Diabetes mellitus type 2, controlled ([facility] [address] ICD-10-CM: E11.9  ICD-9-CM: 250.00  2/26/2018 - Present Yes        Acute kidney injury ([facility] [address] ICD-10-CM: N17.9  ICD-9-CM: 584.9  9/12/2017 - Present Yes        Stage 3 chronic kidney disease ([facility] ICD-10-CM: N18.3  ICD-9-CM: 585.3  9/12/2017 - Present Yes        DM type 2 with diabetic peripheral neuropathy (HCC) ICD-10-CM: E11.42  ICD-9-CM: 250.60, 357.2  3/24/2017 - Present Yes        Diabetic Charcot foot ([facility] ICD-10-CM: E11.610  ICD-9-CM: 250.60, 713.5  3/24/2017 - Present Yes        Presenile dementia, paranoid type ([facility] ICD-10-CM: F03.90  ICD-9-CM: 290.12  11/7/2016 - Present Yes        Asthma ICD-10-CM: J45.909  ICD-9-CM: 493.90  Unknown - Present Yes    Overview Signed 1/13/2015  6:41 AM by Mayra Alvarenga             Hypertension ICD-10-CM: I10  ICD-9-CM: 401.9  11/16/2014 - Present Yes              PLAN:  · uti- admit to medical floor- continue antimicrobials; f/up cultures  · Sepsis- resolving continue supportive care-  · maría- resolving;monitor  renal fxn and dose meds for fxn  · Patient was placed on 2L in er because she was satting 92% on room air- does not use home O2 - should have cpap per her daughter but unable to tolerate it; uses her inhalers  · Dm- monitor bg and cover with insulin  · HTN- monitor bp- prn meds as needed  · PT/OT EVAL  · Continue appropriate home meds  · Further w/up and mgt based on her clinical course      DVT ppx:  Heparin   Anticipated DC needs: none   Code status:  Full  Estimated LOS:  Greater than 2 midnights  Risk:  high    Signed:  Casey Salter MD

## 2020-04-19 NOTE — PROGRESS NOTES
Scant output via purewick. MD Chris Salas notified bladder scan revealed >400 mls. Order was placed for briseno.

## 2020-04-19 NOTE — PROGRESS NOTES
Problem: Self Care Deficits Care Plan (Adult)  Goal: *Acute Goals and Plan of Care (Insert Text)  Description: Patient will complete lower body dressing with contact guard assist to increase self care independence. Patient will complete toileting with contact guard assist to increase self care independence. Patient will complete bathing with contact guard assist to increase self care independence. Patient will tolerate 40 minutes of OT treatment with self incorporated rest breaks to increase activity tolerance to enhance participation in hobbies. Patient will complete all functional transfers with contact guard assist using adaptive equipment as needed. Patient will complete UE exercises with supervision to increase overall activity tolerance and strength. Timeframe: 7 visits        OCCUPATIONAL THERAPY: Initial Assessment and Daily Note 4/19/2020  INPATIENT: OT Visit Days: 1  Payor: [address] [place],9D / Plan: [address] / Product Type: Managed Care Medicare /      NAME/AGE/GENDER: Lloyd Michaels is a 80 y.o. female   PRIMARY DIAGNOSIS:  UTI (urinary tract infection) [N39.0]  Sepsis ([facility] [address] [Z38.7] Acute metabolic encephalopathy Acute metabolic encephalopathy        ICD-10: Treatment Diagnosis:    Generalized Muscle Weakness (M62.81)  Other lack of cordination (R27.8)   Precautions/Allergies:     Aleve [naproxen sodium]; Hydrocodone-acetaminophen; Metformin; Phenobarbital; and Statins-hmg-coa reductase inhibitors      ASSESSMENT:     Ms. Mauri Perry presents UTI and confusion, better than at ER. At baseline patient is mostly independent with ADL's, dtr assists with IADL's. She uses a RW to get around at times and orthopedic shoes. She is below baseline currently and will benefit from skilled OT at this time. Hopefully will be able to d/c home.    Initiate OT    This section established at most recent assessment   PROBLEM LIST (Impairments causing functional limitations):  Decreased Strength  Decreased ADL/Functional Activities  Decreased Balance  Decreased Activity Tolerance   INTERVENTIONS PLANNED: (Benefits and precautions of occupational therapy have been discussed with the patient.)  Activities of daily living training  Adaptive equipment training  Neuromuscular re-eduation  Therapeutic activity  Therapeutic exercise     TREATMENT PLAN: Frequency/Duration: Follow patient 4x tx to address above goals. Rehabilitation Potential For Stated Goals: Good     REHAB RECOMMENDATIONS (at time of discharge pending progress):    Placement: It is my opinion, based on this patient's performance to date, that Ms. Kenneth Griffith may benefit from participating in 1-2 additional therapy sessions in order to continue to assess for rehab potential and then make recommendation for disposition at discharge. Equipment:   None at this time              OCCUPATIONAL PROFILE AND HISTORY:   History of Present Injury/Illness (Reason for Referral):  See H&P  Past Medical History/Comorbidities:   Ms. Kenneth Griffith  has a past medical history of Asthma, Fibromyalgia, Full dentures, GERD (gastroesophageal reflux disease), Hypertension, Incontinence of urine in female, Neuropathy, and Sleep apnea. Ms. Kenneth Griffith  has a past surgical history that includes pr breast surgery procedure unlisted; hx cholecystectomy; hx orthopaedic; hx colonoscopy; vascular surgery procedure unlist (Left, 09/29/2017); pr chest surgery procedure unlisted; hx knee replacement (Bilateral); hx hysterectomy; and hx cataract removal (Bilateral).   Social History/Living Environment:   Home Environment: Independent living  # Steps to Enter: 0  One/Two Story Residence: One story  Living Alone: No  Support Systems: Child(don)  Patient Expects to be Discharged to[de-identified] Independent living facility  Current DME Used/Available at Home: Walker, rolling  Prior Level of Function/Work/Activity:  At baseline patient is mostly independent with ADL's, dtr assists with IADL's. She uses a RW to get around at times and orthopedic shoes. Number of Personal Factors/Comorbidities that affect the Plan of Care: Expanded review of therapy/medical records (1-2):  MODERATE COMPLEXITY   ASSESSMENT OF OCCUPATIONAL PERFORMANCE[de-identified]   Activities of Daily Living:   Basic ADLs (From Assessment) Complex ADLs (From Assessment)   Feeding: Setup  Oral Facial Hygiene/Grooming: Stand-by assistance  Bathing: Minimum assistance  Upper Body Dressing: Minimum assistance  Lower Body Dressing: Moderate assistance  Toileting: Total assistance     Grooming/Bathing/Dressing Activities of Daily Living                       Functional Transfers  Shower Transfer:  Moderate assistance     Bed/Mat Mobility  Supine to Sit: Minimum assistance  Sit to Stand: Minimum assistance  Stand to Sit: Moderate assistance  Bed to Chair: Moderate assistance  Scooting: Minimum assistance     Most Recent Physical Functioning:   Gross Assessment:                  Posture:     Balance:    Bed Mobility:  Supine to Sit: Minimum assistance  Scooting: Minimum assistance  Wheelchair Mobility:     Transfers:  Sit to Stand: Minimum assistance  Stand to Sit: Moderate assistance  Bed to Chair: Moderate assistance            Patient Vitals for the past 6 hrs:   BP BP Patient Position SpO2 Pulse   04/19/20 1122 168/74 At rest 98 % 98       Mental Status  Neurologic State: Alert  Orientation Level: Oriented to person, Oriented to place  Cognition: Follows commands, Memory loss, Decreased attention/concentration                          Physical Skills Involved:  Range of Motion  Balance  Strength  Activity Tolerance Cognitive Skills Affected (resulting in the inability to perform in a timely and safe manner):  WFL  Psychosocial Skills Affected:  WFL    Number of elements that affect the Plan of Care: 3-5:  MODERATE COMPLEXITY   CLINICAL DECISION MAKING:   [employer] AM-PAC 6 Clicks   Daily Activity Inpatient Short Form  How much help from another person does the patient currently need. .. Total A Lot A Little None   1. Putting on and taking off regular lower body clothing? [x] 1   [] 2   [] 3   [] 4   2. Bathing (including washing, rinsing, drying)? [] 1   [x] 2   [] 3   [] 4   3. Toileting, which includes using toilet, bedpan or urinal?   [] 1   [x] 2   [] 3   [] 4   4. Putting on and taking off regular upper body clothing? [] 1   [] 2   [x] 3   [] 4   5. Taking care of personal grooming such as brushing teeth? [] 1   [] 2   [x] 3   [] 4   6. Eating meals? [] 1   [] 2   [x] 3   [] 4   © 2007, Trustees of [facility] [employer], under license to [employer]. All rights reserved      Score:  Initial: 14 Most Recent: X (Date: -- )    Interpretation of Tool:  Represents activities that are increasingly more difficult (i.e. Bed mobility, Transfers, Gait). Medical Necessity:     Skilled intervention continues to be required due to Deficits ntoed abvoe. Reason for Services/Other Comments:  Patient continues to require skilled intervention due to   Dx above   . Use of outcome tool(s) and clinical judgement create a POC that gives a: MODERATE COMPLEXITY         TREATMENT:   (In addition to Assessment/Re-Assessment sessions the following treatments were rendered)     Pre-treatment Symptoms/Complaints:    Pain: Initial:   Pain Intensity 1: 0  Post Session:  0     Self Care: (10): Procedure(s) (per grid) utilized to improve and/or restore self-care/home management as related to dressing, bathing, toileting, and grooming. Required moderate verbal and tactile cueing to facilitate activities of daily living skills. Initial evaluation 10 minutes    Braces/Orthotics/Lines/Etc:   IV  O2 Device: Nasal cannula  Treatment/Session Assessment:    Response to Treatment:  Good, sititng up in recliner, alarm on.    Interdisciplinary Collaboration:   Physical Therapist  Occupational Therapist  Registered Nurse  After treatment position/precautions:   Up in chair  Call light within reach  RN notified   Compliance with Program/Exercises: Compliant all of the time, Will assess as treatment progresses. Recommendations/Intent for next treatment session: \"Next visit will focus on advancements to more challenging activities and reduction in assistance provided\".   Total Treatment Duration:  OT Patient Time In/Time Out  Time In: 1520  Time Out: 1201 Gita Ocampo

## 2020-04-19 NOTE — PROGRESS NOTES
END OF SHIFT NOTE:    INTAKE/OUTPUT  04/18 0701 - 04/19 0700  In: 50 [I.V.:50]  Out: 540 [Urine:540]  Voiding: NO  Catheter: YES  Color: clear  Drain:   External Female Catheter 04/18/20 (Active)   Site Assessment Clean, dry, & intact 4/19/2020  2:46 AM   Repositioned No 4/19/2020  2:46 AM   Perineal Care No 4/19/2020  2:46 AM   Wick Changed No 4/19/2020  2:46 AM   Suction Canister/Tubing Changed No 4/19/2020  2:46 AM   Urine Output (mL) 120 ml 4/19/2020  5:12 AM               DIET  Cardiac regular    Flatus: Patient does have flatus present. Stool:  0 occurrences. Characteristics:  Stool Assessment  Stool Appearance: Soft(per patient)    Ambulating  No    Emesis: 0 occurrences.     Characteristics:          VITAL SIGNS  Patient Vitals for the past 12 hrs:   Temp Pulse Resp BP SpO2   04/19/20 1640     94 %   04/19/20 1625 98.5 °F (36.9 °C) 78 20 174/85 96 %   04/19/20 1441     99 %   04/19/20 1122 98.9 °F (37.2 °C) 98 18 168/74 98 %   04/19/20 0828     97 %   04/19/20 0755 98.1 °F (36.7 °C) (!) 101 20 170/89 98 %       Pain Assessment  Pain Intensity 1: 0 (04/19/20 1520)        Patient Stated Pain Goal: 0            Harriett Bach RN

## 2020-04-19 NOTE — PROGRESS NOTES
Hourly rounding was performed on pt. No complaints of pain, nausea,vomiting. Pt bladder scanned and noted to be retaining urine. MD notified and straight catheterization ordered. Straight cath done and 420 mL urine drained. New external cath placed. Bed is low, locked, call bell within reach. All needs met this shift. Will continue to monitor until next RN comes on.

## 2020-04-19 NOTE — PROGRESS NOTES
Problem: Falls - Risk of  Goal: *Absence of Falls  Description: Document Hieu Narvaez Fall Risk and appropriate interventions in the flowsheet. Outcome: Progressing Towards Goal  Note: Fall Risk Interventions:       Mentation Interventions: Adequate sleep, hydration, pain control, Evaluate medications/consider consulting pharmacy    Medication Interventions: Patient to call before getting OOB    Elimination Interventions: Call light in reach              Problem: Patient Education: Go to Patient Education Activity  Goal: Patient/Family Education  Outcome: Progressing Towards Goal     Problem: Pressure Injury - Risk of  Goal: *Prevention of pressure injury  Description: Document Nabeel Scale and appropriate interventions in the flowsheet.   Outcome: Progressing Towards Goal  Note: Pressure Injury Interventions:  Sensory Interventions: Assess changes in LOC, Check visual cues for pain    Moisture Interventions: Absorbent underpads    Activity Interventions: PT/OT evaluation    Mobility Interventions: HOB 30 degrees or less    Nutrition Interventions: Document food/fluid/supplement intake    Friction and Shear Interventions: Apply protective barrier, creams and emollients, Minimize layers

## 2020-04-20 PROBLEM — G93.40 ENCEPHALOPATHY ACUTE: Status: ACTIVE | Noted: 2020-04-20

## 2020-04-20 LAB
GLUCOSE BLD STRIP.AUTO-MCNC: 125 MG/DL (ref 65–100)
GLUCOSE BLD STRIP.AUTO-MCNC: 165 MG/DL (ref 65–100)
GLUCOSE BLD STRIP.AUTO-MCNC: 181 MG/DL (ref 65–100)
GLUCOSE BLD STRIP.AUTO-MCNC: 225 MG/DL (ref 65–100)
GLUCOSE BLD STRIP.AUTO-MCNC: 240 MG/DL (ref 65–100)

## 2020-04-20 PROCEDURE — 74011000258 HC RX REV CODE- 258: Performed by: INTERNAL MEDICINE

## 2020-04-20 PROCEDURE — 86580 TB INTRADERMAL TEST: CPT | Performed by: INTERNAL MEDICINE

## 2020-04-20 PROCEDURE — 74011000302 HC RX REV CODE- 302: Performed by: INTERNAL MEDICINE

## 2020-04-20 PROCEDURE — 74011636637 HC RX REV CODE- 636/637: Performed by: INTERNAL MEDICINE

## 2020-04-20 PROCEDURE — 94760 N-INVAS EAR/PLS OXIMETRY 1: CPT

## 2020-04-20 PROCEDURE — 96361 HYDRATE IV INFUSION ADD-ON: CPT

## 2020-04-20 PROCEDURE — 94640 AIRWAY INHALATION TREATMENT: CPT

## 2020-04-20 PROCEDURE — 96376 TX/PRO/DX INJ SAME DRUG ADON: CPT

## 2020-04-20 PROCEDURE — 77010033678 HC OXYGEN DAILY

## 2020-04-20 PROCEDURE — 74011250637 HC RX REV CODE- 250/637: Performed by: INTERNAL MEDICINE

## 2020-04-20 PROCEDURE — 74011000250 HC RX REV CODE- 250: Performed by: INTERNAL MEDICINE

## 2020-04-20 PROCEDURE — 65390000012 HC CONDITION CODE 44 OBSERVATION

## 2020-04-20 PROCEDURE — 74011250636 HC RX REV CODE- 250/636: Performed by: INTERNAL MEDICINE

## 2020-04-20 PROCEDURE — 99218 HC RM OBSERVATION: CPT

## 2020-04-20 PROCEDURE — 97161 PT EVAL LOW COMPLEX 20 MIN: CPT

## 2020-04-20 PROCEDURE — 82962 GLUCOSE BLOOD TEST: CPT

## 2020-04-20 PROCEDURE — 97110 THERAPEUTIC EXERCISES: CPT

## 2020-04-20 RX ADMIN — QUETIAPINE FUMARATE 12.5 MG: 25 TABLET ORAL at 22:00

## 2020-04-20 RX ADMIN — ATENOLOL 25 MG: 25 TABLET ORAL at 08:30

## 2020-04-20 RX ADMIN — TRAMADOL HYDROCHLORIDE 300 MG: 300 TABLET, FILM COATED, EXTENDED RELEASE ORAL at 08:49

## 2020-04-20 RX ADMIN — SODIUM CHLORIDE 50 ML/HR: 900 INJECTION, SOLUTION INTRAVENOUS at 08:35

## 2020-04-20 RX ADMIN — HYDRALAZINE HYDROCHLORIDE 12.5 MG: 25 TABLET, FILM COATED ORAL at 14:00

## 2020-04-20 RX ADMIN — BUDESONIDE 500 MCG: 0.5 INHALANT RESPIRATORY (INHALATION) at 20:15

## 2020-04-20 RX ADMIN — MONTELUKAST 10 MG: 10 TABLET, FILM COATED ORAL at 22:00

## 2020-04-20 RX ADMIN — HYDRALAZINE HYDROCHLORIDE 12.5 MG: 25 TABLET, FILM COATED ORAL at 08:31

## 2020-04-20 RX ADMIN — PREGABALIN 25 MG: 25 CAPSULE ORAL at 08:30

## 2020-04-20 RX ADMIN — BUDESONIDE 500 MCG: 0.5 INHALANT RESPIRATORY (INHALATION) at 08:25

## 2020-04-20 RX ADMIN — INSULIN LISPRO 4 UNITS: 100 INJECTION, SOLUTION INTRAVENOUS; SUBCUTANEOUS at 21:58

## 2020-04-20 RX ADMIN — PREGABALIN 25 MG: 25 CAPSULE ORAL at 17:00

## 2020-04-20 RX ADMIN — INSULIN LISPRO 2 UNITS: 100 INJECTION, SOLUTION INTRAVENOUS; SUBCUTANEOUS at 11:53

## 2020-04-20 RX ADMIN — AMLODIPINE BESYLATE 10 MG: 10 TABLET ORAL at 08:30

## 2020-04-20 RX ADMIN — HYDRALAZINE HYDROCHLORIDE 12.5 MG: 25 TABLET, FILM COATED ORAL at 17:00

## 2020-04-20 RX ADMIN — CEFTRIAXONE 1 G: 1 INJECTION, POWDER, FOR SOLUTION INTRAMUSCULAR; INTRAVENOUS at 14:46

## 2020-04-20 RX ADMIN — ASPIRIN 81 MG: 81 TABLET ORAL at 08:30

## 2020-04-20 RX ADMIN — FLUCONAZOLE 200 MG: 100 TABLET ORAL at 17:00

## 2020-04-20 RX ADMIN — TUBERCULIN PURIFIED PROTEIN DERIVATIVE 5 UNITS: 5 INJECTION, SOLUTION INTRADERMAL at 13:58

## 2020-04-20 RX ADMIN — INSULIN LISPRO 2 UNITS: 100 INJECTION, SOLUTION INTRAVENOUS; SUBCUTANEOUS at 16:57

## 2020-04-20 RX ADMIN — LISINOPRIL 40 MG: 20 TABLET ORAL at 08:30

## 2020-04-20 RX ADMIN — HYDRALAZINE HYDROCHLORIDE 12.5 MG: 25 TABLET, FILM COATED ORAL at 22:00

## 2020-04-20 RX ADMIN — FOLIC ACID 1 MG: 1 TABLET ORAL at 08:31

## 2020-04-20 RX ADMIN — LEVOTHYROXINE SODIUM 25 MCG: 0.05 TABLET ORAL at 06:33

## 2020-04-20 NOTE — PROGRESS NOTES
Hospitalist Progress Note     Admit Date:  2020 11:23 AM   Name:  Edgar Person   Age:  80 y.o.  :  1934   MRN:  [id number]   PCP:  Marek Peña MD  Treatment Team: Attending Provider: Neil Ramirez MD; Utilization Review: Colt Van RN; Primary Nurse: Geneva Michelle RN; Physical Therapist: Myah More DPT; Utilization Review: Hellen Ochoa RN  CC: confusion at home x 1 day  HPI:   80yr old female with pmhx sig for dm, htn and dementia who has been treated for a UTI who woke up and was found wondering around her daughters home complaining that her legs don't work. Daughter realized she was confused and called EMS to bring her to the hospital. She was non focal but noted to be septic with a uti. .. hospitalist asked to admit for further mgt; the patient can not give a hx secondary to her confusion. No fevers or chills; no contact with anyone who was exposed to or treated for COVID 19; no recent travel. The patient was admitted and started on antimicrobials. Her encephalopathy has improved- uc are only negative x 1 day. PT is recommending rehab.      10 systems reviewed and negative except as noted in HPI. Past Medical History:   Diagnosis Date    Asthma     sees Dr. Admas Buys Full dentures     GERD (gastroesophageal reflux disease)     Hypertension     Incontinence of urine in female     Neuropathy     Sleep apnea     no C PAP      Past Surgical History:   Procedure Laterality Date    BREAST SURGERY PROCEDURE UNLISTED      bilat lumpectomy    CHEST SURGERY PROCEDURE UNLISTED      WHAT IS THIS? ??    HX CATARACT REMOVAL Bilateral     HX CHOLECYSTECTOMY      HX COLONOSCOPY      HX HYSTERECTOMY      HX KNEE REPLACEMENT Bilateral     HX ORTHOPAEDIC      back    VASCULAR SURGERY PROCEDURE UNLIST Left 2017    aortogram/arteriogram- PTA x2      Allergies   Allergen Reactions    Aleve [Naproxen Sodium] Rash    Hydrocodone-Acetaminophen Itching    Metformin Other (comments)     Decreased kidney function    Phenobarbital Unknown (comments)     Doesn't remember - long time ago     Statins-Hmg-Coa Reductase Inhibitors Myalgia     intolerance      Social History     Tobacco Use    Smoking status: Never Smoker    Smokeless tobacco: Never Used   Substance Use Topics    Alcohol use: No      Family History   Problem Relation Age of Onset    No Known Problems Mother     No Known Problems Father       Immunization History   Administered Date(s) Administered    Influenza High Dose Vaccine PF 09/07/2017, 09/18/2018    Influenza Vaccine 11/08/2017    Influenza Vaccine (Tri) Adjuvanted 09/11/2019    Pneumococcal Conjugate (PCV-13) 03/18/2015, 04/05/2017    Pneumococcal Polysaccharide (PPSV-23) 03/17/2015    TB Skin Test (PPD) Intradermal 09/12/2017, 08/20/2019, 11/29/2019     PTA Medications:  Prior to Admission Medications   Prescriptions Last Dose Informant Patient Reported? Taking? Blood-Glucose Meter monitoring kit   No No   Sig: Check glucose before every meal and at bedtime. Cholecalciferol, Vitamin D3, (VITAMIN D3) 2,000 unit cap capsule   No No   Sig: Take 2,000 Units by mouth two (2) times a day. Patient taking differently: Take 2,000 Units by mouth two (2) times a day. Stop seven days prior to surgery per anesthesia protocol. Insulin Needles, Disposable, (DIANA PEN NEEDLE) 32 gauge x 5/32\" ndle   No No   Sig: Use to administer Novolog TID   Lancets (ONETOUCH ULTRASOFT LANCETS) misc   No No   Sig: Use to check blood sugar. E11.42   NOVOLOG U-100 INSULIN ASPART 100 unit/mL injection   No No   Sig: Inject 4 units sc 4 times a day with meals and bedtime.    QUEtiapine (SEROQUEL) 25 mg tablet   No No   Sig: Take 1/2 tablet before bedtime   TRUEPLUS LANCETS 33 gauge misc   No No   Sig: Check BS 3-4 x daily   acetaminophen (TYLENOL) 650 mg TbER   Yes No   Sig: Take 650 mg by mouth every six (6) hours as needed for Pain.   albuterol (PROAIR HFA) 90 mcg/actuation inhaler   No No   Sig: Take 2 Puffs by inhalation every four (4) hours as needed for Wheezing, Shortness of Breath or Cough. Indications: asthma attack   amLODIPine (NORVASC) 10 mg tablet   No No   Sig: Take 1 Tab by mouth daily. aspirin delayed-release 81 mg tablet   Yes No   Sig: Take 81 mg by mouth every morning. Take day of surgery per anesthesia protocol. atenoloL (TENORMIN) 25 mg tablet   No No   Sig: Take 1 Tab by mouth daily. cranberry 400 mg cap   No No   Sig: Take 1 Cap by mouth two (2) times daily (with meals). Indications: UTI prevention   docusate sodium (COLACE) 100 mg capsule   Yes No   Sig: Take 100 mg by mouth two (2) times daily as needed for Constipation. exenatide microspheres (Bydureon BCise) 2 mg/0.85 mL atIn   No No   Si Syringe by SubCUTAneous route every seven (7) days. fenofibrate nanocrystallized (TRICOR) 48 mg tablet   No No   Sig: Take 1 Tab by mouth daily. fluticasone propionate (FLOVENT HFA) 44 mcg/actuation inhaler   No No   Sig: Take 2 Puffs by inhalation two (2) times a day. Indications: controller medication for asthma   folic acid 647 mcg tablet   Yes No   Sig: Take 400 mcg by mouth two (2) times a day. Stop seven days prior to surgery per anesthesia protocol. glucose blood VI test strips (ONETOUCH ULTRA TEST) strip   No No   Sig: Use to check blood sugar once daily. E11.42   hydrALAZINE (APRESOLINE) 25 mg tablet   No No   Sig: Take 1 Tab by mouth three (3) times daily. insulin lispro (HUMALOG) 100 unit/mL kwikpen   No No   Sig: Inject 4 units subcutaneously with each meal   lancets (ONETOUCH SURESOFT LANCING DEV) misc   No No   Sig: Use to check blood sugar. E11.42   levothyroxine (SYNTHROID) 25 mcg tablet   No No   Sig: Take 1 Tab by mouth Daily (before breakfast). lisinopril (PRINIVIL, ZESTRIL) 40 mg tablet   No No   Sig: Take 1 Tab by mouth daily.    montelukast (SINGULAIR) 10 mg tablet   No No   Sig: Take 1 Tab by mouth daily. polyethylene glycol (MIRALAX) 17 gram packet   No No   Sig: Take 1 Packet by mouth daily as needed. Indications: constipation   Patient taking differently: Take 1 Packet by mouth daily as needed (constipation). mix with 8oz water  Indications: constipation   pregabalin (LYRICA) 50 mg capsule   No No   Sig: Take 1 Cap by mouth two (2) times a day. Max Daily Amount: 100 mg. 1 tab po bid   tiotropium (SPIRIVA WITH HANDIHALER) 18 mcg inhalation capsule   No No   Sig: Take 1 Cap by inhalation every morning. Indications: controller medication for asthma   traMADol (ULTRAM-ER) 300 mg tablet   No No   Sig: Take 1 Tab by mouth daily for 180 days. Max Daily Amount: 300 mg.   trimethoprim-sulfamethoxazole (BACTRIM DS, SEPTRA DS) 160-800 mg per tablet   No No   Sig: Take 1 Tab by mouth two (2) times a day. Facility-Administered Medications: None       Objective:     Patient Vitals for the past 24 hrs:   Temp Pulse Resp BP SpO2   04/20/20 0324 98.2 °F (36.8 °C) 87 16 156/90 94 %   04/19/20 2245 98.1 °F (36.7 °C) 90 16 159/79 95 %   04/19/20 2106     92 %   04/19/20 2002 98.1 °F (36.7 °C) 92 18 160/76 94 %   04/19/20 1640     94 %   04/19/20 1625 98.5 °F (36.9 °C) 78 20 174/85 96 %   04/19/20 1441     99 %   04/19/20 1122 98.9 °F (37.2 °C) 98 18 168/74 98 %   04/19/20 0828     97 %   04/19/20 0755 98.1 °F (36.7 °C) (!) 101 20 170/89 98 %     Oxygen Therapy  O2 Sat (%): 94 % (04/20/20 0324)  Pulse via Oximetry: 86 beats per minute (04/19/20 2106)  O2 Device: Room air (04/19/20 2106)  O2 Flow Rate (L/min): 2 l/min (04/19/20 1441)  FIO2 (%): 28 % (04/19/20 0214)    Intake/Output Summary (Last 24 hours) at 4/20/2020 0751  Last data filed at 4/20/2020 0535  Gross per 24 hour   Intake 2143 ml   Output 1775 ml   Net 368 ml       Physical Exam:  General:    Well nourished. Frail,  Alert. Pleasantly confused  Eyes:   Normal sclera. Extraocular movements intact. ENT:  Normocephalic, atraumatic. Moist mucous membranes  CV:   RRR. No m/r/g. Peripheral pulses present. Capillary refill normal  Lungs:  A/e = bl  No wheezing, rhonchi, or rales. Abdomen: Soft, nontender, nondistended. Bowel sounds normal.   Extremities: Warm and dry. No cyanosis or edema. Neurologic: CN II-XII grossly intact. Sensation intact. Skin:     No rashes or jaundice. Normal coloration  Psych:  Normal mood and affect. I reviewed the labs, imaging, EKGs, telemetry, and other studies done this admission. Data Review:   Recent Results (from the past 24 hour(s))   GLUCOSE, POC    Collection Time: 04/19/20 10:38 AM   Result Value Ref Range    Glucose (POC) 196 (H) 65 - 100 mg/dL   GLUCOSE, POC    Collection Time: 04/19/20  3:56 PM   Result Value Ref Range    Glucose (POC) 237 (H) 65 - 100 mg/dL   GLUCOSE, POC    Collection Time: 04/19/20  8:51 PM   Result Value Ref Range    Glucose (POC) 145 (H) 65 - 100 mg/dL       All Micro Results     Procedure Component Value Units Date/Time    CULTURE, BLOOD [[id number]] Collected:  04/18/20 1429    Order Status:  Completed Specimen:  Blood Updated:  04/20/20 0645     Special Requests: --        LEFT  HAND       Culture result: NO GROWTH 2 DAYS       CULTURE, BLOOD [[id number]] Collected:  04/18/20 1429    Order Status:  Completed Specimen:  Blood Updated:  04/20/20 0645     Special Requests: --        RIGHT  FOREARM       Culture result: NO GROWTH 2 DAYS       CULTURE, URINE [[id number]] Collected:  04/18/20 1140    Order Status:  Completed Specimen:  Urine from Clean catch Updated:  04/19/20 0923          Other Studies:  No results found.     Assessment and Plan:     Hospital Problems as of 4/20/2020 Date Reviewed: 3/4/2020          Codes Class Noted - Resolved POA    UTI (urinary tract infection) ICD-10-CM: N39.0  ICD-9-CM: 599.0  4/18/2020 - Present Unknown        Sepsis ([facility] ICD-10-CM: A41.9  ICD-9-CM: 038.9, 995.91  4/18/2020 - Present Unknown        * (Principal) Acute metabolic encephalopathy ICD-10-CM: G93.41  ICD-9-CM: 348.31  4/18/2020 - Present Yes        Diabetes mellitus type 2, controlled ([facility] ICD-10-CM: E11.9  ICD-9-CM: 250.00  2/26/2018 - Present Yes        Acute kidney injury ([facility] ICD-10-CM: N17.9  ICD-9-CM: 584.9  9/12/2017 - Present Yes        Stage 3 chronic kidney disease ([facility] ICD-10-CM: N18.3  ICD-9-CM: 585.3  9/12/2017 - Present Yes        DM type 2 with diabetic peripheral neuropathy (HCC) ICD-10-CM: E11.42  ICD-9-CM: 250.60, 357.2  3/24/2017 - Present Yes        Diabetic Charcot foot ([facility] ICD-10-CM: E11.610  ICD-9-CM: 250.60, 713.5  3/24/2017 - Present Yes        Presenile dementia, paranoid type ([facility] ICD-10-CM: F03.90  ICD-9-CM: 290.12  11/7/2016 - Present Yes        Asthma ICD-10-CM: J45.909  ICD-9-CM: 493.90  Unknown - Present Yes    Overview Signed 1/13/2015  6:41 AM by Karen Manzo             Hypertension ICD-10-CM: I10  ICD-9-CM: 401.9  11/16/2014 - Present Yes              PLAN:  · uti- continue antimicrobials; f/up Urine cultures-NGTD; BC NGTD  · Sepsis- resolved  · maría- resolving;monitor renal fxn and dose meds for fxn  · Patient was placed on 2L in er because she was satting 92% on room air- does not use home O2 - should have cpap per her daughter but unable to tolerate it; uses her inhalers; she is now on room air  · Dm- monitor bg and cover with insulin  · HTN- monitor bp- prn meds as needed  · PT/OT EVAL- recommending rehab   · Continue appropriate home meds  · Further w/up and mgt based on her clinical course  · Likely dc to rehab in the next 1-2 days       DVT ppx:  Heparin   Anticipated DC needs: none   Code status:  Full  Estimated LOS:  Greater than 2 midnights  Risk:  high    Signed:  Juliana Pugh MD       I attempted to call the patient's daughter Kerry Arvizu 3 times today with no response. I did speak with her yesterday. And it appears that case management spoke with her today.

## 2020-04-20 NOTE — PROGRESS NOTES
Problem: Mobility Impaired (Adult and Pediatric)  Goal: *Acute Goals and Plan of Care (Insert Text)  Description: LTG:  (1.)Ms. Yanni De Dios will move from supine to sit and sit to supine, scoot up and down and roll side to side INDEPENDENTLY with bed flat within 7 treatment day(s). (2.)Ms. Yanni De Dios will transfer from bed to chair and chair to bed with SUPERVISION using the least restrictive device within 7 treatment day(s). (3.)Ms. Yanni De Dios will ambulate with STAND BY ASSIST for 200+ feet with the least restrictive device within 7 treatment day(s). (4.)Ms. Yanni De Dios will perform exercises per HEP for 10+ minutes to improve strength and mobility within 7 days. ________________________________________________________________________________________________   Outcome: Progressing Towards Goal     PHYSICAL THERAPY: Initial Assessment and AM 4/20/2020  INPATIENT: PT Visit Days : 1  Payor: Betina Espinoza / Plan: [employer] [address] / Product Type: Managed Care Medicare /       NAME/AGE/GENDER: Brandy Moya is a 80 y.o. female   PRIMARY DIAGNOSIS: UTI (urinary tract infection) [N39.0]  [facility]epsis ([facility] [G87.2] Acute metabolic encephalopathy Acute metabolic encephalopathy        ICD-10: Treatment Diagnosis:    Generalized Muscle Weakness (M62.81)  Difficulty in walking, Not elsewhere classified (R26.2)  Other abnormalities of gait and mobility (R26.89)   Precaution/Allergies:  Aleve [naproxen sodium]; Hydrocodone-acetaminophen; Metformin; Phenobarbital; and Statins-hmg-coa reductase inhibitors      ASSESSMENT:     Ms. Yanni De Dios is an 80year old female admitted from home for sepsis/UTI. At baseline she lives with daughter and is independent to modified independent with rolling walker or rollator. Pt presents sitting up in chair without specific complaints; agreeable to therapy evaluation and mobility. Min-briefly mod assist for sit-stand transfer from recliner.  Performs standing static/dynamic activities and pre-gait activities at edge of bed with CGA-min A. Ambulates for total of 90 ft in room and hallway with very slow, delayed gait speed and small, shuffled steps. Verbal cues for posture, walker management, and gait safety. Endorses that this is not her typical gait pattern. Returned to room and back to chair for short rest break. Performs sit-stand transfer x 5 additional reps with heavy cueing for hand and foot placement and technique (which aggravated patient). Performs a few seated exercises. Positioned comfortably with legs elevated, needs in reach, and chair alarm on. Mckay Calderon is functioning below baseline with mobility, strength, balance, transfers, and activity tolerance and will benefit from continued acute PT to address deficits/maximize independence with mobility. Currently recommending rehab at [facility] due to unsteady gait, fall risk. This section established at most recent assessment   PROBLEM LIST (Impairments causing functional limitations):  Decreased Strength  Decreased Transfer Abilities  Decreased Ambulation Ability/Technique  Decreased Balance  Decreased Activity Tolerance  Decreased Cognition   INTERVENTIONS PLANNED: (Benefits and precautions of physical therapy have been discussed with the patient.)  Balance Exercise  Bed Mobility  Gait Training  Home Exercise Program (HEP)  Therapeutic Activites  Therapeutic Exercise/Strengthening  Transfer Training     TREATMENT PLAN: Frequency/Duration: 3 times a week for duration of hospital stay  Rehabilitation Potential For Stated Goals: Good     REHAB RECOMMENDATIONS (at time of discharge pending progress):    Placement: It is my opinion, based on this patient's performance to date, that Ms. Angei Clark may benefit from intensive therapy at [address] after discharge due to the functional deficits listed above that are likely to improve with skilled rehabilitation and concerns that he/she may be unsafe to be unsupervised at home due to weakness, unsteady gait, fall risk, need for additional assistance compared to baseline. Equipment:   Tbd pending progress               HISTORY:   History of Present Injury/Illness (Reason for Referral):  Per H&P, \"80yr old female with pmhx sig for dm, htn and dementia who has been treated for a UTI who woke up and was found wondering around her daughters home complaining that her legs don't work. Daughter realized she was confused and called EMS to bring her to the hospital. She was non focal but noted to be septic with a uti. .. hospitalist asked to admit for further mgt; the patient can not give a hx secondary to her confusion. No fevers or chills; no contact with anyone who was exposed to or treated for COVID 19; no recent travel\"    Past Medical History/Comorbidities:   Ms. Patricia Zuniga  has a past medical history of Asthma, Fibromyalgia, Full dentures, GERD (gastroesophageal reflux disease), Hypertension, Incontinence of urine in female, Neuropathy, and Sleep apnea. Ms. Patricia Zuniga  has a past surgical history that includes pr breast surgery procedure unlisted; hx cholecystectomy; hx orthopaedic; hx colonoscopy; vascular surgery procedure unlist (Left, 09/29/2017); pr chest surgery procedure unlisted; hx knee replacement (Bilateral); hx hysterectomy; and hx cataract removal (Bilateral). Social History/Living Environment:   Home Environment: Independent living  # Steps to Enter: 0  One/Two Story Residence: One story  Living Alone: No  Support Systems: Child(don)  Patient Expects to be Discharged to[de-identified] Rehabilitation facility  Current DME Used/Available at Home: clarisa Elmore, Walker, rollator  Prior Level of Function/Work/Activity:  Lives with daughter in single story home. Modified independent with mobility, ambulation, and ADLs at baseline using walker or rollator. Due to confusion pt has poor ability to provide thorough history.       Number of Personal Factors/Comorbidities that affect the Plan of Care: 1-2: MODERATE COMPLEXITY   EXAMINATION:   Most Recent Physical Functioning:   Gross Assessment:  AROM: Within functional limits  Strength: Generally decreased, functional  Coordination: Within functional limits               Posture:  Posture (WDL): Exceptions to WDL  Posture Assessment: Forward head, Rounded shoulders, Trunk flexion  Balance:  Sitting: With support(in recliner)  Standing: Impaired  Standing - Static: Fair  Standing - Dynamic : Fair(to fair-) Bed Mobility:     Wheelchair Mobility:     Transfers:  Sit to Stand: Minimum assistance;Contact guard assistance  Stand to Sit: Contact guard assistance  Bed to Chair: Contact guard assistance;Minimum assistance  Interventions: Verbal cues; Safety awareness training; Tactile cues  Gait:     Base of Support: Center of gravity altered  Speed/Loan: Pace decreased (<100 feet/min); Slow  Step Length: Left shortened;Right shortened  Gait Abnormalities: Trunk sway increased;Decreased step clearance; Path deviations  Distance (ft): 90 Feet (ft)  Assistive Device: Walker, rolling  Ambulation - Level of Assistance: Contact guard assistance;Minimal assistance  Interventions: Verbal cues; Safety awareness training; Tactile cues      Body Structures Involved:  Muscles Body Functions Affected:  Mental  Movement Related Activities and Participation Affected:  General Tasks and Demands  Mobility  Domestic Life  Community, Social and [address] [place] [address]   Number of elements that affect the Plan of Care: 4+: HIGH COMPLEXITY   CLINICAL PRESENTATION:   Presentation: Stable and uncomplicated: LOW COMPLEXITY   CLINICAL DECISION MAKIN [facility] Inpatient Short Form  How much difficulty does the patient currently have. .. Unable A Lot A Little None   1. Turning over in bed (including adjusting bedclothes, sheets and blankets)? [] 1   [] 2   [x] 3   [] 4   2.   Sitting down on and standing up from a chair with arms ( e.g., wheelchair, bedside commode, etc.)   [] 1   [] 2   [x] 3   [] 4   3. Moving from lying on back to sitting on the side of the bed? [] 1   [] 2   [x] 3   [] 4   How much help from another person does the patient currently need. .. Total A Lot A Little None   4. Moving to and from a bed to a chair (including a wheelchair)? [] 1   [] 2   [x] 3   [] 4   5. Need to walk in hospital room? [] 1   [] 2   [x] 3   [] 4   6. Climbing 3-5 steps with a railing? [] 1   [x] 2   [] 3   [] 4   © 2007, Trustees of [address] [place] [address] [address] [address], under license to [employer]. All rights reserved      Score:  Initial: 17 Most Recent: X (Date: -- )    Interpretation of Tool:  Represents activities that are increasingly more difficult (i.e. Bed mobility, Transfers, Gait). Medical Necessity:     Patient demonstrates   good   rehab potential due to higher previous functional level. Reason for Services/Other Comments:  Patient   continues to demonstrate capacity to improve strength, mobility, balance, transfers, and activity tolerance which will   increase independence, decrease amount of assistance required from caregiver, and increase safety  . Use of outcome tool(s) and clinical judgement create a POC that gives a: Clear prediction of patient's progress: LOW COMPLEXITY            TREATMENT:   (In addition to Assessment/Re-Assessment sessions the following treatments were rendered)   Pre-treatment Symptoms/Complaints:  \"thank you\"  Pain: Initial:   Pain Intensity 1: 0  Post Session:  0/10     Therapeutic Exercise: (10 Minutes):  Ambulation and exercises per grid below to improve mobility, strength, balance, and activity tolerance . Required minimal visual and verbal cues to promote proper body posture, promote proper body mechanics, and exercise form . Progressed range, repetitions, and complexity of movement as indicated.      Date:  4/20/20 Date:   Date:     Activity/Exercise Parameters Parameters Parameters   LAQ 10x AB Seated marching 10x AB     Ankle pumps 10x AB     Sit-stand transfers X6 from chair     Ambulation  90 ft                     Braces/Orthotics/Lines/Etc:   IV  O2 Device: Room air  Treatment/Session Assessment:    Response to Treatment:  pt performs mobility with CGA/min A  Interdisciplinary Collaboration:   Physical Therapist  Registered Nurse  After treatment position/precautions:   Up in chair  Bed alarm/tab alert on  Bed/Chair-wheels locked  Bed in low position  Call light within reach   Compliance with Program/Exercises: Will assess as treatment progresses  Recommendations/Intent for next treatment session: \"Next visit will focus on advancements to more challenging activities and reduction in assistance provided\".   Total Treatment Duration:  PT Patient Time In/Time Out  Time In: 1035  Time Out: 703 Aarti Manriquez DPT

## 2020-04-20 NOTE — PROGRESS NOTES
Pt resting in bed. Pt denies pain at this time. All pt personal belongings are close, bed wheels locked, bed in lowest position. Will continue to monitor as needed.

## 2020-04-20 NOTE — PROGRESS NOTES
CM contacted patient's daughter Aubrey Room  [phone] to complete assessment, due to the patient having Dementia. Demographic information confirmed by the patient. The patient lives with her daughter in a condo on the first level with no steps at the entrance. Daughter informed CM, she is disabled and is unable to lift the patient. CM was receptive. Per daughter, the patient requires assistance with bathing. Daughter states she has to cut the patient's food up, when eating meals. The patient also as an aide that provides assistance with care needs,  three days a week (MWF) 2 hours each day scheduled. Daughter states the aide typically arrives at 9:30am.       The patient receives her medications from [facility] and [address] [place] [address] on [address] [place] [address]. According to the patient's daughter, the patient has a history of [facility] services with [facility]. PT/OT have been consulted. PPD has been placed. Per PT note, the patient has been recommended for STR. Patient's daughter would like the patient to obtain STR at [facility]. CM will place referral this day. No additional needs voiced at this time. CM continues to follow the patient. Care Management Interventions  PCP Verified by CM: Yes  Mode of Transport at Discharge: Other (see comment)(TBD)  Transition of Care Consult (CM Consult): Discharge Planning  Discharge Durable Medical Equipment: No(Per daughter, the patient has a rollator walker, showerchair, and bedside commode. )  Physical Therapy Consult: Yes  Occupational Therapy Consult: Yes  Speech Therapy Consult: No  Current Support Network: Relative's Home(The patient lives with her daughter David Kong [phone] in a condo. )  Confirm Follow Up Transport: Family(Patient has never driven.  Daughter provides transportation when needed. )  The Plan for Transition of Care is Related to the Following Treatment Goals : Patient to obtain care to become medically stable for discharge and to discharge to SNF to assist with physical mobility. The Patient and/or Patient Representative was Provided with a Choice of Provider and Agrees with the Discharge Plan?: Yes  Name of the Patient Representative Who was Provided with a Choice of Provider and Agrees with the Discharge Plan: Alisa Mcneil- Patient's daughter assisted with assessment.    Freedom of Choice List was Provided with Basic Dialogue that Supports the Patient's Individualized Plan of Care/Goals, Treatment Preferences and Shares the Quality Data Associated with the Providers?: Yes  The [employer] Information Provided?: No  Discharge Location  Discharge Placement: Skilled nursing facility

## 2020-04-20 NOTE — PROGRESS NOTES
Pt resting comfortably in bed. Pt bed in a low and locked position with her personal items close. Hourly rounds complete. Pt was able to sit in the chair for the majority of the afternoon. Pt just moved back to bed. Pt voided 75 ml before she was moved back to bed. Will continue to monitor and pass on report to the night shift nurse.

## 2020-04-20 NOTE — PROGRESS NOTES
Reviewed notes for spiritual concerns prior to visit  Visit with patient to build rapport with . Calm  Encouraged with presence and words of hope    Patient demonstrates confidence in the care team.  Appears to be coping with illness.     No physical contact with patient per guidelines  Assurance of ongoing prayers      Jessica Simpson,  Staff   C: [phone]  /  [email or web address]

## 2020-04-20 NOTE — PROGRESS NOTES
Interdisciplinary Rounds completed. Nursing, Case Management, and Physician  present. Plan of care reviewed and updated. To start bladder training and discontinue briseno. Waiting for culture result for discharge.

## 2020-04-20 NOTE — PROGRESS NOTES
Virtual Utilization Review RN  has determined this patient meets the Condition Code 44 criteria under the Medicare guidelines for change from Inpatient to observation status. This case sent to second level physician review for correct status determination and agreed upon by your attending MD. Norbert Sainz* . Admission status has been changed in the computer system. A copy of the Utilization Review RN documentation and the  [facility] letter explaining the  outpatient/observation services was given to  (Patient/representative) with verbal explanation and verbal understanding about information given. Letter signed by (patient/representative) with date and time. Signed copy placed in the patients chart for scanning by HIS and copy left at bedside for patient information.      Giulia Appiah,

## 2020-04-21 LAB
ANION GAP SERPL CALC-SCNC: 7 MMOL/L (ref 7–16)
BASOPHILS # BLD: 0.1 K/UL (ref 0–0.2)
BASOPHILS NFR BLD: 1 % (ref 0–2)
BUN SERPL-MCNC: 15 MG/DL (ref 8–23)
CALCIUM SERPL-MCNC: 8.8 MG/DL (ref 8.3–10.4)
CHLORIDE SERPL-SCNC: 107 MMOL/L (ref 98–107)
CO2 SERPL-SCNC: 23 MMOL/L (ref 21–32)
CREAT SERPL-MCNC: 1.03 MG/DL (ref 0.6–1)
DIFFERENTIAL METHOD BLD: ABNORMAL
EOSINOPHIL # BLD: 0.2 K/UL (ref 0–0.8)
EOSINOPHIL NFR BLD: 2 % (ref 0.5–7.8)
ERYTHROCYTE [DISTWIDTH] IN BLOOD BY AUTOMATED COUNT: 12.4 % (ref 11.9–14.6)
GLUCOSE BLD STRIP.AUTO-MCNC: 122 MG/DL (ref 65–100)
GLUCOSE BLD STRIP.AUTO-MCNC: 150 MG/DL (ref 65–100)
GLUCOSE BLD STRIP.AUTO-MCNC: 151 MG/DL (ref 65–100)
GLUCOSE BLD STRIP.AUTO-MCNC: 192 MG/DL (ref 65–100)
GLUCOSE SERPL-MCNC: 127 MG/DL (ref 65–100)
HCT VFR BLD AUTO: 33.7 % (ref 35.8–46.3)
HGB BLD-MCNC: 11.2 G/DL (ref 11.7–15.4)
IMM GRANULOCYTES # BLD AUTO: 0 K/UL (ref 0–0.5)
IMM GRANULOCYTES NFR BLD AUTO: 0 % (ref 0–5)
LYMPHOCYTES # BLD: 1.6 K/UL (ref 0.5–4.6)
LYMPHOCYTES NFR BLD: 19 % (ref 13–44)
MCH RBC QN AUTO: 31.6 PG (ref 26.1–32.9)
MCHC RBC AUTO-ENTMCNC: 33.2 G/DL (ref 31.4–35)
MCV RBC AUTO: 95.2 FL (ref 79.6–97.8)
MM INDURATION POC: 0 MM (ref 0–5)
MONOCYTES # BLD: 0.9 K/UL (ref 0.1–1.3)
MONOCYTES NFR BLD: 10 % (ref 4–12)
NEUTS SEG # BLD: 6 K/UL (ref 1.7–8.2)
NEUTS SEG NFR BLD: 68 % (ref 43–78)
NRBC # BLD: 0 K/UL (ref 0–0.2)
PLATELET # BLD AUTO: 234 K/UL (ref 150–450)
PMV BLD AUTO: 8.8 FL (ref 9.4–12.3)
POTASSIUM SERPL-SCNC: 3.9 MMOL/L (ref 3.5–5.1)
PPD POC: NEGATIVE NEGATIVE
RBC # BLD AUTO: 3.54 M/UL (ref 4.05–5.2)
SODIUM SERPL-SCNC: 137 MMOL/L (ref 136–145)
WBC # BLD AUTO: 8.7 K/UL (ref 4.3–11.1)

## 2020-04-21 PROCEDURE — 74011000250 HC RX REV CODE- 250: Performed by: INTERNAL MEDICINE

## 2020-04-21 PROCEDURE — 74011000258 HC RX REV CODE- 258: Performed by: INTERNAL MEDICINE

## 2020-04-21 PROCEDURE — 82962 GLUCOSE BLOOD TEST: CPT

## 2020-04-21 PROCEDURE — 74011250636 HC RX REV CODE- 250/636: Performed by: INTERNAL MEDICINE

## 2020-04-21 PROCEDURE — 97530 THERAPEUTIC ACTIVITIES: CPT

## 2020-04-21 PROCEDURE — 96376 TX/PRO/DX INJ SAME DRUG ADON: CPT

## 2020-04-21 PROCEDURE — 97110 THERAPEUTIC EXERCISES: CPT

## 2020-04-21 PROCEDURE — 74011250637 HC RX REV CODE- 250/637: Performed by: INTERNAL MEDICINE

## 2020-04-21 PROCEDURE — 94760 N-INVAS EAR/PLS OXIMETRY 1: CPT

## 2020-04-21 PROCEDURE — 97535 SELF CARE MNGMENT TRAINING: CPT

## 2020-04-21 PROCEDURE — 36415 COLL VENOUS BLD VENIPUNCTURE: CPT

## 2020-04-21 PROCEDURE — 94640 AIRWAY INHALATION TREATMENT: CPT

## 2020-04-21 PROCEDURE — 74011636637 HC RX REV CODE- 636/637: Performed by: INTERNAL MEDICINE

## 2020-04-21 PROCEDURE — 99218 HC RM OBSERVATION: CPT

## 2020-04-21 PROCEDURE — 80048 BASIC METABOLIC PNL TOTAL CA: CPT

## 2020-04-21 PROCEDURE — 85025 COMPLETE CBC W/AUTO DIFF WBC: CPT

## 2020-04-21 RX ADMIN — TRAMADOL HYDROCHLORIDE 300 MG: 300 TABLET, FILM COATED, EXTENDED RELEASE ORAL at 08:10

## 2020-04-21 RX ADMIN — BUDESONIDE 500 MCG: 0.5 INHALANT RESPIRATORY (INHALATION) at 08:41

## 2020-04-21 RX ADMIN — FOLIC ACID 1 MG: 1 TABLET ORAL at 08:05

## 2020-04-21 RX ADMIN — AMLODIPINE BESYLATE 10 MG: 10 TABLET ORAL at 08:05

## 2020-04-21 RX ADMIN — LEVOTHYROXINE SODIUM 25 MCG: 0.05 TABLET ORAL at 06:21

## 2020-04-21 RX ADMIN — PREGABALIN 25 MG: 25 CAPSULE ORAL at 17:27

## 2020-04-21 RX ADMIN — ATENOLOL 25 MG: 25 TABLET ORAL at 08:05

## 2020-04-21 RX ADMIN — HYDRALAZINE HYDROCHLORIDE 12.5 MG: 25 TABLET, FILM COATED ORAL at 12:24

## 2020-04-21 RX ADMIN — LISINOPRIL 40 MG: 20 TABLET ORAL at 08:05

## 2020-04-21 RX ADMIN — INSULIN LISPRO 2 UNITS: 100 INJECTION, SOLUTION INTRAVENOUS; SUBCUTANEOUS at 11:30

## 2020-04-21 RX ADMIN — HYDRALAZINE HYDROCHLORIDE 12.5 MG: 25 TABLET, FILM COATED ORAL at 08:05

## 2020-04-21 RX ADMIN — HYDRALAZINE HYDROCHLORIDE 12.5 MG: 25 TABLET, FILM COATED ORAL at 17:27

## 2020-04-21 RX ADMIN — HYDRALAZINE HYDROCHLORIDE 12.5 MG: 25 TABLET, FILM COATED ORAL at 21:43

## 2020-04-21 RX ADMIN — BUDESONIDE 500 MCG: 0.5 INHALANT RESPIRATORY (INHALATION) at 20:35

## 2020-04-21 RX ADMIN — QUETIAPINE FUMARATE 12.5 MG: 25 TABLET ORAL at 21:44

## 2020-04-21 RX ADMIN — MONTELUKAST 10 MG: 10 TABLET, FILM COATED ORAL at 21:43

## 2020-04-21 RX ADMIN — INSULIN LISPRO 2 UNITS: 100 INJECTION, SOLUTION INTRAVENOUS; SUBCUTANEOUS at 17:35

## 2020-04-21 RX ADMIN — FLUCONAZOLE 200 MG: 100 TABLET ORAL at 17:27

## 2020-04-21 RX ADMIN — ASPIRIN 81 MG: 81 TABLET ORAL at 08:05

## 2020-04-21 RX ADMIN — CEFTRIAXONE 1 G: 1 INJECTION, POWDER, FOR SOLUTION INTRAMUSCULAR; INTRAVENOUS at 15:01

## 2020-04-21 RX ADMIN — INSULIN LISPRO 2 UNITS: 100 INJECTION, SOLUTION INTRAVENOUS; SUBCUTANEOUS at 21:45

## 2020-04-21 RX ADMIN — PREGABALIN 25 MG: 25 CAPSULE ORAL at 08:05

## 2020-04-21 NOTE — PROGRESS NOTES
Problem: Self Care Deficits Care Plan (Adult)  Goal: *Acute Goals and Plan of Care (Insert Text)  Description: Patient will complete lower body dressing with contact guard assist to increase self care independence. Patient will complete toileting with contact guard assist to increase self care independence. Patient will complete bathing with contact guard assist to increase self care independence. Patient will tolerate 40 minutes of OT treatment with self incorporated rest breaks to increase activity tolerance to enhance participation in hobbies. Patient will complete all functional transfers with contact guard assist using adaptive equipment as needed. Patient will complete UE exercises with supervision to increase overall activity tolerance and strength. Timeframe: 7 visits        OCCUPATIONAL THERAPY: Daily Note 4/21/2020  OBSERVATION: OT Visit Days: 2  Payor: [address] [place],9D / Plan: [address] / Product Type: Managed Care Medicare /      NAME/AGE/GENDER: Yen Harris is a 80 y.o. female   PRIMARY DIAGNOSIS:  UTI (urinary tract infection) [N39.0]  Sepsis ([facility] [address] [A41.9]  Encephalopathy acute [G93.40]  UTI (urinary tract infection) [W82.2] Acute metabolic encephalopathy Acute metabolic encephalopathy       ICD-10: Treatment Diagnosis:    · Generalized Muscle Weakness (M62.81)  · Other lack of cordination (R27.8)   Precautions/Allergies:     Aleve [naproxen sodium]; Hydrocodone-acetaminophen; Metformin; Phenobarbital; and Statins-hmg-coa reductase inhibitors      ASSESSMENT:     Ms. Ary Enriquez presents UTI and confusion, better than at ER. At baseline patient is mostly independent with ADL's, dtr assists with IADL's. She uses a RW to get around at times and orthopedic shoes. This session, pt greeted reclined in bed, agreeable to OT session. Pt with flat affect, confused. Pt required min A for bed mobility, min A and extra time to scoot to the edge.  Min [place] to stand and for transfer to/ from [facility] with mod cues for technique and use of RW, max A for toileting for clothing management and hygiene. Pt is progressing towards goals, continue POC. This section established at most recent assessment   PROBLEM LIST (Impairments causing functional limitations):  1. Decreased Strength  2. Decreased ADL/Functional Activities  3. Decreased Balance  4. Decreased Activity Tolerance   INTERVENTIONS PLANNED: (Benefits and precautions of occupational therapy have been discussed with the patient.)  1. Activities of daily living training  2. Adaptive equipment training  3. Neuromuscular re-eduation  4. Therapeutic activity  5. Therapeutic exercise     TREATMENT PLAN: Frequency/Duration: Follow patient 4x tx to address above goals. Rehabilitation Potential For Stated Goals: Good     REHAB RECOMMENDATIONS (at time of discharge pending progress):    Placement: It is my opinion, based on this patient's performance to date, that Ms. Zain Luciano may benefit from participating in 1-2 additional therapy sessions in order to continue to assess for rehab potential and then make recommendation for disposition at discharge. Equipment:    None at this time              OCCUPATIONAL PROFILE AND HISTORY:   History of Present Injury/Illness (Reason for Referral):  See H&P  Past Medical History/Comorbidities:   Ms. Zain Luciano  has a past medical history of Asthma, Fibromyalgia, Full dentures, GERD (gastroesophageal reflux disease), Hypertension, Incontinence of urine in female, Neuropathy, and Sleep apnea. Ms. Zain Luciano  has a past surgical history that includes pr breast surgery procedure unlisted; hx cholecystectomy; hx orthopaedic; hx colonoscopy; vascular surgery procedure unlist (Left, 09/29/2017); pr chest surgery procedure unlisted; hx knee replacement (Bilateral); hx hysterectomy; and hx cataract removal (Bilateral).   Social History/Living Environment:   Home Environment: Independent living  # Steps to Enter: 0  One/Two Story Residence: One story  Living Alone: No  Support Systems: Child(don)  Patient Expects to be Discharged to[de-identified] Rehabilitation facility  Current DME Used/Available at Home: [place], [place]isa, Walker, rollator  Prior Level of Function/Work/Activity:  At baseline patient is mostly independent with ADL's, dtr assists with IADL's. She uses a RW to get around at times and orthopedic shoes. Number of Personal Factors/Comorbidities that affect the Plan of Care: Expanded review of therapy/medical records (1-2):  MODERATE COMPLEXITY   ASSESSMENT OF OCCUPATIONAL PERFORMANCE[de-identified]   Activities of Daily Living:   Basic ADLs (From Assessment) Complex ADLs (From Assessment)   Feeding: Setup  Oral Facial Hygiene/Grooming: Stand-by assistance  Bathing: Minimum assistance  Upper Body Dressing: Minimum assistance  Lower Body Dressing: Moderate assistance  Toileting: Total assistance     Grooming/Bathing/Dressing Activities of Daily Living   Grooming  Washing Face: Contact guard assistance  Washing Hands: Contact guard assistance             Toileting  Toileting Assistance: Maximum assistance     Functional Transfers  Toilet Transfer : Minimum assistance;Assist x2     Bed/Mat Mobility  Supine to Sit: Minimum assistance  Sit to Stand: Minimum assistance;Assist x2  Stand to Sit: Minimum assistance  Bed to Chair: Minimum assistance;Assist x2  Scooting: Minimum assistance     Most Recent Physical Functioning:   Gross Assessment:                  Posture:  Posture (WDL): Exceptions to WDL  Posture Assessment:  Forward head, Rounded shoulders, Trunk flexion  Balance:  Sitting: Intact  Standing: Impaired  Standing - Static: Fair  Standing - Dynamic : Fair(-) Bed Mobility:  Supine to Sit: Minimum assistance  Scooting: Minimum assistance  Wheelchair Mobility:     Transfers:  Sit to Stand: Minimum assistance;Assist x2  Stand to Sit: Minimum assistance  Bed to Chair: Minimum assistance;Assist x2  Interventions: Verbal cues;Visual cues; Safety awareness training; Tactile cues;Manual cues  Duration: 17 Minutes            Patient Vitals for the past 6 hrs:   BP BP Patient Position SpO2 Pulse   04/21/20 1151 137/72 At rest 94 % 83       Mental Status  Neurologic State: Alert  Orientation Level: Oriented to person, Oriented to place  Cognition: Follows commands                          Physical Skills Involved:  1. Range of Motion  2. Balance  3. Strength  4. Activity Tolerance Cognitive Skills Affected (resulting in the inability to perform in a timely and safe manner):  1. [facility]  Psychosocial Skills Affected:  1. WFL    Number of elements that affect the Plan of Care: 3-5:  MODERATE COMPLEXITY   CLINICAL DECISION MAKING:   [facility] AM-PAC 6 Clicks   Daily Activity Inpatient Short Form  How much help from another person does the patient currently need. .. Total A Lot A Little None   1. Putting on and taking off regular lower body clothing? [x] 1   [] 2   [] 3   [] 4   2. Bathing (including washing, rinsing, drying)? [] 1   [x] 2   [] 3   [] 4   3. Toileting, which includes using toilet, bedpan or urinal?   [] 1   [x] 2   [] 3   [] 4   4. Putting on and taking off regular upper body clothing? [] 1   [] 2   [x] 3   [] 4   5. Taking care of personal grooming such as brushing teeth? [] 1   [] 2   [x] 3   [] 4   6. Eating meals? [] 1   [] 2   [x] 3   [] 4   © 2007, Trustees of [facility], under license to [employer]. All rights reserved      Score:  Initial: 14 Most Recent: X (Date: -- )    Interpretation of Tool:  Represents activities that are increasingly more difficult (i.e. Bed mobility, Transfers, Gait). Medical Necessity:     · Skilled intervention continues to be required due to Deficits ntoed abvoe. Reason for Services/Other Comments:  · Patient continues to require skilled intervention due to   · Dx above   · .    Use of outcome tool(s) and clinical judgement create a POC that gives a: MODERATE COMPLEXITY         TREATMENT:   (In addition to Assessment/Re-Assessment sessions the following treatments were rendered)     Pre-treatment Symptoms/Complaints:    Pain: Initial:   Pain Intensity 1: 0  Post Session:  0     Self Care: (23 min): Procedure(s) (per grid) utilized to improve and/or restore self-care/home management as related to toileting and grooming. Required moderate verbal and tactile cueing to facilitate activities of daily living skills. Braces/Orthotics/Lines/Etc:   · IV  · O2 Device: Nasal cannula  Treatment/Session Assessment:    · Response to Treatment:  Good, sititng up in recliner, alarm on. · Interdisciplinary Collaboration:   o Occupational Therapist  o Registered Nurse  · After treatment position/precautions:   o Call light within reach  o RN notified   · Compliance with Program/Exercises: Compliant all of the time, Will assess as treatment progresses. · Recommendations/Intent for next treatment session: \"Next visit will focus on advancements to more challenging activities and reduction in assistance provided\".   Total Treatment Duration:  OT Patient Time In/Time Out  Time In: 1437  Time Out: [address] OT

## 2020-04-21 NOTE — PROGRESS NOTES
Problem: Mobility Impaired (Adult and Pediatric)  Goal: *Acute Goals and Plan of Care (Insert Text)  Description: LTG:  (1.)Ms. Loli Martinez will move from supine to sit and sit to supine, scoot up and down and roll side to side INDEPENDENTLY with bed flat within 7 treatment day(s). (2.)Ms. Loli Martinez will transfer from bed to chair and chair to bed with SUPERVISION using the least restrictive device within 7 treatment day(s). (3.)Ms. Loli Martinez will ambulate with STAND BY ASSIST for 200+ feet with the least restrictive device within 7 treatment day(s). (4.)Ms. Loli Martinez will perform exercises per HEP for 10+ minutes to improve strength and mobility within 7 days. ________________________________________________________________________________________________   Outcome: Progressing Towards Goal     PHYSICAL THERAPY: Daily Note and PM 4/21/2020  OBSERVATION: PT Visit Days : 2  Payor: Kaya Botello / Plan: [address] / Product Type: Managed Care Medicare /       NAME/AGE/GENDER: Renny Merlin is a 80 y.o. female   PRIMARY DIAGNOSIS: UTI (urinary tract infection) [N39.0]  Sepsis ([facility] [address] [A41.9]  Encephalopathy acute [G93.40]  UTI (urinary tract infection) [G26.2] Acute metabolic encephalopathy Acute metabolic encephalopathy       ICD-10: Treatment Diagnosis:    · Generalized Muscle Weakness (M62.81)  · Difficulty in walking, Not elsewhere classified (R26.2)  · Other abnormalities of gait and mobility (R26.89)   Precaution/Allergies:  Aleve [naproxen sodium]; Hydrocodone-acetaminophen; Metformin; Phenobarbital; and Statins-hmg-coa reductase inhibitors      ASSESSMENT:     Ms. Loli Martinez is an 80year old female admitted from home for sepsis/UTI. At baseline she lives with daughter and is independent to modified independent with rolling walker or rollator. 4/21- pt presents sitting at edge of bed finishing OT session. Agreeable to therapy.  Needs 3 attempts and mod assist for sit-stand transfer with cueing for weight shifts, body mechanics. Initially constant min assist for mobility in room using walker. Pt unsteady, weak, and with very slow pace. Progressing to close CGA/min assist during ambulation with frequent verbal and tactile cues. Pt up to chair after mobility, took rest break then performs below LE exercises with heavy verbal, visual, and tactile cues for exercise form and continuation. No progress noted today with significantly decreased gait distance. Demonstrates more flat affect today. Will continue with acute PT per plan of care. Recommending rehab at [facility]. This section established at most recent assessment   PROBLEM LIST (Impairments causing functional limitations):  1. Decreased Strength  2. Decreased Transfer Abilities  3. Decreased Ambulation Ability/Technique  4. Decreased Balance  5. Decreased Activity Tolerance  6. Decreased Cognition   INTERVENTIONS PLANNED: (Benefits and precautions of physical therapy have been discussed with the patient.)  1. Balance Exercise  2. Bed Mobility  3. Gait Training  4. Home Exercise Program (HEP)  5. Therapeutic Activites  6. Therapeutic Exercise/Strengthening  7. Transfer Training     TREATMENT PLAN: Frequency/Duration: 3 times a week for duration of hospital stay  Rehabilitation Potential For Stated Goals: Good     REHAB RECOMMENDATIONS (at time of discharge pending progress):    Placement: It is my opinion, based on this patient's performance to date, that Ms. Patricia Zuniga may benefit from intensive therapy at a [address] [place] [address] after discharge due to the functional deficits listed above that are likely to improve with skilled rehabilitation and concerns that he/she may be unsafe to be unsupervised at home due to weakness, unsteady gait, fall risk, need for additional assistance compared to baseline.   Equipment:    Tbd pending progress               HISTORY:   History of Present Injury/Illness (Reason for Referral):  Per H&P, \"80yr old female with pmhx sig for dm, htn and dementia who has been treated for a UTI who woke up and was found wondering around her daughters home complaining that her legs don't work. Daughter realized she was confused and called EMS to bring her to the hospital. She was non focal but noted to be septic with a uti. .. hospitalist asked to admit for further mgt; the patient can not give a hx secondary to her confusion. No fevers or chills; no contact with anyone who was exposed to or treated for COVID 19; no recent travel\"    Past Medical History/Comorbidities:   Ms. Keanu Stiles  has a past medical history of Asthma, Fibromyalgia, Full dentures, GERD (gastroesophageal reflux disease), Hypertension, Incontinence of urine in female, Neuropathy, and Sleep apnea. Ms. Keanu Stiles  has a past surgical history that includes pr breast surgery procedure unlisted; hx cholecystectomy; hx orthopaedic; hx colonoscopy; vascular surgery procedure unlist (Left, 09/29/2017); pr chest surgery procedure unlisted; hx knee replacement (Bilateral); hx hysterectomy; and hx cataract removal (Bilateral). Social History/Living Environment:   Home Environment: Independent living  # Steps to Enter: 0  One/Two Story Residence: One story  Living Alone: No  Support Systems: Child(don)  Patient Expects to be Discharged to[de-identified] Rehabilitation facility  Current DME Used/Available at Home: [place], rolling, Walker, rollator  Prior Level of Function/Work/Activity:  Lives with daughter in single story home. Modified independent with mobility, ambulation, and ADLs at baseline using walker or rollator. Due to confusion pt has poor ability to provide thorough history.       Number of Personal Factors/Comorbidities that affect the Plan of Care: 1-2: MODERATE COMPLEXITY   EXAMINATION:   Most Recent Physical Functioning:   Gross Assessment:  AROM: Within functional limits  Strength: Generally decreased, functional  Coordination: Within functional limits               Posture:  Posture (WDL): Exceptions to WDL  Posture Assessment: Forward head, Rounded shoulders, Trunk flexion  Balance:  Sitting: Intact  Standing: Impaired  Standing - Static: Fair  Standing - Dynamic : Fair(-) Bed Mobility:     Wheelchair Mobility:     Transfers:  Sit to Stand: Minimum assistance; Moderate assistance  Stand to Sit: Minimum assistance  Bed to Chair: Contact guard assistance;Minimum assistance  Interventions: Verbal cues; Visual cues; Safety awareness training; Tactile cues;Manual cues  Duration: 17 Minutes  Gait:     Base of Support: Center of gravity altered  Speed/Loan: Pace decreased (<100 feet/min)  Step Length: Left shortened;Right shortened  Gait Abnormalities: Trunk sway increased;Decreased step clearance  Distance (ft): 20 Feet (ft)  Assistive Device: Walker, rolling  Ambulation - Level of Assistance: Contact guard assistance;Minimal assistance  Interventions: Verbal cues; Safety awareness training; Tactile cues;Manual cues      Body Structures Involved:  1. Muscles Body Functions Affected:  1. Mental  2. Movement Related Activities and Participation Affected:  1. General Tasks and Demands  2. Mobility  3. Domestic Life  4. Community, Social and [place]   Number of elements that affect the Plan of Care: 4+: HIGH COMPLEXITY   CLINICAL PRESENTATION:   Presentation: Stable and uncomplicated: LOW COMPLEXITY   CLINICAL DECISION MAKIN [facility] Inpatient Short Form  How much difficulty does the patient currently have. .. Unable A Lot A Little None   1. Turning over in bed (including adjusting bedclothes, sheets and blankets)? [] 1   [] 2   [x] 3   [] 4   2. Sitting down on and standing up from a chair with arms ( e.g., wheelchair, bedside commode, etc.)   [] 1   [] 2   [x] 3   [] 4   3. Moving from lying on back to sitting on the side of the bed?    [] 1   [] 2   [x] 3   [] 4   How much help from another person does the patient currently need. .. Total A Lot A Little None   4. Moving to and from a bed to a chair (including a wheelchair)? [] 1   [] 2   [x] 3   [] 4   5. Need to walk in hospital room? [] 1   [] 2   [x] 3   [] 4   6. Climbing 3-5 steps with a railing? [] 1   [x] 2   [] 3   [] 4   © 2007, Trustees of [address] [place] [address] [address] [address], under license to [employer]. All rights reserved      Score:  Initial: 17 Most Recent: X (Date: -- )    Interpretation of Tool:  Represents activities that are increasingly more difficult (i.e. Bed mobility, Transfers, Gait). Medical Necessity:     · Patient demonstrates   · good  ·  rehab potential due to higher previous functional level. Reason for Services/Other Comments:  · Patient   · continues to demonstrate capacity to improve strength, mobility, balance, transfers, and activity tolerance which will   · increase independence, decrease amount of assistance required from caregiver, and increase safety  · . Use of outcome tool(s) and clinical judgement create a POC that gives a: Clear prediction of patient's progress: LOW COMPLEXITY            TREATMENT:   (In addition to Assessment/Re-Assessment sessions the following treatments were rendered)   Pre-treatment Symptoms/Complaints:  \"I will try\"  Pain: Initial:   Pain Intensity 1: 0  Post Session:  0/10     Therapeutic Activity: (  17 Minutes ):  Therapeutic activities including Chair transfers, sit-stand transfers x multiple trials from edge of bed, standing static/dynamic activities, and Ambulation on level ground to improve mobility, strength, balance, coordination and activity tolerance. Required minimal to moderate assistance and increased Verbal cues; Safety awareness training; Tactile cues;Manual cues to promote static and dynamic balance in standing and promote motor control of bilateral, lower extremity(s).        Therapeutic Exercise: (8 Minutes):  exercises per grid below to improve mobility, strength, balance, and activity tolerance . Required moderate visual and verbal cues to promote proper body posture, promote proper body mechanics, and exercise form . Progressed range, repetitions, and complexity of movement as indicated. Date:  4/20/20 Date:  4/21/20 Date:     Activity/Exercise Parameters Parameters Parameters   LAQ 10x AB 15x AB    Seated marching 10x AB 15x AB    Ankle pumps 10x AB 15x AB    Sit-stand transfers X6 from chair     Ambulation  90 ft     Seated hip aBd  15x AB              Braces/Orthotics/Lines/Etc:   · IV  · O2 Device: Room air  Treatment/Session Assessment:    · Response to Treatment:  pt performs mobility with CGA/min A  · Interdisciplinary Collaboration:   o Physical Therapist  o Registered Nurse  · After treatment position/precautions:   o Up in chair  o Bed alarm/tab alert on  o Bed/Chair-wheels locked  o Bed in low position  o Call light within reach   · Compliance with Program/Exercises: Will assess as treatment progresses  · Recommendations/Intent for next treatment session: \"Next visit will focus on advancements to more challenging activities and reduction in assistance provided\".   Total Treatment Duration:  PT Patient Time In/Time Out  Time In: 1500  Time Out: 1500 [place], DPT

## 2020-04-21 NOTE — PROGRESS NOTES
Hospitalist Note     Admit Date:  2020 11:23 AM   Name:  Leon Alonzo   Age:  80 y.o.  :  1934   MRN:  [id number]   PCP:  Deepthi Llanes MD  Treatment Team: Attending Provider: Marcus Worley MD; Utilization Review: Felicia Turner RN; Utilization Review: Elenita Martinez RN; Care Manager: Dana Iraheta; Physical Therapist: Alysa Constantino DPT; Occupational Therapist: Jenn Landry OT    HPI/Subjective:   Ms. Mirtha Reich is an 86y/o WF with a h/o DM, HTN dementia who  was found wondering around her daughte'rs home complaining that her legs don't work. Daughter realized she was confused and called EMS to bring her to the hospital. She was non focal but noted to be septic with UA suggestive of acute infection. Hospitalist asked to admit. Started on abx. Improved. : Up watching TV, very pleasant, appetite is good, eager about [place]. No issues otherwise. No other complaints  Objective:     Patient Vitals for the past 24 hrs:   Temp Pulse Resp BP SpO2   20 1151 98.2 °F (36.8 °C) 83 18 137/72 94 %   20 0843     96 %   20 0710 98.7 °F (37.1 °C) 86 18 139/69 94 %   20 0316 96.3 °F (35.7 °C) 88 17 148/76 94 %   20 2352     98 %   20 2302 98.6 °F (37 °C) 86 17 146/64 97 %   20     95 %   20 2002 98.2 °F (36.8 °C) 85 18 146/64 95 %   20 1742 98.1 °F (36.7 °C) 86 18 142/70 95 %     Oxygen Therapy  O2 Sat (%): 94 % (20 1151)  Pulse via Oximetry: 89 beats per minute (20 0843)  O2 Device: Room air (20 0843)  O2 Flow Rate (L/min): 2 l/min (20 2352)  FIO2 (%): 28 % (20 0214)    Estimated body mass index is 31.93 kg/m² as calculated from the following:    Height as of this encounter: 5' 4\" (1.626 m). Weight as of this encounter: 84.4 kg (186 lb).       Intake/Output Summary (Last 24 hours) at 2020 1427  Last data filed at 2020 0817  Gross per 24 hour   Intake 120 ml   Output    Net 120 ml       *Note that automatically entered I/Os may not be accurate; dependent on patient compliance with collection and accurate  by techs. General:    Well nourished. Alert. CV:   RRR. No murmur, rub, or gallop. Lungs:   CTAB. No wheezing, rhonchi, or rales. Abdomen:   Soft, nontender, nondistended. Extremities: Warm and dry. No cyanosis or edema. Skin:     No rashes or jaundice. Neuro:  No gross focal deficits    Data Review:  I have reviewed all labs, meds, and studies from the last 24 hours:    Recent Results (from the past 24 hour(s))   GLUCOSE, POC    Collection Time: 04/20/20  4:23 PM   Result Value Ref Range    Glucose (POC) 181 (H) 65 - 100 mg/dL   GLUCOSE, POC    Collection Time: 04/20/20  9:25 PM   Result Value Ref Range    Glucose (POC) 225 (H) 65 - 100 mg/dL   CBC WITH AUTOMATED DIFF    Collection Time: 04/21/20  5:04 AM   Result Value Ref Range    WBC 8.7 4.3 - 11.1 K/uL    RBC 3.54 (L) 4.05 - 5.2 M/uL    HGB 11.2 (L) 11.7 - 15.4 g/dL    HCT 33.7 (L) 35.8 - 46.3 %    MCV 95.2 79.6 - 97.8 FL    MCH 31.6 26.1 - 32.9 PG    MCHC 33.2 31.4 - 35.0 g/dL    RDW 12.4 11.9 - 14.6 %    PLATELET 768 053 - 267 K/uL    MPV 8.8 (L) 9.4 - 12.3 FL    ABSOLUTE NRBC 0.00 0.0 - 0.2 K/uL    DF AUTOMATED      NEUTROPHILS 68 43 - 78 %    LYMPHOCYTES 19 13 - 44 %    MONOCYTES 10 4.0 - 12.0 %    EOSINOPHILS 2 0.5 - 7.8 %    BASOPHILS 1 0.0 - 2.0 %    IMMATURE GRANULOCYTES 0 0.0 - 5.0 %    ABS. NEUTROPHILS 6.0 1.7 - 8.2 K/UL    ABS. LYMPHOCYTES 1.6 0.5 - 4.6 K/UL    ABS. MONOCYTES 0.9 0.1 - 1.3 K/UL    ABS. EOSINOPHILS 0.2 0.0 - 0.8 K/UL    ABS. BASOPHILS 0.1 0.0 - 0.2 K/UL    ABS. IMM.  GRANS. 0.0 0.0 - 0.5 K/UL   METABOLIC PANEL, BASIC    Collection Time: 04/21/20  5:04 AM   Result Value Ref Range    Sodium 137 136 - 145 mmol/L    Potassium 3.9 3.5 - 5.1 mmol/L    Chloride 107 98 - 107 mmol/L    CO2 23 21 - 32 mmol/L    Anion gap 7 7 - 16 mmol/L    Glucose 127 (H) 65 - 100 mg/dL    BUN 15 8 - 23 MG/DL    Creatinine 1.03 (H) 0.6 - 1.0 MG/DL    GFR est AA >60 >60 ml/min/1.73m2    GFR est non-AA 54 (L) >60 ml/min/1.73m2    Calcium 8.8 8.3 - 10.4 MG/DL   GLUCOSE, POC    Collection Time: 04/21/20  7:13 AM   Result Value Ref Range    Glucose (POC) 122 (H) 65 - 100 mg/dL   GLUCOSE, POC    Collection Time: 04/21/20 11:49 AM   Result Value Ref Range    Glucose (POC) 151 (H) 65 - 100 mg/dL        All Micro Results     Procedure Component Value Units Date/Time    CULTURE, URINE [[id number]]  (Abnormal) Collected:  04/18/20 1140    Order Status:  Completed Specimen:  Urine from Clean catch Updated:  04/21/20 0731     Special Requests: NO SPECIAL REQUESTS        Culture result:       50,000-100,000 COLONIES/mL YEAST IDENTIFICATION TO FOLLOW          CULTURE, BLOOD [[id number]] Collected:  04/18/20 1429    Order Status:  Completed Specimen:  Blood Updated:  04/21/20 0657     Special Requests: --        RIGHT  FOREARM       Culture result: NO GROWTH 3 DAYS       CULTURE, BLOOD [[id number]] Collected:  04/18/20 1429    Order Status:  Completed Specimen:  Blood Updated:  04/21/20 0657     Special Requests: --        LEFT  HAND       Culture result: NO GROWTH 3 DAYS             Current Meds:  Current Facility-Administered Medications   Medication Dose Route Frequency    folic acid (FOLVITE) tablet 1 mg  1 mg Oral DAILY    amLODIPine (NORVASC) tablet 10 mg  10 mg Oral DAILY    lisinopriL (PRINIVIL, ZESTRIL) tablet 40 mg  40 mg Oral DAILY    sodium chloride (NS) flush 5-10 mL  5-10 mL IntraVENous PRN    cefTRIAXone (ROCEPHIN) 1 g in 0.9% sodium chloride (MBP/ADV) 50 mL  1 g IntraVENous Q24H    0.9% sodium chloride infusion  50 mL/hr IntraVENous CONTINUOUS    aspirin delayed-release tablet 81 mg  81 mg Oral DAILY    levothyroxine (SYNTHROID) tablet 25 mcg  25 mcg Oral ACB    montelukast (SINGULAIR) tablet 10 mg  10 mg Oral QHS    pregabalin (LYRICA) capsule 25 mg  25 mg Oral BID    QUEtiapine (SEROquel) tablet 12.5 mg  12.5 mg Oral QHS    traMADoL TM24 300 mg (Patient Supplied)  300 mg Oral DAILY    insulin lispro (HUMALOG) injection 0-10 Units  0-10 Units SubCUTAneous AC&HS    hydrALAZINE (APRESOLINE) 20 mg/mL injection 20 mg  20 mg IntraVENous Q6H PRN    atenoloL (TENORMIN) tablet 25 mg  25 mg Oral DAILY    hydrALAZINE (APRESOLINE) tablet 12.5 mg  12.5 mg Oral QID    budesonide (PULMICORT) 500 mcg/2 ml nebulizer suspension  500 mcg Nebulization BID RT    albuterol-ipratropium (DUO-NEB) 2.5 MG-0.5 MG/3 ML  3 mL Nebulization Q6H PRN    fluconazole (DIFLUCAN) tablet 200 mg  200 mg Oral QPM       Other Studies:  No results found for this visit on 04/18/20. No results found.     SARS-CoV-2 LAB Results  \"Novel Coronavirus\" Test: No results found for: COV2NT   \"Emergent Disease\" Test: No results found for: EDPR  As of: 2:27 PM on 4/21/2020        Assessment and Plan:     Hospital Problems as of 4/21/2020 Date Reviewed: 3/4/2020          Codes Class Noted - Resolved POA    Encephalopathy acute ICD-10-CM: G93.40  ICD-9-CM: 348.30  4/20/2020 - Present Unknown        UTI (urinary tract infection) ICD-10-CM: N39.0  ICD-9-CM: 599.0  4/18/2020 - Present Unknown        Sepsis ([facility] ICD-10-CM: A41.9  ICD-9-CM: 038.9, 995.91  4/18/2020 - Present Unknown        * (Principal) Acute metabolic encephalopathy [id number]: G93.41  ICD-9-CM: 348.31  4/18/2020 - Present Yes        Diabetes mellitus type 2, controlled ([facility] ICD-10-CM: E11.9  ICD-9-CM: 250.00  2/26/2018 - Present Yes        Acute kidney injury ([facility] ICD-10-CM: N17.9  ICD-9-CM: 584.9  9/12/2017 - Present Yes        Stage 3 chronic kidney disease ([facility] ICD-10-CM: N18.3  ICD-9-CM: 585.3  9/12/2017 - Present Yes        DM type 2 with diabetic peripheral neuropathy ([facility] ICD-10-CM: E11.42  ICD-9-CM: 250.60, 357.2  3/24/2017 - Present Yes        Diabetic Charcot foot ([facility] ICD-10-CM: E11.610  ICD-9-CM: 250.60, 713.5  3/24/2017 - Present Yes        Presenile dementia, paranoid type [facility]) ICD-10-CM: F03.90  ICD-9-CM: 290.12  11/7/2016 - Present Yes        Asthma ICD-10-CM: J45.909  ICD-9-CM: 493.90  Unknown - Present Yes    Overview Signed 1/13/2015  6:41 AM by Aidee Aguilar             Hypertension ICD-10-CM: I10  ICD-9-CM: 401.9  11/16/2014 - Present Yes              Plan:  # Generalized weakness   - STR. # Acute metabolic encephalopathy   - Resolved. 2/2 UTI. # Sepsis 2/2 UTI   - Resolved. Yeast growing in cx but likely colonization. # DANIELLA   - Resolved. # DM   - SSI    # HTN   - Home meds    # Hypothyroidism   - Synthroid    DC planning/Dispo:  Rehab.   Diet:  DIET CARDIAC  DVT ppx:  SCDs    Signed:  Keo Dang MD

## 2020-04-22 VITALS
SYSTOLIC BLOOD PRESSURE: 132 MMHG | OXYGEN SATURATION: 95 % | HEIGHT: 64 IN | RESPIRATION RATE: 18 BRPM | WEIGHT: 186 LBS | BODY MASS INDEX: 31.76 KG/M2 | HEART RATE: 79 BPM | DIASTOLIC BLOOD PRESSURE: 68 MMHG | TEMPERATURE: 97.9 F

## 2020-04-22 PROBLEM — N39.0 UTI (URINARY TRACT INFECTION): Status: RESOLVED | Noted: 2020-04-18 | Resolved: 2020-04-22

## 2020-04-22 PROBLEM — G93.40 ENCEPHALOPATHY ACUTE: Status: RESOLVED | Noted: 2020-04-20 | Resolved: 2020-04-22

## 2020-04-22 PROBLEM — G93.41 ACUTE METABOLIC ENCEPHALOPATHY: Status: RESOLVED | Noted: 2020-04-18 | Resolved: 2020-04-22

## 2020-04-22 PROBLEM — A41.9 SEPSIS (HCC): Status: RESOLVED | Noted: 2020-04-18 | Resolved: 2020-04-22

## 2020-04-22 PROBLEM — N17.9 ACUTE KIDNEY INJURY (HCC): Status: RESOLVED | Noted: 2017-09-12 | Resolved: 2020-04-22

## 2020-04-22 LAB
ERYTHROCYTE [DISTWIDTH] IN BLOOD BY AUTOMATED COUNT: 12.4 % (ref 11.9–14.6)
GLUCOSE BLD STRIP.AUTO-MCNC: 128 MG/DL (ref 65–100)
GLUCOSE BLD STRIP.AUTO-MCNC: 281 MG/DL (ref 65–100)
HCT VFR BLD AUTO: 39.5 % (ref 35.8–46.3)
HGB BLD-MCNC: 13 G/DL (ref 11.7–15.4)
MCH RBC QN AUTO: 31.9 PG (ref 26.1–32.9)
MCHC RBC AUTO-ENTMCNC: 32.9 G/DL (ref 31.4–35)
MCV RBC AUTO: 96.8 FL (ref 79.6–97.8)
MM INDURATION POC: 0 MM (ref 0–5)
NRBC # BLD: 0 K/UL (ref 0–0.2)
PLATELET # BLD AUTO: 284 K/UL (ref 150–450)
PMV BLD AUTO: 8.8 FL (ref 9.4–12.3)
PPD POC: NEGATIVE NEGATIVE
RBC # BLD AUTO: 4.08 M/UL (ref 4.05–5.2)
WBC # BLD AUTO: 10.6 K/UL (ref 4.3–11.1)

## 2020-04-22 PROCEDURE — 94640 AIRWAY INHALATION TREATMENT: CPT

## 2020-04-22 PROCEDURE — 74011250637 HC RX REV CODE- 250/637: Performed by: INTERNAL MEDICINE

## 2020-04-22 PROCEDURE — 74011000250 HC RX REV CODE- 250: Performed by: INTERNAL MEDICINE

## 2020-04-22 PROCEDURE — 82962 GLUCOSE BLOOD TEST: CPT

## 2020-04-22 PROCEDURE — 74011636637 HC RX REV CODE- 636/637: Performed by: INTERNAL MEDICINE

## 2020-04-22 PROCEDURE — 36415 COLL VENOUS BLD VENIPUNCTURE: CPT

## 2020-04-22 PROCEDURE — 94760 N-INVAS EAR/PLS OXIMETRY 1: CPT

## 2020-04-22 PROCEDURE — 85027 COMPLETE CBC AUTOMATED: CPT

## 2020-04-22 PROCEDURE — 99218 HC RM OBSERVATION: CPT

## 2020-04-22 RX ORDER — QUETIAPINE FUMARATE 25 MG/1
12.5 TABLET, FILM COATED ORAL
Qty: 15 TAB | Refills: 0 | Status: SHIPPED | OUTPATIENT
Start: 2020-04-22 | End: 2020-07-20 | Stop reason: SDUPTHER

## 2020-04-22 RX ORDER — TRAMADOL HYDROCHLORIDE 300 MG/1
300 TABLET, EXTENDED RELEASE ORAL DAILY
Qty: 3 TAB | Refills: 0 | Status: SHIPPED | OUTPATIENT
Start: 2020-04-22 | End: 2020-08-24 | Stop reason: SDUPTHER

## 2020-04-22 RX ORDER — PREGABALIN 25 MG/1
25 CAPSULE ORAL 2 TIMES DAILY
Qty: 60 CAP | Refills: 0 | Status: SHIPPED | OUTPATIENT
Start: 2020-04-22 | End: 2020-07-20 | Stop reason: SDUPTHER

## 2020-04-22 RX ADMIN — PREGABALIN 25 MG: 25 CAPSULE ORAL at 08:50

## 2020-04-22 RX ADMIN — ASPIRIN 81 MG: 81 TABLET ORAL at 08:50

## 2020-04-22 RX ADMIN — FOLIC ACID 1 MG: 1 TABLET ORAL at 08:50

## 2020-04-22 RX ADMIN — HYDRALAZINE HYDROCHLORIDE 12.5 MG: 25 TABLET, FILM COATED ORAL at 08:49

## 2020-04-22 RX ADMIN — ATENOLOL 25 MG: 25 TABLET ORAL at 08:49

## 2020-04-22 RX ADMIN — LISINOPRIL 40 MG: 20 TABLET ORAL at 08:49

## 2020-04-22 RX ADMIN — AMLODIPINE BESYLATE 10 MG: 10 TABLET ORAL at 08:51

## 2020-04-22 RX ADMIN — TRAMADOL HYDROCHLORIDE 300 MG: 300 TABLET, FILM COATED, EXTENDED RELEASE ORAL at 08:59

## 2020-04-22 RX ADMIN — BUDESONIDE 500 MCG: 0.5 INHALANT RESPIRATORY (INHALATION) at 07:49

## 2020-04-22 RX ADMIN — LEVOTHYROXINE SODIUM 25 MCG: 0.05 TABLET ORAL at 05:53

## 2020-04-22 RX ADMIN — HYDRALAZINE HYDROCHLORIDE 12.5 MG: 25 TABLET, FILM COATED ORAL at 12:18

## 2020-04-22 RX ADMIN — INSULIN LISPRO 6 UNITS: 100 INJECTION, SOLUTION INTRAVENOUS; SUBCUTANEOUS at 12:18

## 2020-04-22 NOTE — PROGRESS NOTES
Report called to [facility] [place]. Opportunity for questions given.  IV removed and Pickup time 230pm

## 2020-04-22 NOTE — DISCHARGE SUMMARY
Hospitalist Discharge Summary     Admit Date:  2020 11:23 AM   Name:  Miriam Gloria   Age:  80 y.o.  :  1934   MRN:  [id number]   PCP:  Sukhi Morgan MD  Treatment Team: Attending Provider: Aleks Cox MD; Utilization Review: Jared Costa RN; Utilization Review: Dinora Ontiveros RN; Care Manager: Vanessa Bustamante    Problem List for this Hospitalization:  Hospital Problems as of 2020 Date Reviewed: 3/4/2020          Codes Class Noted - Resolved POA    Diabetes mellitus type 2, controlled ([facility] ICD-10-CM: E11.9  ICD-9-CM: 250.00  2018 - Present Yes        Stage 3 chronic kidney disease ([facility] ICD-10-CM: N18.3  ICD-9-CM: 585.3  2017 - Present Yes        DM type 2 with diabetic peripheral neuropathy ([facility] ICD-10-CM: E11.42  ICD-9-CM: 250.60, 357.2  3/24/2017 - Present Yes        Diabetic Charcot foot ([facility] ICD-10-CM: E11.610  ICD-9-CM: 250.60, 713.5  3/24/2017 - Present Yes        Presenile dementia, paranoid type ([facility] ICD-10-CM: F03.90  ICD-9-CM: 290.12  2016 - Present Yes        Asthma ICD-10-CM: J45.909  ICD-9-CM: 493.90  Unknown - Present Yes    Overview Signed 2015  6:41 AM by Tiffany Bo             Hypertension ICD-10-CM: I10  ICD-9-CM: 401.9  2014 - Present Yes        RESOLVED: Encephalopathy acute ICD-10-CM: G93.40  ICD-9-CM: 348.30  2020 - 2020 Yes        RESOLVED: UTI (urinary tract infection) ICD-10-CM: N39.0  ICD-9-CM: 599.0  2020 - 2020 Unknown        RESOLVED: Sepsis ([facility] ICD-10-CM: A41.9  ICD-9-CM: 038.9, 995.91  2020 - 2020 Yes        * (Principal) RESOLVED: Acute metabolic encephalopathy B-63-ZE: G93.41  ICD-9-CM: 348.31  2020 - 2020 Yes        RESOLVED: Acute kidney injury [facility]) ICD-10-CM: N17.9  ICD-9-CM: 584.9  2017 - 2020 Yes            Hospital Course:  Ms. Amber Acosta is an 86y/o WF with a h/o DM, HTN and dementia who was found confused at her daughter'sdi home complaining that her didn't work. Her daughter called EMS to bring her to the hospital. She was non focal but noted to be septic with UA suggestive of acute infection. Of note, she had recently been prescribed Bactrim for a suspected UTI. Also had DANIELLA. Hospitalist asked to admit. Started on Ceftriaxone and IVFs. Her encephalopathy resolved. Urine culture was negative but grew some yeast. Patient was treated with Diflucan though I don't suspect this is the cause of her sepsis, I think her Bactrim sterilized her urine which is why the culture was negative. She's completed a total of 7d antibiotics which I think is sufficient given her improvement. Hospital course otherwise unremarkable and she is medically stable for discharge to rehab today. Disposition: Rehab Facility  Activity: Activity as tolerated  Diet: DIET CARDIAC Regular; FR 1800ML  Code Status: Prior    Follow Up Orders:  No orders of the defined types were placed in this encounter. Follow-up Information     Follow up With Specialties Details Why Floyd Chand [address] [place] [address]  [phone]    Lauryn Philip MD Family Practice In 1 week Hospital f/u. [address] [facility]  [phone]            Discharge meds at bottom of this note. Plan was discussed with patient, nursing, CM. All questions answered. Patient was stable at time of discharge. Given instructions to call a physician or return if any concerns. Discharge summary and encounter summary was sent to PCP electronically via \"Comm Mgt\" link in [facility], if possible.     Diagnostic Imaging/Tests:   Ct Head Wo Cont    Result Date: 4/18/2020  EXAMINATION: HEAD CT WITHOUT CONTRAST 4/18/2020 12:42 PM ACCESSION NUMBER: [id number] INDICATION: weakness and altered mental status COMPARISON: Head CT 1/30/2020 TECHNIQUE: Multiple-row detector helical CT examination of the head without intravenous contrast. Radiation dose reduction techniques were used for this study:  Our CT scanners use one or all of the following: Automated exposure control, adjustment of the mA and/or kVp according to patient's size, iterative reconstruction. FINDINGS: The ventricles are stable in caliber in comparison to the prior exam and are appropriate for the mild degree of symmetric global brain parenchymal volume loss. There is no midline shift. The basilar cisterns are patent. There is no cerebellar tonsillar ectopia or herniation. Hypodensities in the periventricular and subcortical white matter are nonspecific, but they are most likely to represent chronic microangiopathy. Prior bilateral lens replacement. Unchanged prominence of the extra-axial spaces along the posterior left parietal convexity. Chronic lacunar infarction in the right basal ganglia is unchanged. There is no evidence of acute intracranial hemorrhage or extra-axial collections. There is no CT evidence of acute infarction. The paranasal sinuses and mastoid air cells are well aerated and clear. IMPRESSION: 1. No acute intracranial abnormality. 2. Chronic features as above. VOICE DICTATED BY: Dr. Con Akhtar     Xr Chest Port    Result Date: 4/18/2020  EXAMINATION: CHEST RADIOGRAPH 4/18/2020 12:17 PM ACCESSION NUMBER: [id number] COMPARISON: 02/25/2020 INDICATION: weakness TECHNIQUE: A single view of the chest was obtained. FINDINGS: Support Devices: None Cardiac Silhouette: Within normal limits in size. Mediastinum: Calcifications are seen in the aorta. Lungs: No focal airspace disease. Upper Abdomen: Normal Osseous Structures: There are no lytic and blastic lesions. IMPRESSION: No acute abnormality Atherosclerosis        Echocardiogram results:  No results found for this visit on 04/18/20.     Procedures done this admission:  * No surgery found *    All Micro Results     Procedure Component Value Units Date/Time    CULTURE, URINE [[id number]]  (Abnormal) Collected:  04/18/20 1140    Order Status:  Completed Specimen:  Urine from Clean catch Updated:  04/22/20 0745     Special Requests: NO SPECIAL REQUESTS        Culture result:       50,000-100,000 COLONIES/mL YEAST SUBCULTURE IN PROGRESS          CULTURE, BLOOD [[id number]] Collected:  04/18/20 1429    Order Status:  Completed Specimen:  Blood Updated:  04/22/20 0704     Special Requests: --        RIGHT  FOREARM       Culture result: NO GROWTH 4 DAYS       CULTURE, BLOOD [[id number]] Collected:  04/18/20 1429    Order Status:  Completed Specimen:  Blood Updated:  04/22/20 0704     Special Requests: --        LEFT  HAND       Culture result: NO GROWTH 4 DAYS             SARS-CoV-2 LAB Results  \"Novel Coronavirus\" Test: No results found for: COV2NT   \"Emergent Disease\" Test: No results found for: EDPR  As of: 10:27 AM on 4/22/2020      Labs: Results:       BMP, Mg, Phos Recent Labs     04/21/20  0504      K 3.9      CO2 23   AGAP 7   BUN 15   CREA 1.03*   CA 8.8   *      CBC Recent Labs     04/22/20  0652 04/21/20  0504   WBC 10.6 8.7   RBC 4.08 3.54*   HGB 13.0 11.2*   HCT 39.5 33.7*    234   GRANS  --  68   LYMPH  --  19   EOS  --  2   MONOS  --  10   BASOS  --  1   IG  --  0   ANEU  --  6.0   ABL  --  1.6   KITTY  --  0.2   ABM  --  0.9   ABB  --  0.1   AIG  --  0.0      LFT No results for input(s): SGOT, ALT, TBIL, AP, TP, ALB, GLOB, AGRAT, GPT in the last 72 hours.    Cardiac Testing Lab Results   Component Value Date/Time    BNP 12 02/11/2015 09:20 AM    BNP 16 11/16/2010 10:13 AM    CK 27 12/01/2019 07:25 AM    CK - MB 2.0 12/01/2019 07:25 AM    CK - MB  12/01/2019 03:25 AM     CORRECTION TO MEDICAL RECORD-DISREGARD THESE TEST RESULTS    CK-MB Index 7.4 (H) 12/01/2019 07:25 AM    Troponin-I <0.05 11/16/2010 10:13 AM    Troponin-I, Qt. 0.03 12/01/2019 03:25 AM    Troponin-I, Qt. 0.05 11/27/2019 12:17 PM Troponin-I, Qt. 0.04 08/19/2019 08:38 PM      Coagulation Tests Lab Results   Component Value Date/Time    Prothrombin time 12.5 04/18/2020 11:40 AM    INR 0.9 04/18/2020 11:40 AM      A1c Lab Results   Component Value Date/Time    Hemoglobin A1c 8.1 (H) 12/06/2019 05:16 AM    Hemoglobin A1c 8.3 (H) 08/21/2019 04:30 AM    Hemoglobin A1c 8.1 (H) 03/07/2019 02:46 PM      Lipid Panel Lab Results   Component Value Date/Time    Cholesterol, total 260 (H) 01/29/2020 02:36 PM    HDL Cholesterol 55 01/29/2020 02:36 PM    LDL, calculated 141 (H) 01/29/2020 02:36 PM    VLDL, calculated 64 (H) 01/29/2020 02:36 PM    Triglyceride 321 (H) 01/29/2020 02:36 PM      Thyroid Panel Lab Results   Component Value Date/Time    TSH 3.920 03/07/2019 02:46 PM    TSH 6.470 (H) 02/07/2019 04:15 PM    T4, Free 0.99 03/07/2019 02:46 PM    T4, Free 0.92 02/07/2019 04:15 PM        Most Recent UA Lab Results   Component Value Date/Time    Color YELLOW 04/18/2020 11:40 AM    Appearance CLOUDY 04/18/2020 11:40 AM    Specific gravity 1.023 04/18/2020 11:40 AM    pH (UA) 5.0 04/18/2020 11:40 AM    Protein 100 (A) 04/18/2020 11:40 AM    Glucose 250 04/18/2020 11:40 AM    Ketone TRACE (A) 04/18/2020 11:40 AM    Bilirubin Negative 04/18/2020 11:40 AM    Blood Negative 04/18/2020 11:40 AM    Urobilinogen 0.2 04/18/2020 11:40 AM    Nitrites Negative 04/18/2020 11:40 AM    Leukocyte Esterase SMALL (A) 04/18/2020 11:40 AM    WBC 20-50 04/18/2020 11:40 AM    RBC 0-3 04/18/2020 11:40 AM    Epithelial cells 0-3 04/18/2020 11:40 AM    Bacteria 0 04/18/2020 11:40 AM    Casts 0 04/18/2020 11:40 AM    Crystals, urine 0 04/18/2020 11:40 AM    Mucus 0 04/18/2020 11:40 AM    Other observations RESULTS VERIFIED MANUALLY 04/18/2020 11:40 AM        Allergies   Allergen Reactions    Aleve [Naproxen Sodium] Rash    Hydrocodone-Acetaminophen Itching    Metformin Other (comments)     Decreased kidney function    Phenobarbital Unknown (comments)     Doesn't remember - long time ago     Statins-Hmg-Coa Reductase Inhibitors Myalgia     intolerance     Immunization History   Administered Date(s) Administered    Influenza High Dose Vaccine PF 09/07/2017, 09/18/2018    Influenza Vaccine 11/08/2017    Influenza Vaccine (Tri) Adjuvanted 09/11/2019    Pneumococcal Conjugate (PCV-13) 03/18/2015, 04/05/2017    Pneumococcal Polysaccharide (PPSV-23) 03/17/2015    TB Skin Test (PPD) Intradermal 09/12/2017, 08/20/2019, 11/29/2019, 04/20/2020       All Labs from Last 24 Hrs:  Recent Results (from the past 24 hour(s))   GLUCOSE, POC    Collection Time: 04/21/20 11:49 AM   Result Value Ref Range    Glucose (POC) 151 (H) 65 - 100 mg/dL   GLUCOSE, POC    Collection Time: 04/21/20  4:33 PM   Result Value Ref Range    Glucose (POC) 150 (H) 65 - 100 mg/dL   GLUCOSE, POC    Collection Time: 04/21/20  8:31 PM   Result Value Ref Range    Glucose (POC) 192 (H) 65 - 100 mg/dL   CBC W/O DIFF    Collection Time: 04/22/20  6:52 AM   Result Value Ref Range    WBC 10.6 4.3 - 11.1 K/uL    RBC 4.08 4.05 - 5.2 M/uL    HGB 13.0 11.7 - 15.4 g/dL    HCT 39.5 35.8 - 46.3 %    MCV 96.8 79.6 - 97.8 FL    MCH 31.9 26.1 - 32.9 PG    MCHC 32.9 31.4 - 35.0 g/dL    RDW 12.4 11.9 - 14.6 %    PLATELET 627 709 - 139 K/uL    MPV 8.8 (L) 9.4 - 12.3 FL    ABSOLUTE NRBC 0.00 0.0 - 0.2 K/uL   GLUCOSE, POC    Collection Time: 04/22/20  7:11 AM   Result Value Ref Range    Glucose (POC) 128 (H) 65 - 100 mg/dL       Discharge Exam:  Patient Vitals for the past 24 hrs:   Temp Pulse Resp BP SpO2   04/22/20 0751     97 %   04/22/20 0715 97.7 °F (36.5 °C) 81 18 155/76 97 %   04/22/20 0334 98.1 °F (36.7 °C) 85 18 119/71 94 %   04/22/20 0008 98.4 °F (36.9 °C) 82 20 125/65 98 %   04/21/20 2143  79  139/74    04/21/20 2035     97 %   04/21/20 1931 98.4 °F (36.9 °C) 89 18 122/60 95 %   04/21/20 1918 98.1 °F (36.7 °C) 91 20 95/72 96 %   04/21/20 1645 98.4 °F (36.9 °C) 87 18 142/69 95 %   04/21/20 1151 98.2 °F (36.8 °C) 83 18 137/72 94 %     Oxygen Therapy  O2 Sat (%): 97 % (04/22/20 0751)  Pulse via Oximetry: 82 beats per minute (04/22/20 0751)  O2 Device: Room air (04/22/20 0751)  O2 Flow Rate (L/min): 2 l/min (04/20/20 2352)  FIO2 (%): 28 % (04/19/20 0214)    Estimated body mass index is 31.93 kg/m² as calculated from the following:    Height as of this encounter: 5' 4\" (1.626 m). Weight as of this encounter: 84.4 kg (186 lb). Intake/Output Summary (Last 24 hours) at 4/22/2020 1027  Last data filed at 4/22/2020 0543  Gross per 24 hour   Intake    Output 1200 ml   Net -1200 ml       *Note that automatically entered I/Os may not be accurate; dependent on patient compliance with collection and accurate  by assistants. General:    Well nourished. Alert. Eyes:   Normal sclerae. Extraocular movements intact. ENT:  Normocephalic, atraumatic. Moist mucous membranes  CV:   Regular rate and rhythm. No murmur, rub, or gallop. Lungs:  Clear to auscultation bilaterally. No wheezing, rhonchi, or rales. Abdomen: Soft, nontender, nondistended. Extremities: Warm and dry. No cyanosis or edema. Neurologic: CN II-XII grossly intact. No gross focal deficits   Skin:     No rashes or jaundice. Psych:  Normal mood and affect.     Current Med List in Hospital:   Current Facility-Administered Medications   Medication Dose Route Frequency    folic acid (FOLVITE) tablet 1 mg  1 mg Oral DAILY    amLODIPine (NORVASC) tablet 10 mg  10 mg Oral DAILY    lisinopriL (PRINIVIL, ZESTRIL) tablet 40 mg  40 mg Oral DAILY    sodium chloride (NS) flush 5-10 mL  5-10 mL IntraVENous PRN    cefTRIAXone (ROCEPHIN) 1 g in 0.9% sodium chloride (MBP/ADV) 50 mL  1 g IntraVENous Q24H    aspirin delayed-release tablet 81 mg  81 mg Oral DAILY    levothyroxine (SYNTHROID) tablet 25 mcg  25 mcg Oral ACB    montelukast (SINGULAIR) tablet 10 mg  10 mg Oral QHS    pregabalin (LYRICA) capsule 25 mg  25 mg Oral BID    QUEtiapine (SEROquel) tablet 12.5 mg  12.5 mg Oral QHS    traMADoL TM24 300 mg (Patient Supplied)  300 mg Oral DAILY    insulin lispro (HUMALOG) injection 0-10 Units  0-10 Units SubCUTAneous AC&HS    hydrALAZINE (APRESOLINE) 20 mg/mL injection 20 mg  20 mg IntraVENous Q6H PRN    atenoloL (TENORMIN) tablet 25 mg  25 mg Oral DAILY    hydrALAZINE (APRESOLINE) tablet 12.5 mg  12.5 mg Oral QID    budesonide (PULMICORT) 500 mcg/2 ml nebulizer suspension  500 mcg Nebulization BID RT    albuterol-ipratropium (DUO-NEB) 2.5 MG-0.5 MG/3 ML  3 mL Nebulization Q6H PRN    fluconazole (DIFLUCAN) tablet 200 mg  200 mg Oral QPM       Discharge Info:   Current Discharge Medication List      CONTINUE these medications which have CHANGED    Details   pregabalin (LYRICA) 25 mg capsule Take 1 Cap by mouth two (2) times a day. Max Daily Amount: 50 mg. 1 tab po bid  Qty: 60 Cap, Refills: 0    Associated Diagnoses: Diabetic polyneuropathy associated with type 2 diabetes mellitus (HCC)      QUEtiapine (SEROquel) 25 mg tablet Take 0.5 Tabs by mouth nightly. Take 1/2 tablet before bedtime  Qty: 15 Tab, Refills: 0    Associated Diagnoses: Presenile dementia, paranoid type, with behavioral disturbance (HCC)      traMADoL (ULTRAM-ER) 300 mg tablet Take 1 Tab by mouth daily for 3 days. Max Daily Amount: 300 mg. Qty: 3 Tab, Refills: 0    Associated Diagnoses: Low back pain radiating to both legs; Generalized OA         CONTINUE these medications which have NOT CHANGED    Details   glipiZIDE (GLUCOTROL) 10 mg tablet Take 10 mg by mouth daily. insulin lispro (HUMALOG) 100 unit/mL kwikpen Inject 4 units subcutaneously with each meal  Qty: 15 mL, Refills: 3      albuterol (PROAIR HFA) 90 mcg/actuation inhaler Take 2 Puffs by inhalation every four (4) hours as needed for Wheezing, Shortness of Breath or Cough.  Indications: asthma attack  Qty: 1 Inhaler, Refills: 0      fluticasone propionate (FLOVENT HFA) 44 mcg/actuation inhaler Take 2 Puffs by inhalation two (2) times a day. Indications: controller medication for asthma  Qty: 1 Inhaler, Refills: 1      glucose blood VI test strips (ONETOUCH ULTRA TEST) strip Use to check blood sugar once daily. E11.42  Qty: 100 Strip, Refills: 11    Associated Diagnoses: DM type 2 with diabetic peripheral neuropathy (HCC)      TRUEPLUS LANCETS 33 gauge misc Check BS 3-4 x daily  Qty: 100 Lancet, Refills: 11      Insulin Needles, Disposable, (DIANA PEN NEEDLE) 32 gauge x 5/32\" ndle Use to administer Novolog TID  Qty: 100 Pen Needle, Refills: 11      !! lancets (ONETOUCH SURESOFT LANCING DEV) misc Use to check blood sugar. E11.42  Qty: 100 Each, Refills: 3    Associated Diagnoses: DM type 2 with diabetic peripheral neuropathy (HCC)      hydrALAZINE (APRESOLINE) 25 mg tablet Take 1 Tab by mouth three (3) times daily. Qty: 90 Tab, Refills: 1      amLODIPine (NORVASC) 10 mg tablet Take 1 Tab by mouth daily. Qty: 90 Tab, Refills: 3    Associated Diagnoses: Essential hypertension with goal blood pressure less than 140/90      levothyroxine (SYNTHROID) 25 mcg tablet Take 1 Tab by mouth Daily (before breakfast). Qty: 90 Tab, Refills: 3      fenofibrate nanocrystallized (TRICOR) 48 mg tablet Take 1 Tab by mouth daily. Qty: 90 Tab, Refills: 3    Associated Diagnoses: Hypertriglyceridemia      lisinopril (PRINIVIL, ZESTRIL) 40 mg tablet Take 1 Tab by mouth daily. Qty: 90 Tab, Refills: 3      atenoloL (TENORMIN) 25 mg tablet Take 1 Tab by mouth daily. Qty: 90 Tab, Refills: 3      montelukast (SINGULAIR) 10 mg tablet Take 1 Tab by mouth daily. Qty: 90 Tab, Refills: 3    Associated Diagnoses: Mild intermittent asthma without complication      Blood-Glucose Meter monitoring kit Check glucose before every meal and at bedtime. Qty: 1 Kit, Refills: 0      !! Lancets (ONETOUCH ULTRASOFT LANCETS) misc Use to check blood sugar.  E11.42  Qty: 100 Each, Refills: 3    Associated Diagnoses: DM type 2 with diabetic peripheral neuropathy (HCC)      docusate sodium (COLACE) 100 mg capsule Take 100 mg by mouth two (2) times daily as needed for Constipation. Cholecalciferol, Vitamin D3, (VITAMIN D3) 2,000 unit cap capsule Take 2,000 Units by mouth two (2) times a day. Qty: 60 capsule, Refills: 5    Associated Diagnoses: Vitamin D deficiency      folic acid 257 mcg tablet Take 400 mcg by mouth two (2) times a day. Stop seven days prior to surgery per anesthesia protocol. exenatide microspheres (Bydureon BCise) 2 mg/0.85 mL atIn 1 Syringe by SubCUTAneous route every seven (7) days. Qty: 4 Syringe, Refills: 0      tiotropium (SPIRIVA WITH HANDIHALER) 18 mcg inhalation capsule Take 1 Cap by inhalation every morning. Indications: controller medication for asthma  Qty: 30 Cap, Refills: 1      NOVOLOG U-100 INSULIN ASPART 100 unit/mL injection Inject 4 units sc 4 times a day with meals and bedtime. Qty: 10 mL, Refills: 5      acetaminophen (TYLENOL) 650 mg TbER Take 650 mg by mouth every six (6) hours as needed for Pain.      polyethylene glycol (MIRALAX) 17 gram packet Take 1 Packet by mouth daily as needed. Indications: constipation  Qty: 1 Each, Refills: 0      aspirin delayed-release 81 mg tablet Take 81 mg by mouth every morning. Take day of surgery per anesthesia protocol. !! - Potential duplicate medications found. Please discuss with provider. STOP taking these medications       trimethoprim-sulfamethoxazole (BACTRIM DS, SEPTRA DS) 160-800 mg per tablet Comments:   Reason for Stopping:         cranberry 400 mg cap Comments:   Reason for Stopping:                 Time spent in patient discharge planning and coordination 35 minutes.     Signed:  Roel Faustin MD

## 2020-04-22 NOTE — PROGRESS NOTES
Patient to discharge to [facility] for STR. Patient's room number is 403 B and report line is [phone]. CM arranged [address] to arrive at 2:30pm. [id number] [facility]  notified of this information. CM attempted to contact patient's daughter Prosper Nance to inform her of transport time. CM unsuccessful at reaching the patient's daughter, however, CM left a voicemail requesting call back. No additional needs voiced at this time. CM remains available. Care Management Interventions  PCP Verified by CM: Yes  Mode of Transport at Discharge: [facility]  Transition of Care Consult (CM Consult): Discharge Planning, SNF  Discharge Durable Medical Equipment: No(Per daughter, the patient has a rollator walker, showerchair, and bedside commode. )  Physical Therapy Consult: Yes  Occupational Therapy Consult: Yes  Speech Therapy Consult: No  Current Support Network: Relative's Home(The patient lives with her daughter Prosper Nance [phone] in a condo. )  Confirm Follow Up Transport: Family(Patient has never driven. Daughter provides transportation when needed. )  The Plan for Transition of Care is Related to the Following Treatment Goals : Patient to obtain care to become medically stable for discharge and to discharge to SNF to assist with physical mobility. The Patient and/or Patient Representative was Provided with a Choice of Provider and Agrees with the Discharge Plan?: Yes  Name of the Patient Representative Who was Provided with a Choice of Provider and Agrees with the Discharge Plan: Sheng Rivas- Patient's daughter assisted with assessment.    Freedom of Choice List was Provided with Basic Dialogue that Supports the Patient's Individualized Plan of Care/Goals, Treatment Preferences and Shares the Quality Data Associated with the Providers?: Yes  The [employer] Information Provided?: No  Discharge Location  Discharge Placement: Skilled nursing facility([address] )

## 2020-04-26 LAB
BACTERIA SPEC CULT: ABNORMAL
SERVICE CMNT-IMP: ABNORMAL

## 2020-05-08 ENCOUNTER — PATIENT OUTREACH (OUTPATIENT)
Dept: CASE MANAGEMENT | Age: 85
End: 2020-05-08

## 2020-05-08 NOTE — PROGRESS NOTES
Community Care Team documentation for patient in [facility]    The information below provided by:Jose Miguel    PT Update: discharged        Nursing Update:stable      Discharge Date: 5/8/20 Home w/ daughter and Tom Caceres       Assign to [address] [facility]

## 2020-05-13 ENCOUNTER — PATIENT OUTREACH (OUTPATIENT)
Dept: CASE MANAGEMENT | Age: 85
End: 2020-05-13

## 2020-05-13 NOTE — PROGRESS NOTES
This note will not be viewable in [address] 1st attempt to reach patient for a 30 day follow up KATERINA PEÑALOZA Call. No answer on daughters phone. Mailbox full unable to leave a message at this time. [address] [facility] Coordinator will attempt to reach patient again within the 30 day period.

## 2020-05-14 ENCOUNTER — PATIENT OUTREACH (OUTPATIENT)
Dept: CASE MANAGEMENT | Age: 85
End: 2020-05-14

## 2020-05-14 NOTE — PROGRESS NOTES
This note will not be viewable in [address] Transition of Care SNF Discharge Follow-Up      Date/Time:  2020 2:29 PM    Patient was admitted to [facility] on 2020 and discharged on 2020 for Acute metabolic encephalopathy. Contacted after discharge from [facility]  on 2020. LPN Care Coordinator contacted the family by telephone to perform post hospital discharge assessment. Verified name and  with family as identifiers. Provided introduction to self, and explanation of the Care Coordinator role. Patient received discharge instructions. LPN reviewed discharge instructions and red flags with patient who verbalized understanding. Patient given an opportunity to ask questions and does not have any further questions or concerns at this time. The patient agrees to contact the PCP office for questions related to their healthcare. LPN provided contact information for future reference. Patients top risk factors for readmission:    Asthma  CKD  Diabetes    Home Health orders at discharge:   [address]:   [facility]    Date of initial or scheduled visit:   2020    Durable Medical Equipment ordered at discharge:   No, patient has her own RW, shower chair, and [address]:   N/A    Durable Medical Equipment received:   N/A    Medication(s):   Medications at Discharge:   Patient's daughter was unsure if the patient was started on any new medications while at [facility] rehab. Medication review was performed with patient, who verbalizes understanding of administration of home medications. There were no barriers to obtaining medications identified at this time. Current Outpatient Medications   Medication Sig    pregabalin (LYRICA) 25 mg capsule Take 1 Cap by mouth two (2) times a day. Max Daily Amount: 50 mg. 1 tab po bid    QUEtiapine (SEROquel) 25 mg tablet Take 0.5 Tabs by mouth nightly.  Take 1/2 tablet before bedtime    glipiZIDE (GLUCOTROL) 10 mg tablet Take 10 mg by mouth daily.  insulin lispro (HUMALOG) 100 unit/mL kwikpen Inject 4 units subcutaneously with each meal    exenatide microspheres (Bydureon BCise) 2 mg/0.85 mL atIn 1 Syringe by SubCUTAneous route every seven (7) days.  tiotropium (SPIRIVA WITH HANDIHALER) 18 mcg inhalation capsule Take 1 Cap by inhalation every morning. Indications: controller medication for asthma    albuterol (PROAIR HFA) 90 mcg/actuation inhaler Take 2 Puffs by inhalation every four (4) hours as needed for Wheezing, Shortness of Breath or Cough. Indications: asthma attack    fluticasone propionate (FLOVENT HFA) 44 mcg/actuation inhaler Take 2 Puffs by inhalation two (2) times a day. Indications: controller medication for asthma    glucose blood VI test strips (ONETOUCH ULTRA TEST) strip Use to check blood sugar once daily. E11.42    TRUEPLUS LANCETS 33 gauge misc Check BS 3-4 x daily    Insulin Needles, Disposable, (DIANA PEN NEEDLE) 32 gauge x 5/32\" ndle Use to administer Novolog TID    NOVOLOG U-100 INSULIN ASPART 100 unit/mL injection Inject 4 units sc 4 times a day with meals and bedtime.  lancets (ONETOUCH SURESOFT LANCING DEV) misc Use to check blood sugar. E11.42    hydrALAZINE (APRESOLINE) 25 mg tablet Take 1 Tab by mouth three (3) times daily.  amLODIPine (NORVASC) 10 mg tablet Take 1 Tab by mouth daily.  levothyroxine (SYNTHROID) 25 mcg tablet Take 1 Tab by mouth Daily (before breakfast).  fenofibrate nanocrystallized (TRICOR) 48 mg tablet Take 1 Tab by mouth daily.  lisinopril (PRINIVIL, ZESTRIL) 40 mg tablet Take 1 Tab by mouth daily.  atenoloL (TENORMIN) 25 mg tablet Take 1 Tab by mouth daily.  montelukast (SINGULAIR) 10 mg tablet Take 1 Tab by mouth daily.  Blood-Glucose Meter monitoring kit Check glucose before every meal and at bedtime.     acetaminophen (TYLENOL) 650 mg TbER Take 650 mg by mouth every six (6) hours as needed for Pain.  Lancets (ONETOUCH ULTRASOFT LANCETS) misc Use to check blood sugar. E11.42    docusate sodium (COLACE) 100 mg capsule Take 100 mg by mouth two (2) times daily as needed for Constipation.  polyethylene glycol (MIRALAX) 17 gram packet Take 1 Packet by mouth daily as needed. Indications: constipation (Patient taking differently: Take 1 Packet by mouth daily as needed (constipation). mix with 8oz water  Indications: constipation)    aspirin delayed-release 81 mg tablet Take 81 mg by mouth every morning. Take day of surgery per anesthesia protocol.  Cholecalciferol, Vitamin D3, (VITAMIN D3) 2,000 unit cap capsule Take 2,000 Units by mouth two (2) times a day. (Patient taking differently: Take 2,000 Units by mouth two (2) times a day. Stop seven days prior to surgery per anesthesia protocol.)    folic acid 360 mcg tablet Take 400 mcg by mouth two (2) times a day. Stop seven days prior to surgery per anesthesia protocol. No current facility-administered medications for this visit. There are no discontinued medications. ADL assessment:   Patient is independent with most ADLs. Daughter states that she needs assistance with getting in and out of the shower and sometimes dressing. [facility] follow up appointment(s):   Future Appointments   Date Time Provider Neva Florian   5/18/2020 10:00 AM Lauryn Philip MD [facility]   5/27/2020  3:30 PM Lauryn Philip MD [facility]   8/11/2020  1:00 PM [facility]SA ULTRASOUND 1 [facility]   8/17/2020  1:30 PM Adiel Magallanes NP [facility]      Non-[facility] follow up appointment(s):   None    Any other questions or concerns expressed by patient? Patient's daughter states that the patient is doing well and adapting nicely to being back home from rehab. She can perform most ADLs independently and has no c/o pain. Her appetite has been good and she is gaining weight back that she dropped while in the hospital and rehab facility.  No concerns or issues voiced by daughter at this time.        Schedule next appointment with ARELY or referral to CTN/ ACM:  N/A    Next follow up call:  7-14 days

## 2020-05-28 ENCOUNTER — PATIENT OUTREACH (OUTPATIENT)
Dept: CASE MANAGEMENT | Age: 85
End: 2020-05-28

## 2020-05-28 NOTE — PROGRESS NOTES
This note will not be viewable in [address] Second attempt to contact patient's daughter regarding ARELY FU with no answer on mobile phone. Message left to return call. CC will attempt outreach again within 5 business days.

## 2020-06-05 ENCOUNTER — PATIENT OUTREACH (OUTPATIENT)
Dept: CASE MANAGEMENT | Age: 85
End: 2020-06-05

## 2020-06-05 NOTE — PROGRESS NOTES
This note will not be viewable in [address] 3rd attempt to contact patient's daughter regarding ARELY FU outreach, no answer on mobile phone. Message left to return call. CC will be removed from care team and the episode will be closed.

## 2021-03-01 ENCOUNTER — HOSPITAL ENCOUNTER (EMERGENCY)
Age: 86
Discharge: HOME | End: 2021-03-02
Attending: EMERGENCY MEDICINE
Payer: MEDICARE

## 2021-03-01 ENCOUNTER — APPOINTMENT (OUTPATIENT)
Dept: GENERAL RADIOLOGY | Age: 86
End: 2021-03-01
Attending: PHYSICIAN ASSISTANT
Payer: MEDICARE

## 2021-03-01 DIAGNOSIS — L03.115 CELLULITIS OF RIGHT LOWER EXTREMITY: Primary | ICD-10-CM

## 2021-03-01 LAB
ALBUMIN SERPL-MCNC: 3.3 G/DL (ref 3.2–4.6)
ALBUMIN/GLOB SERPL: 0.9 {RATIO} (ref 1.2–3.5)
ALP SERPL-CCNC: 80 U/L (ref 50–130)
ALT SERPL-CCNC: 14 U/L (ref 12–65)
ANION GAP SERPL CALC-SCNC: 10 MMOL/L (ref 7–16)
AST SERPL-CCNC: 11 U/L (ref 15–37)
BASOPHILS # BLD: 0.1 K/UL (ref 0–0.2)
BASOPHILS NFR BLD: 1 % (ref 0–2)
BILIRUB SERPL-MCNC: 0.4 MG/DL (ref 0.2–1.1)
BUN SERPL-MCNC: 20 MG/DL (ref 8–23)
CALCIUM SERPL-MCNC: 9.4 MG/DL (ref 8.3–10.4)
CHLORIDE SERPL-SCNC: 103 MMOL/L (ref 98–107)
CO2 SERPL-SCNC: 26 MMOL/L (ref 21–32)
CREAT SERPL-MCNC: 1.32 MG/DL (ref 0.6–1)
DIFFERENTIAL METHOD BLD: ABNORMAL
EOSINOPHIL # BLD: 0 K/UL (ref 0–0.8)
EOSINOPHIL NFR BLD: 0 % (ref 0.5–7.8)
ERYTHROCYTE [DISTWIDTH] IN BLOOD BY AUTOMATED COUNT: 12.6 % (ref 11.9–14.6)
GLOBULIN SER CALC-MCNC: 3.7 G/DL (ref 2.3–3.5)
GLUCOSE SERPL-MCNC: 237 MG/DL (ref 65–100)
HCT VFR BLD AUTO: 36.4 % (ref 35.8–46.3)
HGB BLD-MCNC: 11.8 G/DL (ref 11.7–15.4)
IMM GRANULOCYTES # BLD AUTO: 0 K/UL (ref 0–0.5)
IMM GRANULOCYTES NFR BLD AUTO: 0 % (ref 0–5)
LACTATE SERPL-SCNC: 3.7 MMOL/L (ref 0.4–2)
LYMPHOCYTES # BLD: 1.4 K/UL (ref 0.5–4.6)
LYMPHOCYTES NFR BLD: 14 % (ref 13–44)
MCH RBC QN AUTO: 30.6 PG (ref 26.1–32.9)
MCHC RBC AUTO-ENTMCNC: 32.4 G/DL (ref 31.4–35)
MCV RBC AUTO: 94.3 FL (ref 79.6–97.8)
MONOCYTES # BLD: 0.8 K/UL (ref 0.1–1.3)
MONOCYTES NFR BLD: 8 % (ref 4–12)
NEUTS SEG # BLD: 7.2 K/UL (ref 1.7–8.2)
NEUTS SEG NFR BLD: 76 % (ref 43–78)
NRBC # BLD: 0 K/UL (ref 0–0.2)
PLATELET # BLD AUTO: 307 K/UL (ref 150–450)
PMV BLD AUTO: 9 FL (ref 9.4–12.3)
POTASSIUM SERPL-SCNC: 4 MMOL/L (ref 3.5–5.1)
PROT SERPL-MCNC: 7 G/DL (ref 6.3–8.2)
RBC # BLD AUTO: 3.86 M/UL (ref 4.05–5.2)
SODIUM SERPL-SCNC: 139 MMOL/L (ref 136–145)
WBC # BLD AUTO: 9.5 K/UL (ref 4.3–11.1)

## 2021-03-01 PROCEDURE — 87040 BLOOD CULTURE FOR BACTERIA: CPT

## 2021-03-01 PROCEDURE — 85025 COMPLETE CBC W/AUTO DIFF WBC: CPT

## 2021-03-01 PROCEDURE — 83605 ASSAY OF LACTIC ACID: CPT

## 2021-03-01 PROCEDURE — 99283 EMERGENCY DEPT VISIT LOW MDM: CPT

## 2021-03-01 PROCEDURE — 80053 COMPREHEN METABOLIC PANEL: CPT

## 2021-03-01 PROCEDURE — 73630 X-RAY EXAM OF FOOT: CPT

## 2021-03-01 PROCEDURE — 81003 URINALYSIS AUTO W/O SCOPE: CPT

## 2021-03-01 RX ORDER — DOXYCYCLINE 100 MG/1
100 CAPSULE ORAL
Status: COMPLETED | OUTPATIENT
Start: 2021-03-01 | End: 2021-03-02

## 2021-03-01 RX ORDER — SODIUM CHLORIDE 9 MG/ML
1000 INJECTION, SOLUTION INTRAVENOUS ONCE
Status: COMPLETED | OUTPATIENT
Start: 2021-03-01 | End: 2021-03-02

## 2021-03-01 RX ORDER — DOXYCYCLINE HYCLATE 100 MG
100 TABLET ORAL 2 TIMES DAILY
Qty: 20 TAB | Refills: 0 | Status: SHIPPED | OUTPATIENT
Start: 2021-03-01 | End: 2021-03-11

## 2021-03-02 VITALS
HEART RATE: 88 BPM | DIASTOLIC BLOOD PRESSURE: 91 MMHG | SYSTOLIC BLOOD PRESSURE: 169 MMHG | RESPIRATION RATE: 18 BRPM | HEIGHT: 64 IN | OXYGEN SATURATION: 95 % | TEMPERATURE: 98.1 F | BODY MASS INDEX: 27.66 KG/M2 | WEIGHT: 162 LBS

## 2021-03-02 LAB — LACTATE SERPL-SCNC: 2.7 MMOL/L (ref 0.4–2)

## 2021-03-02 PROCEDURE — 83605 ASSAY OF LACTIC ACID: CPT

## 2021-03-02 PROCEDURE — 74011250637 HC RX REV CODE- 250/637: Performed by: PHYSICIAN ASSISTANT

## 2021-03-02 PROCEDURE — 74011250636 HC RX REV CODE- 250/636: Performed by: PHYSICIAN ASSISTANT

## 2021-03-02 RX ORDER — DOXYCYCLINE HYCLATE 100 MG
100 TABLET ORAL 2 TIMES DAILY
Qty: 20 TAB | Refills: 0 | Status: SHIPPED | OUTPATIENT
Start: 2021-03-02 | End: 2021-03-12

## 2021-03-02 RX ADMIN — DOXYCYCLINE HYCLATE 100 MG: 100 CAPSULE ORAL at 00:15

## 2021-03-02 RX ADMIN — SODIUM CHLORIDE 1000 ML: 900 INJECTION, SOLUTION INTRAVENOUS at 00:11

## 2021-03-02 NOTE — DISCHARGE INSTRUCTIONS
Stop the Augmentin. Start the Doxycycline. Gently wash the area multiple times a day with soap and water, blot dry and apply neosporin and a bandaid. Follow up with your PCP for recheck.

## 2021-03-02 NOTE — ED NOTES
I have reviewed discharge instructions with the patient and daughter Lawpatricione Books via telephone). The patient and daughter Lawayne Books via telephone) verbalized understanding. Patient left ED via Discharge Method: stretcher to Home with transport. Opportunity for questions and clarification provided. Patient given 1 scripts. To continue your aftercare when you leave the hospital, you may receive an automated call from our care team to check in on how you are doing. This is a free service and part of our promise to provide the best care and service to meet your aftercare needs.  If you have questions, or wish to unsubscribe from this service please call [phone]. Thank you for Choosing our [employer] Emergency Department.

## 2021-03-02 NOTE — ED NOTES
Called lab, they said someone is in the chart so they could not start blood. They will start it now.

## 2021-03-02 NOTE — ED PROVIDER NOTES
Patient is here with swelling and redness to her right foot. She has a lesion/callus to the medial aspect on the arch. This has been there for about the last week. She is a diabetic and does not have any feeling in her feet. She does wear specialized shoes. She went to the emergency room at [facility] and they gave her an injection of Rocephin and sent her home with Keflex. She saw her primary care physician today in follow-up who changed her to Augmentin today. She rosalina a line around the redness on her foot and told her if it started to spread that she should come to the emergency room so she is here tonight because it started going beyond the line. She has no fever, nausea, vomiting, chest pain, shortness of breath, abdominal pain, dizziness, weakness, dyspnea on exertion, orthopnea, drainage from the wound, trouble with urination or bowel movements or other new symptoms. She did ambulate to the room without difficulty and is well-hydrated. Patient thinks her diabetic shoes are rubbing her foot. She does live with her daughter. The history is provided by the patient. Foot Pain   This is a recurrent problem. The current episode started more than 2 days ago. The problem occurs constantly. The problem has not changed since onset. The pain is present in the right foot. The pain is at a severity of 0/10. The patient is experiencing no pain. Pertinent negatives include no numbness, full range of motion, no stiffness, no tingling, no itching, no back pain and no neck pain. The symptoms are aggravated by movement, activity and standing. She has tried nothing for the symptoms. There has been no history of extremity trauma.         Past Medical History:   Diagnosis Date    Asthma     sees Dr. Haja Sue Full dentures     GERD (gastroesophageal reflux disease)     Hypertension     Incontinence of urine in female     Neuropathy     Sleep apnea     no C PAP       Past Surgical History: Procedure Laterality Date    HX CATARACT REMOVAL Bilateral     HX CHOLECYSTECTOMY      HX COLONOSCOPY      HX HYSTERECTOMY      HX KNEE REPLACEMENT Bilateral     HX ORTHOPAEDIC      back    NC BREAST SURGERY PROCEDURE UNLISTED      bilat lumpectomy    NC CHEST SURGERY PROCEDURE UNLISTED      WHAT IS THIS? ??    VASCULAR SURGERY PROCEDURE UNLIST Left 09/29/2017    aortogram/arteriogram- PTA x2         Family History:   Problem Relation Age of Onset    No Known Problems Mother     No Known Problems Father        Social History     Socioeconomic History    Marital status:      Spouse name: Not on file    Number of children: Not on file    Years of education: Not on file    Highest education level: Not on file   Occupational History    Not on file   Social Needs    Financial resource strain: Not on file    Food insecurity     Worry: Not on file     Inability: Not on file    Transportation needs     Medical: Not on file     Non-medical: Not on file   Tobacco Use    Smoking status: Never Smoker    Smokeless tobacco: Never Used   Substance and Sexual Activity    Alcohol use: No    Drug use: No    Sexual activity: Not on file   Lifestyle    Physical activity     Days per week: Not on file     Minutes per session: Not on file    Stress: Not on file   Relationships    Social connections     Talks on phone: Not on file     Gets together: Not on file     Attends Faith service: Not on file     Active member of club or organization: Not on file     Attends meetings of clubs or organizations: Not on file     Relationship status: Not on file    Intimate partner violence     Fear of current or ex partner: Not on file     Emotionally abused: Not on file     Physically abused: Not on file     Forced sexual activity: Not on file   Other Topics Concern    Not on file   Social History Narrative    Not on file         ALLERGIES: Aleve [naproxen sodium], Hydrocodone-acetaminophen, Metformin, Phenobarbital, and Statins-hmg-coa reductase inhibitors    Review of Systems   Constitutional: Negative. HENT: Negative. Eyes: Negative. Respiratory: Negative. Cardiovascular: Negative. Gastrointestinal: Negative. Genitourinary: Negative. Musculoskeletal: Negative. Negative for back pain, neck pain and stiffness. Skin: Positive for color change and wound. Negative for itching. Neurological: Negative. Negative for tingling and numbness. Psychiatric/Behavioral: Negative. All other systems reviewed and are negative. Vitals:    03/01/21 2119 03/01/21 2120   BP:  (!) 158/89   Pulse:  87   Resp:  18   Temp: 98.1 °F (36.7 °C)    SpO2:  98%   Weight:  73.5 kg (162 lb)   Height:  5' 4\" (1.626 m)            Physical Exam  Vitals signs and nursing note reviewed. Constitutional:       Appearance: She is well-developed. HENT:      Head: Normocephalic and atraumatic. Right Ear: External ear normal.      Left Ear: External ear normal.      Nose: Nose normal.   Eyes:      Conjunctiva/sclera: Conjunctivae normal.      Pupils: Pupils are equal, round, and reactive to light. Neck:      Musculoskeletal: Normal range of motion and neck supple. Cardiovascular:      Rate and Rhythm: Normal rate and regular rhythm. Heart sounds: Normal heart sounds. Pulmonary:      Effort: Pulmonary effort is normal.      Breath sounds: Normal breath sounds. Abdominal:      General: Bowel sounds are normal.      Palpations: Abdomen is soft. Musculoskeletal: Normal range of motion. Feet:    Skin:     General: Skin is warm and dry. Neurological:      Mental Status: She is alert and oriented to person, place, and time. Deep Tendon Reflexes: Reflexes are normal and symmetric. Psychiatric:         Behavior: Behavior normal.         Thought Content:  Thought content normal.         Judgment: Judgment normal.                  MDM  Number of Diagnoses or Management Options  Cellulitis of right lower extremity: new and does not require workup  Diagnosis management comments: 1:24 AM lactate has improved after IV fluids. Discussed results with patient, need for close outpatient follow-up. She appears comfortable and in no distress. She has a normal white count and is afebrile. Amount and/or Complexity of Data Reviewed  Clinical lab tests: ordered and reviewed  Tests in the radiology section of CPT®: ordered and reviewed    Risk of Complications, Morbidity, and/or Mortality  Presenting problems: moderate  Diagnostic procedures: moderate  Management options: moderate    Patient Progress  Patient progress: improved         Procedures    11:18 PM Spoke with Dr. Kevin Ruiz regarding patient. We will change patient to Doxycycline to cover possible MRSA. Nothing to drain on exam. Foot does not look much different than picture in the chart from the office today with her PCP. WBC is normal. Stop the Augmentin. Take the Doxycycline. 11:49 PM Care transferred to Dr. Kevin Ruzi at shift change. Lactic acid is elevated. Will add 2 hour repeat, IVF ordered.

## 2021-05-03 ENCOUNTER — TELEPHONE (OUTPATIENT)
Dept: PHARMACY | Age: 86
End: 2021-05-03

## 2021-05-03 NOTE — TELEPHONE ENCOUNTER
CLINICAL PHARMACY: ADHERENCE REVIEW  Identified care gap per Rajesh; fills at [facility]: ACE/ARB, Diabetes and Statin adherence    Last Visit: 4/7/21 PCP; 3/17/21 cardiology (see Blaire Williamson)    Patient identified as LIS = 3, therefore patient's co-pays are $0.00 through all phases of the benefit regardless of the days' supply dispensed     Per 3/17/21 cardiology office visit documentation [facility])  Patient has been to the ER a few times since saw her. She went twice in the last month for dyspnea. This is in the setting of not taking her medications consistently. Her daughter is with her today who she lives with. She states that the patient has some memory difficulty and has had issues remembering when to take her medications. She also thinks that patient was out of medications for least a week or 2. She has now been back on her medications consistently for the past week and has been feeling much better. ASSESSMENT  ACE/ARB ADHERENCE    Per Insurance Records through 4/25/21 ([address] [facility] [place] = did not meet metric; YTD PDC = filled only once):   Lisinopril 40 mg tab last filled on 3/1/21 for 90 day supply. Next refill due: 5/30/21  Last Rx 3/1/21 x 90 tabs, 3 refills    BP Readings from Last 3 Encounters:   03/02/21 (!) 169/91   03/01/21 (!) 172/90   11/24/20 (!) 142/80     Estimated Creatinine Clearance: 26.4 mL/min (A) (by C-G formula based on SCr of 1.5 mg/dL (H)). DIABETES ADHERENCE    Per Insurance Records through 4/25/21 ([address] [place] = n/a; YTD PDC = filled only once):   [place] last filled on 4/7/21 for 28 day supply.  Next refill due: 5/5/21  Last Rx 4/7/21 x 12 syringes, 1 refill    Lab Results   Component Value Date/Time    Hemoglobin A1c 8.4 (H) 04/07/2021 03:10 PM    Hemoglobin A1c 7.8 (H) 11/24/2020 10:58 AM    Hemoglobin A1c 7.8 (H) 07/20/2020 03:41 PM     STATIN ADHERENCE    Per Insurance Records through 4/25/21 ([place] = did not meet metric; YTD PDC = filled only once): Atorvastatin 40 mg tab last filled on 3/17/21 for 30 day supply. Next refill due: 4/16/21  Prescriber Yuba Copier (cardiology - see Marti Mackey), last Rx 3/17/21 x 30 tabs, 11 refills    Lab Results   Component Value Date/Time    Cholesterol, total 203 (H) 04/07/2021 03:10 PM    HDL Cholesterol 57 04/07/2021 03:10 PM    LDL, calculated 77 04/07/2021 03:10 PM    LDL, calculated 141 (H) 01/29/2020 02:36 PM    VLDL, calculated 69 (H) 04/07/2021 03:10 PM    VLDL, calculated 64 (H) 01/29/2020 02:36 PM    Triglyceride 439 (H) 04/07/2021 03:10 PM     ALT (SGPT)   Date Value Ref Range Status   04/07/2021 11 0 - 32 IU/L Final     AST (SGOT)   Date Value Ref Range Status   04/07/2021 13 0 - 40 IU/L Final     The ASCVD Risk score (Kelly Coronado, et al., 2013) failed to calculate for the following reasons: The 2013 ASCVD risk score is only valid for ages 36 to 78     PLAN  The following are interventions that have been identified:  - Patient overdue refilling atorvastatin and active on home medication list  - Patient eligible for 90 day supply of Bydureon (possibly has secondary insurance and limits supply?), atorvastatin - $0 copay for 30 and 90 ds  - Did not yet speak with pharmacy, possible that pt has since filled    Attempting to reach patient/daughter to review.  Left message asking for return call ( for dtadin Sultana).     Future Appointments   Date Time Provider Neva Florian   5/3/2021  4:15 PM [facility] COVID VACCINE 21 DAY [facility]   7/7/2021  2:00 PM Meldon Hammans, MD [facility] PVF PVD       Mine Colunga, PharmD, [facility]  Department, toll free: [phone], option 7

## 2021-05-03 NOTE — LETTER
[address] Catina Ruiz 10 Garret Bailey [address] [facility] [address]  
 
 
 
 
05/07/21 Dear Garret Bailey, We tried to reach you recently regarding your medications, but were unable to reach you on the telephone. We have on file that you are currently taking Bydureon injection 2 mg every week for blood sugars, atorvastatin (generic Lipitor) 40 mg tablet - 1 tablet every evening for cholesterol. If you are no longer taking or taking differently, please call us at the number below so that we can discuss this and update your medication profile. It appears that these medications have not been filled at proper times. We are worried you might be missing doses or not taking them as directed. It is important that you take your medications regularly and try not to miss a single dose. Some ways to help you remember to take and refill your medications are to: · Use a pill box, set an alarm, and/or keep your medication near something that you do every day · Fill a 3-month supply of your prescription at a time to save you time and trips to the pharmacy  if you would like assistance in switching your prescriptions to a 3-month supply, please contact us · Ask your pharmacy if they participate in [facility]", a program where you can  all of your medications on the same day · Ask your pharmacy if you can be set up with automatic refill, where they will automatically refill your prescription when it is due and let you know it's ready to  Sincerely,  
Carissa Lewis, PharmD, [address], toll free: [phone], option 7

## 2021-05-07 NOTE — TELEPHONE ENCOUNTER
Another attempt made to reach patient/daughter. No answer, LM. Did call to pharmacy and medications have no been refilled since dates below, they will get ready for her just in case since $0 copay. For Pharmacy [address] in place: NO    Recommendation Provided To: Pharmacy: 1    Intervention Detail: Adherence Monitorin   Gap Closed?  NO    Intervention Accepted By: Pharmacy: 1   Time Spent (min): 10

## 2022-02-08 PROBLEM — R07.9 CHEST PAIN: Status: ACTIVE | Noted: 2020-08-14

## 2022-02-08 PROBLEM — R77.8 ELEVATED TROPONIN: Status: ACTIVE | Noted: 2020-10-05

## 2022-02-08 PROBLEM — R53.81 DEBILITY: Status: ACTIVE | Noted: 2020-10-05

## 2022-02-08 PROBLEM — K59.00 CONSTIPATION: Status: ACTIVE | Noted: 2017-07-18

## 2022-02-08 PROBLEM — G30.9 ALZHEIMER'S DEMENTIA WITHOUT BEHAVIORAL DISTURBANCE (HCC): Status: ACTIVE | Noted: 2020-10-05

## 2022-02-08 PROBLEM — E03.8 OTHER SPECIFIED HYPOTHYROIDISM: Status: ACTIVE | Noted: 2021-06-30

## 2022-02-08 PROBLEM — J44.9 COPD (CHRONIC OBSTRUCTIVE PULMONARY DISEASE) (HCC): Status: ACTIVE | Noted: 2020-12-14

## 2022-02-08 PROBLEM — R55 SYNCOPE AND COLLAPSE: Status: ACTIVE | Noted: 2021-06-30

## 2022-02-08 PROBLEM — I50.42 CHRONIC COMBINED SYSTOLIC AND DIASTOLIC HEART FAILURE (HCC): Status: ACTIVE | Noted: 2021-06-30

## 2022-02-08 PROBLEM — F02.80 ALZHEIMER'S DEMENTIA WITHOUT BEHAVIORAL DISTURBANCE (HCC): Status: ACTIVE | Noted: 2020-10-05
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
## 2022-03-18 PROBLEM — E11.42 DM TYPE 2 WITH DIABETIC PERIPHERAL NEUROPATHY (HCC): Status: ACTIVE | Noted: 2017-03-24

## 2022-03-18 PROBLEM — E11.9 DIABETES MELLITUS TYPE 2, CONTROLLED (HCC): Status: ACTIVE | Noted: 2018-02-26

## 2022-03-18 PROBLEM — R53.81 DEBILITY: Status: ACTIVE | Noted: 2020-10-05

## 2022-03-18 PROBLEM — E11.610 DIABETIC CHARCOT FOOT (HCC): Status: ACTIVE | Noted: 2017-03-24

## 2022-03-18 PROBLEM — R07.9 CHEST PAIN: Status: ACTIVE | Noted: 2020-08-14

## 2022-03-18 PROBLEM — I50.42 CHRONIC COMBINED SYSTOLIC AND DIASTOLIC HEART FAILURE (HCC): Status: ACTIVE | Noted: 2021-06-30

## 2022-03-18 PROBLEM — J44.9 COPD (CHRONIC OBSTRUCTIVE PULMONARY DISEASE) (HCC): Status: ACTIVE | Noted: 2020-12-14

## 2022-03-19 PROBLEM — R77.8 ELEVATED TROPONIN: Status: ACTIVE | Noted: 2020-10-05

## 2022-03-19 PROBLEM — R79.89 ELEVATED TROPONIN: Status: ACTIVE | Noted: 2020-10-05

## 2022-03-19 PROBLEM — I73.9 PAD (PERIPHERAL ARTERY DISEASE) (HCC): Status: ACTIVE | Noted: 2018-11-09

## 2022-03-19 PROBLEM — I99.8 ISCHEMIA OF LEFT LOWER EXTREMITY: Status: ACTIVE | Noted: 2017-10-30

## 2022-03-19 PROBLEM — K59.00 CONSTIPATION: Status: ACTIVE | Noted: 2017-07-18

## 2022-03-19 PROBLEM — M15.4 EROSIVE OSTEOARTHRITIS OF BOTH HANDS: Status: ACTIVE | Noted: 2017-07-18

## 2022-03-19 PROBLEM — E03.8 OTHER SPECIFIED HYPOTHYROIDISM: Status: ACTIVE | Noted: 2021-06-30

## 2022-03-19 PROBLEM — Z91.81 HISTORY OF RECENT FALL: Status: ACTIVE | Noted: 2018-07-19

## 2022-03-19 PROBLEM — R55 SYNCOPE AND COLLAPSE: Status: ACTIVE | Noted: 2021-06-30

## 2022-03-19 PROBLEM — F02.80 ALZHEIMER'S DEMENTIA WITHOUT BEHAVIORAL DISTURBANCE (HCC): Status: ACTIVE | Noted: 2020-10-05

## 2022-03-19 PROBLEM — G30.9 ALZHEIMER'S DEMENTIA WITHOUT BEHAVIORAL DISTURBANCE (HCC): Status: ACTIVE | Noted: 2020-10-05

## 2022-03-20 PROBLEM — N18.30 STAGE 3 CHRONIC KIDNEY DISEASE (HCC): Status: ACTIVE | Noted: 2017-09-12

## 2023-07-24 ENCOUNTER — HOSPITAL ENCOUNTER (OUTPATIENT)
Dept: WOUND CARE | Age: 88
Discharge: HOME | End: 2023-07-24
Payer: MEDICARE

## 2023-07-24 VITALS
WEIGHT: 174 LBS | DIASTOLIC BLOOD PRESSURE: 68 MMHG | HEART RATE: 80 BPM | TEMPERATURE: 97.6 F | HEIGHT: 61 IN | BODY MASS INDEX: 32.85 KG/M2 | SYSTOLIC BLOOD PRESSURE: 163 MMHG

## 2023-07-24 DIAGNOSIS — E11.621 DIABETIC ULCER OF LEFT MIDFOOT ASSOCIATED WITH TYPE 2 DIABETES MELLITUS, WITH FAT LAYER EXPOSED (HCC): ICD-10-CM

## 2023-07-24 DIAGNOSIS — L97.422 DIABETIC ULCER OF LEFT MIDFOOT ASSOCIATED WITH TYPE 2 DIABETES MELLITUS, WITH FAT LAYER EXPOSED (HCC): ICD-10-CM

## 2023-07-24 PROCEDURE — 11042 DBRDMT SUBQ TIS 1ST 20SQCM/<: CPT

## 2023-07-24 RX ORDER — ATORVASTATIN CALCIUM 10 MG/1
TABLET, FILM COATED ORAL
COMMUNITY
Start: 2023-07-16

## 2023-07-24 RX ORDER — NALOXONE HYDROCHLORIDE 4 MG/.1ML
4 SPRAY NASAL
COMMUNITY
Start: 2022-03-02

## 2023-07-24 RX ORDER — INSULIN GLARGINE 100 [IU]/ML
INJECTION, SOLUTION SUBCUTANEOUS
COMMUNITY
Start: 2023-07-15

## 2023-07-24 RX ORDER — CHOLECALCIFEROL (VITAMIN D3) 125 MCG
5 CAPSULE ORAL
COMMUNITY
Start: 2022-03-02

## 2023-07-24 RX ORDER — LANOLIN ALCOHOL/MO/W.PET/CERES
CREAM (GRAM) TOPICAL
COMMUNITY
Start: 2021-07-08 | End: 2023-07-24

## 2023-07-24 RX ORDER — TITANIUM DIOXIDE, OCTINOXATE, ZINC OXIDE 4.61; 1.6; .78 G/40ML; G/40ML; G/40ML
1 CREAM TOPICAL DAILY
COMMUNITY

## 2023-07-24 RX ORDER — AMLODIPINE BESYLATE 5 MG/1
TABLET ORAL
COMMUNITY
Start: 2023-07-10 | End: 2023-07-24

## 2023-07-24 RX ORDER — INSULIN LISPRO 100 [IU]/ML
INJECTION, SOLUTION INTRAVENOUS; SUBCUTANEOUS
COMMUNITY
Start: 2023-04-22

## 2023-07-24 RX ORDER — BUDESONIDE AND FORMOTEROL FUMARATE DIHYDRATE 80; 4.5 UG/1; UG/1
2 AEROSOL RESPIRATORY (INHALATION) 2 TIMES DAILY
COMMUNITY
Start: 2022-02-16

## 2023-07-24 RX ORDER — OXYCODONE 13.5 MG/1
CAPSULE, EXTENDED RELEASE ORAL
COMMUNITY
Start: 2023-07-11

## 2023-07-24 RX ORDER — LEVOTHYROXINE SODIUM 0.03 MG/1
TABLET ORAL
COMMUNITY
Start: 2021-07-08 | End: 2023-07-24

## 2023-07-24 RX ORDER — LIDOCAINE 50 MG/G
PATCH TOPICAL
COMMUNITY
Start: 2023-07-04

## 2023-07-24 RX ORDER — ASPIRIN 81 MG/1
TABLET, CHEWABLE ORAL
COMMUNITY
Start: 2021-07-08

## 2023-07-24 RX ORDER — CARVEDILOL 25 MG/1
TABLET ORAL
COMMUNITY
Start: 2021-07-08

## 2023-07-24 RX ORDER — SENNOSIDES 8.6 MG
CAPSULE ORAL
COMMUNITY
Start: 2021-07-08 | End: 2023-07-24

## 2023-07-24 RX ORDER — LORATADINE 10 MG/1
CAPSULE, LIQUID FILLED ORAL
COMMUNITY
Start: 2021-07-08

## 2023-07-24 NOTE — FLOWSHEET NOTE
07/24/23 1424   Wound 07/24/23 Foot Left;Plantar #1   Date First Assessed/Time First Assessed: 07/24/23 1423   Present on Hospital Admission: Yes  Wound Approximate Age at First Assessment (Weeks): 24 weeks  Primary Wound Type: Diabetic Ulcer  Location: Foot  Wound Location Orientation: Left;Plantar  Wou. ..    Wound Image     Wound Etiology Diabetic Patel 2   Dressing Status Intact;Dry;Clean   Wound Cleansed Cleansed with saline   Dressing/Treatment Hydrofera blue   Offloading for Diabetic Foot Ulcers Offloading ordered   Wound Length (cm) 0.3 cm   Wound Width (cm) 0.5 cm   Wound Depth (cm) 0.2 cm   Wound Surface Area (cm^2) 0.15 cm^2   Wound Volume (cm^3) 0.03 cm^3   Post-Procedure Length (cm) 0.5 cm   Post-Procedure Width (cm) 0.7 cm   Post-Procedure Depth (cm) 0.1 cm   Post-Procedure Surface Area (cm^2) 0.35 cm^2   Post-Procedure Volume (cm^3) 0.035 cm^3   Wound Assessment Pink/red   Drainage Amount Small   Drainage Description Serosanguinous   Odor None   Vicki-wound Assessment Hyperkeratosis (callous)   Wound Thickness Description not for Pressure Injury Full thickness     Post debridement

## 2023-07-24 NOTE — WOUND CARE
[facility] Inst[facility]  [address] [facility]  [address], [address] [place]   Phone [phone]   Fax [phone]      NAME:  Elke Mejia OF BIRTH:  5/30/1934  MEDICAL RECORD NUMBER:  [id number]  DATE:  7/24/2023    Return Appointment:   1 week with Mike Johnson DO      Instructions: Left plantar foot:  Cleanse with normal saline  Apply vashe OTC cleanser onto gauze and let sit for at least 1 minute  Hydrofera Ready: Cut to wound size, place in wound bed, shiny side out. Cover with bordered foam  Change 3 times per week      Get inserts re-fitted     Patient to offload wound with diabetic shoes with inserts while standing or weight bearing. Do not get wound wet. May purchase cast cover at local pharmacy to keep dry in shower. Wound healing is greatly slowed when blood glucose levels are greater than 200. Monitor glucose levels daily to ensure tight glucose control. Follow up with PCP if your glucose levels are frequently greater than 200. Increase protein intake to promote wound healing. Protein supplements such as Solo and Ensure are great options. Should you experience increased redness, swelling, pain, foul odor, size of wound(s), or have a temperature over 101 degrees please contact the [address] at [phone] or if after hours contact your primary care physician or go to the hospital emergency department. PLEASE NOTE: IF YOU ARE UNABLE TO OBTAIN WOUND SUPPLIES, CONTINUE TO USE THE SUPPLIES YOU HAVE AVAILABLE UNTIL YOU ARE ABLE TO REACH US. IT IS MOST IMPORTANT TO KEEP THE WOUND COVERED AT ALL TIMES.     Electronically signed Jelani Espinosa RN on 7/24/2023 at 2:33 PM

## 2023-07-24 NOTE — FLOWSHEET NOTE
07/24/23 1424   Wound 07/24/23 Foot Left;Plantar #1   Date First Assessed/Time First Assessed: 07/24/23 1423   Present on Hospital Admission: Yes  Wound Approximate Age at First Assessment (Weeks): 24 weeks  Primary Wound Type: Diabetic Ulcer  Location: Foot  Wound Location Orientation: Left;Plantar  Wou. ..    Wound Image    Wound Etiology Diabetic Patel 2   Dressing Status Intact;Dry;Clean   Wound Cleansed Cleansed with saline   Dressing/Treatment Hydrofera blue   Offloading for Diabetic Foot Ulcers Offloading ordered   Wound Length (cm) 0.3 cm   Wound Width (cm) 0.5 cm   Wound Depth (cm) 0.2 cm   Wound Surface Area (cm^2) 0.15 cm^2   Wound Volume (cm^3) 0.03 cm^3   Wound Assessment Pink/red   Drainage Amount Small   Drainage Description Serosanguinous   Odor None   Vicki-wound Assessment Hyperkeratosis (callous)   Wound Thickness Description not for Pressure Injury Full thickness

## 2023-07-24 NOTE — DISCHARGE INSTRUCTIONS
[facility]  [address] [facility]  [address], [address]  Phone [phone]   Fax [phone]      NAME:  Maia Ruiz OF BIRTH:  5/30/1934  MEDICAL RECORD NUMBER:  [id number]  DATE:  @ED@    Return Appointment:   1 week with Darlen Sacks, DO      Instructions:  Left plantar foot:  Cleanse with normal saline  Apply vashe OTC cleanser onto gauze and let sit for at least 1 minute  Hydrofera Ready: Cut to wound size, place in wound bed, shiny side out. Cover with bordered foam  Change 3 times per week        Get inserts re-fitted after wound is healed     Patient to offload wound with diabetic shoes with inserts while standing or weight bearing. Do not get wound wet. May purchase cast cover at local pharmacy to keep dry in shower. Wound healing is greatly slowed when blood glucose levels are greater than 200. Monitor glucose levels daily to ensure tight glucose control. Follow up with PCP if your glucose levels are frequently greater than 200. Increase protein intake to promote wound healing. Protein supplements such as Solo and Ensure are great options. Should you experience increased redness, swelling, pain, foul odor, size of wound(s), or have a temperature over 101 degrees please contact the [address] at [phone] or if after hours contact your primary care physician or go to the hospital emergency department. PLEASE NOTE: IF YOU ARE UNABLE TO OBTAIN WOUND SUPPLIES, CONTINUE TO USE THE SUPPLIES YOU HAVE AVAILABLE UNTIL YOU ARE ABLE TO REACH US. IT IS MOST IMPORTANT TO KEEP THE WOUND COVERED AT ALL TIMES.     Electronically signed Annemarie Morin RN on 7/24/2023 at 2:41 PM

## 2023-07-30 PROBLEM — E11.621 DIABETIC ULCER OF LEFT MIDFOOT ASSOCIATED WITH TYPE 2 DIABETES MELLITUS, WITH FAT LAYER EXPOSED (HCC): Chronic | Status: ACTIVE | Noted: 2023-07-30

## 2023-07-30 PROBLEM — E11.621 DIABETIC ULCER OF LEFT MIDFOOT ASSOCIATED WITH TYPE 2 DIABETES MELLITUS, WITH FAT LAYER EXPOSED (HCC): Status: ACTIVE | Noted: 2023-07-30

## 2023-07-30 PROBLEM — L97.422 DIABETIC ULCER OF LEFT MIDFOOT ASSOCIATED WITH TYPE 2 DIABETES MELLITUS, WITH FAT LAYER EXPOSED (HCC): Chronic | Status: ACTIVE | Noted: 2023-07-30

## 2023-07-30 PROBLEM — L97.422 DIABETIC ULCER OF LEFT MIDFOOT ASSOCIATED WITH TYPE 2 DIABETES MELLITUS, WITH FAT LAYER EXPOSED (HCC): Status: ACTIVE | Noted: 2023-07-30

## 2023-07-30 RX ORDER — SODIUM CHLOR/HYPOCHLOROUS ACID 0.033 %
SOLUTION, IRRIGATION IRRIGATION ONCE
OUTPATIENT
Start: 2023-07-30 | End: 2023-07-30

## 2023-07-30 RX ORDER — GINSENG 100 MG
CAPSULE ORAL ONCE
OUTPATIENT
Start: 2023-07-30 | End: 2023-07-30

## 2023-07-30 RX ORDER — LIDOCAINE HYDROCHLORIDE 20 MG/ML
JELLY TOPICAL ONCE
OUTPATIENT
Start: 2023-07-30 | End: 2023-07-30

## 2023-07-30 RX ORDER — BETAMETHASONE DIPROPIONATE 0.05 %
OINTMENT (GRAM) TOPICAL ONCE
OUTPATIENT
Start: 2023-07-30 | End: 2023-07-30

## 2023-07-30 RX ORDER — GENTAMICIN SULFATE 1 MG/G
OINTMENT TOPICAL ONCE
OUTPATIENT
Start: 2023-07-30 | End: 2023-07-30

## 2023-07-30 RX ORDER — CLOBETASOL PROPIONATE 0.5 MG/G
OINTMENT TOPICAL ONCE
OUTPATIENT
Start: 2023-07-30 | End: 2023-07-30

## 2023-07-30 RX ORDER — IBUPROFEN 200 MG
TABLET ORAL ONCE
OUTPATIENT
Start: 2023-07-30 | End: 2023-07-30

## 2023-07-30 RX ORDER — BACITRACIN ZINC AND POLYMYXIN B SULFATE 500; 1000 [USP'U]/G; [USP'U]/G
OINTMENT TOPICAL ONCE
OUTPATIENT
Start: 2023-07-30 | End: 2023-07-30

## 2023-07-30 RX ORDER — LIDOCAINE HYDROCHLORIDE 40 MG/ML
SOLUTION TOPICAL ONCE
OUTPATIENT
Start: 2023-07-30 | End: 2023-07-30

## 2023-07-30 RX ORDER — LIDOCAINE 40 MG/G
CREAM TOPICAL ONCE
OUTPATIENT
Start: 2023-07-30 | End: 2023-07-30

## 2023-07-30 RX ORDER — LIDOCAINE 50 MG/G
OINTMENT TOPICAL ONCE
OUTPATIENT
Start: 2023-07-30 | End: 2023-07-30

## 2023-07-30 NOTE — PROGRESS NOTES
calves. Edema trace  Neurologic: no gross cranial nerve deficit and speech normal. No focal deficits. Mental status normal.    Medical Decision Making:     Patient is diabetic ulcer. It appears to have underlying Charcot foot. We discussed importance of offloading this area get this to heal.  We discussed options of offloading. Patient tolerated debridement today. Discussed excellent attrition and good blood sugar control. We discussed excellent protein intake. See orders below. Assessment required other independent historian(s): Yes. Additional Historian: patient . Comorbid conditions affecting wound healing: As noted in [address] [place] [address] and [facility] which was reviewed. Problem List Items Addressed This Visit          Endocrine    Diabetic ulcer of left midfoot associated with type 2 diabetes mellitus, with fat layer exposed ([address]) (Chronic)    Relevant Medications    LANTUS SOLOSTAR 100 UNIT/ML injection pen    insulin lispro, 1 Unit Dial, (HUMALOG/ADMELOG) 100 UNIT/ML SOPN       Wounds and Treatment Plan:       Return Appointment:   1 week with Ender Beard, DO        Instructions: Left plantar foot:  Cleanse with normal saline  Apply vashe OTC cleanser onto gauze and let sit for at least 1 minute  Hydrofera Ready: Cut to wound size, place in wound bed, shiny side out. Cover with bordered foam  Change 3 times per week        Get inserts re-fitted after wound is healed     Patient to offload wound with diabetic shoes with inserts while standing or weight bearing. Do not get wound wet. May purchase cast cover at local pharmacy to keep dry in shower. Wound healing is greatly slowed when blood glucose levels are greater than 200. Monitor glucose levels daily to ensure tight glucose control. Follow up with PCP if your glucose levels are frequently greater than 200. Increase protein intake to promote wound healing. Protein supplements such as Solo and Ensure are great options.    Other diagnoses or problems

## 2023-07-31 ENCOUNTER — HOSPITAL ENCOUNTER (OUTPATIENT)
Dept: WOUND CARE | Age: 88
Discharge: HOME | End: 2023-07-31
Payer: MEDICARE

## 2023-07-31 VITALS
HEIGHT: 61 IN | WEIGHT: 174 LBS | DIASTOLIC BLOOD PRESSURE: 58 MMHG | SYSTOLIC BLOOD PRESSURE: 119 MMHG | BODY MASS INDEX: 32.85 KG/M2 | HEART RATE: 77 BPM | TEMPERATURE: 98.7 F

## 2023-07-31 DIAGNOSIS — E11.621 DIABETIC ULCER OF LEFT MIDFOOT ASSOCIATED WITH TYPE 2 DIABETES MELLITUS, WITH FAT LAYER EXPOSED (HCC): Primary | ICD-10-CM

## 2023-07-31 DIAGNOSIS — L97.422 DIABETIC ULCER OF LEFT MIDFOOT ASSOCIATED WITH TYPE 2 DIABETES MELLITUS, WITH FAT LAYER EXPOSED (HCC): Primary | ICD-10-CM

## 2023-07-31 PROCEDURE — 11042 DBRDMT SUBQ TIS 1ST 20SQCM/<: CPT

## 2023-07-31 RX ORDER — LIDOCAINE HYDROCHLORIDE 20 MG/ML
JELLY TOPICAL ONCE
Status: COMPLETED | OUTPATIENT
Start: 2023-07-31 | End: 2023-07-31

## 2023-07-31 RX ORDER — CLOBETASOL PROPIONATE 0.5 MG/G
OINTMENT TOPICAL ONCE
OUTPATIENT
Start: 2023-07-31 | End: 2023-07-31

## 2023-07-31 RX ORDER — GINSENG 100 MG
CAPSULE ORAL ONCE
OUTPATIENT
Start: 2023-07-31 | End: 2023-07-31

## 2023-07-31 RX ORDER — GENTAMICIN SULFATE 1 MG/G
OINTMENT TOPICAL ONCE
OUTPATIENT
Start: 2023-07-31 | End: 2023-07-31

## 2023-07-31 RX ORDER — LIDOCAINE HYDROCHLORIDE 40 MG/ML
SOLUTION TOPICAL ONCE
OUTPATIENT
Start: 2023-07-31 | End: 2023-07-31

## 2023-07-31 RX ORDER — LIDOCAINE 40 MG/G
CREAM TOPICAL ONCE
OUTPATIENT
Start: 2023-07-31 | End: 2023-07-31

## 2023-07-31 RX ORDER — LIDOCAINE HYDROCHLORIDE 20 MG/ML
JELLY TOPICAL ONCE
OUTPATIENT
Start: 2023-07-31 | End: 2023-07-31

## 2023-07-31 RX ORDER — BETAMETHASONE DIPROPIONATE 0.05 %
OINTMENT (GRAM) TOPICAL ONCE
OUTPATIENT
Start: 2023-07-31 | End: 2023-07-31

## 2023-07-31 RX ORDER — LIDOCAINE 50 MG/G
OINTMENT TOPICAL ONCE
OUTPATIENT
Start: 2023-07-31 | End: 2023-07-31

## 2023-07-31 RX ORDER — BACITRACIN ZINC AND POLYMYXIN B SULFATE 500; 1000 [USP'U]/G; [USP'U]/G
OINTMENT TOPICAL ONCE
OUTPATIENT
Start: 2023-07-31 | End: 2023-07-31

## 2023-07-31 RX ORDER — SODIUM CHLOR/HYPOCHLOROUS ACID 0.033 %
SOLUTION, IRRIGATION IRRIGATION ONCE
OUTPATIENT
Start: 2023-07-31 | End: 2023-07-31

## 2023-07-31 RX ORDER — IBUPROFEN 200 MG
TABLET ORAL ONCE
OUTPATIENT
Start: 2023-07-31 | End: 2023-07-31

## 2023-07-31 RX ADMIN — LIDOCAINE HYDROCHLORIDE: 20 JELLY TOPICAL at 10:37

## 2023-07-31 NOTE — WOUND CARE
Discharge Instructions for  [address]  [facility]  [address], [address]  Phone [phone]   Fax [phone]      NAME:  Cr Dumont  YOB: 1934  MEDICAL RECORD NUMBER:  [id number]  DATE:  7/31/2023    Return Appointment:   2 weeks with Michael Butler DO    Instructions: Left plantar foot:  Cleanse with normal saline  Apply vashe OTC cleanser onto gauze and let sit for at least 1 minute  Hydrofera Ready: Cut to wound size, place in wound bed, shiny side out. Cover with bordered foam  Change 3 times per week  Do not get wound wet in shower, pool or tub. May purchase cast cover at local pharmacy to keep dry in shower. Patient to offload wound with diabetic shoes with inserts while standing or weight bearing. Get inserts re-fitted after wound is healed    Do not get wound wet. May purchase cast cover at local pharmacy to keep dry in shower. Wound healing is greatly slowed when blood glucose levels are greater than 200. Monitor glucose levels daily to ensure tight glucose control. Follow up with PCP if your glucose levels are frequently greater than 200. Increase protein intake to promote wound healing. Protein supplements such as Solo and Ensure are great options. Should you experience increased redness, swelling, pain, foul odor, size of wound(s), or have a temperature over 101 degrees please contact the [address] at [phone] or if after hours contact your primary care physician or go to the hospital emergency department. PLEASE NOTE: IF YOU ARE UNABLE TO OBTAIN WOUND SUPPLIES, CONTINUE TO USE THE SUPPLIES YOU HAVE AVAILABLE UNTIL YOU ARE ABLE TO REACH US. IT IS MOST IMPORTANT TO KEEP THE WOUND COVERED AT ALL TIMES.     Electronically signed Juve Vasquez RN on 7/31/2023 at 10:42 AM

## 2023-07-31 NOTE — FLOWSHEET NOTE
Pre & post debridement     07/31/23 1026   Right Leg Edema Point of Measurement   Leg circumference 38 cm   Ankle circumference 21 cm   Foot circumference 23 cm   Compression Therapy Compression not ordered   Left Leg Edema Point of Measurement   Leg circumference 37 cm   Ankle circumference 22 cm   Foot circumference 24 cm   Compression Therapy Compression not ordered   RLE Neurovascular Assessment   Capillary Refill Less than/Equal to 3 seconds   Color Appropriate for Ethnicity   Temperature Warm   RLE Sensation  Decreased   R Pedal Pulse +1   LLE Neurovascular Assessment   Capillary Refill Less than/Equal to 3 seconds   Color Appropriate for Ethnicity   Temperature Warm   LLE Sensation  Decreased   L Pedal Pulse +1   Wound 07/24/23 Foot Left;Plantar #1   Date First Assessed/Time First Assessed: 07/24/23 1423   Present on Hospital Admission: Yes  Wound Approximate Age at First Assessment (Weeks): 24 weeks  Primary Wound Type: Diabetic Ulcer  Location: Foot  Wound Location Orientation: Left;Plantar  Wou. .. Wound Image     Wound Etiology Diabetic Patel 2   Dressing Status Clean;Dry; Intact   Wound Cleansed Cleansed with saline; Soap and water;Vashe   Dressing/Treatment Hydrofera blue   Offloading for Diabetic Foot Ulcers Offloading ordered   Wound Length (cm) 0.3 cm   Wound Width (cm) 0.5 cm   Wound Depth (cm) 0.2 cm   Wound Surface Area (cm^2) 0.15 cm^2   Change in Wound Size % (l*w) 0   Wound Volume (cm^3) 0.03 cm^3   Wound Healing % 0   Post-Procedure Length (cm) 0.3 cm   Post-Procedure Width (cm) 0.5 cm   Post-Procedure Depth (cm) 0.2 cm   Post-Procedure Surface Area (cm^2) 0.15 cm^2   Post-Procedure Volume (cm^3) 0.03 cm^3   Wound Assessment Pink/red   Drainage Amount Small   Drainage Description Serosanguinous   Vicki-wound Assessment Intact   Margins Attached edges; Defined edges   Wound Thickness Description not for Pressure Injury Full thickness

## 2023-07-31 NOTE — DISCHARGE INSTRUCTIONS
Alexia Grissom, RN  Registered Nurse  Wound Care     Addendum  Date of Service:  7/31/2023 10:00 AM                                                                                                                           Discharge Instructions for  [address]  [facility] [place]  [address], [address]  Phone [phone]   Fax [phone]        NAME:  Elke Mejia OF BIRTH:  5/30/1934  MEDICAL RECORD NUMBER:  [id number]  DATE:  7/31/2023     Return Appointment:   2 weeks with Mike Johnson DO     Instructions: Left plantar foot:  Cleanse with normal saline  Apply vashe OTC cleanser onto gauze and let sit for at least 1 minute  Hydrofera Ready: Cut to wound size, place in wound bed, shiny side out. Cover with bordered foam  Change 3 times per week  Do not get wound wet in shower, pool or tub. May purchase cast cover at local pharmacy to keep dry in shower. Patient to offload wound with diabetic shoes with inserts while standing or weight bearing. Get inserts re-fitted after wound is healed     Do not get wound wet. May purchase cast cover at local pharmacy to keep dry in shower. Wound healing is greatly slowed when blood glucose levels are greater than 200. Monitor glucose levels daily to ensure tight glucose control. Follow up with PCP if your glucose levels are frequently greater than 200. Increase protein intake to promote wound healing. Protein supplements such as Solo and Ensure are great options. Should you experience increased redness, swelling, pain, foul odor, size of wound(s), or have a temperature over 101 degrees please contact the [address] at [phone] or if after hours contact your primary care physician or go to the hospital emergency department. PLEASE NOTE: IF YOU ARE UNABLE TO OBTAIN WOUND SUPPLIES, CONTINUE TO USE THE SUPPLIES YOU HAVE AVAILABLE UNTIL YOU ARE ABLE TO REACH US.  IT IS MOST IMPORTANT TO KEEP

## 2023-08-06 PROBLEM — R53.81 DEBILITY: Chronic | Status: ACTIVE | Noted: 2020-10-05

## 2023-08-14 ENCOUNTER — HOSPITAL ENCOUNTER (OUTPATIENT)
Dept: WOUND CARE | Age: 88
Discharge: HOME | End: 2023-08-14
Payer: MEDICARE

## 2023-08-14 VITALS
WEIGHT: 174 LBS | RESPIRATION RATE: 20 BRPM | HEIGHT: 61 IN | BODY MASS INDEX: 32.85 KG/M2 | SYSTOLIC BLOOD PRESSURE: 147 MMHG | DIASTOLIC BLOOD PRESSURE: 70 MMHG | HEART RATE: 72 BPM | TEMPERATURE: 98.1 F

## 2023-08-14 DIAGNOSIS — E11.621 DIABETIC ULCER OF LEFT MIDFOOT ASSOCIATED WITH TYPE 2 DIABETES MELLITUS, WITH FAT LAYER EXPOSED (HCC): Primary | ICD-10-CM

## 2023-08-14 DIAGNOSIS — L97.422 DIABETIC ULCER OF LEFT MIDFOOT ASSOCIATED WITH TYPE 2 DIABETES MELLITUS, WITH FAT LAYER EXPOSED (HCC): Primary | ICD-10-CM

## 2023-08-14 PROCEDURE — 99212 OFFICE O/P EST SF 10 MIN: CPT | Performed by: FAMILY MEDICINE

## 2023-08-14 PROCEDURE — 99212 OFFICE O/P EST SF 10 MIN: CPT

## 2023-08-14 RX ORDER — GENTAMICIN SULFATE 1 MG/G
OINTMENT TOPICAL ONCE
OUTPATIENT
Start: 2023-08-14 | End: 2023-08-14

## 2023-08-14 RX ORDER — BACITRACIN ZINC AND POLYMYXIN B SULFATE 500; 1000 [USP'U]/G; [USP'U]/G
OINTMENT TOPICAL ONCE
OUTPATIENT
Start: 2023-08-14 | End: 2023-08-14

## 2023-08-14 RX ORDER — LIDOCAINE HYDROCHLORIDE 20 MG/ML
JELLY TOPICAL ONCE
OUTPATIENT
Start: 2023-08-14 | End: 2023-08-14

## 2023-08-14 RX ORDER — SODIUM CHLOR/HYPOCHLOROUS ACID 0.033 %
SOLUTION, IRRIGATION IRRIGATION ONCE
OUTPATIENT
Start: 2023-08-14 | End: 2023-08-14

## 2023-08-14 RX ORDER — GINSENG 100 MG
CAPSULE ORAL ONCE
OUTPATIENT
Start: 2023-08-14 | End: 2023-08-14

## 2023-08-14 RX ORDER — LIDOCAINE 50 MG/G
OINTMENT TOPICAL ONCE
OUTPATIENT
Start: 2023-08-14 | End: 2023-08-14

## 2023-08-14 RX ORDER — IBUPROFEN 200 MG
TABLET ORAL ONCE
OUTPATIENT
Start: 2023-08-14 | End: 2023-08-14

## 2023-08-14 RX ORDER — CLOBETASOL PROPIONATE 0.5 MG/G
OINTMENT TOPICAL ONCE
OUTPATIENT
Start: 2023-08-14 | End: 2023-08-14

## 2023-08-14 RX ORDER — BETAMETHASONE DIPROPIONATE 0.05 %
OINTMENT (GRAM) TOPICAL ONCE
OUTPATIENT
Start: 2023-08-14 | End: 2023-08-14

## 2023-08-14 RX ORDER — LIDOCAINE 40 MG/G
CREAM TOPICAL ONCE
OUTPATIENT
Start: 2023-08-14 | End: 2023-08-14

## 2023-08-14 RX ORDER — LIDOCAINE HYDROCHLORIDE 40 MG/ML
SOLUTION TOPICAL ONCE
OUTPATIENT
Start: 2023-08-14 | End: 2023-08-14

## 2023-08-14 NOTE — FLOWSHEET NOTE
08/14/23 1440   Wound 07/24/23 Foot Left;Plantar #1   Date First Assessed/Time First Assessed: 07/24/23 1423   Present on Hospital Admission: Yes  Wound Approximate Age at First Assessment (Weeks): 24 weeks  Primary Wound Type: Diabetic Ulcer  Location: Foot  Wound Location Orientation: Left;Plantar  Wou. ..    Wound Image    Wound Etiology Diabetic Patel 2   Wound Cleansed Cleansed with saline   Dressing/Treatment Hydrofera blue   Offloading for Diabetic Foot Ulcers Diabetic shoes/inserts   Wound Length (cm) 0 cm   Wound Width (cm) 0 cm   Wound Depth (cm) 0 cm   Wound Surface Area (cm^2) 0 cm^2   Change in Wound Size % (l*w) 100   Wound Volume (cm^3) 0 cm^3   Wound Healing % 100   Wound Assessment Epithelialization   Drainage Amount None (dry)   Odor None   Wound Thickness Description not for Pressure Injury Full thickness   Pain Assessment   Pain Assessment None - Denies Pain

## 2023-08-14 NOTE — WOUND CARE
[facility]  [address] [facility]  [address], [address]   Phone [phone]   Fax [phone]      NAME:  Gordy Leija OF BIRTH:  5/30/1934  MEDICAL RECORD NUMBER:  [id number]  DATE:  8/14/2023    Return Appointment:   Discharge with Jessa Aparicio DO      Instructions: Wound has healed. Wear your shoes at all times to offload. Apply vasoline to your foot 2x daily. Continue with your protein shakes daily. Follow up with podiatry or orthotics to have new diabetic shoes and inserts made. Discharge from the wound center. Follow up if needed. Should you experience increased redness, swelling, pain, foul odor, size of wound(s), or have a temperature over 101 degrees please contact the [facility] [address] at [phone] or if after hours contact your primary care physician or go to the hospital emergency department. PLEASE NOTE: IF YOU ARE UNABLE TO OBTAIN WOUND SUPPLIES, CONTINUE TO USE THE SUPPLIES YOU HAVE AVAILABLE UNTIL YOU ARE ABLE TO REACH US. IT IS MOST IMPORTANT TO KEEP THE WOUND COVERED AT ALL TIMES.     Electronically signed Skinny Johnson RN on 8/14/2023 at 3:16 PM

## 2023-08-14 NOTE — DISCHARGE INSTRUCTIONS
[facility] Inst[facility]  [address] [facility]  [address], [address]   Phone [phone]   Fax [phone]      NAME:  Joshua Maldonado OF BIRTH:  5/30/1934  MEDICAL RECORD NUMBER:  [id number]  DATE:  @ED@    Return Appointment:   Discharge with James Lanza DO      Instructions: Wound has healed. Wear your shoes at all times to offload. Apply vasoline to your foot 2x daily. Continue with your protein shakes daily. Follow up with podiatry or orthotics to have new diabetic shoes and inserts made. Discharge from the wound center. Follow up if needed. Should you experience increased redness, swelling, pain, foul odor, size of wound(s), or have a temperature over 101 degrees please contact the [address] at [phone] or if after hours contact your primary care physician or go to the hospital emergency department. PLEASE NOTE: IF YOU ARE UNABLE TO OBTAIN WOUND SUPPLIES, CONTINUE TO USE THE SUPPLIES YOU HAVE AVAILABLE UNTIL YOU ARE ABLE TO REACH US. IT IS MOST IMPORTANT TO KEEP THE WOUND COVERED AT ALL TIMES.     Electronically signed Diane Mckeon RN on 8/14/2023 at 3:19 PM

## 2023-08-20 PROBLEM — R07.9 CHEST PAIN: Chronic | Status: ACTIVE | Noted: 2020-08-14

## 2023-08-20 PROBLEM — R07.9 CHEST PAIN: Status: ACTIVE | Noted: 2020-08-14

## 2023-11-20 NOTE — PROGRESS NOTES
There are no Wet Read(s) to document. YADIRA spoke with patient daughter Concha Lombard who stated they did not want to appeal the denial for STR, but would like to have [facility] at discharge. I explained to Humaira Jimenez would set up [facility] for them.

## 2024-04-16 ENCOUNTER — HOSPITAL ENCOUNTER (EMERGENCY)
Age: 89
Discharge: HOME | End: 2024-04-16
Attending: EMERGENCY MEDICINE
Payer: MEDICARE

## 2024-04-16 ENCOUNTER — APPOINTMENT (OUTPATIENT)
Dept: GENERAL RADIOLOGY | Age: 89
End: 2024-04-16
Payer: MEDICARE

## 2024-04-16 VITALS
HEART RATE: 85 BPM | DIASTOLIC BLOOD PRESSURE: 79 MMHG | SYSTOLIC BLOOD PRESSURE: 156 MMHG | BODY MASS INDEX: 32.66 KG/M2 | WEIGHT: 173 LBS | HEIGHT: 61 IN | TEMPERATURE: 98.6 F | OXYGEN SATURATION: 99 % | RESPIRATION RATE: 17 BRPM

## 2024-04-16 DIAGNOSIS — L97.429 DIABETIC ULCER OF LEFT MIDFOOT ASSOCIATED WITH DIABETES MELLITUS OF OTHER TYPE, UNSPECIFIED ULCER STAGE (HCC): Primary | ICD-10-CM

## 2024-04-16 DIAGNOSIS — E13.621 DIABETIC ULCER OF LEFT MIDFOOT ASSOCIATED WITH DIABETES MELLITUS OF OTHER TYPE, UNSPECIFIED ULCER STAGE (HCC): Primary | ICD-10-CM

## 2024-04-16 LAB
ALBUMIN SERPL-MCNC: 3.2 G/DL (ref 3.2–4.6)
ALBUMIN/GLOB SERPL: 0.7 (ref 0.4–1.6)
ALP SERPL-CCNC: 81 U/L (ref 50–136)
ALT SERPL-CCNC: 17 U/L (ref 12–65)
ANION GAP SERPL CALC-SCNC: 4 MMOL/L (ref 2–11)
AST SERPL-CCNC: 16 U/L (ref 15–37)
BACTERIA URNS QL MICRO: NEGATIVE /HPF
BASOPHILS # BLD: 0.1 K/UL (ref 0–0.2)
BASOPHILS NFR BLD: 1 % (ref 0–2)
BILIRUB SERPL-MCNC: 0.5 MG/DL (ref 0.2–1.1)
BILIRUB UR QL: NEGATIVE
BUN SERPL-MCNC: 23 MG/DL (ref 8–23)
CALCIUM SERPL-MCNC: 9.5 MG/DL (ref 8.3–10.4)
CASTS URNS QL MICRO: NORMAL /LPF
CHLORIDE SERPL-SCNC: 102 MMOL/L (ref 103–113)
CO2 SERPL-SCNC: 28 MMOL/L (ref 21–32)
CREAT SERPL-MCNC: 1.3 MG/DL (ref 0.6–1)
DIFFERENTIAL METHOD BLD: ABNORMAL
EOSINOPHIL # BLD: 0 K/UL (ref 0–0.8)
EOSINOPHIL NFR BLD: 0 % (ref 0.5–7.8)
EPI CELLS #/AREA URNS HPF: NORMAL /HPF
ERYTHROCYTE [DISTWIDTH] IN BLOOD BY AUTOMATED COUNT: 12.2 % (ref 11.9–14.6)
GLOBULIN SER CALC-MCNC: 4.5 G/DL (ref 2.8–4.5)
GLUCOSE SERPL-MCNC: 256 MG/DL (ref 65–100)
GLUCOSE UR QL STRIP.AUTO: NEGATIVE MG/DL
HCT VFR BLD AUTO: 35 % (ref 35.8–46.3)
HGB BLD-MCNC: 11.3 G/DL (ref 11.7–15.4)
IMM GRANULOCYTES # BLD AUTO: 0.1 K/UL (ref 0–0.5)
IMM GRANULOCYTES NFR BLD AUTO: 1 % (ref 0–5)
KETONES UR-MCNC: NEGATIVE MG/DL
LEUKOCYTE ESTERASE UR QL STRIP: ABNORMAL
LYMPHOCYTES # BLD: 1.1 K/UL (ref 0.5–4.6)
LYMPHOCYTES NFR BLD: 9 % (ref 13–44)
MCH RBC QN AUTO: 31.4 PG (ref 26.1–32.9)
MCHC RBC AUTO-ENTMCNC: 32.3 G/DL (ref 31.4–35)
MCV RBC AUTO: 97.2 FL (ref 82–102)
MONOCYTES # BLD: 1.2 K/UL (ref 0.1–1.3)
MONOCYTES NFR BLD: 10 % (ref 4–12)
NEUTS SEG # BLD: 9.5 K/UL (ref 1.7–8.2)
NEUTS SEG NFR BLD: 79 % (ref 43–78)
NITRITE UR QL: NEGATIVE
NRBC # BLD: 0 K/UL (ref 0–0.2)
PH UR: 6.5 (ref 5–9)
PLATELET # BLD AUTO: 339 K/UL (ref 150–450)
PMV BLD AUTO: 8.4 FL (ref 9.4–12.3)
POTASSIUM SERPL-SCNC: 4 MMOL/L (ref 3.5–5.1)
PROT SERPL-MCNC: 7.7 G/DL (ref 6.3–8.2)
PROT UR QL: ABNORMAL MG/DL
RBC # BLD AUTO: 3.6 M/UL (ref 4.05–5.2)
RBC # UR STRIP: NEGATIVE
RBC #/AREA URNS HPF: NORMAL /HPF
SERVICE CMNT-IMP: ABNORMAL
SODIUM SERPL-SCNC: 134 MMOL/L (ref 136–146)
SP GR UR: 1.01 (ref 1–1.02)
UROBILINOGEN UR QL: 0.2 EU/DL (ref 0.2–1)
WBC # BLD AUTO: 11.9 K/UL (ref 4.3–11.1)
WBC URNS QL MICRO: NORMAL /HPF

## 2024-04-16 PROCEDURE — 81001 URINALYSIS AUTO W/SCOPE: CPT

## 2024-04-16 PROCEDURE — 80053 COMPREHEN METABOLIC PANEL: CPT

## 2024-04-16 PROCEDURE — 99284 EMERGENCY DEPT VISIT MOD MDM: CPT

## 2024-04-16 PROCEDURE — 81015 MICROSCOPIC EXAM OF URINE: CPT

## 2024-04-16 PROCEDURE — 73630 X-RAY EXAM OF FOOT: CPT

## 2024-04-16 PROCEDURE — 85025 COMPLETE CBC W/AUTO DIFF WBC: CPT

## 2024-04-16 PROCEDURE — 81003 URINALYSIS AUTO W/O SCOPE: CPT

## 2024-04-16 RX ORDER — SULFAMETHOXAZOLE AND TRIMETHOPRIM 800; 160 MG/1; MG/1
1 TABLET ORAL 2 TIMES DAILY
Qty: 20 TABLET | Refills: 0 | Status: SHIPPED | OUTPATIENT
Start: 2024-04-16 | End: 2024-04-26

## 2024-04-16 ASSESSMENT — PAIN - FUNCTIONAL ASSESSMENT: PAIN_FUNCTIONAL_ASSESSMENT: 0-10

## 2024-04-16 ASSESSMENT — PAIN SCALES - GENERAL
PAINLEVEL_OUTOF10: 4
PAINLEVEL_OUTOF10: 0
PAINLEVEL_OUTOF10: 0

## 2024-04-16 ASSESSMENT — LIFESTYLE VARIABLES
HOW OFTEN DO YOU HAVE A DRINK CONTAINING ALCOHOL: NEVER
HOW MANY STANDARD DRINKS CONTAINING ALCOHOL DO YOU HAVE ON A TYPICAL DAY: PATIENT DOES NOT DRINK

## 2024-04-16 NOTE — ED PROVIDER NOTES
Emergency Department Provider Note       PCP: Dede Layne MD   Age: 89 y.o.   Sex: female     DISPOSITION Decision To Discharge 04/16/2024 12:19:16 PM       ICD-10-CM    1. Diabetic ulcer of left midfoot associated with diabetes mellitus of other type, unspecified ulcer stage (HCC)  E13.621 [facility]    L97.429           Medical Decision Making     Patient is a 89-year-old female with a history of DM, CHF, CKD, COPD, dementia who presents with chronic wound left foot medial aspect and disposition wished for the patient to have the wound evaluated.  There is no reported fevers or chills.       Foot Problem  Location:  Foot  Time since incident: Chronic.  Injury: no    Foot location:  L foot  Pain details:     Quality:  Aching and dull    Radiates to:  Does not radiate    Severity:  Mild    Onset quality:  Gradual    Timing:  Constant  Dislocation: no    Foreign body present:  No foreign bodies  Prior injury to area:  No  Relieved by:  Nothing    Differential diagnosis includes but is not limited to left foot infection, osteomyelitis    Patient's physical exam is remarkable for left mid foot medial aspect wound 3 cm with mild surrounding erythema.  Patient has no abscess.    My independent interpretation of patient's left foot x-ray is negative for osteomyelitis.  Patient's afebrile and vital signs are stable.  We will DC home with antibiotics as well as referral to wound care.  Information has been sent back to the facility that patient is to follow-up with wound care and to return for any worsening symptoms or fever.       1 or more acute illnesses that pose a threat to life or bodily function.   Prescription drug management performed.  Shared medical decision making was utilized in creating the patients health plan today.    I independently ordered and reviewed each unique test.  I reviewed external records: provider visit note from PCP.     I interpreted the X-rays left foot

## 2024-04-16 NOTE — ED TRIAGE NOTES
Pt arrived via EMS from [facility]. Pt recently had surgery on lt foot. Hx DM, CHF, CKD IV, COPD, dementia. Temp 99.1, HR 80s,125/63, sat 99%. Facility called for wound check.

## 2024-04-16 NOTE — ED NOTES
Patient mobility status  with difficulty, uses a walker. Provider aware     I have reviewed discharge instructions with the patient and transport staff.  The patient and transport staff verbalized understanding.    Patient left ED via Discharge Method: stretcher to [facility] with  [facility] .    Opportunity for questions and clarification provided.     Patient given 1 scripts.           Tata Hercules RN  04/16/24 7620

## 2024-04-16 NOTE — DISCHARGE INSTRUCTIONS
Please follow-up with wound care.  An electronic referral has been sent.  Please call them to schedule an appointment if they have not called you within 1 day.

## 2024-04-23 ENCOUNTER — APPOINTMENT (OUTPATIENT)
Dept: GENERAL RADIOLOGY | Age: 89
End: 2024-04-23
Payer: MEDICARE

## 2024-04-23 ENCOUNTER — HOSPITAL ENCOUNTER (INPATIENT)
Age: 89
LOS: 2 days | Discharge: NURSING, REHAB OR LONG-TERM CARE | End: 2024-04-25
Attending: EMERGENCY MEDICINE | Admitting: INTERNAL MEDICINE
Payer: MEDICARE

## 2024-04-23 DIAGNOSIS — E13.621 DIABETIC ULCER OF LEFT MIDFOOT ASSOCIATED WITH DIABETES MELLITUS OF OTHER TYPE, UNSPECIFIED ULCER STAGE (HCC): ICD-10-CM

## 2024-04-23 DIAGNOSIS — L03.116 CELLULITIS OF LEFT LOWER EXTREMITY: Primary | ICD-10-CM

## 2024-04-23 DIAGNOSIS — L97.429 DIABETIC ULCER OF LEFT MIDFOOT ASSOCIATED WITH DIABETES MELLITUS OF OTHER TYPE, UNSPECIFIED ULCER STAGE (HCC): ICD-10-CM

## 2024-04-23 DIAGNOSIS — Z78.9 FAILURE OF OUTPATIENT TREATMENT: ICD-10-CM

## 2024-04-23 PROBLEM — E03.9 HYPOTHYROID: Status: ACTIVE | Noted: 2021-06-30

## 2024-04-23 PROBLEM — E11.621 DIABETIC ULCER OF LEFT FOOT DUE TO TYPE 2 DIABETES MELLITUS (HCC): Status: ACTIVE | Noted: 2024-04-23

## 2024-04-23 PROBLEM — L97.529 DIABETIC ULCER OF LEFT FOOT DUE TO TYPE 2 DIABETES MELLITUS (HCC): Status: ACTIVE | Noted: 2024-04-23

## 2024-04-23 LAB
ALBUMIN SERPL-MCNC: 3.6 G/DL (ref 3.2–4.6)
ALBUMIN/GLOB SERPL: 0.8 (ref 1–1.9)
ALP SERPL-CCNC: 87 U/L (ref 35–104)
ALT SERPL-CCNC: 7 U/L (ref 12–65)
ANION GAP SERPL CALC-SCNC: 13 MMOL/L (ref 9–18)
APPEARANCE UR: CLEAR
AST SERPL-CCNC: 19 U/L (ref 15–37)
BASOPHILS # BLD: 0.1 K/UL (ref 0–0.2)
BASOPHILS NFR BLD: 1 % (ref 0–2)
BILIRUB SERPL-MCNC: 0.3 MG/DL (ref 0–1.2)
BILIRUB UR QL: NEGATIVE
BUN SERPL-MCNC: 33 MG/DL (ref 8–23)
CALCIUM SERPL-MCNC: 9.4 MG/DL (ref 8.8–10.2)
CHLORIDE SERPL-SCNC: 98 MMOL/L (ref 98–107)
CO2 SERPL-SCNC: 22 MMOL/L (ref 20–28)
COLOR UR: NORMAL
CREAT SERPL-MCNC: 1.68 MG/DL (ref 0.6–1.1)
DIFFERENTIAL METHOD BLD: ABNORMAL
EKG ATRIAL RATE: 72 BPM
EKG DIAGNOSIS: NORMAL
EKG P AXIS: 0 DEGREES
EKG P-R INTERVAL: 61 MS
EKG Q-T INTERVAL: 421 MS
EKG QRS DURATION: 121 MS
EKG QTC CALCULATION (BAZETT): 455 MS
EKG R AXIS: 13 DEGREES
EKG T AXIS: -31 DEGREES
EKG VENTRICULAR RATE: 70 BPM
EOSINOPHIL # BLD: 0 K/UL (ref 0–0.8)
EOSINOPHIL NFR BLD: 0 % (ref 0.5–7.8)
ERYTHROCYTE [DISTWIDTH] IN BLOOD BY AUTOMATED COUNT: 12.7 % (ref 11.9–14.6)
GLOBULIN SER CALC-MCNC: 4.7 G/DL (ref 2.3–3.5)
GLUCOSE BLD STRIP.AUTO-MCNC: 159 MG/DL (ref 65–100)
GLUCOSE BLD STRIP.AUTO-MCNC: 173 MG/DL (ref 65–100)
GLUCOSE SERPL-MCNC: 212 MG/DL (ref 70–99)
GLUCOSE UR STRIP.AUTO-MCNC: NEGATIVE MG/DL
HCT VFR BLD AUTO: 33 % (ref 35.8–46.3)
HGB BLD-MCNC: 10.5 G/DL (ref 11.7–15.4)
HGB UR QL STRIP: NEGATIVE
IMM GRANULOCYTES # BLD AUTO: 0 K/UL (ref 0–0.5)
IMM GRANULOCYTES NFR BLD AUTO: 0 % (ref 0–5)
KETONES UR QL STRIP.AUTO: NEGATIVE MG/DL
LACTATE SERPL-SCNC: 1.3 MMOL/L (ref 0.5–2)
LEUKOCYTE ESTERASE UR QL STRIP.AUTO: NEGATIVE
LYMPHOCYTES # BLD: 1.3 K/UL (ref 0.5–4.6)
LYMPHOCYTES NFR BLD: 14 % (ref 13–44)
MCH RBC QN AUTO: 30.6 PG (ref 26.1–32.9)
MCHC RBC AUTO-ENTMCNC: 31.8 G/DL (ref 31.4–35)
MCV RBC AUTO: 96.2 FL (ref 82–102)
MONOCYTES # BLD: 1 K/UL (ref 0.1–1.3)
MONOCYTES NFR BLD: 11 % (ref 4–12)
NEUTS SEG # BLD: 6.6 K/UL (ref 1.7–8.2)
NEUTS SEG NFR BLD: 74 % (ref 43–78)
NITRITE UR QL STRIP.AUTO: NEGATIVE
NRBC # BLD: 0 K/UL (ref 0–0.2)
PH UR STRIP: 5 (ref 5–9)
PLATELET # BLD AUTO: 370 K/UL (ref 150–450)
PMV BLD AUTO: 7.8 FL (ref 9.4–12.3)
POTASSIUM SERPL-SCNC: 4.5 MMOL/L (ref 3.5–5.1)
PROCALCITONIN SERPL-MCNC: 0.14 NG/ML (ref 0–0.1)
PROT SERPL-MCNC: 8.3 G/DL (ref 6.3–8.2)
PROT UR STRIP-MCNC: NEGATIVE MG/DL
RBC # BLD AUTO: 3.43 M/UL (ref 4.05–5.2)
SERVICE CMNT-IMP: ABNORMAL
SERVICE CMNT-IMP: ABNORMAL
SODIUM SERPL-SCNC: 134 MMOL/L (ref 136–145)
SP GR UR REFRACTOMETRY: 1.01 (ref 1–1.02)
UROBILINOGEN UR QL STRIP.AUTO: 0.2 EU/DL (ref 0.2–1)
WBC # BLD AUTO: 9 K/UL (ref 4.3–11.1)

## 2024-04-23 PROCEDURE — 80053 COMPREHEN METABOLIC PANEL: CPT

## 2024-04-23 PROCEDURE — 1100000000 HC RM PRIVATE

## 2024-04-23 PROCEDURE — 36415 COLL VENOUS BLD VENIPUNCTURE: CPT

## 2024-04-23 PROCEDURE — 2580000003 HC RX 258: Performed by: INTERNAL MEDICINE

## 2024-04-23 PROCEDURE — 82962 GLUCOSE BLOOD TEST: CPT

## 2024-04-23 PROCEDURE — 6370000000 HC RX 637 (ALT 250 FOR IP): Performed by: INTERNAL MEDICINE

## 2024-04-23 PROCEDURE — 87040 BLOOD CULTURE FOR BACTERIA: CPT

## 2024-04-23 PROCEDURE — 93010 ELECTROCARDIOGRAM REPORT: CPT | Performed by: INTERNAL MEDICINE

## 2024-04-23 PROCEDURE — 93005 ELECTROCARDIOGRAM TRACING: CPT | Performed by: EMERGENCY MEDICINE

## 2024-04-23 PROCEDURE — 85025 COMPLETE CBC W/AUTO DIFF WBC: CPT

## 2024-04-23 PROCEDURE — 87641 MR-STAPH DNA AMP PROBE: CPT

## 2024-04-23 PROCEDURE — 81003 URINALYSIS AUTO W/O SCOPE: CPT

## 2024-04-23 PROCEDURE — 2580000003 HC RX 258: Performed by: EMERGENCY MEDICINE

## 2024-04-23 PROCEDURE — 84145 PROCALCITONIN (PCT): CPT

## 2024-04-23 PROCEDURE — 94640 AIRWAY INHALATION TREATMENT: CPT

## 2024-04-23 PROCEDURE — 99285 EMERGENCY DEPT VISIT HI MDM: CPT

## 2024-04-23 PROCEDURE — 73630 X-RAY EXAM OF FOOT: CPT

## 2024-04-23 PROCEDURE — 83605 ASSAY OF LACTIC ACID: CPT

## 2024-04-23 PROCEDURE — 6360000002 HC RX W HCPCS: Performed by: EMERGENCY MEDICINE

## 2024-04-23 PROCEDURE — 6360000002 HC RX W HCPCS: Performed by: INTERNAL MEDICINE

## 2024-04-23 PROCEDURE — 96374 THER/PROPH/DIAG INJ IV PUSH: CPT

## 2024-04-23 RX ORDER — INSULIN LISPRO 100 [IU]/ML
0-4 INJECTION, SOLUTION INTRAVENOUS; SUBCUTANEOUS NIGHTLY
Status: DISCONTINUED | OUTPATIENT
Start: 2024-04-23 | End: 2024-04-25 | Stop reason: HOSPADM

## 2024-04-23 RX ORDER — ONDANSETRON 4 MG/1
4 TABLET, ORALLY DISINTEGRATING ORAL EVERY 8 HOURS PRN
Status: DISCONTINUED | OUTPATIENT
Start: 2024-04-23 | End: 2024-04-25 | Stop reason: HOSPADM

## 2024-04-23 RX ORDER — TORSEMIDE 10 MG/1
10 TABLET ORAL DAILY
COMMUNITY

## 2024-04-23 RX ORDER — ATORVASTATIN CALCIUM 10 MG/1
10 TABLET, FILM COATED ORAL NIGHTLY
Status: DISCONTINUED | OUTPATIENT
Start: 2024-04-23 | End: 2024-04-25 | Stop reason: HOSPADM

## 2024-04-23 RX ORDER — SODIUM CHLORIDE 9 MG/ML
INJECTION, SOLUTION INTRAVENOUS CONTINUOUS
Status: ACTIVE | OUTPATIENT
Start: 2024-04-23 | End: 2024-04-24

## 2024-04-23 RX ORDER — INSULIN LISPRO 100 [IU]/ML
0-4 INJECTION, SOLUTION INTRAVENOUS; SUBCUTANEOUS
Status: DISCONTINUED | OUTPATIENT
Start: 2024-04-23 | End: 2024-04-25 | Stop reason: HOSPADM

## 2024-04-23 RX ORDER — LEVOTHYROXINE SODIUM 0.05 MG/1
25 TABLET ORAL
Status: DISCONTINUED | OUTPATIENT
Start: 2024-04-24 | End: 2024-04-25 | Stop reason: HOSPADM

## 2024-04-23 RX ORDER — POLYETHYLENE GLYCOL 3350 17 G/17G
17 POWDER, FOR SOLUTION ORAL DAILY PRN
Status: DISCONTINUED | OUTPATIENT
Start: 2024-04-23 | End: 2024-04-25 | Stop reason: HOSPADM

## 2024-04-23 RX ORDER — PREGABALIN 25 MG/1
25 CAPSULE ORAL 2 TIMES DAILY
Status: DISCONTINUED | OUTPATIENT
Start: 2024-04-23 | End: 2024-04-25 | Stop reason: HOSPADM

## 2024-04-23 RX ORDER — ENOXAPARIN SODIUM 100 MG/ML
30 INJECTION SUBCUTANEOUS EVERY 24 HOURS
Status: DISCONTINUED | OUTPATIENT
Start: 2024-04-23 | End: 2024-04-25

## 2024-04-23 RX ORDER — AMLODIPINE BESYLATE 10 MG/1
10 TABLET ORAL DAILY
Status: DISCONTINUED | OUTPATIENT
Start: 2024-04-24 | End: 2024-04-25 | Stop reason: HOSPADM

## 2024-04-23 RX ORDER — MONTELUKAST SODIUM 10 MG/1
10 TABLET ORAL DAILY
Status: DISCONTINUED | OUTPATIENT
Start: 2024-04-24 | End: 2024-04-25 | Stop reason: HOSPADM

## 2024-04-23 RX ORDER — SODIUM CHLORIDE 0.9 % (FLUSH) 0.9 %
5-40 SYRINGE (ML) INJECTION PRN
Status: DISCONTINUED | OUTPATIENT
Start: 2024-04-23 | End: 2024-04-25 | Stop reason: HOSPADM

## 2024-04-23 RX ORDER — FOLIC ACID 1 MG/1
0.5 TABLET ORAL 2 TIMES DAILY
Status: DISCONTINUED | OUTPATIENT
Start: 2024-04-23 | End: 2024-04-25 | Stop reason: HOSPADM

## 2024-04-23 RX ORDER — LANOLIN ALCOHOL/MO/W.PET/CERES
400 CREAM (GRAM) TOPICAL 2 TIMES DAILY
Status: DISCONTINUED | OUTPATIENT
Start: 2024-04-23 | End: 2024-04-23

## 2024-04-23 RX ORDER — INSULIN GLARGINE 100 [IU]/ML
20 INJECTION, SOLUTION SUBCUTANEOUS NIGHTLY
Status: DISCONTINUED | OUTPATIENT
Start: 2024-04-23 | End: 2024-04-25 | Stop reason: HOSPADM

## 2024-04-23 RX ORDER — ONDANSETRON 2 MG/ML
4 INJECTION INTRAMUSCULAR; INTRAVENOUS EVERY 6 HOURS PRN
Status: DISCONTINUED | OUTPATIENT
Start: 2024-04-23 | End: 2024-04-25 | Stop reason: HOSPADM

## 2024-04-23 RX ORDER — ASPIRIN 81 MG/1
81 TABLET, CHEWABLE ORAL DAILY
Status: DISCONTINUED | OUTPATIENT
Start: 2024-04-24 | End: 2024-04-25 | Stop reason: HOSPADM

## 2024-04-23 RX ORDER — SODIUM CHLORIDE 0.9 % (FLUSH) 0.9 %
5-40 SYRINGE (ML) INJECTION EVERY 12 HOURS SCHEDULED
Status: DISCONTINUED | OUTPATIENT
Start: 2024-04-23 | End: 2024-04-25 | Stop reason: HOSPADM

## 2024-04-23 RX ORDER — ACETAMINOPHEN 325 MG/1
650 TABLET ORAL EVERY 6 HOURS PRN
Status: DISCONTINUED | OUTPATIENT
Start: 2024-04-23 | End: 2024-04-25 | Stop reason: HOSPADM

## 2024-04-23 RX ORDER — CARVEDILOL 25 MG/1
25 TABLET ORAL 2 TIMES DAILY WITH MEALS
Status: DISCONTINUED | OUTPATIENT
Start: 2024-04-23 | End: 2024-04-25 | Stop reason: HOSPADM

## 2024-04-23 RX ORDER — LANOLIN ALCOHOL/MO/W.PET/CERES
5 CREAM (GRAM) TOPICAL NIGHTLY
Status: DISCONTINUED | OUTPATIENT
Start: 2024-04-23 | End: 2024-04-25 | Stop reason: HOSPADM

## 2024-04-23 RX ORDER — METRONIDAZOLE 500 MG/1
500 TABLET ORAL EVERY 8 HOURS SCHEDULED
Status: DISCONTINUED | OUTPATIENT
Start: 2024-04-23 | End: 2024-04-24

## 2024-04-23 RX ORDER — SODIUM CHLORIDE 9 MG/ML
INJECTION, SOLUTION INTRAVENOUS PRN
Status: DISCONTINUED | OUTPATIENT
Start: 2024-04-23 | End: 2024-04-25 | Stop reason: HOSPADM

## 2024-04-23 RX ORDER — ALBUTEROL SULFATE 90 UG/1
2 AEROSOL, METERED RESPIRATORY (INHALATION) EVERY 4 HOURS PRN
Status: DISCONTINUED | OUTPATIENT
Start: 2024-04-23 | End: 2024-04-25 | Stop reason: HOSPADM

## 2024-04-23 RX ADMIN — INSULIN GLARGINE 20 UNITS: 100 INJECTION, SOLUTION SUBCUTANEOUS at 20:52

## 2024-04-23 RX ADMIN — CARVEDILOL 25 MG: 25 TABLET, FILM COATED ORAL at 17:42

## 2024-04-23 RX ADMIN — Medication 4.5 MG: at 20:52

## 2024-04-23 RX ADMIN — METRONIDAZOLE 500 MG: 500 TABLET ORAL at 21:40

## 2024-04-23 RX ADMIN — VANCOMYCIN HYDROCHLORIDE 2000 MG: 10 INJECTION, POWDER, LYOPHILIZED, FOR SOLUTION INTRAVENOUS at 18:15

## 2024-04-23 RX ADMIN — ATORVASTATIN CALCIUM 10 MG: 10 TABLET, FILM COATED ORAL at 20:52

## 2024-04-23 RX ADMIN — FOLIC ACID 0.5 MG: 1 TABLET ORAL at 20:52

## 2024-04-23 RX ADMIN — MOMETASONE FUROATE AND FORMOTEROL FUMARATE DIHYDRATE 2 PUFF: 100; 5 AEROSOL RESPIRATORY (INHALATION) at 20:49

## 2024-04-23 RX ADMIN — ENOXAPARIN SODIUM 30 MG: 100 INJECTION SUBCUTANEOUS at 16:55

## 2024-04-23 RX ADMIN — SODIUM CHLORIDE, PRESERVATIVE FREE 10 ML: 5 INJECTION INTRAVENOUS at 20:53

## 2024-04-23 RX ADMIN — PREGABALIN 25 MG: 25 CAPSULE ORAL at 20:52

## 2024-04-23 RX ADMIN — SODIUM CHLORIDE: 9 INJECTION, SOLUTION INTRAVENOUS at 16:55

## 2024-04-23 RX ADMIN — METRONIDAZOLE 500 MG: 500 TABLET ORAL at 16:51

## 2024-04-23 RX ADMIN — WATER 2000 MG: 1 INJECTION INTRAMUSCULAR; INTRAVENOUS; SUBCUTANEOUS at 14:34

## 2024-04-23 ASSESSMENT — LIFESTYLE VARIABLES
HOW MANY STANDARD DRINKS CONTAINING ALCOHOL DO YOU HAVE ON A TYPICAL DAY: PATIENT DOES NOT DRINK
HOW OFTEN DO YOU HAVE A DRINK CONTAINING ALCOHOL: NEVER

## 2024-04-23 ASSESSMENT — PAIN SCALES - GENERAL: PAINLEVEL_OUTOF10: 0

## 2024-04-23 ASSESSMENT — PAIN - FUNCTIONAL ASSESSMENT: PAIN_FUNCTIONAL_ASSESSMENT: NONE - DENIES PAIN

## 2024-04-23 NOTE — PROGRESS NOTES
4 Eyes Skin Assessment     NAME:  Samia Almodovar  YOB: 1934  MEDICAL RECORD NUMBER:  [id number]    The patient is being assessed for  Admission    I agree that at least one RN has performed a thorough Head to Toe Skin Assessment on the patient. ALL assessment sites listed below have been assessed.      Areas assessed by both nurses:    Sacrum. Buttock, Coccyx, Ischium        Does the Patient have a Wound? No noted wound(s)       Adarsh Prevention initiated by RN: No  Wound Care Orders initiated by RN: No    Pressure Injury (Stage 3,4, Unstageable, DTI, NWPT, and Complex wounds) if present, place Wound referral order by RN under : No    New Ostomies, if present place, Ostomy referral order under : No     Nurse 1 eSignature: Electronically signed by JOURDAN FENTON RN on 4/23/24 at 6:12 PM EDT    **SHARE this note so that the co-signing nurse can place an eSignature**    Nurse 2 eSignature: {Esignature:[id number]}

## 2024-04-23 NOTE — H&P
Hospitalist History and Physical   Admit Date:  2024  1:13 PM   Name:  Samia Almodovar   Age:  89 y.o.  Sex:  female  :  1934   MRN:  [id number]   Room:  ER    Presenting/Chief Complaint: Wound Check     Reason(s) for Admission: Diabetic ulcer of left foot due to type 2 diabetes mellitus (HCC) [E11.621, L97.529]     History of Present Illness:   Samia Almodovar is a 89 y.o. female with medical history of chronic systolic heart failure, type 2 diabetes, COPD, hypertension, hyperlipidemia, CKD 3, Alzheimer's dementia who presented with worsening left foot cellulitis and wound.  Majority of history was obtained by daughter at bedside.    Patient currently resides in a memory care unit.  Daughter noted that she was developing a diabetic foot ulcer on the left medial foot.  She has known Charcot foot deformity and is supposed be wearing her diabetic shoes.  However, she was not at the facility.  The wound appeared to worsen and she was taken to the ER last week.  She was evaluated here and was sent home on Bactrim.  Patient completed her 7-day Bactrim course and daughter noted that the foot ulcer appeared to be getting worse.  There is also associated redness and cellulitic findings surrounding the wound.  Patient was brought back to the ER for further workup.    Currently patient denies any fevers, chills, shortness breath, chest pain, nausea, vomiting.  She denies any pain to the site but does have neuropathy.  ER workup shows no evidence of sepsis.  Wound does appear to be larger given worsening, will be admitted to hospitalist service for further workup        Assessment & Plan:     Diabetic ulcer of left midfoot associated with type 2 diabetes mellitus, with fat layer exposed (HCC)  Patient has known Charcot foot deformity.  Not been compliant with her diabetic shoes and therefore wound has gotten worse.  Status post oral antibiotics with no improvement.  Currently not septic.  -Start  test strips (ASCENSIA AUTODISC VI;ONE TOUCH ULTRA TEST VI) strip Use to check blood sugar once daily. E11.42    budesonide-formoterol (SYMBICORT) 80-4.5 MCG/ACT AERO 2 puffs, Inhalation, 2 TIMES DAILY    carvedilol (COREG) 25 MG tablet 1 tablet DAILY (route: oral)    Cranberry 400 MG CAPS 1 capsule, Oral, DAILY    diclofenac sodium (VOLTAREN) 1 % GEL No dose, route, or frequency recorded.    docusate (COLACE, DULCOLAX) 100 mg, Oral, 2 TIMES DAILY PRN    folic acid (FOLVITE) 400 mcg, Oral, 2 TIMES DAILY    Lantus SoloStar 20 Units, SubCUTAneous, NIGHTLY    levothyroxine (SYNTHROID) 25 mcg, Oral, DAILY BEFORE BREAKFAST    lidocaine (LIDODERM) 5 % No dose, route, or frequency recorded.    loratadine (CLARITIN) 10 MG capsule 1 capsule DAILY (route: oral)    melatonin 5 mg, Oral    montelukast (SINGULAIR) 10 mg, Oral, DAILY    naloxone 4 mg, ONCE PRN    polyethylene glycol (GLYCOLAX) 17 g, Oral, DAILY PRN    pregabalin (LYRICA) 25 mg, Oral, 2 TIMES DAILY    sulfamethoxazole-trimethoprim (BACTRIM DS) 800-160 MG per tablet 1 tablet, Oral, 2 TIMES DAILY    torsemide (DEMADEX) 10 mg, Oral, DAILY    vitamin D (CHOLECALCIFEROL) 2,000 Units, Oral, 2 TIMES DAILY    XTAMPZA ER 13.5 MG C12A No dose, route, or frequency recorded.       I have personally reviewed labs and tests:  Recent Results (from the past 24 hour(s))   Comprehensive Metabolic Panel    Collection Time: 04/23/24  1:54 PM   Result Value Ref Range    Sodium 134 (L) 136 - 145 mmol/L    Potassium 4.5 3.5 - 5.1 mmol/L    Chloride 98 98 - 107 mmol/L    CO2 22 20 - 28 mmol/L    Anion Gap 13 9 - 18 mmol/L    Glucose 212 (H) 70 - 99 mg/dL    BUN 33 (H) 8 - 23 MG/DL    Creatinine 1.68 (H) 0.60 - 1.10 MG/DL    Est, Glom Filt Rate 29 (L) >60 ml/min/1.73m2    Calcium 9.4 8.8 - 10.2 MG/DL    Total Bilirubin 0.3 0.0 - 1.2 MG/DL    ALT 7 (L) 12 - 65 U/L    AST 19 15 - 37 U/L    Alk Phosphatase 87 35 - 104 U/L    Total Protein 8.3 (H) 6.3 - 8.2 g/dL    Albumin 3.6 3.2 - 4.6 g/dL

## 2024-04-23 NOTE — ED TRIAGE NOTES
Pt arrives via ems from home c/o diabetic wound on L foot. HH states wound looks worse even after oral abx. Pt denies pain to area. Denies fever/chills.

## 2024-04-23 NOTE — ED NOTES
TRANSFER - OUT REPORT:    Verbal report given to pierre krishnan   on Smaia Almodovar  being transferred to [facility] for routine progression of patient care       Report consisted of patient's Situation, Background, Assessment and   Recommendations(SBAR).     Information from the following report(s) Nurse Handoff Report and ED SBAR was reviewed with the receiving nurse.    Friend Fall Assessment:    Presents to emergency department  because of falls (Syncope, seizure, or loss of consciousness): No  Age > 70: Yes  Altered Mental Status, Intoxication with alcohol or substance confusion (Disorientation, impaired judgment, poor safety awaremess, or inability to follow instructions): No  Impaired Mobility: Ambulates or transfers with assistive devices or assistance; Unable to ambulate or transer.: Yes  Nursing Judgement: Yes          Lines:   Peripheral IV 04/23/24 Right Antecubital (Active)        Opportunity for questions and clarification was provided.      Patient transported with:  Vanessa Hall RN  04/23/24 2049

## 2024-04-23 NOTE — ED PROVIDER NOTES
Emergency Department Provider Note       PCP: Unknown, Provider, APRN - NP   Age: 89 y.o.   Sex: female     DISPOSITION Decision To Admit 04/23/2024 02:51:16 PM       ICD-10-CM    1. Cellulitis of left lower extremity  L03.116       2. Diabetic ulcer of left midfoot associated with diabetes mellitus of other type, unspecified ulcer stage (HCC)  E13.621     L97.429       3. Failure of outpatient treatment  Z78.9           Medical Decision Making     Worsening redness on antibiotics.  Patient will need admission.  She was given IV antibiotics.     1 or more acute illnesses that pose a threat to life or bodily function.   Prescription drug management performed.    I independently ordered and reviewed each unique test.     The patients assessment required an independent historian: dtr.  The reason they were needed is dementia.  I interpreted the X-rays no gas.  My Independent EKG Interpretation: sinus rhythm, no evidence of arrhythmia      ST Segments:Nonspecific ST segments - NO STEMI   Rate: 70  The patient was admitted and I have discussed patient management with the admitting provider.          History     With complaint of worsening redness and wound on her left foot.  Patient has a history of diabetic foot ulcers.  States she does not recall how long this been there.  Daughter arrived and states it has been there for at least a week called and make an appointment at the wound clinic they did not have one for several weeks so she was started on antibiotics by the nursing home.  She has taken several days of antibiotics and the redness on her foot is worsening.  Patient denies any pain states she cannot feel that area anyway.    The history is provided by the patient.     Physical Exam     Vitals signs and nursing note reviewed:  Vitals:    04/23/24 1315   BP: 120/63   Pulse: 74   Resp: 16   Temp: 97.5 °F (36.4 °C)   TempSrc: Oral   SpO2: 97%   Weight: 77.1 kg (170 lb)   Height: 1.549 m (5' 1\")      Physical

## 2024-04-23 NOTE — PROGRESS NOTES
TRANSFER - IN REPORT:    Verbal report received from KEVAN Mendez on Samia Almodovar  being received from ED for routine progression of patient care      Report consisted of patient's Situation, Background, Assessment and   Recommendations(SBAR).     Information from the following report(s) Nurse Handoff Report was reviewed with the receiving nurse.    Opportunity for questions and clarification was provided.      Assessment will be completed upon patient's arrival to unit and care assumed.

## 2024-04-23 NOTE — ED NOTES
Unable to obtain 2nd cultures on pt. Lab contacted, spoke with Mila.     Vanessa Parikh, RN  04/23/24 9983       Vanessa Parikh RN  04/23/24 8544

## 2024-04-23 NOTE — PROGRESS NOTES
VANCO NOTE RENAL INSUFFICIENCY PATIENT   [facility]   Pharmacy Pharmacokinetic Monitoring Service - Vancomycin    Consulting Provider: Monroe Bazzi   Indication: Diabetic Ulcer: Foot  Target Concentration: Random level ? 20 mg/L  Day of Therapy: 1  Additional Antimicrobials: Cefepime and Flagyl    Pertinent Laboratory Values:   Wt Readings from Last 1 Encounters:   04/23/24 77.1 kg (170 lb)     Temp Readings from Last 1 Encounters:   04/23/24 97.3 °F (36.3 °C) (Oral)     Recent Labs     04/23/24  1354   BUN 33*   CREATININE 1.68*   WBC 9.0   PROCAL 0.14*   LACTSEPSIS 1.3       No results found for: \"VANCOTROUGH\", \"VANCORANDOM\"    MRSA Nasal Swab: N/A. Non-respiratory infection.    Assessment:  Date:  Dose/Freq Admin Times Level/Time:   4/23 2000mg IV x 1 1815                              Plan:  Concentration-guided dosing due to renal impairment  Start vancomycin 2000mg IV x 1  Vancomycin concentrations will be ordered as clinically appropriate   Pharmacy will continue to monitor patient and adjust therapy as indicated    Thank you for the consult,  Martha Holt [facility]

## 2024-04-24 LAB
ANION GAP SERPL CALC-SCNC: 12 MMOL/L (ref 9–18)
BASOPHILS # BLD: 0.1 K/UL (ref 0–0.2)
BASOPHILS NFR BLD: 1 % (ref 0–2)
BUN SERPL-MCNC: 24 MG/DL (ref 8–23)
CALCIUM SERPL-MCNC: 8.7 MG/DL (ref 8.8–10.2)
CHLORIDE SERPL-SCNC: 106 MMOL/L (ref 98–107)
CO2 SERPL-SCNC: 21 MMOL/L (ref 20–28)
CREAT SERPL-MCNC: 1.29 MG/DL (ref 0.6–1.1)
DIFFERENTIAL METHOD BLD: ABNORMAL
EOSINOPHIL # BLD: 0 K/UL (ref 0–0.8)
EOSINOPHIL NFR BLD: 0 % (ref 0.5–7.8)
ERYTHROCYTE [DISTWIDTH] IN BLOOD BY AUTOMATED COUNT: 12.6 % (ref 11.9–14.6)
GLUCOSE BLD STRIP.AUTO-MCNC: 110 MG/DL (ref 65–100)
GLUCOSE BLD STRIP.AUTO-MCNC: 165 MG/DL (ref 65–100)
GLUCOSE BLD STRIP.AUTO-MCNC: 245 MG/DL (ref 65–100)
GLUCOSE BLD STRIP.AUTO-MCNC: 261 MG/DL (ref 65–100)
GLUCOSE SERPL-MCNC: 105 MG/DL (ref 70–99)
HCT VFR BLD AUTO: 30.9 % (ref 35.8–46.3)
HGB BLD-MCNC: 9.9 G/DL (ref 11.7–15.4)
IMM GRANULOCYTES # BLD AUTO: 0 K/UL (ref 0–0.5)
IMM GRANULOCYTES NFR BLD AUTO: 0 % (ref 0–5)
LYMPHOCYTES # BLD: 0.8 K/UL (ref 0.5–4.6)
LYMPHOCYTES NFR BLD: 10 % (ref 13–44)
MCH RBC QN AUTO: 31 PG (ref 26.1–32.9)
MCHC RBC AUTO-ENTMCNC: 32 G/DL (ref 31.4–35)
MCV RBC AUTO: 96.9 FL (ref 82–102)
MONOCYTES # BLD: 0.8 K/UL (ref 0.1–1.3)
MONOCYTES NFR BLD: 12 % (ref 4–12)
MRSA DNA SPEC QL NAA+PROBE: NOT DETECTED
NEUTS SEG # BLD: 5.5 K/UL (ref 1.7–8.2)
NEUTS SEG NFR BLD: 77 % (ref 43–78)
NRBC # BLD: 0 K/UL (ref 0–0.2)
PLATELET # BLD AUTO: 341 K/UL (ref 150–450)
PMV BLD AUTO: 7.8 FL (ref 9.4–12.3)
POTASSIUM SERPL-SCNC: 4.8 MMOL/L (ref 3.5–5.1)
RBC # BLD AUTO: 3.19 M/UL (ref 4.05–5.2)
S AUREUS CPE NOSE QL NAA+PROBE: DETECTED
SERVICE CMNT-IMP: ABNORMAL
SODIUM SERPL-SCNC: 139 MMOL/L (ref 136–145)
VANCOMYCIN SERPL-MCNC: 15.1 UG/ML
WBC # BLD AUTO: 7.2 K/UL (ref 4.3–11.1)

## 2024-04-24 PROCEDURE — 1100000000 HC RM PRIVATE

## 2024-04-24 PROCEDURE — 97165 OT EVAL LOW COMPLEX 30 MIN: CPT

## 2024-04-24 PROCEDURE — 6360000002 HC RX W HCPCS: Performed by: PHYSICIAN ASSISTANT

## 2024-04-24 PROCEDURE — 2580000003 HC RX 258: Performed by: PHYSICIAN ASSISTANT

## 2024-04-24 PROCEDURE — 2580000003 HC RX 258: Performed by: INTERNAL MEDICINE

## 2024-04-24 PROCEDURE — 80048 BASIC METABOLIC PNL TOTAL CA: CPT

## 2024-04-24 PROCEDURE — 97535 SELF CARE MNGMENT TRAINING: CPT

## 2024-04-24 PROCEDURE — 85025 COMPLETE CBC W/AUTO DIFF WBC: CPT

## 2024-04-24 PROCEDURE — 94640 AIRWAY INHALATION TREATMENT: CPT

## 2024-04-24 PROCEDURE — 6370000000 HC RX 637 (ALT 250 FOR IP): Performed by: INTERNAL MEDICINE

## 2024-04-24 PROCEDURE — 6360000002 HC RX W HCPCS: Performed by: INTERNAL MEDICINE

## 2024-04-24 PROCEDURE — 97530 THERAPEUTIC ACTIVITIES: CPT

## 2024-04-24 PROCEDURE — 80202 ASSAY OF VANCOMYCIN: CPT

## 2024-04-24 PROCEDURE — 94760 N-INVAS EAR/PLS OXIMETRY 1: CPT

## 2024-04-24 PROCEDURE — 82962 GLUCOSE BLOOD TEST: CPT

## 2024-04-24 PROCEDURE — 97161 PT EVAL LOW COMPLEX 20 MIN: CPT

## 2024-04-24 PROCEDURE — 36415 COLL VENOUS BLD VENIPUNCTURE: CPT

## 2024-04-24 RX ADMIN — INSULIN GLARGINE 20 UNITS: 100 INJECTION, SOLUTION SUBCUTANEOUS at 20:57

## 2024-04-24 RX ADMIN — AMLODIPINE BESYLATE 10 MG: 10 TABLET ORAL at 08:29

## 2024-04-24 RX ADMIN — INSULIN LISPRO 1 UNITS: 100 INJECTION, SOLUTION INTRAVENOUS; SUBCUTANEOUS at 16:30

## 2024-04-24 RX ADMIN — MONTELUKAST 10 MG: 10 TABLET, FILM COATED ORAL at 08:29

## 2024-04-24 RX ADMIN — ENOXAPARIN SODIUM 30 MG: 100 INJECTION SUBCUTANEOUS at 16:29

## 2024-04-24 RX ADMIN — FOLIC ACID 0.5 MG: 1 TABLET ORAL at 08:30

## 2024-04-24 RX ADMIN — Medication 4.5 MG: at 20:57

## 2024-04-24 RX ADMIN — PREGABALIN 25 MG: 25 CAPSULE ORAL at 08:30

## 2024-04-24 RX ADMIN — CEFEPIME 1000 MG: 1 INJECTION, POWDER, FOR SOLUTION INTRAMUSCULAR; INTRAVENOUS at 14:49

## 2024-04-24 RX ADMIN — CARVEDILOL 25 MG: 25 TABLET, FILM COATED ORAL at 16:29

## 2024-04-24 RX ADMIN — MOMETASONE FUROATE AND FORMOTEROL FUMARATE DIHYDRATE 2 PUFF: 100; 5 AEROSOL RESPIRATORY (INHALATION) at 20:10

## 2024-04-24 RX ADMIN — SODIUM CHLORIDE: 9 INJECTION, SOLUTION INTRAVENOUS at 14:40

## 2024-04-24 RX ADMIN — METRONIDAZOLE 500 MG: 500 TABLET ORAL at 05:25

## 2024-04-24 RX ADMIN — FOLIC ACID 0.5 MG: 1 TABLET ORAL at 20:57

## 2024-04-24 RX ADMIN — ASPIRIN 81 MG 81 MG: 81 TABLET ORAL at 08:30

## 2024-04-24 RX ADMIN — LEVOTHYROXINE SODIUM 25 MCG: 0.05 TABLET ORAL at 05:25

## 2024-04-24 RX ADMIN — MOMETASONE FUROATE AND FORMOTEROL FUMARATE DIHYDRATE 2 PUFF: 100; 5 AEROSOL RESPIRATORY (INHALATION) at 07:22

## 2024-04-24 RX ADMIN — CARVEDILOL 25 MG: 25 TABLET, FILM COATED ORAL at 08:30

## 2024-04-24 RX ADMIN — SODIUM CHLORIDE, PRESERVATIVE FREE 10 ML: 5 INJECTION INTRAVENOUS at 08:32

## 2024-04-24 RX ADMIN — PREGABALIN 25 MG: 25 CAPSULE ORAL at 20:57

## 2024-04-24 RX ADMIN — ATORVASTATIN CALCIUM 10 MG: 10 TABLET, FILM COATED ORAL at 20:57

## 2024-04-24 ASSESSMENT — PAIN SCALES - GENERAL
PAINLEVEL_OUTOF10: 0
PAINLEVEL_OUTOF10: 0

## 2024-04-24 NOTE — PLAN OF CARE
Problem: Discharge Planning  Goal: Discharge to home or other facility with appropriate resources  4/23/2024 2230 by April Marquez RN  Outcome: Progressing  Flowsheets (Taken 4/23/2024 1937)  Discharge to home or other facility with appropriate resources: Identify barriers to discharge with patient and caregiver  4/23/2024 1645 by Yara Alvarez, RN  Outcome: Progressing     Problem: Skin/Tissue Integrity  Goal: Absence of new skin breakdown  Description: 1.  Monitor for areas of redness and/or skin breakdown  2.  Assess vascular access sites hourly  3.  Every 4-6 hours minimum:  Change oxygen saturation probe site  4.  Every 4-6 hours:  If on nasal continuous positive airway pressure, respiratory therapy assess nares and determine need for appliance change or resting period.  4/23/2024 2230 by April Marquez RN  Outcome: Progressing  4/23/2024 1645 by Yara Alvarez RN  Outcome: Progressing     Problem: Safety - Adult  Goal: Free from fall injury  4/23/2024 2230 by April Marquez RN  Outcome: Progressing  Flowsheets (Taken 4/23/2024 1937)  Free From Fall Injury: Instruct family/caregiver on patient safety  4/23/2024 1645 by Yara Alvarez, RN  Outcome: Progressing

## 2024-04-24 NOTE — WOUND CARE
Charcot foot changes left foot, wears offloading shoe at times.  The medial foot at the arch area has ruptured blister that is drying out and scabbing over may be forming eschar.  May need surgical debridement at some point stable at this time. Recommend simple bandage to keep clean and dry and follow up at wound clinic if possible.  Will add betaine and foam dressing every other day. If hydrafera blue was available would benefit from that dressing instead. Ongoing Skilled nursing would also be helpful from rehab versus home health. Will monitor.

## 2024-04-24 NOTE — PROGRESS NOTES
0.0 0.0 - 0.5 K/UL       No results for input(s): \"COVID19\" in the last 72 hours.    Current Meds:  Current Facility-Administered Medications   Medication Dose Route Frequency    albuterol sulfate HFA (PROVENTIL;VENTOLIN;PROAIR) 108 (90 Base) MCG/ACT inhaler 2 puff  2 puff Inhalation Q4H PRN    amLODIPine (NORVASC) tablet 10 mg  10 mg Oral Daily    aspirin chewable tablet 81 mg  81 mg Oral Daily    atorvastatin (LIPITOR) tablet 10 mg  10 mg Oral Nightly    mometasone-formoterol (DULERA) 100-5 MCG/ACT inhaler 2 puff  2 puff Inhalation BID RT    carvedilol (COREG) tablet 25 mg  25 mg Oral BID WC    insulin glargine (LANTUS) injection vial 20 Units  20 Units SubCUTAneous Nightly    levothyroxine (SYNTHROID) tablet 25 mcg  25 mcg Oral QAM AC    melatonin tablet 4.5 mg  4.5 mg Oral Nightly    montelukast (SINGULAIR) tablet 10 mg  10 mg Oral Daily    pregabalin (LYRICA) capsule 25 mg  25 mg Oral BID    sodium chloride flush 0.9 % injection 5-40 mL  5-40 mL IntraVENous 2 times per day    sodium chloride flush 0.9 % injection 5-40 mL  5-40 mL IntraVENous PRN    0.9 % sodium chloride infusion   IntraVENous PRN    enoxaparin Sodium (LOVENOX) injection 30 mg  30 mg SubCUTAneous Q24H    ondansetron (ZOFRAN-ODT) disintegrating tablet 4 mg  4 mg Oral Q8H PRN    Or    ondansetron (ZOFRAN) injection 4 mg  4 mg IntraVENous Q6H PRN    polyethylene glycol (GLYCOLAX) packet 17 g  17 g Oral Daily PRN    acetaminophen (TYLENOL) tablet 650 mg  650 mg Oral Q6H PRN    Or    acetaminophen (TYLENOL) suppository 650 mg  650 mg Rectal Q6H PRN    cefepime (MAXIPIME) 1,000 mg in sodium chloride 0.9 % 50 mL IVPB (mini-bag)  1,000 mg IntraVENous Q24H    metroNIDAZOLE (FLAGYL) tablet 500 mg  500 mg Oral 3 times per day    vancomycin (VANCOCIN) intermittent dosing (placeholder)   Other RX Placeholder    insulin lispro (HUMALOG) injection vial 0-4 Units  0-4 Units SubCUTAneous TID WC    insulin lispro (HUMALOG) injection vial 0-4 Units  0-4 Units

## 2024-04-24 NOTE — PROGRESS NOTES
ACUTE OCCUPATIONAL THERAPY GOALS:   (Developed with and agreed upon by patient and/or caregiver.)  1. Patient will complete upper body bathing and dressing with SET UP and adaptive equipment as needed.   2. Patient will complete toilet transfer with STAND BY ASSIST.     3. Patient will complete grooming ADL in unsupported sitting with SET UP.  4. Patient will tolerate 25 minutes of OT treatment with 1-2 rest breaks to increase activity tolerance for ADLs.   5. Patient will complete functional transfers with STAND BY ASSIST and adaptive equipment as needed.   6. Patient will     Timeframe: 7 visits     OCCUPATIONAL THERAPY Initial Assessment and Daily Note       OT Visit Days: 1  Acknowledge Orders  Time  OT Charge Capture  Rehab Caseload Tracker      Samia Almodovar is a 89 y.o. female   PRIMARY DIAGNOSIS: Diabetic ulcer of left midfoot associated with type 2 diabetes mellitus, with fat layer exposed (HCC)  Cellulitis of left lower extremity [L03.116]  Failure of outpatient treatment [Z78.9]  Diabetic ulcer of left foot due to type 2 diabetes mellitus (HCC) [E11.621, L97.529]  Diabetic ulcer of left midfoot associated with diabetes mellitus of other type, unspecified ulcer stage (HCC) [E13.621, L97.429]       Reason for Referral: Generalized Muscle Weakness (M62.81)  Difficulty in walking, Not elsewhere classified (R26.2)  Other abnormalities of gait and mobility (R26.89)  Inpatient: Payor: [facility] MEDICARE / Plan: [employer] DUAL COMPLETE / Product Type: *No Product type* /     ASSESSMENT:     REHAB RECOMMENDATIONS:   Recommendation to date pending progress:  Setting:  No further skilled occupational therapy after discharge from hospital  Return to facility    Equipment:    None     ASSESSMENT:  Ms. Almodovar is an 88 y/o female presents with L midfoot cellulitis, hx charcot foot, has shoes with inserts present. Pt with hx of dementia, AAO x2, admitted from memory care facility. At baseline pt gets

## 2024-04-24 NOTE — THERAPY EVALUATION
ACUTE PHYSICAL THERAPY GOALS:   (Developed with and agreed upon by patient and/or caregiver.)  (1.) Samia Almodovar will move from supine to sit and sit to supine , scoot up and down, and roll side to side with STAND BY ASSIST within 7 treatment day(s).    (2.) Samia Almodovar will transfer from bed to chair and chair to bed with STAND BY ASSIST using the least restrictive device within 7 treatment day(s).    (3.) Samia Almodovar will perform seated static and dynamic balance activities x 30 minutes with SUPERVISION to improve safety within 7 treatment day(s).  (4.) Samia Almodovar will perform standing static and dynamic balance activities x 5 minutes with STAND BY ASSIST to improve safety within 7 treatment day(s).  (5.) Samia Almodovar will perform therapeutic exercises x 20 min for HEP with SUPERVISION to improve strength, endurance, and functional mobility within 7 treatment day(s).     PHYSICAL THERAPY Initial Assessment, Daily Note, and AM  (Link to Caseload Tracking: PT Visit Days : 1  Acknowledge Orders  Time In/Out  PT Charge Capture  Rehab Caseload Tracker    Samia Almodovar is a 89 y.o. female   PRIMARY DIAGNOSIS: Diabetic ulcer of left midfoot associated with type 2 diabetes mellitus, with fat layer exposed (HCC)  Cellulitis of left lower extremity [L03.116]  Failure of outpatient treatment [Z78.9]  Diabetic ulcer of left foot due to type 2 diabetes mellitus (HCC) [E11.621, L97.529]  Diabetic ulcer of left midfoot associated with diabetes mellitus of other type, unspecified ulcer stage (HCC) [E13.621, L97.429]     Reason for Referral: Generalized Muscle Weakness (M62.81)  Other lack of cordination (R27.8)  Other abnormalities of gait and mobility (R26.89)  Inpatient: Payor: [facility] MEDICARE / Plan: [employer] DUAL COMPLETE / Product Type: *No Product type* /     ASSESSMENT:     REHAB RECOMMENDATIONS:   Recommendation to date pending progress:  Setting:  Return to Memory Care

## 2024-04-25 VITALS
RESPIRATION RATE: 17 BRPM | DIASTOLIC BLOOD PRESSURE: 77 MMHG | HEIGHT: 61 IN | TEMPERATURE: 98.6 F | WEIGHT: 170 LBS | HEART RATE: 68 BPM | SYSTOLIC BLOOD PRESSURE: 163 MMHG | OXYGEN SATURATION: 96 % | BODY MASS INDEX: 32.1 KG/M2

## 2024-04-25 LAB
ANION GAP SERPL CALC-SCNC: 12 MMOL/L (ref 9–18)
BUN SERPL-MCNC: 19 MG/DL (ref 8–23)
CALCIUM SERPL-MCNC: 8.9 MG/DL (ref 8.8–10.2)
CHLORIDE SERPL-SCNC: 103 MMOL/L (ref 98–107)
CO2 SERPL-SCNC: 22 MMOL/L (ref 20–28)
CREAT SERPL-MCNC: 1.1 MG/DL (ref 0.6–1.1)
GLUCOSE BLD STRIP.AUTO-MCNC: 146 MG/DL (ref 65–100)
GLUCOSE BLD STRIP.AUTO-MCNC: 182 MG/DL (ref 65–100)
GLUCOSE SERPL-MCNC: 150 MG/DL (ref 70–99)
POTASSIUM SERPL-SCNC: 4.5 MMOL/L (ref 3.5–5.1)
SERVICE CMNT-IMP: ABNORMAL
SERVICE CMNT-IMP: ABNORMAL
SODIUM SERPL-SCNC: 137 MMOL/L (ref 136–145)

## 2024-04-25 PROCEDURE — 80048 BASIC METABOLIC PNL TOTAL CA: CPT

## 2024-04-25 PROCEDURE — 97535 SELF CARE MNGMENT TRAINING: CPT

## 2024-04-25 PROCEDURE — 94760 N-INVAS EAR/PLS OXIMETRY 1: CPT

## 2024-04-25 PROCEDURE — 97530 THERAPEUTIC ACTIVITIES: CPT

## 2024-04-25 PROCEDURE — 2580000003 HC RX 258: Performed by: INTERNAL MEDICINE

## 2024-04-25 PROCEDURE — 94640 AIRWAY INHALATION TREATMENT: CPT

## 2024-04-25 PROCEDURE — 6370000000 HC RX 637 (ALT 250 FOR IP): Performed by: INTERNAL MEDICINE

## 2024-04-25 PROCEDURE — 99221 1ST HOSP IP/OBS SF/LOW 40: CPT | Performed by: PHYSICIAN ASSISTANT

## 2024-04-25 PROCEDURE — 36415 COLL VENOUS BLD VENIPUNCTURE: CPT

## 2024-04-25 PROCEDURE — 82962 GLUCOSE BLOOD TEST: CPT

## 2024-04-25 RX ORDER — ENOXAPARIN SODIUM 100 MG/ML
40 INJECTION SUBCUTANEOUS EVERY 24 HOURS
Status: DISCONTINUED | OUTPATIENT
Start: 2024-04-25 | End: 2024-04-25 | Stop reason: HOSPADM

## 2024-04-25 RX ORDER — CEFADROXIL 500 MG/1
500 CAPSULE ORAL DAILY
Qty: 6 CAPSULE | Refills: 0 | Status: SHIPPED | OUTPATIENT
Start: 2024-04-25 | End: 2024-05-01

## 2024-04-25 RX ORDER — GUAIFENESIN 200 MG/10ML
200 LIQUID ORAL EVERY 4 HOURS PRN
Status: DISCONTINUED | OUTPATIENT
Start: 2024-04-25 | End: 2024-04-25 | Stop reason: HOSPADM

## 2024-04-25 RX ADMIN — MOMETASONE FUROATE AND FORMOTEROL FUMARATE DIHYDRATE 2 PUFF: 100; 5 AEROSOL RESPIRATORY (INHALATION) at 13:50

## 2024-04-25 RX ADMIN — MONTELUKAST 10 MG: 10 TABLET, FILM COATED ORAL at 08:16

## 2024-04-25 RX ADMIN — LEVOTHYROXINE SODIUM 25 MCG: 0.05 TABLET ORAL at 05:44

## 2024-04-25 RX ADMIN — ASPIRIN 81 MG 81 MG: 81 TABLET ORAL at 08:16

## 2024-04-25 RX ADMIN — PREGABALIN 25 MG: 25 CAPSULE ORAL at 08:15

## 2024-04-25 RX ADMIN — SODIUM CHLORIDE, PRESERVATIVE FREE 5 ML: 5 INJECTION INTRAVENOUS at 09:05

## 2024-04-25 RX ADMIN — AMLODIPINE BESYLATE 10 MG: 10 TABLET ORAL at 08:16

## 2024-04-25 RX ADMIN — FOLIC ACID 0.5 MG: 1 TABLET ORAL at 08:16

## 2024-04-25 RX ADMIN — CARVEDILOL 25 MG: 25 TABLET, FILM COATED ORAL at 08:15

## 2024-04-25 NOTE — CARE COORDINATION
04/25/24 1158   Service Assessment   Patient Orientation Person;Place   Cognition Short Term Memory Deficit   History Provided By Patient;Child/Family   Primary Caregiver   (Memory Care)   Support Systems Children   PCP Verified by CM Yes  (Pt sees MD at the facility)   Last Visit to PCP Within last 3 months   Prior Functional Level Assistance with the following:;Bathing;Dressing;Toileting;Cooking;Housework;Shopping;Mobility   Current Functional Level Assistance with the following:;Bathing;Dressing;Toileting;Cooking;Housework;Shopping;Mobility   Can patient return to prior living arrangement Yes   Ability to make needs known: Good   Family able to assist with home care needs: Yes   Would you like for me to discuss the discharge plan with any other family members/significant others, and if so, who? Yes  (daughter Batool)   Services At/After Discharge   Transition of Care Consult (CM Consult) Long Term Care;Discharge Planning   Services At/After Discharge Long term care;Transport    Resource Information Provided? No   Mode of Transport at Discharge [facility] Transport Time of Discharge 1430   Confirm Follow Up Transport Family   Condition of Participation: Discharge Planning   The Plan for Transition of Care is related to the following treatment goals: Pt back to Memory Care facility for continued care   The Patient and/or Patient Representative was provided with a Choice of Provider? Patient   The Patient and/Or Patient Representative agree with the Discharge Plan? Yes   Freedom of Choice list was provided with basic dialogue that supports the patient's individualized plan of care/goals, treatment preferences, and shares the quality data associated with the providers?  Yes     Pt chart reviewed for discharge planning. CM met with pt and daughter Batool at bedside, verified demographic information/ health insurance. Pt lives at [facility]. Daughter reports pt used a rollator but was using a wheelchair  just before admission. Pt is for discharge today to [facility] as planned.  Transport via [employer] around 1430.  Packet prepared to go with pt to facility.  Spoke with Batool at bedside with update on anticipated transport time for mom. Pt will go to Room 312. Report to be called to [phone] connect to 300 unit. Primary RN made aware.

## 2024-04-25 NOTE — DISCHARGE SUMMARY
positive, antibiotics narrowed to cefepime. Blood cultures showed no growth. She will complete course of antibiotics with renally-dose cefadroxil.  Orthopedic surgery consulted and no indication for surgical intervention at this time. Wound care consulted and local care recommendations provided.    She was evaluated by PT/OT and will return to [facility]. She is medically stable for discharge today. Outpatient follow-up with wound care.     Disposition:  Memory care  Diet: ADULT DIET; Regular; 4 carb choices (60 gm/meal)  Code Status: DNR    Follow Ups:  Follow-up Information    None         Time spent in patient discharge and coordination 35 minutes.        Follow up labs/diagnostics (ultimately defer to outpatient provider):  Defer to Follow-up Provider    Plan was discussed with Patient.  All questions answered.  Patient was stable at time of discharge.  Instructions given to call a physician or return if any concerns.    Pending Labs       Order Current Status    Blood Culture 1 Preliminary result    Blood Culture 2 Preliminary result            Current Discharge Medication List        START taking these medications    Details   cefadroxil (DURICEF) 500 MG capsule Take 1 capsule by mouth daily for 6 days  Qty: 6 capsule, Refills: 0           CONTINUE these medications which have NOT CHANGED    Details   torsemide (DEMADEX) 10 MG tablet Take 1 tablet by mouth daily      aspirin (ASPIRIN LOW DOSE) 81 MG chewable tablet 1 tablet DAILY (route: oral)      carvedilol (COREG) 25 MG tablet 1 tablet DAILY (route: oral)      Cranberry 400 MG CAPS Take 1 capsule by mouth daily      budesonide-formoterol (SYMBICORT) 80-4.5 MCG/ACT AERO Inhale 2 puffs into the lungs 2 times daily      atorvastatin (LIPITOR) 10 MG tablet       LANTUS SOLOSTAR 100 UNIT/ML injection pen Inject 20 Units into the skin nightly      melatonin 5 MG TABS tablet Take 1 tablet by mouth      XTAMPZA ER 13.5 MG C12A       amLODIPine (NORVASC) 10 MG  POC Glucose 245 (H) 65 - 100 mg/dL    Performed by: Greg    POCT Glucose    Collection Time: 04/24/24  8:52 PM   Result Value Ref Range    POC Glucose 261 (H) 65 - 100 mg/dL    Performed by: Meghan    Basic Metabolic Panel w/ Reflex to MG    Collection Time: 04/25/24  6:05 AM   Result Value Ref Range    Sodium 137 136 - 145 mmol/L    Potassium 4.5 3.5 - 5.1 mmol/L    Chloride 103 98 - 107 mmol/L    CO2 22 20 - 28 mmol/L    Anion Gap 12 9 - 18 mmol/L    Glucose 150 (H) 70 - 99 mg/dL    BUN 19 8 - 23 MG/DL    Creatinine 1.10 0.60 - 1.10 MG/DL    Est, Glom Filt Rate 48 (L) >60 ml/min/1.73m2    Calcium 8.9 8.8 - 10.2 MG/DL   POCT Glucose    Collection Time: 04/25/24  6:24 AM   Result Value Ref Range    POC Glucose 146 (H) 65 - 100 mg/dL    Performed by: Meghan    POCT Glucose    Collection Time: 04/25/24 11:03 AM   Result Value Ref Range    POC Glucose 182 (H) 65 - 100 mg/dL    Performed by: Kristel        No results for input(s): \"COVID19\" in the last 72 hours.    Recent Vital Data:  Patient Vitals for the past 24 hrs:   Temp Pulse Resp BP SpO2   04/25/24 0751 98.6 °F (37 °C) 80 18 (!) 163/77 96 %   04/24/24 2010 -- 69 16 -- 94 %   04/24/24 1956 99 °F (37.2 °C) 90 16 92/77 94 %       Oxygen Therapy  SpO2: 96 %  Pulse via Oximetry: 68 beats per minute  Pulse Oximeter Device Mode: Intermittent  O2 Device: None (Room air)    Estimated body mass index is 32.12 kg/m² as calculated from the following:    Height as of this encounter: 1.549 m (5' 1\").    Weight as of this encounter: 77.1 kg (170 lb).    Intake/Output Summary (Last 24 hours) at 4/25/2024 1119  Last data filed at 4/25/2024 0810  Gross per 24 hour   Intake 240 ml   Output 1550 ml   Net -1310 ml         Physical Exam:    General:    Well nourished.  No overt distress  Head:  Normocephalic, atraumatic  Eyes:  Sclerae appear normal.  Pupils equally round.    HENT:  Nares appear normal, no drainage.  Moist mucous membranes  Neck:  No

## 2024-04-25 NOTE — PROGRESS NOTES
ACUTE PHYSICAL THERAPY GOALS:   (Developed with and agreed upon by patient and/or caregiver.)  (1.) Samia Almodovar will move from supine to sit and sit to supine , scoot up and down, and roll side to side with STAND BY ASSIST within 7 treatment day(s).    (2.) Samia Almodovar will transfer from bed to chair and chair to bed with STAND BY ASSIST using the least restrictive device within 7 treatment day(s).    (3.) Samia Almodovar will perform seated static and dynamic balance activities x 30 minutes with SUPERVISION to improve safety within 7 treatment day(s).  (4.) Samia Almodovar will perform standing static and dynamic balance activities x 5 minutes with STAND BY ASSIST to improve safety within 7 treatment day(s).  (5.) Samia Almodovar will perform therapeutic exercises x 20 min for HEP with SUPERVISION to improve strength, endurance, and functional mobility within 7 treatment day(s).      PHYSICAL THERAPY: Daily Note AM   (Link to Caseload Tracking: PT Visit Days : 2  Time In/Out PT Charge Capture  Rehab Caseload Tracker  Orders    Samia Almodovar is a 89 y.o. female   PRIMARY DIAGNOSIS: Diabetic ulcer of left midfoot associated with type 2 diabetes mellitus, with fat layer exposed (HCC)  Cellulitis of left lower extremity [L03.116]  Failure of outpatient treatment [Z78.9]  Diabetic ulcer of left foot due to type 2 diabetes mellitus (HCC) [E11.621, L97.529]  Diabetic ulcer of left midfoot associated with diabetes mellitus of other type, unspecified ulcer stage (HCC) [E13.621, L97.429]       Inpatient: Payor: [facility] MEDICARE / Plan: [employer] DUAL COMPLETE / Product Type: *No Product type* /     ASSESSMENT:     REHAB RECOMMENDATIONS:   Recommendation to date pending progress:  Setting:  Return to facility    Equipment:    None     ASSESSMENT:  Ms. Almodovar presents resting in bed, in good spirits, agreeable to therapy. Pt has LLE charcot foot, has shoes with inserts.  PT facilitated

## 2024-04-25 NOTE — PROGRESS NOTES
TRANSFER - OUT REPORT:    Verbal report given to Bassem on Samia Almodovar  being transferred to [facility] for routine progression of patient care       Report consisted of patient's Situation, Background, Assessment and   Recommendations(SBAR).     Information from the following report(s) Nurse Handoff Report, Adult Overview, MAR, and Recent Results was reviewed with the receiving nurse.           Lines:   Peripheral IV 04/23/24 Right Antecubital (Active)   Site Assessment Clean, dry & intact 04/24/24 1955   Line Status Flushed;Capped 04/24/24 1955   Line Care Connections checked and tightened 04/24/24 1955   Phlebitis Assessment No symptoms 04/24/24 1955   Infiltration Assessment 0 04/24/24 1955   Alcohol Cap Used Yes 04/24/24 1955   Dressing Status Clean, dry & intact 04/24/24 1955   Dressing Type Transparent 04/24/24 1955       Peripheral IV 04/24/24 Left Forearm (Active)        Opportunity for questions and clarification was provided.      Patient is scheduled to be transported via [employer] around 1430 this afternoon.

## 2024-04-25 NOTE — CONSULTS
[facility]  Consultation Note    Patient ID:  Name: Samia Almodovar  MRN: [id number]  AGE: 89 y.o.  : 1934    Date of Consultation:  2024  Referring Physician:  Hospitalist     Subjective: Pt denies any complaints this morning.  She is a poor historian and has clear least some underlying dementia.  Orthopedics was consulted to evaluate a left Charcot foot chronic wound.  She appears to have seen the [place] [facility] for this last year and there are images on the chart from those visits.  This appears to have healed but then at some point this is worsened again.  The patient does not know when this foot started getting worse.  She denies any pain. They have no other orthopedic concerns at this time.      Past Medical History Includes:   Past Medical History:   Diagnosis Date    Asthma     sees Dr. Cannon    CHF (congestive heart failure) (HCC)     CHF (congestive heart failure) (HCC)     COPD (chronic obstructive pulmonary disease) (HCC)     Fibromyalgia     Foot infection     er visit     Full dentures     GERD (gastroesophageal reflux disease)     Hypertension     Incontinence of urine in female     Left foot infection     hx     Neuropathy     Sleep apnea     no C PAP   ,   Past Surgical History:   Procedure Laterality Date    BREAST SURGERY      bilat lumpectomy    CATARACT REMOVAL Bilateral     CHEST SURGERY      WHAT IS THIS???    CHOLECYSTECTOMY      COLONOSCOPY      HYSTERECTOMY (CERVIX STATUS UNKNOWN)      ORTHOPEDIC SURGERY      back    TOTAL KNEE ARTHROPLASTY Bilateral     VASCULAR SURGERY Left 2017    aortogram/arteriogram- PTA x2     Family History:   Family History   Problem Relation Age of Onset    No Known Problems Father     Heart Disease Granddaughter         Gnetic    No Known Problems Mother       Social History:   Social History     Tobacco Use    Smoking status: Never    Smokeless tobacco: Never   Substance Use Topics    Alcohol

## 2024-04-25 NOTE — PROGRESS NOTES
ACUTE OCCUPATIONAL THERAPY GOALS:   (Developed with and agreed upon by patient and/or caregiver.)  1. Patient will complete upper body bathing and dressing with SET UP and adaptive equipment as needed.   2. Patient will complete toilet transfer with STAND BY ASSIST.     3. Patient will complete grooming ADL in unsupported sitting with SET UP.  4. Patient will tolerate 25 minutes of OT treatment with 1-2 rest breaks to increase activity tolerance for ADLs.   5. Patient will complete functional transfers with STAND BY ASSIST and adaptive equipment as needed.   6. Patient will      Timeframe: 7 visits     OCCUPATIONAL THERAPY: Daily Note AM   OT Visit Days: 2   Time In/Out  OT Charge Capture  Rehab Caseload Tracker  OT Orders    Samia Almodovar is a 89 y.o. female   PRIMARY DIAGNOSIS: Diabetic ulcer of left midfoot associated with type 2 diabetes mellitus, with fat layer exposed (HCC)  Cellulitis of left lower extremity [L03.116]  Failure of outpatient treatment [Z78.9]  Diabetic ulcer of left foot due to type 2 diabetes mellitus (HCC) [E11.621, L97.529]  Diabetic ulcer of left midfoot associated with diabetes mellitus of other type, unspecified ulcer stage (HCC) [E13.621, L97.429]       Inpatient: Payor: [facility] MEDICARE / Plan: [employer] DUAL COMPLETE / Product Type: *No Product type* /     ASSESSMENT:     REHAB RECOMMENDATIONS:   Recommendation to date pending progress:  Setting:  No further skilled occupational therapy after discharge from hospital  Return to memory care    Equipment:    None     ASSESSMENT:  Ms. Almodovar  is an 89 y/o female presents with L midfoot cellulitis, hx charcot foot, has shoes with inserts present. Pt with hx of dementia, AAO x2, admitted from memory care facility. Today pt presents with decreased activity tolerance and balance impacting ADLs.     Pt overall CGA for bed mobility and with fair+ to good sitting balance edge of bed. Pt tolerated prolonged sitting balance edge of      TREATMENT GRID:  N/A    AFTER TREATMENT PRECAUTIONS: Alarm Activated, Call light within reach, Chair, Needs within reach, and RN notified    INTERDISCIPLINARY COLLABORATION:  RN/ PCT, PT/ PTA, and OT/ TRISTAN    EDUCATION:       TOTAL TREATMENT DURATION AND TIME:  Time In: 0855  Time Out: 0920  Minutes: 25    GIANNI Lackey, OT

## 2024-04-25 NOTE — PLAN OF CARE
Problem: Discharge Planning  Goal: Discharge to home or other facility with appropriate resources  4/24/2024 2216 by April Marquez RN  Outcome: Progressing  Flowsheets (Taken 4/24/2024 1955)  Discharge to home or other facility with appropriate resources: Identify barriers to discharge with patient and caregiver  4/24/2024 1011 by Sakina Chavez RN  Outcome: Progressing  Flowsheets (Taken 4/24/2024 0830)  Discharge to home or other facility with appropriate resources: Identify barriers to discharge with patient and caregiver     Problem: Skin/Tissue Integrity  Goal: Absence of new skin breakdown  Description: 1.  Monitor for areas of redness and/or skin breakdown  2.  Assess vascular access sites hourly  3.  Every 4-6 hours minimum:  Change oxygen saturation probe site  4.  Every 4-6 hours:  If on nasal continuous positive airway pressure, respiratory therapy assess nares and determine need for appliance change or resting period.  4/24/2024 2216 by April Marquez RN  Outcome: Progressing  4/24/2024 1011 by Sakina Chavez RN  Outcome: Progressing     Problem: Safety - Adult  Goal: Free from fall injury  4/24/2024 2216 by April Marquez RN  Outcome: Progressing  Flowsheets (Taken 4/24/2024 1955)  Free From Fall Injury: Instruct family/caregiver on patient safety  4/24/2024 1011 by Sakina Chavez RN  Outcome: Progressing  Flowsheets (Taken 4/24/2024 0830)  Free From Fall Injury: Instruct family/caregiver on patient safety     Problem: Chronic Conditions and Co-morbidities  Goal: Patient's chronic conditions and co-morbidity symptoms are monitored and maintained or improved  4/24/2024 2216 by April Marquez RN  Outcome: Progressing  Flowsheets (Taken 4/24/2024 1955)  Care Plan - Patient's Chronic Conditions and Co-Morbidity Symptoms are Monitored and Maintained or Improved: Monitor and assess patient's chronic conditions and comorbid symptoms for stability, deterioration, or improvement  4/24/2024 1011 by  Gastrointestinal - Adult  Goal: Minimal or absence of nausea and vomiting  Outcome: Progressing  Flowsheets (Taken 4/24/2024 1955)  Minimal or absence of nausea and vomiting: Administer IV fluids as ordered to ensure adequate hydration     Problem: Genitourinary - Adult  Goal: Absence of urinary retention  Outcome: Progressing  Flowsheets (Taken 4/24/2024 1955)  Absence of urinary retention:   Monitor intake/output and perform bladder scan as needed   Assess patient’s ability to void and empty bladder     Problem: Infection - Adult  Goal: Absence of infection at discharge  Outcome: Progressing  Flowsheets (Taken 4/24/2024 1955)  Absence of infection at discharge: Assess and monitor for signs and symptoms of infection     Problem: Metabolic/Fluid and Electrolytes - Adult  Goal: Electrolytes maintained within normal limits  Outcome: Progressing  Flowsheets (Taken 4/24/2024 1955)  Electrolytes maintained within normal limits: Monitor labs and assess patient for signs and symptoms of electrolyte imbalances  Goal: Hemodynamic stability and optimal renal function maintained  Outcome: Progressing  Flowsheets (Taken 4/24/2024 1955)  Hemodynamic stability and optimal renal function maintained: Monitor labs and assess for signs and symptoms of volume excess or deficit  Goal: Glucose maintained within prescribed range  Outcome: Progressing  Flowsheets (Taken 4/24/2024 1955)  Glucose maintained within prescribed range: Monitor blood glucose as ordered     Problem: Hematologic - Adult  Goal: Maintains hematologic stability  Outcome: Progressing  Flowsheets (Taken 4/24/2024 1955)  Maintains hematologic stability: Assess for signs and symptoms of bleeding or hemorrhage

## 2024-04-25 NOTE — PLAN OF CARE
Problem: Discharge Planning  Goal: Discharge to home or other facility with appropriate resources  4/25/2024 1500 by Tristen Davies RN  Outcome: Adequate for Discharge  4/25/2024 1459 by Tristen Davies RN  Outcome: Progressing     Problem: Skin/Tissue Integrity  Goal: Absence of new skin breakdown  Description: 1.  Monitor for areas of redness and/or skin breakdown  2.  Assess vascular access sites hourly  3.  Every 4-6 hours minimum:  Change oxygen saturation probe site  4.  Every 4-6 hours:  If on nasal continuous positive airway pressure, respiratory therapy assess nares and determine need for appliance change or resting period.  4/25/2024 1500 by Tristen Davies RN  Outcome: Adequate for Discharge  4/25/2024 1459 by Tristen Davies RN  Outcome: Progressing     Problem: Safety - Adult  Goal: Free from fall injury  4/25/2024 1500 by Tristen Davies RN  Outcome: Adequate for Discharge  4/25/2024 1459 by Tristen Davies RN  Outcome: Progressing     Problem: Chronic Conditions and Co-morbidities  Goal: Patient's chronic conditions and co-morbidity symptoms are monitored and maintained or improved  4/25/2024 1500 by Tristen Davies RN  Outcome: Adequate for Discharge  4/25/2024 1459 by Tristen Davies RN  Outcome: Progressing     Problem: Pain  Goal: Verbalizes/displays adequate comfort level or baseline comfort level  4/25/2024 1500 by Tristen Davies RN  Outcome: Adequate for Discharge  4/25/2024 1459 by Tristen Davies RN  Outcome: Progressing     Problem: Neurosensory - Adult  Goal: Achieves stable or improved neurological status  4/25/2024 1500 by Tristen Davies RN  Outcome: Adequate for Discharge  4/25/2024 1459 by Tristen Davies RN  Outcome: Progressing     Problem: Respiratory - Adult  Goal: Achieves optimal ventilation and oxygenation  4/25/2024 1500 by Tristen Davies RN  Outcome: Adequate for Discharge  4/25/2024 1459 by Tristen Davies RN  Outcome: Progressing     Problem:

## 2024-04-25 NOTE — PLAN OF CARE
Problem: Discharge Planning  Goal: Discharge to home or other facility with appropriate resources  Outcome: Progressing     Problem: Skin/Tissue Integrity  Goal: Absence of new skin breakdown  Description: 1.  Monitor for areas of redness and/or skin breakdown  2.  Assess vascular access sites hourly  3.  Every 4-6 hours minimum:  Change oxygen saturation probe site  4.  Every 4-6 hours:  If on nasal continuous positive airway pressure, respiratory therapy assess nares and determine need for appliance change or resting period.  Outcome: Progressing     Problem: Safety - Adult  Goal: Free from fall injury  Outcome: Progressing     Problem: Chronic Conditions and Co-morbidities  Goal: Patient's chronic conditions and co-morbidity symptoms are monitored and maintained or improved  Outcome: Progressing     Problem: Pain  Goal: Verbalizes/displays adequate comfort level or baseline comfort level  Outcome: Progressing     Problem: Neurosensory - Adult  Goal: Achieves stable or improved neurological status  Outcome: Progressing     Problem: Respiratory - Adult  Goal: Achieves optimal ventilation and oxygenation  Outcome: Progressing     Problem: Cardiovascular - Adult  Goal: Maintains optimal cardiac output and hemodynamic stability  Outcome: Progressing     Problem: Skin/Tissue Integrity - Adult  Goal: Skin integrity remains intact  Outcome: Progressing     Problem: Musculoskeletal - Adult  Goal: Return mobility to safest level of function  Outcome: Progressing     Problem: Gastrointestinal - Adult  Goal: Minimal or absence of nausea and vomiting  Outcome: Progressing     Problem: Genitourinary - Adult  Goal: Absence of urinary retention  Outcome: Progressing     Problem: Infection - Adult  Goal: Absence of infection at discharge  Outcome: Progressing     Problem: Metabolic/Fluid and Electrolytes - Adult  Goal: Electrolytes maintained within normal limits  Outcome: Progressing  Goal: Hemodynamic stability and optimal
